# Patient Record
Sex: MALE | Race: WHITE | NOT HISPANIC OR LATINO | Employment: OTHER | URBAN - METROPOLITAN AREA
[De-identification: names, ages, dates, MRNs, and addresses within clinical notes are randomized per-mention and may not be internally consistent; named-entity substitution may affect disease eponyms.]

---

## 2017-01-04 ENCOUNTER — ANTICOAGULATION THERAPY VISIT (OUTPATIENT)
Dept: NURSING | Facility: CLINIC | Age: 72
End: 2017-01-04
Payer: COMMERCIAL

## 2017-01-04 DIAGNOSIS — I26.99 PULMONARY EMBOLUS (H): ICD-10-CM

## 2017-01-04 DIAGNOSIS — Z79.01 LONG-TERM (CURRENT) USE OF ANTICOAGULANTS: Primary | ICD-10-CM

## 2017-01-04 LAB — INR POINT OF CARE: 2.4 (ref 0.86–1.14)

## 2017-01-04 PROCEDURE — 85610 PROTHROMBIN TIME: CPT | Mod: QW

## 2017-01-04 PROCEDURE — 36416 COLLJ CAPILLARY BLOOD SPEC: CPT

## 2017-01-04 PROCEDURE — 99207 ZZC NO CHARGE NURSE ONLY: CPT

## 2017-01-04 NOTE — PROGRESS NOTES
ANTICOAGULATION FOLLOW-UP CLINIC VISIT    Patient Name:  Rob Beavers  Date:  1/4/2017  Contact Type:  Face to Face    SUBJECTIVE:     Patient Findings     Positives No Problem Findings           OBJECTIVE    INR PROTIME   Date Value Ref Range Status   01/04/2017 2.4* 0.86 - 1.14 Final       ASSESSMENT / PLAN  INR assessment THER    Recheck INR In: 3 WEEKS    INR Location Clinic      Anticoagulation Summary as of 1/4/2017     INR goal 2.0-3.0   Selected INR 2.4 (1/4/2017)   Maintenance plan 5 mg (5 mg x 1) on Fri; 2.5 mg (5 mg x 0.5) all other days   Full instructions 5 mg on Fri; 2.5 mg all other days   Weekly total 20 mg   No change documented Laura Ventura RN   Plan last modified Alecia Saavedra RN (3/15/2016)   Next INR check 1/27/2017   Priority INR   Target end date     Indications   Long-term (current) use of anticoagulants [Z79.01] [Z79.01]  Pulmonary embolus (H) [I26.99]         Anticoagulation Episode Summary     INR check location     Preferred lab     Send INR reminders to  [facility]    Comments       Anticoagulation Care Providers     Provider Role Specialty Phone number    Tian Santana MD [facility] [phone]            See the Encounter Report to view Anticoagulation Flowsheet and Dosing Calendar (Go to Encounters tab in chart review, and find the Anticoagulation Therapy Visit)    2.4 today.  Continue with 5 mg on Friday;  2.5 mg all other days.  Recheck in 3 weeks. Patient will be in [place] for 8 weeks from Feb - March.     Dosage adjustment made based on physician directed care plan.    Laura Ventura RN

## 2017-01-04 NOTE — MR AVS SNAPSHOT
Rob Kelleyger   1/4/2017 9:00 AM   Anticoagulation Therapy Visit    Description:  71 year old male   Provider:   ANTICOAGULATION CLINIC   Department:  Ec Nurse           INR as of 1/4/2017     Selected INR 2.4 (1/4/2017)      Anticoagulation Summary as of 1/4/2017     INR goal 2.0-3.0   Selected INR 2.4 (1/4/2017)   Full instructions 5 mg on Fri; 2.5 mg all other days   Next INR check 1/27/2017    Indications   Long-term (current) use of anticoagulants [Z79.01] [Z79.01]  Pulmonary embolus (H) [I26.99]         Description     2.4 today.  Continue with 5 mg on Friday;  2.5 mg all other days.  Recheck in 3 weeks.         Your next Anticoagulation Clinic appointment(s)     Jan 27, 2017  9:00 AM   Anticoagulation Visit with  [facility] CLINIC   [facility]    [address] [place] [id number] [phone]              Contact Numbers     Clinic Number:         January 2017 Details    Sun Mon Tue Wed Thu Fri Sat     1               2               3               4      2.5 mg   See details      5      2.5 mg         6      5 mg         7      2.5 mg           8      2.5 mg         9      2.5 mg         10      2.5 mg         11      2.5 mg         12      2.5 mg         13      5 mg         14      2.5 mg           15      2.5 mg         16      2.5 mg         17      2.5 mg         18      2.5 mg         19      2.5 mg         20      5 mg         21      2.5 mg           22      2.5 mg         23      2.5 mg         24      2.5 mg         25      2.5 mg         26      2.5 mg         27            28                 29               30               31                    Date Details   01/04 This INR check       Date of next INR:  1/27/2017         How to take your warfarin dose     To take:  2.5 mg Take 0.5 of a 5 mg tablet.    To take:  5 mg Take 1 of the 5 mg tablets.

## 2017-01-20 ENCOUNTER — ANTICOAGULATION THERAPY VISIT (OUTPATIENT)
Dept: NURSING | Facility: CLINIC | Age: 72
End: 2017-01-20
Payer: COMMERCIAL

## 2017-01-20 DIAGNOSIS — I26.99 PULMONARY EMBOLUS (H): ICD-10-CM

## 2017-01-20 DIAGNOSIS — Z79.01 LONG-TERM (CURRENT) USE OF ANTICOAGULANTS: Primary | ICD-10-CM

## 2017-01-20 LAB — INR POINT OF CARE: 2.5 (ref 0.86–1.14)

## 2017-01-20 PROCEDURE — 99207 ZZC NO CHARGE NURSE ONLY: CPT

## 2017-01-20 PROCEDURE — 85610 PROTHROMBIN TIME: CPT | Mod: QW

## 2017-01-20 PROCEDURE — 36416 COLLJ CAPILLARY BLOOD SPEC: CPT

## 2017-01-20 NOTE — PROGRESS NOTES
ANTICOAGULATION FOLLOW-UP CLINIC VISIT    Patient Name:  Rob Beavers  Date:  1/20/2017  Contact Type:  Face to Face    SUBJECTIVE:     Patient Findings     Positives No Problem Findings           OBJECTIVE    INR PROTIME   Date Value Ref Range Status   01/20/2017 2.5* 0.86 - 1.14 Final       ASSESSMENT / PLAN  INR assessment THER    Recheck INR In: 6 WEEKS    INR Location Clinic      Anticoagulation Summary as of 1/20/2017     INR goal 2.0-3.0   Selected INR 2.5 (1/20/2017)   Maintenance plan 5 mg (5 mg x 1) on Fri; 2.5 mg (5 mg x 0.5) all other days   Full instructions 5 mg on Fri; 2.5 mg all other days   Weekly total 20 mg   Plan last modified Alecia Saavedra RN (3/15/2016)   Next INR check 3/3/2017   Priority INR   Target end date     Indications   Long-term (current) use of anticoagulants [Z79.01] [Z79.01]  Pulmonary embolus (H) [I26.99]         Anticoagulation Episode Summary     INR check location     Preferred lab     Send INR reminders to  [facility]    Comments       Anticoagulation Care Providers     Provider Role Specialty Phone number    Tian Santana MD [facility] [phone]            See the Encounter Report to view Anticoagulation Flowsheet and Dosing Calendar (Go to Encounters tab in chart review, and find the Anticoagulation Therapy Visit)    2.5 today.  Continue with 5 mg on Fri; 2.5 mg all other days.  Recheck in 6 weeks.    Patient going to [place] for couple month.  Will be back around March 2017    Dosage adjustment made based on physician directed care plan.    Laura Ventura RN

## 2017-02-22 DIAGNOSIS — I27.82 OTHER CHRONIC PULMONARY EMBOLISM WITH ACUTE COR PULMONALE (H): ICD-10-CM

## 2017-02-22 DIAGNOSIS — I26.09 OTHER CHRONIC PULMONARY EMBOLISM WITH ACUTE COR PULMONALE (H): ICD-10-CM

## 2017-02-22 RX ORDER — WARFARIN SODIUM 5 MG/1
TABLET ORAL
Qty: 90 TABLET | Refills: 0 | Status: SHIPPED | OUTPATIENT
Start: 2017-02-22 | End: 2017-08-22

## 2017-02-22 NOTE — TELEPHONE ENCOUNTER
Refill request approved per [facility] protocol    Laura Ventura RN

## 2017-02-22 NOTE — TELEPHONE ENCOUNTER
Warfarin    Last Written Prescription Date: 6/8/16  Last Fill Qty: 90, # refills: 1  Last Office Visit with [facility] or [facility] prescribing provider: 4/20/16       Date and Result of Last PT/INR:   Lab Results   Component Value Date    INR 2.5 01/20/2017    INR 2.4 01/04/2017    INR 2.8 03/15/2016    INR 1.13 02/19/2014          Karen Gomez CMA

## 2017-04-06 ENCOUNTER — ANTICOAGULATION THERAPY VISIT (OUTPATIENT)
Dept: NURSING | Facility: CLINIC | Age: 72
End: 2017-04-06
Payer: COMMERCIAL

## 2017-04-06 DIAGNOSIS — Z79.01 LONG-TERM (CURRENT) USE OF ANTICOAGULANTS: ICD-10-CM

## 2017-04-06 LAB — INR POINT OF CARE: 2.6 (ref 0.86–1.14)

## 2017-04-06 PROCEDURE — 85610 PROTHROMBIN TIME: CPT | Mod: QW

## 2017-04-06 PROCEDURE — 99207 ZZC NO CHARGE NURSE ONLY: CPT

## 2017-04-06 PROCEDURE — 36416 COLLJ CAPILLARY BLOOD SPEC: CPT

## 2017-04-06 NOTE — MR AVS SNAPSHOT
Rob Kelleyger   4/6/2017 9:00 AM   Anticoagulation Therapy Visit    Description:  71 year old male   Provider:  [facility] ANT[facility]   Department:  Ec Nurse           INR as of 4/6/2017     Today's INR 2.6      Anticoagulation Summary as of 4/6/2017     INR goal 2.0-3.0   Today's INR 2.6   Full instructions 5 mg on Fri; 2.5 mg all other days   Next INR check 5/18/2017    Indications   Long-term (current) use of anticoagulants [Z79.01] [Z79.01]  Pulmonary embolus (H) [I26.99]         Your next Anticoagulation Clinic appointment(s)     May 18, 2017  9:00 AM CDT   Anticoagulation Visit with [facility]   [facility]    [address] [place] [id number]   [phone]              Contact Numbers     Clinic Number:         April 2017 Details    Sun Mon Tue Wed Thu Fri Sat           1                 2               3               4               5               6      2.5 mg   See details      7      5 mg         8      2.5 mg           9      2.5 mg         10      2.5 mg         11      2.5 mg         12      2.5 mg         13      2.5 mg         14      5 mg         15      2.5 mg           16      2.5 mg         17      2.5 mg         18      2.5 mg         19      2.5 mg         20      2.5 mg         21      5 mg         22      2.5 mg           23      2.5 mg         24      2.5 mg         25      2.5 mg         26      2.5 mg         27      2.5 mg         28      5 mg         29      2.5 mg           30      2.5 mg                Date Details   04/06 This INR check               How to take your warfarin dose     To take:  2.5 mg Take 0.5 of a 5 mg tablet.    To take:  5 mg Take 1 of the 5 mg tablets.           May 2017 Details    Sun Mon Tue Wed Thu Fri Sat      1      2.5 mg         2      2.5 mg         3      2.5 mg         4      2.5 mg         5      5 mg         6      2.5 mg           7      2.5 mg         8       2.5 mg         9      2.5 mg         10      2.5 mg         11      2.5 mg         12      5 mg         13      2.5 mg           14      2.5 mg         15      2.5 mg         16      2.5 mg         17      2.5 mg         18            19               20                 21               22               23               24               25               26               27                 28               29               30               31                   Date Details   No additional details    Date of next INR:  5/18/2017         How to take your warfarin dose     To take:  2.5 mg Take 0.5 of a 5 mg tablet.    To take:  5 mg Take 1 of the 5 mg tablets.

## 2017-04-06 NOTE — PROGRESS NOTES
ANTICOAGULATION FOLLOW-UP CLINIC VISIT    Patient Name:  Rob Beavers  Date:  4/6/2017  Contact Type:  Face to Face    SUBJECTIVE:     Patient Findings     Positives No Problem Findings           OBJECTIVE    INR Protime   Date Value Ref Range Status   04/06/2017 2.6 (A) 0.86 - 1.14 Final       ASSESSMENT / PLAN  INR assessment THER    Recheck INR In: 6 WEEKS    INR Location Clinic      Anticoagulation Summary as of 4/6/2017     INR goal 2.0-3.0   Today's INR 2.6   Maintenance plan 5 mg (5 mg x 1) on Fri; 2.5 mg (5 mg x 0.5) all other days   Full instructions 5 mg on Fri; 2.5 mg all other days   Weekly total 20 mg   No change documented Sisi Granados RN   Plan last modified Alecia Saavedra RN (3/15/2016)   Next INR check 5/18/2017   Priority INR   Target end date     Indications   Long-term (current) use of anticoagulants [Z79.01] [Z79.01]  Pulmonary embolus (H) [I26.99]         Anticoagulation Episode Summary     INR check location     Preferred lab     Send INR reminders to [facility]    Comments       Anticoagulation Care Providers     Provider Role Specialty Phone number    Tian Santana MD [facility] [phone]            See the Encounter Report to view Anticoagulation Flowsheet and Dosing Calendar (Go to Encounters tab in chart review, and find the Anticoagulation Therapy Visit)    Take 5 mg F, 2.5 mg all other days, recheck 6 weeks.     Dosage adjustment made based on physician directed care plan.    Sisi Granados RN

## 2017-04-12 DIAGNOSIS — G25.0 BENIGN FAMILIAL TREMOR: ICD-10-CM

## 2017-04-12 DIAGNOSIS — Z00.00 ROUTINE GENERAL MEDICAL EXAMINATION AT A HEALTH CARE FACILITY: ICD-10-CM

## 2017-04-12 DIAGNOSIS — I10 HYPERTENSION GOAL BP (BLOOD PRESSURE) < 140/90: ICD-10-CM

## 2017-04-13 RX ORDER — PROPRANOLOL HYDROCHLORIDE 80 MG/1
CAPSULE, EXTENDED RELEASE ORAL
Qty: 30 CAPSULE | Refills: 0 | Status: SHIPPED | OUTPATIENT
Start: 2017-04-13 | End: 2017-04-26

## 2017-04-13 RX ORDER — LISINOPRIL 10 MG/1
TABLET ORAL
Qty: 30 TABLET | Refills: 0 | Status: SHIPPED | OUTPATIENT
Start: 2017-04-13 | End: 2017-04-26

## 2017-04-13 NOTE — TELEPHONE ENCOUNTER
Lisinopril      Last Written Prescription Date: 4/20/16  Last Fill Quantity: 90, # refills: 3  Last Office Visit with [facility] or [facility] prescribing provider: 4/20/16       Potassium   Date Value Ref Range Status   04/20/2016 4.7 3.4 - 5.3 mmol/L Final     Creatinine   Date Value Ref Range Status   04/20/2016 0.80 0.66 - 1.25 mg/dL Final     BP Readings from Last 3 Encounters:   04/20/16 112/64   02/27/15 114/70   02/19/14 117/73     Propranolol      Last Written Prescription Date: 4/20/16  Last Fill Quantity: 90, # refills: 3    Last Office Visit with [facility], [facility] or [facility] prescribing provider:  4/20/16   Future Office Visit:        BP Readings from Last 3 Encounters:   04/20/16 112/64   02/27/15 114/70   02/19/14 117/73     Karen Gomez CMA

## 2017-04-13 NOTE — TELEPHONE ENCOUNTER
Called patient and advised that he is due for an office visit for htn med recheck- patient states that he will call next week to schedule this- advised that i would give a 30 day supply- he will need t get the next refills from Dr. Santana in an office visit- patient understands - does not want to schedule now- he will call back.    Jennifer Bush RN  [facility]  [phone]

## 2017-04-26 ENCOUNTER — OFFICE VISIT (OUTPATIENT)
Dept: FAMILY MEDICINE | Facility: CLINIC | Age: 72
End: 2017-04-26
Payer: COMMERCIAL

## 2017-04-26 VITALS
BODY MASS INDEX: 31.98 KG/M2 | HEIGHT: 68 IN | SYSTOLIC BLOOD PRESSURE: 134 MMHG | HEART RATE: 64 BPM | WEIGHT: 211 LBS | TEMPERATURE: 97.3 F | DIASTOLIC BLOOD PRESSURE: 80 MMHG

## 2017-04-26 DIAGNOSIS — Z11.59 NEED FOR HEPATITIS C SCREENING TEST: ICD-10-CM

## 2017-04-26 DIAGNOSIS — Z00.00 ROUTINE GENERAL MEDICAL EXAMINATION AT A HEALTH CARE FACILITY: Primary | ICD-10-CM

## 2017-04-26 DIAGNOSIS — Z79.01 LONG-TERM (CURRENT) USE OF ANTICOAGULANTS: ICD-10-CM

## 2017-04-26 DIAGNOSIS — Z13.6 CARDIOVASCULAR SCREENING; LDL GOAL LESS THAN 130: ICD-10-CM

## 2017-04-26 DIAGNOSIS — Z71.89 ADVANCED DIRECTIVES, COUNSELING/DISCUSSION: Chronic | ICD-10-CM

## 2017-04-26 DIAGNOSIS — G25.0 BENIGN FAMILIAL TREMOR: ICD-10-CM

## 2017-04-26 DIAGNOSIS — I26.99 OTHER PULMONARY EMBOLISM WITHOUT ACUTE COR PULMONALE, UNSPECIFIED CHRONICITY (H): ICD-10-CM

## 2017-04-26 DIAGNOSIS — I10 HYPERTENSION GOAL BP (BLOOD PRESSURE) < 140/90: ICD-10-CM

## 2017-04-26 DIAGNOSIS — K22.70 BARRETT'S ESOPHAGUS WITHOUT DYSPLASIA: ICD-10-CM

## 2017-04-26 LAB
ALBUMIN SERPL-MCNC: 3.8 G/DL (ref 3.4–5)
ALP SERPL-CCNC: 84 U/L (ref 40–150)
ALT SERPL W P-5'-P-CCNC: 23 U/L (ref 0–70)
ANION GAP SERPL CALCULATED.3IONS-SCNC: 7 MMOL/L (ref 3–14)
AST SERPL W P-5'-P-CCNC: 18 U/L (ref 0–45)
BILIRUB SERPL-MCNC: 0.7 MG/DL (ref 0.2–1.3)
BUN SERPL-MCNC: 14 MG/DL (ref 7–30)
CALCIUM SERPL-MCNC: 8.8 MG/DL (ref 8.5–10.1)
CHLORIDE SERPL-SCNC: 104 MMOL/L (ref 94–109)
CHOLEST SERPL-MCNC: 189 MG/DL
CO2 SERPL-SCNC: 30 MMOL/L (ref 20–32)
CREAT SERPL-MCNC: 0.87 MG/DL (ref 0.66–1.25)
GFR SERPL CREATININE-BSD FRML MDRD: 87 ML/MIN/1.7M2
GLUCOSE SERPL-MCNC: 100 MG/DL (ref 70–99)
HCV AB SERPL QL IA: NORMAL
HDLC SERPL-MCNC: 48 MG/DL
LDLC SERPL CALC-MCNC: 119 MG/DL
NONHDLC SERPL-MCNC: 141 MG/DL
POTASSIUM SERPL-SCNC: 4.4 MMOL/L (ref 3.4–5.3)
PROT SERPL-MCNC: 7.7 G/DL (ref 6.8–8.8)
PSA SERPL-MCNC: 1.05 UG/L (ref 0–4)
SODIUM SERPL-SCNC: 141 MMOL/L (ref 133–144)
TRIGL SERPL-MCNC: 111 MG/DL

## 2017-04-26 PROCEDURE — 80053 COMPREHEN METABOLIC PANEL: CPT | Performed by: FAMILY MEDICINE

## 2017-04-26 PROCEDURE — 99397 PER PM REEVAL EST PAT 65+ YR: CPT | Performed by: FAMILY MEDICINE

## 2017-04-26 PROCEDURE — 36415 COLL VENOUS BLD VENIPUNCTURE: CPT | Performed by: FAMILY MEDICINE

## 2017-04-26 PROCEDURE — G0103 PSA SCREENING: HCPCS | Performed by: FAMILY MEDICINE

## 2017-04-26 PROCEDURE — 86803 HEPATITIS C AB TEST: CPT | Performed by: FAMILY MEDICINE

## 2017-04-26 PROCEDURE — 80061 LIPID PANEL: CPT | Performed by: FAMILY MEDICINE

## 2017-04-26 RX ORDER — LISINOPRIL 10 MG/1
TABLET ORAL
Qty: 90 TABLET | Refills: 3 | Status: SHIPPED | OUTPATIENT
Start: 2017-04-26 | End: 2018-05-29

## 2017-04-26 RX ORDER — PROPRANOLOL HYDROCHLORIDE 80 MG/1
CAPSULE, EXTENDED RELEASE ORAL
Qty: 90 CAPSULE | Refills: 3 | Status: SHIPPED | OUTPATIENT
Start: 2017-04-26 | End: 2018-05-29

## 2017-04-26 RX ORDER — OMEPRAZOLE 40 MG/1
40 CAPSULE, DELAYED RELEASE ORAL DAILY
Qty: 90 CAPSULE | Refills: 3 | Status: SHIPPED | OUTPATIENT
Start: 2017-04-26 | End: 2018-05-05

## 2017-04-26 NOTE — PROGRESS NOTES
SUBJECTIVE:                                                            Rob Beavers is a 71 year old male who presents for Preventive Visit.      Are you in the first 12 months of your Medicare Part B coverage?  No    Healthy Habits:    Do you get at least three servings of calcium containing foods daily (dairy, green leafy vegetables, etc.)? No    Amount of exercise or daily activities, outside of work: 2-3 day(s) per week    Problems taking medications regularly No    Medication side effects: No    Have you had an eye exam in the past two years? no    Do you see a dentist twice per year? no    Do you have sleep apnea, excessive snoring or daytime drowsiness?no    COGNITIVE SCREEN  1) Repeat 3 items (Banana, Sunrise, Chair)    2) Clock draw: NORMAL  3) 3 item recall: Recalls 3 objects  Results: 3 items recalled: COGNITIVE IMPAIRMENT LESS LIKELY    Mini-CogTM Copyright S [employer]. Licensed by the author for use in [facility]; reprinted with permission (calvin@[facility]). All rights reserved.            Sore in mouth      Duration: ~ 6 months   Not painful, soft mucous no surrounding change in color.       Reviewed and updated as needed this visit by clinical staff  Tobacco  Allergies  Meds  Fam Hx  Soc Hx        Reviewed and updated as needed this visit by Provider        Social History   Substance Use Topics     Smoking status: Never Smoker     Smokeless tobacco: Never Used     Alcohol use 0.0 oz/week     0 Standard drinks or equivalent per week      Comment: moderate       The patient does not drink >3 drinks per day nor >7 drinks per week.    Today's PHQ-2 Score:   PHQ-2 ( 1999 [employer]) 4/26/2017 4/19/2017   Q1: Little interest or pleasure in doing things 0 -   Q2: Feeling down, depressed or hopeless 0 -   PHQ-2 Score 0 -   Little interest or pleasure in doing things - Not at all   Feeling down, depressed or hopeless - Not at all   PHQ-2 Score - 0       Do you feel safe in your environment -  Yes    Do you have a Health Care Directive?: No: Advance care planning reviewed with patient; information given to patient to review.    Current providers sharing in care for this patient include:   Patient Care Team:  Tian Santana MD as PCP - General (Family Practice)      Hearing impairment: No    Ability to successfully perform activities of daily living: Yes, no assistance needed     Fall risk:  Fallen 2 or more times in the past year?: No  Any fall with injury in the past year?: No    Home safety:  none identified      The following health maintenance items are reviewed in Epic and correct as of today:  Health Maintenance   Topic Date Due     HEPATITIS C SCREENING  08/27/1963     AORTIC ANEURYSM SCREENING (SYSTEM ASSIGNED)  08/27/2012     PNEUMOCOCCAL (2 of 2 - PCV13) 11/13/2013     OP ANNUAL INR REFERRAL  02/20/2015     FALL RISK ASSESSMENT  04/20/2017     INFLUENZA VACCINE (SYSTEM ASSIGNED)  09/01/2017     TETANUS IMMUNIZATION (SYSTEM ASSIGNED)  12/13/2020     LIPID SCREEN Q5 YR MALE (SYSTEM ASSIGNED)  04/20/2021     ADVANCE DIRECTIVE PLANNING Q5 YRS (NO INBASKET)  04/20/2021     COLONOSCOPY Q5 YR INBASKET MESSAGE  05/25/2021         Pneumonia Vaccine:Adults age 65+ who received Pneumovax (PPSV23) at 65 years or older: Should be given PCV13 > 1 year after their most recent PPSV23     ROS:  C: NEGATIVE for fever, chills, change in weight  I: NEGATIVE for worrisome rashes, moles or lesions  E: NEGATIVE for vision changes or irritation  E/M: NEGATIVE for ear, mouth and throat problems  R: NEGATIVE for significant cough or SOB  B: NEGATIVE for masses, tenderness or discharge  CV: NEGATIVE for chest pain, palpitations or peripheral edema  GI: NEGATIVE for nausea, abdominal pain, heartburn, or change in bowel habits  : NEGATIVE for frequency, dysuria, or hematuria  M: NEGATIVE for significant arthralgias or myalgia  N: NEGATIVE for weakness, dizziness or paresthesias  E: NEGATIVE for temperature intolerance,  "skin/hair changes  H: NEGATIVE for bleeding problems  P: NEGATIVE for changes in mood or affect    Problem list, Medication list, Allergies, and Medical/Social/Surgical histories reviewed in [facility] and updated as appropriate.  Labs reviewed in EPIC  BP Readings from Last 3 Encounters:   04/26/17 134/80   04/20/16 112/64   02/27/15 114/70    Wt Readings from Last 3 Encounters:   04/26/17 211 lb (95.7 kg)   04/20/16 200 lb (90.7 kg)   02/27/15 206 lb (93.4 kg)                  OBJECTIVE:                                                            /80  Pulse 64  Temp 97.3  F (36.3  C) (Tympanic)  Ht 5' 7.72\" (1.72 m)  Wt 211 lb (95.7 kg)  BMI 32.35 kg/m2 Estimated body mass index is 32.35 kg/(m^2) as calculated from the following:    Height as of this encounter: 5' 7.72\" (1.72 m).    Weight as of this encounter: 211 lb (95.7 kg).  EXAM:   GENERAL: healthy, alert and no distress  EYES: Eyes grossly normal to inspection, PERRL and conjunctivae and sclerae normal  HENT: ear canals and TM's normal, nose and mouth without ulcers or lesions  NECK: no adenopathy, no asymmetry, masses, or scars and thyroid normal to palpation  RESP: lungs clear to auscultation - no rales, rhonchi or wheezes  CV: regular rate and rhythm, normal S1 S2, no S3 or S4, no murmur, click or rub, no peripheral edema and peripheral pulses strong  ABDOMEN: soft, nontender, no hepatosplenomegaly, no masses and bowel sounds normal  MS: no gross musculoskeletal defects noted, no edema  SKIN: no suspicious lesions or rashes  NEURO: Normal strength and tone, mentation intact and speech normal  PSYCH: mentation appears normal, affect normal/bright    ASSESSMENT / PLAN:                                                            1. Routine general medical examination at a health care facility  small sore in the mouth most likely mucocele. Suggested observation and if not improving or getting bigger may need to be removed, we can refer to ENT.  - " lisinopril (PRINIVIL/ZESTRIL) 10 MG tablet; TAKE ONE TABLET (10 MG) BY MOUTH ONE TIME DAILY  Dispense: 90 tablet; Refill: 3  - omeprazole (PRILOSEC) 40 MG capsule; Take 1 capsule (40 mg) by mouth daily Take 30-60 minutes before a meal.  Dispense: 90 capsule; Refill: 3  - propranolol (INDERAL LA) 80 MG 24 hr capsule; TAKE 1 CAPSULE (80 MG) BY MOUTH ONCE A DAY  Dispense: 90 capsule; Refill: 3  - Hepatitis C Screen Reflex to HCV RNA Quant and Genotype  - HONORING CHOICES REFERRAL  - Lipid Profile (Chol, Trig, HDL, LDL calc)  - Comprehensive metabolic panel  - PSA, tumor marker    2. Hypertension goal BP (blood pressure) < 140/90    - lisinopril (PRINIVIL/ZESTRIL) 10 MG tablet; TAKE ONE TABLET (10 MG) BY MOUTH ONE TIME DAILY  Dispense: 90 tablet; Refill: 3  - propranolol (INDERAL LA) 80 MG 24 hr capsule; TAKE 1 CAPSULE (80 MG) BY MOUTH ONCE A DAY  Dispense: 90 capsule; Refill: 3  - Comprehensive metabolic panel    3. Benign familial tremor    Use Inderal LA, no issues    4. Need for hepatitis C screening test      5. Advanced directives, counseling/discussion    - HONORING CHOICES REFERRAL    6. Long-term (current) use of anticoagulants [Z79.01]    - Comprehensive metabolic panel    7. Other pulmonary embolism without acute cor pulmonale, unspecified chronicity (H)  On warfarin    8. CARDIOVASCULAR SCREENING; LDL GOAL LESS THAN 130    - Lipid Profile (Chol, Trig, HDL, LDL calc)  - Comprehensive metabolic panel    9. 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  - omeprazole (PRILOSEC) 40 MG capsule; Take 1 capsule (40 mg) by mouth daily Take 30-60 minutes before a meal.  Dispense: 90 capsule; Refill: 3    End of Life Planning:  Patient currently has an advanced directive: No.  I have verified the patient's ablity to prepare an advanced directive/make health care decisions.  Literature was provided to assist patient in preparing an advanced directive.    COUNSELING:  Reviewed preventive health counseling, as  "reflected in patient instructions       Regular exercise       Healthy diet/nutrition        Estimated body mass index is 32.35 kg/(m^2) as calculated from the following:    Height as of this encounter: 5' 7.72\" (1.72 m).    Weight as of this encounter: 211 lb (95.7 kg).     reports that he has never smoked. He has never used smokeless tobacco.      Appropriate preventive services were discussed with this patient, including applicable screening as appropriate for cardiovascular disease, diabetes, osteopenia/osteoporosis, and glaucoma.  As appropriate for age/gender, discussed screening for colorectal cancer, prostate cancer, breast cancer, and cervical cancer. Checklist reviewing preventive services available has been given to the patient.    Reviewed patients plan of care and provided an AVS. The Basic Care Plan (routine screening as documented in Health Maintenance) for Rob meets the Care Plan requirement. This Care Plan has been established and reviewed with the Patient.    Counseling Resources:  ATP IV Guidelines  Pooled Cohorts Equation Calculator  Breast Cancer Risk Calculator  FRAX Risk Assessment  ICSI Preventive Guidelines  Dietary Guidelines for Americans, 2010  USDA's MyPlate  ASA Prophylaxis  Lung CA Screening    Tian Santana MD  [facility]  "

## 2017-04-26 NOTE — MR AVS SNAPSHOT
After Visit Summary   4/26/2017    Rob Beavers    MRN: [id number]           Patient Information     Date Of Birth          1945        Visit Information        Provider Department      4/26/2017 9:00 AM Tian Santana MD [facility] [place]        Today's Diagnoses     Routine general medical examination at a health care facility    -  1    Hypertension goal BP (blood pressure) < 140/90        Benign familial tremor        Need for hepatitis C screening test        Advanced directives, counseling/discussion        Long-term (current) use of anticoagulants [Z79.01]        Other pulmonary embolism without acute cor pulmonale, unspecified chronicity (H)        CARDIOVASCULAR SCREENING; LDL GOAL LESS THAN 130        Andrews's esophagus without dysplasia          Care Instructions      Preventive Health Recommendations:       Male Ages 65 and over    Yearly exam:             See your health care provider every year in order to  o   Review health changes.   o   Discuss preventive care.    o   Review your medicines if your doctor has prescribed any.    Talk with your health care provider about whether you should have a test to screen for prostate cancer (PSA).    Every 3 years, have a diabetes test (fasting glucose). If you are at risk for diabetes, you should have this test more often.    Every 5 years, have a cholesterol test. Have this test more often if you are at risk for high cholesterol or heart disease.     Every 10 years, have a colonoscopy. Or, have a yearly FIT test (stool test). These exams will check for colon cancer.    Talk to with your health care provider about screening for Abdominal Aortic Aneurysm if you have a family history of AAA or have a history of smoking.  Shots:     Get a flu shot each year.     Get a tetanus shot every 10 years.     Talk to your doctor about your pneumonia vaccines. There are now two you should receive - Pneumovax (PPSV 23) and Prevnar (PCV  13).    Talk to your doctor about a shingles vaccine.     Talk to your doctor about the hepatitis B vaccine.  Nutrition:     Eat at least 5 servings of fruits and vegetables each day.     Eat whole-grain bread, whole-wheat pasta and brown rice instead of white grains and rice.     Talk to your doctor about Calcium and Vitamin D.   Lifestyle    Exercise for at least 150 minutes a week (30 minutes a day, 5 days a week). This will help you control your weight and prevent disease.     Limit alcohol to one drink per day.     No smoking.     Wear sunscreen to prevent skin cancer.     See your dentist every six months for an exam and cleaning.     See your eye doctor every 1 to 2 years to screen for conditions such as glaucoma, macular degeneration and cataracts.        Follow-ups after your visit        Additional Services     HONORING CHOICES REFERRAL       Referral to Advance Directive Counseling:  Basic Advance Care Planning - 1:1 need    The  will call you unless your appointment was made at the clinic. You may also call the  at [phone] if you wish.                  Your next 10 appointments already scheduled     May 18, 2017  9:00 AM CDT   Anticoagulation Visit with [facility]   [facility] [place] ([facility])    [address] [place] [id number] [phone]              Who to contact     If you have questions or need follow up information about today's clinic visit or your schedule please contact [facility] [place] directly at [phone].  Normal or non-critical lab and imaging results will be communicated to you by MyChart, letter or phone within 4 business days after the clinic has received the results. If you do not hear from us within 7 days, please contact the clinic through MyChart or phone. If you have a critical or abnormal lab result, we will notify you by phone as soon as possible.  Submit refill  "requests through [employer] or call your pharmacy and they will forward the refill request to us. Please allow 3 business days for your refill to be completed.          Additional Information About Your Visit        S[employer] Information     [employer] gives you secure access to your electronic health record. If you see a primary care provider, you can also send messages to your care team and make appointments. If you have questions, please call your primary care clinic.  If you do not have a primary care provider, please call [phone] and they will assist you.        Care EveryWhere ID     This is your Care EveryWhere ID. This could be used by other organizations to access your [place] medical records  [id number]        Your Vitals Were     Pulse Temperature Height BMI (Body Mass Index)          64 97.3  F (36.3  C) (Tympanic) 5' 7.72\" (1.72 m) 32.35 kg/m2         Blood Pressure from Last 3 Encounters:   04/26/17 134/80   04/20/16 112/64   02/27/15 114/70    Weight from Last 3 Encounters:   04/26/17 211 lb (95.7 kg)   04/20/16 200 lb (90.7 kg)   02/27/15 206 lb (93.4 kg)              We Performed the Following     Comprehensive metabolic panel     Hepatitis C Screen Reflex to HCV RNA Quant and Genotype     HONORING CHOICES REFERRAL     Lipid Profile (Chol, Trig, HDL, LDL calc)     PSA, tumor marker          Today's Medication Changes          These changes are accurate as of: 4/26/17  9:32 AM.  If you have any questions, ask your nurse or doctor.               These medicines have changed or have updated prescriptions.        Dose/Directions    lisinopril 10 MG tablet   Commonly known as:  PRINIVIL/ZESTRIL   This may have changed:  See the new instructions.   Used for:  Hypertension goal BP (blood pressure) < 140/90, Routine general medical examination at a health care facility   Changed by:  Tian Santana MD        TAKE ONE TABLET (10 MG) BY MOUTH ONE TIME DAILY   Quantity:  90 tablet   Refills:  3       omeprazole " 40 MG capsule   Commonly known as:  priLOSEC   This may have changed:  Another medication with the same name was removed. Continue taking this medication, and follow the directions you see here.   Used for:  Routine general medical examination at a health care facility, Andrews's esophagus without dysplasia   Changed by:  Tian Santana MD        Dose:  40 mg   Take 1 capsule (40 mg) by mouth daily Take 30-60 minutes before a meal.   Quantity:  90 capsule   Refills:  3       propranolol 80 MG 24 hr capsule   Commonly known as:  INDERAL LA   This may have changed:  See the new instructions.   Used for:  Hypertension goal BP (blood pressure) < 140/90, Routine general medical examination at a health care facility   Changed by:  Tian Santana MD        TAKE 1 CAPSULE (80 MG) BY MOUTH ONCE A DAY   Quantity:  90 capsule   Refills:  3            Where to get your medicines      These medications were sent to [facility] [address] [place] [id number] [address]  [address] [address]     Phone:  [phone]     lisinopril 10 MG tablet    omeprazole 40 MG capsule    propranolol 80 MG 24 hr capsule                Primary Care Provider Office Phone # Fax #    Tian Santana [phone]       [facility] [address] [place] [address] [address]  [place] [address]        Thank you!     Thank you for choosing [facility] [place]  for your care. Our goal is always to provide you with excellent care. Hearing back from our patients is one way we can continue to improve our services. Please take a few minutes to complete the written survey that you may receive in the mail after your visit with us. Thank you!             Your Updated Medication List - Protect others around you: Learn how to safely use, store and throw away your medicines at www.disposemymeds.org.          This list is accurate as of: 4/26/17  9:32 AM.  Always use your most recent med list.                   Brand Name  Dispense Instructions for use    lisinopril 10 MG tablet    PRINIVIL/ZESTRIL    90 tablet    TAKE ONE TABLET (10 MG) BY MOUTH ONE TIME DAILY       Multi-vitamin Tabs tablet   Generic drug:  multivitamin, therapeutic with minerals      ONE TABLET DAILY       omeprazole 40 MG capsule    priLOSEC    90 capsule    Take 1 capsule (40 mg) by mouth daily Take 30-60 minutes before a meal.       propranolol 80 MG 24 hr capsule    INDERAL LA    90 capsule    TAKE 1 CAPSULE (80 MG) BY MOUTH ONCE A DAY       VITAMIN D (CHOLECALCIFEROL) PO      Take 1,000 Units by mouth daily       warfarin 5 MG tablet    COUMADIN    90 tablet    TAKE 1 TAB(5MG) BY MOUTH ON FRIDAYS AND TAKE 1/2 TAB(2.5MG)ALL OTHER DAY/OR AS DIRECTED BY [facility]

## 2017-04-26 NOTE — NURSING NOTE
"Chief Complaint   Patient presents with     Wellness Visit     Fasting        Initial /80  Pulse 64  Temp 97.3  F (36.3  C) (Tympanic)  Ht 5' 7.72\" (1.72 m)  Wt 211 lb (95.7 kg)  BMI 32.35 kg/m2 Estimated body mass index is 32.35 kg/(m^2) as calculated from the following:    Height as of this encounter: 5' 7.72\" (1.72 m).    Weight as of this encounter: 211 lb (95.7 kg).  Medication Reconciliation: complete    Current Outpatient Prescriptions   Medication Sig Dispense Refill     lisinopril (PRINIVIL/ZESTRIL) 10 MG tablet TAKE ONE TABLET (10 MG) BY MOUTH ONE TIME DAILY 30 tablet 0     propranolol (INDERAL LA) 80 MG 24 hr capsule TAKE 1 CAPSULE (80 MG) BY MOUTH ONCE A DAY 30 capsule 0     warfarin (COUMADIN) 5 MG tablet TAKE 1 TAB(5MG) BY MOUTH ON FRIDAYS AND TAKE 1/2 TAB(2.5MG)ALL OTHER DAY/OR AS DIRECTED BY [facility] 90 tablet 0     omeprazole (PRILOSEC) 40 MG capsule Take 1 capsule (40 mg) by mouth daily Take 30-60 minutes before a meal. 30 capsule 1     VITAMIN D, CHOLECALCIFEROL, PO Take 1,000 Units by mouth daily       MULTI-VITAMIN PO TABS ONE TABLET DAILY         Genaro LUU CMA  "

## 2017-04-26 NOTE — LETTER
[address] [place] [address]   [phone]      April 27, 2017      Rob Beavers  [address] [place] [address]            Dear Rob,        I have reviewed your recent labs. Here are the results:     -Liver and gallbladder tests are normal. (ALT,AST, Alk phos, bilirubin), kidney function is normal (Cr, GFR), Sodium is normal, Potassium is normal, Calcium is normal, Glucose is normal (diabetes screening test).   -Cholesterol levels (LDL,HDL, Triglycerides) are normal.  ADVISE: rechecking in 1 year.   -PSA (prostate specific antigen) test is normal.  This indicates a low likelihood of prostate cancer.  ADVISE: yearly recheck.     Enclosed is a copy of the results.  If you have any questions or concerns, please call the clinic at [phone] and make a follow up appointment with me.    Results for orders placed or performed in visit on 04/26/17   Hepatitis C Screen Reflex to HCV RNA Quant and Genotype   Result Value Ref Range    Hepatitis C Antibody  NR     Nonreactive   Assay performance characteristics have not been established for newborns,   infants, and children     Lipid Profile (Chol, Trig, HDL, LDL calc)   Result Value Ref Range    Cholesterol 189 <200 mg/dL    Triglycerides 111 <150 mg/dL    HDL Cholesterol 48 >39 mg/dL    LDL Cholesterol Calculated 119 (H) <100 mg/dL    Non HDL Cholesterol 141 (H) <130 mg/dL   Comprehensive metabolic panel   Result Value Ref Range    Sodium 141 133 - 144 mmol/L    Potassium 4.4 3.4 - 5.3 mmol/L    Chloride 104 94 - 109 mmol/L    Carbon Dioxide 30 20 - 32 mmol/L    Anion Gap 7 3 - 14 mmol/L    Glucose 100 (H) 70 - 99 mg/dL    Urea Nitrogen 14 7 - 30 mg/dL    Creatinine 0.87 0.66 - 1.25 mg/dL    GFR Estimate 87 >60 mL/min/1.7m2    GFR Estimate If Black >90   GFR Calc   >60 mL/min/1.7m2    Calcium 8.8 8.5 - 10.1 mg/dL    Bilirubin Total 0.7 0.2 - 1.3 mg/dL    Albumin 3.8 3.4 - 5.0 g/dL     Protein Total 7.7 6.8 - 8.8 g/dL    Alkaline Phosphatase 84 40 - 150 U/L    ALT 23 0 - 70 U/L    AST 18 0 - 45 U/L   PSA, tumor marker   Result Value Ref Range    PSA 1.05 0 - 4 ug/L       Sincerely,    Tian Santana M.D.

## 2017-05-18 ENCOUNTER — TELEPHONE (OUTPATIENT)
Dept: FAMILY MEDICINE | Facility: CLINIC | Age: 72
End: 2017-05-18

## 2017-05-18 ENCOUNTER — ANTICOAGULATION THERAPY VISIT (OUTPATIENT)
Dept: NURSING | Facility: CLINIC | Age: 72
End: 2017-05-18
Payer: COMMERCIAL

## 2017-05-18 DIAGNOSIS — I26.99 PULMONARY EMBOLUS (H): Primary | ICD-10-CM

## 2017-05-18 DIAGNOSIS — I26.99 OTHER PULMONARY EMBOLISM WITHOUT ACUTE COR PULMONALE, UNSPECIFIED CHRONICITY (H): ICD-10-CM

## 2017-05-18 DIAGNOSIS — Z79.01 LONG-TERM (CURRENT) USE OF ANTICOAGULANTS: ICD-10-CM

## 2017-05-18 LAB — INR POINT OF CARE: 2.6 (ref 0.86–1.14)

## 2017-05-18 PROCEDURE — 36416 COLLJ CAPILLARY BLOOD SPEC: CPT

## 2017-05-18 PROCEDURE — 85610 PROTHROMBIN TIME: CPT | Mod: QW

## 2017-05-18 NOTE — MR AVS SNAPSHOT
Rob Kelleyger   5/18/2017 9:00 AM   Anticoagulation Therapy Visit    Description:  71 year old male   Provider:  [facility]   Department:  [facility]           INR as of 5/18/2017     Today's INR 2.6      Anticoagulation Summary as of 5/18/2017     INR goal 2.0-3.0   Today's INR 2.6   Full instructions 5 mg on Fri; 2.5 mg all other days   Next INR check 6/29/2017    Indications   Long-term (current) use of anticoagulants [Z79.01] [Z79.01]  Pulmonary embolus (H) [I26.99]         Your next Anticoagulation Clinic appointment(s)     Jun 29, 2017  9:00 AM CDT   [facility] with [facility]   [facility] [place]    [address] [place] [id number]   [phone]              Contact Numbers     Clinic Number:         May 2017 Details    Sun Mon Tue Wed Thu Fri Sat      1               2               3               4               5               6                 7               8               9               10               11               12               13                 14               15               16               17               18      2.5 mg   See details      19      5 mg         20      2.5 mg           21      2.5 mg         22      2.5 mg         23      2.5 mg         24      2.5 mg         25      2.5 mg         26      5 mg         27      2.5 mg           28      2.5 mg         29      2.5 mg         30      2.5 mg         31      2.5 mg             Date Details   05/18 This INR check               How to take your warfarin dose     To take:  2.5 mg Take 0.5 of a 5 mg tablet.    To take:  5 mg Take 1 of the 5 mg tablets.           June 2017 Details    Sun Mon Tue Wed Thu Fri Sat         1      2.5 mg         2      5 mg         3      2.5 mg           4      2.5 mg         5      2.5 mg         6      2.5 mg         7      2.5 mg         8      2.5 mg         9      5 mg         10      2.5  mg           11      2.5 mg         12      2.5 mg         13      2.5 mg         14      2.5 mg         15      2.5 mg         16      5 mg         17      2.5 mg           18      2.5 mg         19      2.5 mg         20      2.5 mg         21      2.5 mg         22      2.5 mg         23      5 mg         24      2.5 mg           25      2.5 mg         26      2.5 mg         27      2.5 mg         28      2.5 mg         29            30                 Date Details   No additional details    Date of next INR:  6/29/2017         How to take your warfarin dose     To take:  2.5 mg Take 0.5 of a 5 mg tablet.    To take:  5 mg Take 1 of the 5 mg tablets.

## 2017-05-18 NOTE — TELEPHONE ENCOUNTER
Patient is due for annual INR referral renewal    Indication:  PE    Range:  2.0-3.0    Duration:  Lifetime    Referral pended    LOBITO Mayfield

## 2017-05-18 NOTE — PROGRESS NOTES
ANTICOAGULATION FOLLOW-UP CLINIC VISIT    Patient Name:  Rob Beavers  Date:  5/18/2017  Contact Type:  Face to Face    SUBJECTIVE:     Patient Findings     Positives No Problem Findings           OBJECTIVE    INR Protime   Date Value Ref Range Status   05/18/2017 2.6 (A) 0.86 - 1.14 Final       ASSESSMENT / PLAN  INR assessment THER    Recheck INR In: 6 WEEKS    INR Location Clinic      Anticoagulation Summary as of 5/18/2017     INR goal 2.0-3.0   Today's INR 2.6   Maintenance plan 5 mg (5 mg x 1) on Fri; 2.5 mg (5 mg x 0.5) all other days   Full instructions 5 mg on Fri; 2.5 mg all other days   Weekly total 20 mg   Plan last modified Alecia Saavedra RN (3/15/2016)   Next INR check 6/29/2017   Priority INR   Target end date     Indications   Long-term (current) use of anticoagulants [Z79.01] [Z79.01]  Pulmonary embolus (H) [I26.99]         Anticoagulation Episode Summary     INR check location     Preferred lab     Send INR reminders to  [facility]    Comments       Anticoagulation Care Providers     Provider Role Specialty Phone number    Tian Santana MD [facility] [phone]            See the Encounter Report to view Anticoagulation Flowsheet and Dosing Calendar (Go to Encounters tab in chart review, and find the Anticoagulation Therapy Visit)    Take 5 mg F, 2.5 mg all other days, recheck 6 weeks.     Dosage adjustment made based on physician directed care plan.    Sisi Granados RN

## 2017-06-24 ENCOUNTER — APPOINTMENT (OUTPATIENT)
Dept: ULTRASOUND IMAGING | Facility: CLINIC | Age: 72
End: 2017-06-24
Attending: EMERGENCY MEDICINE
Payer: COMMERCIAL

## 2017-06-24 ENCOUNTER — HOSPITAL ENCOUNTER (EMERGENCY)
Facility: CLINIC | Age: 72
Discharge: HOME | End: 2017-06-24
Attending: EMERGENCY MEDICINE | Admitting: EMERGENCY MEDICINE
Payer: COMMERCIAL

## 2017-06-24 VITALS
DIASTOLIC BLOOD PRESSURE: 100 MMHG | WEIGHT: 204 LBS | HEART RATE: 50 BPM | OXYGEN SATURATION: 95 % | BODY MASS INDEX: 30.92 KG/M2 | SYSTOLIC BLOOD PRESSURE: 173 MMHG | HEIGHT: 68 IN | TEMPERATURE: 97.7 F | RESPIRATION RATE: 14 BRPM

## 2017-06-24 DIAGNOSIS — K80.21 CALCULUS OF GALLBLADDER WITH BILIARY OBSTRUCTION BUT WITHOUT CHOLECYSTITIS: ICD-10-CM

## 2017-06-24 LAB
ALBUMIN SERPL-MCNC: 3.8 G/DL (ref 3.4–5)
ALP SERPL-CCNC: 80 U/L (ref 40–150)
ALT SERPL W P-5'-P-CCNC: 25 U/L (ref 0–70)
ANION GAP SERPL CALCULATED.3IONS-SCNC: 7 MMOL/L (ref 3–14)
AST SERPL W P-5'-P-CCNC: 25 U/L (ref 0–45)
BASOPHILS # BLD AUTO: 0 10E9/L (ref 0–0.2)
BASOPHILS NFR BLD AUTO: 0.3 %
BILIRUB SERPL-MCNC: 0.7 MG/DL (ref 0.2–1.3)
BUN SERPL-MCNC: 14 MG/DL (ref 7–30)
CALCIUM SERPL-MCNC: 8.6 MG/DL (ref 8.5–10.1)
CHLORIDE SERPL-SCNC: 106 MMOL/L (ref 94–109)
CO2 SERPL-SCNC: 26 MMOL/L (ref 20–32)
CREAT SERPL-MCNC: 0.82 MG/DL (ref 0.66–1.25)
DIFFERENTIAL METHOD BLD: ABNORMAL
EOSINOPHIL # BLD AUTO: 0.1 10E9/L (ref 0–0.7)
EOSINOPHIL NFR BLD AUTO: 1.2 %
ERYTHROCYTE [DISTWIDTH] IN BLOOD BY AUTOMATED COUNT: 13.3 % (ref 10–15)
GFR SERPL CREATININE-BSD FRML MDRD: ABNORMAL ML/MIN/1.7M2
GLUCOSE SERPL-MCNC: 158 MG/DL (ref 70–99)
HCT VFR BLD AUTO: 43.3 % (ref 40–53)
HGB BLD-MCNC: 15.6 G/DL (ref 13.3–17.7)
IMM GRANULOCYTES # BLD: 0 10E9/L (ref 0–0.4)
IMM GRANULOCYTES NFR BLD: 0.3 %
INR PPP: 2.37 (ref 0.86–1.14)
LIPASE SERPL-CCNC: 139 U/L (ref 73–393)
LYMPHOCYTES # BLD AUTO: 1.5 10E9/L (ref 0.8–5.3)
LYMPHOCYTES NFR BLD AUTO: 13.7 %
MCH RBC QN AUTO: 32.5 PG (ref 26.5–33)
MCHC RBC AUTO-ENTMCNC: 36 G/DL (ref 31.5–36.5)
MCV RBC AUTO: 90 FL (ref 78–100)
MONOCYTES # BLD AUTO: 0.4 10E9/L (ref 0–1.3)
MONOCYTES NFR BLD AUTO: 3.5 %
NEUTROPHILS # BLD AUTO: 9 10E9/L (ref 1.6–8.3)
NEUTROPHILS NFR BLD AUTO: 81 %
NRBC # BLD AUTO: 0 10*3/UL
NRBC BLD AUTO-RTO: 0 /100
PLATELET # BLD AUTO: 241 10E9/L (ref 150–450)
POTASSIUM SERPL-SCNC: 4.6 MMOL/L (ref 3.4–5.3)
PROT SERPL-MCNC: 7.7 G/DL (ref 6.8–8.8)
RBC # BLD AUTO: 4.8 10E12/L (ref 4.4–5.9)
SODIUM SERPL-SCNC: 139 MMOL/L (ref 133–144)
WBC # BLD AUTO: 11.1 10E9/L (ref 4–11)

## 2017-06-24 PROCEDURE — 96375 TX/PRO/DX INJ NEW DRUG ADDON: CPT

## 2017-06-24 PROCEDURE — 76705 ECHO EXAM OF ABDOMEN: CPT

## 2017-06-24 PROCEDURE — 85025 COMPLETE CBC W/AUTO DIFF WBC: CPT | Performed by: EMERGENCY MEDICINE

## 2017-06-24 PROCEDURE — 96361 HYDRATE IV INFUSION ADD-ON: CPT

## 2017-06-24 PROCEDURE — 96376 TX/PRO/DX INJ SAME DRUG ADON: CPT

## 2017-06-24 PROCEDURE — 25000132 ZZH RX MED GY IP 250 OP 250 PS 637: Performed by: EMERGENCY MEDICINE

## 2017-06-24 PROCEDURE — 25000128 H RX IP 250 OP 636

## 2017-06-24 PROCEDURE — 99285 EMERGENCY DEPT VISIT HI MDM: CPT | Mod: 25

## 2017-06-24 PROCEDURE — 80053 COMPREHEN METABOLIC PANEL: CPT | Performed by: EMERGENCY MEDICINE

## 2017-06-24 PROCEDURE — 96374 THER/PROPH/DIAG INJ IV PUSH: CPT

## 2017-06-24 PROCEDURE — 25000128 H RX IP 250 OP 636: Performed by: EMERGENCY MEDICINE

## 2017-06-24 PROCEDURE — 83690 ASSAY OF LIPASE: CPT | Performed by: EMERGENCY MEDICINE

## 2017-06-24 PROCEDURE — 85610 PROTHROMBIN TIME: CPT | Performed by: EMERGENCY MEDICINE

## 2017-06-24 RX ORDER — ONDANSETRON 4 MG/1
4 TABLET, ORALLY DISINTEGRATING ORAL EVERY 6 HOURS PRN
Qty: 10 TABLET | Refills: 0 | Status: SHIPPED | OUTPATIENT
Start: 2017-06-24 | End: 2017-07-01

## 2017-06-24 RX ORDER — OXYCODONE HYDROCHLORIDE 5 MG/1
5 TABLET ORAL EVERY 6 HOURS PRN
Qty: 20 TABLET | Refills: 0 | Status: SHIPPED | OUTPATIENT
Start: 2017-06-24 | End: 2017-07-12

## 2017-06-24 RX ORDER — ONDANSETRON 2 MG/ML
4 INJECTION INTRAMUSCULAR; INTRAVENOUS ONCE
Status: COMPLETED | OUTPATIENT
Start: 2017-06-24 | End: 2017-06-24

## 2017-06-24 RX ORDER — OXYCODONE AND ACETAMINOPHEN 5; 325 MG/1; MG/1
1 TABLET ORAL ONCE
Status: COMPLETED | OUTPATIENT
Start: 2017-06-24 | End: 2017-06-24

## 2017-06-24 RX ORDER — HYDROMORPHONE HYDROCHLORIDE 1 MG/ML
0.5 INJECTION, SOLUTION INTRAMUSCULAR; INTRAVENOUS; SUBCUTANEOUS
Status: DISCONTINUED | OUTPATIENT
Start: 2017-06-24 | End: 2017-06-24 | Stop reason: HOSPADM

## 2017-06-24 RX ADMIN — HYDROMORPHONE HYDROCHLORIDE 0.5 MG: 1 INJECTION, SOLUTION INTRAMUSCULAR; INTRAVENOUS; SUBCUTANEOUS at 05:31

## 2017-06-24 RX ADMIN — ONDANSETRON 4 MG: 2 SOLUTION INTRAMUSCULAR; INTRAVENOUS at 04:18

## 2017-06-24 RX ADMIN — SODIUM CHLORIDE 1000 ML: 9 INJECTION, SOLUTION INTRAVENOUS at 04:17

## 2017-06-24 RX ADMIN — OXYCODONE HYDROCHLORIDE AND ACETAMINOPHEN 1 TABLET: 5; 325 TABLET ORAL at 05:43

## 2017-06-24 RX ADMIN — HYDROMORPHONE HYDROCHLORIDE 1 MG: 1 INJECTION, SOLUTION INTRAMUSCULAR; INTRAVENOUS; SUBCUTANEOUS at 04:18

## 2017-06-24 ASSESSMENT — ENCOUNTER SYMPTOMS
ABDOMINAL PAIN: 1
VOMITING: 1
ABDOMINAL DISTENTION: 1
NAUSEA: 1

## 2017-06-24 NOTE — ED AVS SNAPSHOT
Emergency Department    [address] [facility] [address]    Phone:  [phone]    Fax:  [phone]                                       Rob Beavers   MRN: [id number]    Department:   Emergency Department   Date of Visit:  6/24/2017           Patient Information     Date Of Birth          1945        Your diagnoses for this visit were:     Calculus of gallbladder with biliary obstruction but without cholecystitis        You were seen by Elsa Guerra MD.      Follow-up Information     Schedule an appointment as soon as possible for a visit with [facility].    Contact information:    [address], [address]  [facility] [address] [phone]        Follow up with  Emergency Department.    Specialty:  EMERGENCY MEDICINE    Why:  As needed, If symptoms worsen    Contact information:    [address] [facility] [address] [phone]        Discharge Instructions         Gallstones with Biliary Colic    You have abdominal pain due to irritation and spasm of the gallbladder. This is called biliary colic. The gallbladder is a small sac under the liver, which stores and releases a fluid that aids in the digestion of fat. A collection of crystals may form stones inside the gallbladder (gallstones). Gallstones can cause the gallbladder to spasm. If they block the duct out of the gallbladder, they can cause pain and even an infection.   A number of factors increase the risk for having gallstones:    Being female    Being severely overweight (obese)    Older age    Losing or gaining weight quickly    Eating a high-calorie diet    Being pregnant    Taking hormone therapy    Having diabetes  Home care    Rest in bed.    Drink only clear liquids until you feel better.    You may have been prescribed medicine for pain or nausea. Take these as directed.    Fat in your diet makes the gallbladder contract and may cause increased pain.  Avoid foods that are high in fat (such as full-fat dairy, fried foods, and fatty meats) for at least two days.    If you are overweight, talk to your healthcare provider about losing weight.  Follow-up care  Follow up with your healthcare provider or as advised. There is a chance that you will have another episode of pain from your gallstones at some point. Removal of the gallbladder is an option to prevent this. Talk with your healthcare provider about your treatment options.  When to seek medical advice  Call your healthcare provider if any of the following occur:    Worsening pain or pain lasting for longer than 6 hours    Pain moving to the right lower abdomen    Repeated vomiting    Swollen abdomen    Fever of 100.4 F (38 C) or higher, or as directed by your healthcare provider    Very dark urine, light colored stools, or yellow color of the skin or eyes    Chest, arm, back, neck or jaw pain  Date Last Reviewed: 6/18/2015 2000-2017 [employer]. [address] [place] [address]. All rights reserved. This information is not intended as a substitute for professional medical care. Always follow your healthcare professional's instructions.      Opioid Medication Information    You have been given a prescription for an opioid (narcotic) pain medicine and/or have received a pain medicine while here in the Emergency Department. These medicines can make you drowsy or impaired. You must not drive, operate dangerous equipment, or engage in any other dangerous activities while taking these medications. If you drive while taking these medications, you could be arrested for DUI, or driving under the influence. Do not drink any alcohol while you are taking these medications.   Opioid pain medications can cause addiction. If you have a history of chemical dependency of any type, you are at a higher risk of becoming addicted to pain medications.  Only take these prescribed medications to treat your  pain when all other options have been tried. Take it for as short a time and as few doses as possible. Store your pain pills in a secure place, as they are frequently stolen and provide a dangerous opportunity for children or visitors in your house to start abusing these powerful medications. We will not replace any lost or stolen medicine.  As soon as your pain is better, you should flush all your remaining medication.   Many prescription pain medications contain Tylenol  (acetaminophen), including Vicodin , Tylenol #3 , Norco , Lortab , and Percocet .  You should not take any extra pills of Tylenol  if you are using these prescription medications or you can get very sick.  Do not ever take more than 4000 mg of acetaminophen in any 24 hour period.  All opioids tend to cause constipation. Drink plenty of water and eat foods that have a lot of fiber, such as fruits, vegetables, prune juice, apple juice and high fiber cereal.  Take a laxative if you don t move your bowels at least every other day. Miralax , Milk of Magnesia, Colace , or Senna  can be used to keep you regular.            Future Appointments        Provider Department Dept Phone Center    6/29/2017 9:00 AM  [facility] [phone]       24 Hour Appointment Hotline       To make an appointment at any [facility], call [phone] ([phone]). If you don't have a family doctor or clinic, we will help you find one. [facility] are conveniently located to serve the needs of you and your family.             Review of your medicines      START taking        Dose / Directions Last dose taken    ondansetron 4 MG ODT tab   Commonly known as:  ZOFRAN ODT   Dose:  4 mg   Quantity:  10 tablet        Take 1 tablet (4 mg) by mouth every 6 hours as needed for nausea   Refills:  0        oxyCODONE 5 MG IR tablet   Commonly known as:  ROXICODONE   Dose:  5 mg   Quantity:  20 tablet        Take 1 tablet (5  mg) by mouth every 6 hours as needed for pain   Refills:  0          Our records show that you are taking the medicines listed below. If these are incorrect, please call your family doctor or clinic.        Dose / Directions Last dose taken    lisinopril 10 MG tablet   Commonly known as:  PRINIVIL/ZESTRIL   Quantity:  90 tablet        TAKE ONE TABLET (10 MG) BY MOUTH ONE TIME DAILY   Refills:  3        Multi-vitamin Tabs tablet   Generic drug:  multivitamin, therapeutic with minerals        ONE TABLET DAILY   Refills:  0        omeprazole 40 MG capsule   Commonly known as:  priLOSEC   Dose:  40 mg   Quantity:  90 capsule        Take 1 capsule (40 mg) by mouth daily Take 30-60 minutes before a meal.   Refills:  3        propranolol 80 MG 24 hr capsule   Commonly known as:  INDERAL LA   Quantity:  90 capsule        TAKE 1 CAPSULE (80 MG) BY MOUTH ONCE A DAY   Refills:  3        VITAMIN D (CHOLECALCIFEROL) PO   Dose:  1000 Units        Take 1,000 Units by mouth daily   Refills:  0        warfarin 5 MG tablet   Commonly known as:  COUMADIN   Quantity:  90 tablet        TAKE 1 TAB(5MG) BY MOUTH ON FRIDAYS AND TAKE 1/2 TAB(2.5MG)ALL OTHER DAY/OR AS DIRECTED BY [facility]   Refills:  0                Prescriptions were sent or printed at these locations (2 Prescriptions)                   Other Prescriptions                Printed at Department/Unit printer (2 of 2)         oxyCODONE (ROXICODONE) 5 MG IR tablet               ondansetron (ZOFRAN ODT) 4 MG ODT tab                Procedures and tests performed during your visit     CBC with platelets differential    Comprehensive metabolic panel    INR    Lipase    US Abdomen Limited      Orders Needing Specimen Collection     None      Pending Results     Date and Time Order Name Status Description    6/24/2017 0400 US Abdomen Limited Preliminary             Pending Culture Results     No orders found from 6/22/2017 to 6/25/2017.            Pending Results Instructions     If you  had any lab results that were not finalized at the time of your Discharge, you can call the ED Lab Result RN at [phone]. You will be contacted by this team for any positive Lab results or changes in treatment. The nurses are available 7 days a week from 10A to 6:30P.  You can leave a message 24 hours per day and they will return your call.        Test Results From Your Hospital Stay        6/24/2017  4:30 AM      Component Results     Component Value Ref Range & Units Status    WBC 11.1 (H) 4.0 - 11.0 10e9/L Final    RBC Count 4.80 4.4 - 5.9 10e12/L Final    Hemoglobin 15.6 13.3 - 17.7 g/dL Final    Hematocrit 43.3 40.0 - 53.0 % Final    MCV 90 78 - 100 fl Final    MCH 32.5 26.5 - 33.0 pg Final    MCHC 36.0 31.5 - 36.5 g/dL Final    RDW 13.3 10.0 - 15.0 % Final    Platelet Count 241 150 - 450 10e9/L Final    Diff Method Automated Method  Final    % Neutrophils 81.0 % Final    % Lymphocytes 13.7 % Final    % Monocytes 3.5 % Final    % Eosinophils 1.2 % Final    % Basophils 0.3 % Final    % Immature Granulocytes 0.3 % Final    Nucleated RBCs 0 0 /100 Final    Absolute Neutrophil 9.0 (H) 1.6 - 8.3 10e9/L Final    Absolute Lymphocytes 1.5 0.8 - 5.3 10e9/L Final    Absolute Monocytes 0.4 0.0 - 1.3 10e9/L Final    Absolute Eosinophils 0.1 0.0 - 0.7 10e9/L Final    Absolute Basophils 0.0 0.0 - 0.2 10e9/L Final    Abs Immature Granulocytes 0.0 0 - 0.4 10e9/L Final    Absolute Nucleated RBC 0.0  Final         6/24/2017  4:48 AM      Component Results     Component Value Ref Range & Units Status    Sodium 139 133 - 144 mmol/L Final    Potassium 4.6 3.4 - 5.3 mmol/L Final    Chloride 106 94 - 109 mmol/L Final    Carbon Dioxide 26 20 - 32 mmol/L Final    Anion Gap 7 3 - 14 mmol/L Final    Glucose 158 (H) 70 - 99 mg/dL Final    Urea Nitrogen 14 7 - 30 mg/dL Final    Creatinine 0.82 0.66 - 1.25 mg/dL Final    GFR Estimate >90  Non  GFR Calc   >60 mL/min/1.7m2 Final    GFR Estimate If Black >90    American GFR Calc   >60 mL/min/1.7m2 Final    Calcium 8.6 8.5 - 10.1 mg/dL Final    Bilirubin Total 0.7 0.2 - 1.3 mg/dL Final    Albumin 3.8 3.4 - 5.0 g/dL Final    Protein Total 7.7 6.8 - 8.8 g/dL Final    Alkaline Phosphatase 80 40 - 150 U/L Final    ALT 25 0 - 70 U/L Final    AST 25 0 - 45 U/L Final         6/24/2017  4:45 AM      Component Results     Component Value Ref Range & Units Status    Lipase 139 73 - 393 U/L Final         6/24/2017  5:45 AM      Narrative     US ABDOMEN LIMITED  6/24/2017 4:57 AM      HISTORY: Abdominal pain and bloating.     COMPARISON: None.    FINDINGS: The liver is diffusely increased in echotexture consistent  with fatty infiltration. No focal mass. There is no intra or  extrahepatic biliary dilatation. The common hepatic duct measures 0.4  cm. There is a single 1.2 cm stone in the neck of the gallbladder. The  pancreas is completely obscured by bowel gas. The right kidney  measures 11.6 cm and is normal in appearance.        Impression     IMPRESSION:  1. Cholelithiasis. There is no biliary dilatation or evidence of  cholecystitis.  2. Fatty infiltration of the liver.         6/24/2017  5:45 AM      Component Results     Component Value Ref Range & Units Status    INR 2.37 (H) 0.86 - 1.14 Final                Clinical Quality Measure: Blood Pressure Screening     Your blood pressure was checked while you were in the emergency department today. The last reading we obtained was  BP: (!) 176/92 . Please read the guidelines below about what these numbers mean and what you should do about them.  If your systolic blood pressure (the top number) is less than 120 and your diastolic blood pressure (the bottom number) is less than 80, then your blood pressure is normal. There is nothing more that you need to do about it.  If your systolic blood pressure (the top number) is 120-139 or your diastolic blood pressure (the bottom number) is 80-89, your blood pressure may be higher than it  should be. You should have your blood pressure rechecked within a year by a primary care provider.  If your systolic blood pressure (the top number) is 140 or greater or your diastolic blood pressure (the bottom number) is 90 or greater, you may have high blood pressure. High blood pressure is treatable, but if left untreated over time it can put you at risk for heart attack, stroke, or kidney failure. You should have your blood pressure rechecked by a primary care provider within the next 4 weeks.  If your provider in the emergency department today gave you specific instructions to follow-up with your doctor or provider even sooner than that, you should follow that instruction and not wait for up to 4 weeks for your follow-up visit.        Thank you for choosing [facility]       Thank you for choosing [facility] for your care. Our goal is always to provide you with excellent care. Hearing back from our patients is one way we can continue to improve our services. Please take a few minutes to complete the written survey that you may receive in the mail after you visit with us. Thank you!        [facility] Information     [employer] gives you secure access to your electronic health record. If you see a primary care provider, you can also send messages to your care team and make appointments. If you have questions, please call your primary care clinic.  If you do not have a primary care provider, please call [phone] and they will assist you.        Care EveryWhere ID     This is your Care EveryWhere ID. This could be used by other organizations to access your [facility] medical records  [id number]        Equal Access to Services     DELL DASILVA : Hadii deana Jiménez, waaxda luqadaha, qaybta kaalmada elizabeth, gavi morales . So [facility] [phone].    ATENCIÓN: Si habla español, tiene a lobo disposición servicios gratuitos de asistencia lingüística. Llame al [phone].    We comply with  applicable federal civil rights laws and [place] laws. We do not discriminate on the basis of race, color, national origin, age, disability sex, sexual orientation or gender identity.            After Visit Summary       This is your record. Keep this with you and show to your community pharmacist(s) and doctor(s) at your next visit.

## 2017-06-24 NOTE — ED AVS SNAPSHOT
Emergency Department    [address] [place] [address]    Phone:  [phone]    Fax:  [phone]                                       Rob Beavers   MRN: [id number]    Department:   Emergency Department   Date of Visit:  6/24/2017           After Visit Summary Signature Page     I have received my discharge instructions, and my questions have been answered. I have discussed any challenges I see with this plan with the nurse or doctor.    ..........................................................................................................................................  Patient/Patient Representative Signature      ..........................................................................................................................................  Patient Representative Print Name and Relationship to Patient    ..................................................               ................................................  Date                                            Time    ..........................................................................................................................................  Reviewed by Signature/Title    ...................................................              ..............................................  Date                                                            Time

## 2017-06-24 NOTE — DISCHARGE INSTRUCTIONS
Gallstones with Biliary Colic    You have abdominal pain due to irritation and spasm of the gallbladder. This is called biliary colic. The gallbladder is a small sac under the liver, which stores and releases a fluid that aids in the digestion of fat. A collection of crystals may form stones inside the gallbladder (gallstones). Gallstones can cause the gallbladder to spasm. If they block the duct out of the gallbladder, they can cause pain and even an infection.   A number of factors increase the risk for having gallstones:    Being female    Being severely overweight (obese)    Older age    Losing or gaining weight quickly    Eating a high-calorie diet    Being pregnant    Taking hormone therapy    Having diabetes  Home care    Rest in bed.    Drink only clear liquids until you feel better.    You may have been prescribed medicine for pain or nausea. Take these as directed.    Fat in your diet makes the gallbladder contract and may cause increased pain. Avoid foods that are high in fat (such as full-fat dairy, fried foods, and fatty meats) for at least two days.    If you are overweight, talk to your healthcare provider about losing weight.  Follow-up care  Follow up with your healthcare provider or as advised. There is a chance that you will have another episode of pain from your gallstones at some point. Removal of the gallbladder is an option to prevent this. Talk with your healthcare provider about your treatment options.  When to seek medical advice  Call your healthcare provider if any of the following occur:    Worsening pain or pain lasting for longer than 6 hours    Pain moving to the right lower abdomen    Repeated vomiting    Swollen abdomen    Fever of 100.4 F (38 C) or higher, or as directed by your healthcare provider    Very dark urine, light colored stools, or yellow color of the skin or eyes    Chest, arm, back, neck or jaw pain  Date Last Reviewed: 6/18/2015 2000-2017 [employer]  [employer]. [address] [place] [address]. All rights reserved. This information is not intended as a substitute for professional medical care. Always follow your healthcare professional's instructions.      Opioid Medication Information    You have been given a prescription for an opioid (narcotic) pain medicine and/or have received a pain medicine while here in the Emergency Department. These medicines can make you drowsy or impaired. You must not drive, operate dangerous equipment, or engage in any other dangerous activities while taking these medications. If you drive while taking these medications, you could be arrested for DUI, or driving under the influence. Do not drink any alcohol while you are taking these medications.   Opioid pain medications can cause addiction. If you have a history of chemical dependency of any type, you are at a higher risk of becoming addicted to pain medications.  Only take these prescribed medications to treat your pain when all other options have been tried. Take it for as short a time and as few doses as possible. Store your pain pills in a secure place, as they are frequently stolen and provide a dangerous opportunity for children or visitors in your house to start abusing these powerful medications. We will not replace any lost or stolen medicine.  As soon as your pain is better, you should flush all your remaining medication.   Many prescription pain medications contain Tylenol  (acetaminophen), including Vicodin , Tylenol #3 , Norco , Lortab , and Percocet .  You should not take any extra pills of Tylenol  if you are using these prescription medications or you can get very sick.  Do not ever take more than 4000 mg of acetaminophen in any 24 hour period.  All opioids tend to cause constipation. Drink plenty of water and eat foods that have a lot of fiber, such as fruits, vegetables, prune juice, apple juice and high fiber cereal.  Take a laxative if you don t move your  bowels at least every other day. Miralax , Milk of Magnesia, Colace , or Senna  can be used to keep you regular.

## 2017-06-24 NOTE — ED PROVIDER NOTES
"  History     Chief Complaint:  Abdominal pain     HPI   Rob Beavers is a 71 year old male with history of gallstones who presents to the emergency department today via EMS for evaluation of abdominal pain. Patient reports epigastric abdominal pain starting around 0000, 4 hours prior to arrival, that was accompanied by bloating, nausea, and vomiting . He states his pain worsened throughout the night, prompting his presentation to the emergency department for further evaluation. He has had similar pain previously but never pain that lasted this long; he was diagnosed with gallstones at that time.    Allergies:  No Known Drug Allergies      Medications:    lisinopril (PRINIVIL/ZESTRIL) 10 MG tablet  omeprazole (PRILOSEC) 40 MG capsule  propranolol (INDERAL LA) 80 MG 24 hr capsule  warfarin (COUMADIN) 5 MG tablet  VITAMIN D, CHOLECALCIFEROL, PO    Past Medical History:    Andrews's esophagus without dysplasia   Cardiovascular screening   Hypertension     Past Surgical History:    C total hip arthroplasty   Dental surgery   Tonsillectomy     Family History:    Hypertension   Macular disorder   Blood disease   Dementia     Social History:  The patient was accompanied to the ED by EMS.  Smoking Status: Never smoker  Smokeless Tobacco: Never used   Alcohol Use: Moderate   Marital Status:  Single     Review of Systems   Gastrointestinal: Positive for abdominal distention, abdominal pain, nausea and vomiting.   All other systems reviewed and are negative.    Physical Exam   First Vitals:   Patient Vitals for the past 24 hrs:   BP Temp Temp src Pulse Resp SpO2 Height Weight   06/24/17 0600 (!) 176/92 - - - - 90 % - -   06/24/17 0430 156/83 - - 55 - (!) 81 % - -   06/24/17 0415 - - - - - 99 % - -   06/24/17 0409 (!) 177/91 97.7  F (36.5  C) Oral 52 18 - 1.727 m (5' 8\") 92.5 kg (204 lb)   06/24/17 0400 (!) 177/91 - - - - 99 % - -     Physical Exam  General/Appearance: appears stated age, well-groomed, appears " uncomfortable  Eyes: EOMI, no scleral injection, no icterus  ENT: MMM  Neck: supple, nl ROM, no stiffness  Cardiovascular: RRR, nl S1S2, no m/r/g, 2+ pulses in all 4 extremities, cap refill <2sec  Respiratory: CTAB, good air movement throughout, no wheezes/rhonchi/rales, no increased WOB, no retractions  Back: no lesions  GI: abd soft, non-distended, moderated epigastric and RUQ ttp,  no HSM, no rebound, no guarding, nl BS  MSK: STEWART, good tone, no bony abnormality  Skin: warm and well-perfused, no rash, no edema, no ecchymosis, nl turgor  Neuro: GCS 15, alert and oriented, no gross focal neuro deficits  Psych: interacts appropriately  Heme: no petechia, no purpura, no active bleeding      Emergency Department Course     Imaging:  Radiology findings were communicated with the patient who voiced understanding of the findings.    US Abdomen Limited:  IMPRESSION:  1. Cholelithiasis. There is no biliary dilatation or evidence of  cholecystitis.  2. Fatty infiltration of the liver.  Report per radiology      Laboratory:  Laboratory findings were communicated with the patient who voiced understanding of the findings.    CBC: WBC 11.1 (H), o/w WNL. (HGB 15.6, )    CMP: Glucose 158 (H),  o/w WNL. (Creatinine 0.82)   Lipase: 139  INR: 2.37 (H)      Interventions:  0417 NS 1,000mL IV   0418 Dilaudid 0.5mg IV   0418 Zofran 4mg IV   0531 Dilaudid 0.5mg IV   0543 Percocet 5-325mg PO      Emergency Department Course:  Nursing notes and vitals reviewed.  I performed an exam of the patient as documented above.   IV was inserted and blood was drawn for laboratory testing, results above.   The patient was sent for a US Abdomen Limited while in the emergency department, results above.    0510 Patient rechecked and updated.    0615 Patient rechecked and updated. Patient feels much better and ready for discharge   I personally reviewed the laboratory results with the Patient and answered all related questions prior to  discharge.    Impression & Plan      Medical Decision Making:  Rob Beavers is a 71 year old male with known gallstones who presents with epigastric and right upper quadrant pain. He states these symptoms are similar to previous episodes of gallstone attacks, however they have never lasted as long. Ultrasound confirms a gallstone stuck in the gallbladder neck but no other evidence of infection. I suspect this is the cause of his pain. I also considered other things such as bowel obstruction, appendicitis, other intraabdominal infections, however I feel based on exam these are unlikely. The patient at this time has symptom control and improvement. He will be sent home with oxycodone and Zofran but instruction to watch for worsening pain, worsening nausea/vomiting, fevers, which should prompt him to return to the emergency department.     Diagnosis:    ICD-10-CM    1. Calculus of gallbladder with biliary obstruction but without cholecystitis K80.21       Disposition:   Discharged to home with the below prescription      Discharge Medications:    New Prescriptions    ONDANSETRON (ZOFRAN ODT) 4 MG ODT TAB    Take 1 tablet (4 mg) by mouth every 6 hours as needed for nausea    OXYCODONE (ROXICODONE) 5 MG IR TABLET    Take 1 tablet (5 mg) by mouth every 6 hours as needed for pain       Scribe Disclosure:  I, Grant Clemente, am serving as a scribe at 4:03 AM on 6/24/2017 to document services personally performed by Elsa Guerra*, based on my observations and the provider's statements to me.     Grant Clemente  6/24/2017    EMERGENCY DEPARTMENT       Elsa Guerra MD  06/24/17 0641

## 2017-06-24 NOTE — ED NOTES
Bed: ED26  Expected date: 6/24/17  Expected time: 3:50 AM  Means of arrival: Ambulance  Comments:  [phone] [id number] Abd. Elsa Gonzalez MD  06/24/17 0359

## 2017-06-27 ENCOUNTER — TELEPHONE (OUTPATIENT)
Dept: FAMILY MEDICINE | Facility: CLINIC | Age: 72
End: 2017-06-27

## 2017-06-27 ENCOUNTER — MYC MEDICAL ADVICE (OUTPATIENT)
Dept: FAMILY MEDICINE | Facility: CLINIC | Age: 72
End: 2017-06-27

## 2017-06-27 ENCOUNTER — RADIANT APPOINTMENT (OUTPATIENT)
Dept: GENERAL RADIOLOGY | Facility: CLINIC | Age: 72
End: 2017-06-27
Attending: FAMILY MEDICINE
Payer: COMMERCIAL

## 2017-06-27 ENCOUNTER — OFFICE VISIT (OUTPATIENT)
Dept: FAMILY MEDICINE | Facility: CLINIC | Age: 72
End: 2017-06-27
Payer: COMMERCIAL

## 2017-06-27 ENCOUNTER — TRANSFERRED RECORDS (OUTPATIENT)
Dept: HEALTH INFORMATION MANAGEMENT | Facility: CLINIC | Age: 72
End: 2017-06-27

## 2017-06-27 VITALS
BODY MASS INDEX: 31.78 KG/M2 | SYSTOLIC BLOOD PRESSURE: 144 MMHG | HEART RATE: 68 BPM | OXYGEN SATURATION: 98 % | WEIGHT: 209 LBS | DIASTOLIC BLOOD PRESSURE: 80 MMHG | TEMPERATURE: 99.3 F

## 2017-06-27 DIAGNOSIS — R10.31 RLQ ABDOMINAL PAIN: ICD-10-CM

## 2017-06-27 DIAGNOSIS — K81.9 CHOLECYSTITIS: ICD-10-CM

## 2017-06-27 DIAGNOSIS — R10.13 ABDOMINAL PAIN, EPIGASTRIC: Primary | ICD-10-CM

## 2017-06-27 DIAGNOSIS — R10.13 ABDOMINAL PAIN, EPIGASTRIC: ICD-10-CM

## 2017-06-27 LAB
ALBUMIN UR-MCNC: 30 MG/DL
APPEARANCE UR: CLEAR
BACTERIA #/AREA URNS HPF: ABNORMAL /HPF
BILIRUB UR QL STRIP: ABNORMAL
COLOR UR AUTO: YELLOW
ERYTHROCYTE [DISTWIDTH] IN BLOOD BY AUTOMATED COUNT: 13.5 % (ref 10–15)
GLUCOSE UR STRIP-MCNC: NEGATIVE MG/DL
HCT VFR BLD AUTO: 44.6 % (ref 40–53)
HGB BLD-MCNC: 15.4 G/DL (ref 13.3–17.7)
HGB UR QL STRIP: ABNORMAL
KETONES UR STRIP-MCNC: 40 MG/DL
LEUKOCYTE ESTERASE UR QL STRIP: NEGATIVE
MCH RBC QN AUTO: 31.4 PG (ref 26.5–33)
MCHC RBC AUTO-ENTMCNC: 34.5 G/DL (ref 31.5–36.5)
MCV RBC AUTO: 91 FL (ref 78–100)
MUCOUS THREADS #/AREA URNS LPF: PRESENT /LPF
NITRATE UR QL: NEGATIVE
NON-SQ EPI CELLS #/AREA URNS LPF: ABNORMAL /LPF
PH UR STRIP: 6 PH (ref 5–7)
PLATELET # BLD AUTO: 261 10E9/L (ref 150–450)
RBC # BLD AUTO: 4.9 10E12/L (ref 4.4–5.9)
RBC #/AREA URNS AUTO: ABNORMAL /HPF (ref 0–2)
SP GR UR STRIP: 1.02 (ref 1–1.03)
URN SPEC COLLECT METH UR: ABNORMAL
UROBILINOGEN UR STRIP-ACNC: 1 EU/DL (ref 0.2–1)
WBC # BLD AUTO: 14.2 10E9/L (ref 4–11)
WBC #/AREA URNS AUTO: ABNORMAL /HPF (ref 0–2)

## 2017-06-27 PROCEDURE — 85027 COMPLETE CBC AUTOMATED: CPT | Performed by: FAMILY MEDICINE

## 2017-06-27 PROCEDURE — 80053 COMPREHEN METABOLIC PANEL: CPT | Performed by: FAMILY MEDICINE

## 2017-06-27 PROCEDURE — 36415 COLL VENOUS BLD VENIPUNCTURE: CPT | Performed by: FAMILY MEDICINE

## 2017-06-27 PROCEDURE — 81001 URINALYSIS AUTO W/SCOPE: CPT | Performed by: FAMILY MEDICINE

## 2017-06-27 PROCEDURE — 99214 OFFICE O/P EST MOD 30 MIN: CPT | Performed by: FAMILY MEDICINE

## 2017-06-27 PROCEDURE — 74020 XR ABDOMEN 2 VW: CPT

## 2017-06-27 NOTE — TELEPHONE ENCOUNTER
Please have patient follow up. May need to evaluate, to see if surgical opnionis most Likely vs constipation.

## 2017-06-27 NOTE — TELEPHONE ENCOUNTER
Please see MyChart message below and advise. 2 separate messages sent, please see both.   Ting Barreto RN   [facility] - Triage

## 2017-06-27 NOTE — TELEPHONE ENCOUNTER
Results received from [facility] and given to provider to review.  CT Abdomen & Pelvis with Contrast  Kirsten GODOY

## 2017-06-27 NOTE — PATIENT INSTRUCTIONS
[facility])  [address], [address]  [place], [place] [address]        Phone: [phone]    Nothing to eat or drink 2 hours prior to the exam.     Tuesday June 27  Check in at 12:20pm

## 2017-06-27 NOTE — MR AVS SNAPSHOT
After Visit Summary   6/27/2017    Rob Beavers    MRN: [id number]           Patient Information     Date Of Birth          1945        Visit Information        Provider Department      6/27/2017 11:40 AM Tian Santana MD [facility]        Today's Diagnoses     Abdominal pain, epigastric    -  1    RLQ abdominal pain          Care Instructions    [facility]  [facility]  [address], [address]  [place], [place] [address]        Phone: [phone]    Nothing to eat or drink 2 hours prior to the exam.     Tuesday June 27  Check in at 12:20pm          Follow-ups after your visit        Your next 10 appointments already scheduled     Jun 29, 2017  9:00 AM CDT   Anticoagulation Visit with [facility]   [facility]    [address] [address]   [phone]              Future tests that were ordered for you today     Open Future Orders        Priority Expected Expires Ordered    CT Abdomen Pelvis w Contrast Routine  6/27/2018 6/27/2017            Who to contact     If you have questions or need follow up information about today's clinic visit or your schedule please contact [facility] directly at [phone].  Normal or non-critical lab and imaging results will be communicated to you by MyChart, letter or phone within 4 business days after the clinic has received the results. If you do not hear from us within 7 days, please contact the clinic through MyChart or phone. If you have a critical or abnormal lab result, we will notify you by phone as soon as possible.  Submit refill requests through [employer] or call your pharmacy and they will forward the refill request to us. Please allow 3 business days for your refill to be completed.          Additional Information About Your Visit        MyChart Information     [employer]  gives you secure access to your electronic health record. If you see a primary care provider, you can also send messages to your care team and make appointments. If you have questions, please call your primary care clinic.  If you do not have a primary care provider, please call [phone] and they will assist you.        Care EveryWhere ID     This is your Care EveryWhere ID. This could be used by other organizations to access your [place] medical records  [id number]        Your Vitals Were     Pulse Temperature Pulse Oximetry BMI (Body Mass Index)          68 99.3  F (37.4  C) (Tympanic) 98% 31.78 kg/m2         Blood Pressure from Last 3 Encounters:   06/27/17 144/80   06/24/17 (!) 173/100   04/26/17 134/80    Weight from Last 3 Encounters:   06/27/17 209 lb (94.8 kg)   06/24/17 204 lb (92.5 kg)   04/26/17 211 lb (95.7 kg)              We Performed the Following     CBC with platelets     Comprehensive metabolic panel     UA reflex to Microscopic        Primary Care Provider Office Phone # Fax #    Tian Santana [phone]       [facility] [place] [address] [place] [address] [address]  [place] [address]        Equal Access to Services     DELL DASILVA AH: Hadii aad ku hadasho Soomaali, waaxda luqadaha, qaybta kaalmada adeegyada, waxay idiin hayshannonn lissett mclaughlin lakarley ah. So [facility] [phone].    ATENCIÓN: Si habla español, tiene a lobo disposición servicios gratuitos de asistencia lingüística. Llame al [phone].    We comply with applicable federal civil rights laws and [place] laws. We do not discriminate on the basis of race, color, national origin, age, disability sex, sexual orientation or gender identity.            Thank you!     Thank you for choosing [facility] [place]  for your care. Our goal is always to provide you with excellent care. Hearing back from our patients is one way we can continue to improve our services. Please take a few minutes to complete the written  survey that you may receive in the mail after your visit with us. Thank you!             Your Updated Medication List - Protect others around you: Learn how to safely use, store and throw away your medicines at www.disposemymeds.org.          This list is accurate as of: 6/27/17 12:13 PM.  Always use your most recent med list.                   Brand Name Dispense Instructions for use Diagnosis    lisinopril 10 MG tablet    PRINIVIL/ZESTRIL    90 tablet    TAKE ONE TABLET (10 MG) BY MOUTH ONE TIME DAILY    Hypertension goal BP (blood pressure) < 140/90, Routine general medical examination at a health care facility       Multi-vitamin Tabs tablet   Generic drug:  multivitamin, therapeutic with minerals      ONE TABLET DAILY        omeprazole 40 MG capsule    priLOSEC    90 capsule    Take 1 capsule (40 mg) by mouth daily Take 30-60 minutes before a meal.    Routine general medical examination at a health care facility, Andrews's esophagus without dysplasia       ondansetron 4 MG ODT tab    ZOFRAN ODT    10 tablet    Take 1 tablet (4 mg) by mouth every 6 hours as needed for nausea        oxyCODONE 5 MG IR tablet    ROXICODONE    20 tablet    Take 1 tablet (5 mg) by mouth every 6 hours as needed for pain        propranolol 80 MG 24 hr capsule    INDERAL LA    90 capsule    TAKE 1 CAPSULE (80 MG) BY MOUTH ONCE A DAY    Hypertension goal BP (blood pressure) < 140/90, Routine general medical examination at a health care facility       VITAMIN D (CHOLECALCIFEROL) PO      Take 1,000 Units by mouth daily        warfarin 5 MG tablet    COUMADIN    90 tablet    TAKE 1 TAB(5MG) BY MOUTH ON FRIDAYS AND TAKE 1/2 TAB(2.5MG)ALL OTHER DAY/OR AS DIRECTED BY ACC    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

## 2017-06-27 NOTE — PROGRESS NOTES
SUBJECTIVE:                                                    Rob Beavers is a 71 year old male who presents to clinic today for the following health issues:      ED/UC Followup:    Facility:  [facility]  Date of visit: 6/24/17  Reason for visit: abd pain  Current Status: still c/o pressure in abd area    Was seen in the ER, ultra sound was done, which indicate stone at the neck of gall bladder without any acute cholecystitis., he was given one pain medication in the clinic.  He was given nausea medication, he noticed pain was better but last 24 hours again he has some discomfort in the abdomen, now more in lower abdomen, he did not have nay bowl movement since he was in the ER. No fever, chills, no nausea or vomiting.        Problem list and histories reviewed & adjusted, as indicated.  Additional history: as documented    Patient Active Problem List   Diagnosis     CARDIOVASCULAR SCREENING; LDL GOAL LESS THAN 130     Advanced directives, counseling/discussion     Hypertension goal BP (blood pressure) < 140/90     Prostate nodule     Benign familial tremor     Pulmonary embolus (H)     Long-term (current) use of anticoagulants [Z79.01]     Andrews's esophagus without dysplasia     Past Surgical History:   Procedure Laterality Date     C TOTAL HIP ARTHROPLASTY  2010     DENTAL SURGERY       TONSILLECTOMY  1951       Social History   Substance Use Topics     Smoking status: Never Smoker     Smokeless tobacco: Never Used     Alcohol use 0.0 oz/week     0 Standard drinks or equivalent per week      Comment: moderate     Family History   Problem Relation Age of Onset     Hypertension Mother      passed away at 89     Eye Disorder Mother      macular degeneration     Blood Disease Mother      She is on warfarin     Dementia Father 80     passed away at 88 yrs old     Breast Cancer Sister      DIABETES Paternal Uncle      Asthma No family hx of      C.A.D. No family hx of      Cancer - colorectal No family hx of       Prostate Cancer No family hx of      Anesthesia Reaction No family hx of      Thyroid Disease No family hx of          Current Outpatient Prescriptions   Medication Sig Dispense Refill     ondansetron (ZOFRAN ODT) 4 MG ODT tab Take 1 tablet (4 mg) by mouth every 6 hours as needed for nausea 10 tablet 0     lisinopril (PRINIVIL/ZESTRIL) 10 MG tablet TAKE ONE TABLET (10 MG) BY MOUTH ONE TIME DAILY 90 tablet 3     omeprazole (PRILOSEC) 40 MG capsule Take 1 capsule (40 mg) by mouth daily Take 30-60 minutes before a meal. 90 capsule 3     propranolol (INDERAL LA) 80 MG 24 hr capsule TAKE 1 CAPSULE (80 MG) BY MOUTH ONCE A DAY 90 capsule 3     warfarin (COUMADIN) 5 MG tablet TAKE 1 TAB(5MG) BY MOUTH ON FRIDAYS AND TAKE 1/2 TAB(2.5MG)ALL OTHER DAY/OR AS DIRECTED BY ACC 90 tablet 0     VITAMIN D, CHOLECALCIFEROL, PO Take 1,000 Units by mouth daily       MULTI-VITAMIN PO TABS ONE TABLET DAILY       oxyCODONE (ROXICODONE) 5 MG IR tablet Take 1 tablet (5 mg) by mouth every 6 hours as needed for pain (Patient not taking: Reported on 6/27/2017) 20 tablet 0       Reviewed and updated as needed this visit by clinical staff  Tobacco  Allergies  Meds       Reviewed and updated as needed this visit by Provider         ROS:  C: NEGATIVE for fever, chills, change in weight  E/M: NEGATIVE for ear, mouth and throat problems  R: NEGATIVE for significant cough or SOB  CV: NEGATIVE for chest pain, palpitations or peripheral edema  GI: POSITIVE for abdominal pain epigastric and RLQ    OBJECTIVE:                                                    /80  Pulse 68  Temp 99.3  F (37.4  C) (Tympanic)  Wt 209 lb (94.8 kg)  SpO2 98%  BMI 31.78 kg/m2  Body mass index is 31.78 kg/(m^2).   GENERAL: healthy, alert, well nourished, well hydrated, no distress  HENT: ear canals- normal; TMs- normal; Nose- normal; Mouth- no ulcers, no lesions  NECK: no tenderness, no adenopathy, no asymmetry, no masses, no stiffness; thyroid- normal to  palpation  RESP: lungs clear to auscultation - no rales, no rhonchi, no wheezes  CV: regular rates and rhythm, normal S1 S2, no S3 or S4 and no murmur, no click or rub -  Pain in the epigastric and RLQ area, no shifting dullness.         ASSESSMENT/PLAN:                                                        ICD-10-CM    1. Abdominal pain, epigastric R10.13 XR Abdomen 2 Views     CBC with platelets     Comprehensive metabolic panel     UA reflex to Microscopic     CT Abdomen Pelvis w Contrast   2. RLQ abdominal pain R10.31 CT Abdomen Pelvis w Contrast       Patient has increase discomfort in RLQ , as well some degree of indigestion symptoms as well. US was reviewed.  He has gall balder stone with out acute cholecystitis.  He denies any fever, no nausea or vomiting.  Abdominal xray done as well he has obstipation, non specific gas pattern.  Will get further evaluation by CT scan to rule out any acute etiology like appendicitis vs cholecystitis given mild elevation WBC.  UA is also reviewed, urine seem concentrated with mild dehydration with increase ketones.  Once CT scan is done we will follow up on that, if nay acute etiology he will be directed accordingly other wise out patient General surgery referral for gall bladder issue.    Tian Santana MD  [facility]    Addendum:  Spoke with the radiologist, he does not have acute appendicitis, he does have inflamation around the gall bladder area with stone, US was done before which also innidiate gall bladder stone but no acute symptoms,  No free gas or peritonitis symptoms, suggested him he need surgical evaluation, referral done, he will follow up.  He will need gall bladder surgery.  Warning signs including worsening pain or fever nausea or vomiting , he may need to go ER.  U indicate some dehydration, he may need to drink plenty of fluid.

## 2017-06-27 NOTE — NURSING NOTE
"Chief Complaint   Patient presents with     ER F/U       Initial /80  Pulse 68  Temp 99.3  F (37.4  C) (Tympanic)  Wt 209 lb (94.8 kg)  SpO2 98%  BMI 31.78 kg/m2 Estimated body mass index is 31.78 kg/(m^2) as calculated from the following:    Height as of 6/24/17: 5' 8\" (1.727 m).    Weight as of this encounter: 209 lb (94.8 kg).  Medication Reconciliation: complete    Current Outpatient Prescriptions   Medication Sig Dispense Refill     ondansetron (ZOFRAN ODT) 4 MG ODT tab Take 1 tablet (4 mg) by mouth every 6 hours as needed for nausea 10 tablet 0     lisinopril (PRINIVIL/ZESTRIL) 10 MG tablet TAKE ONE TABLET (10 MG) BY MOUTH ONE TIME DAILY 90 tablet 3     omeprazole (PRILOSEC) 40 MG capsule Take 1 capsule (40 mg) by mouth daily Take 30-60 minutes before a meal. 90 capsule 3     propranolol (INDERAL LA) 80 MG 24 hr capsule TAKE 1 CAPSULE (80 MG) BY MOUTH ONCE A DAY 90 capsule 3     warfarin (COUMADIN) 5 MG tablet TAKE 1 TAB(5MG) BY MOUTH ON FRIDAYS AND TAKE 1/2 TAB(2.5MG)ALL OTHER DAY/OR AS DIRECTED BY ACC 90 tablet 0     VITAMIN D, CHOLECALCIFEROL, PO Take 1,000 Units by mouth daily       MULTI-VITAMIN PO TABS ONE TABLET DAILY       oxyCODONE (ROXICODONE) 5 MG IR tablet Take 1 tablet (5 mg) by mouth every 6 hours as needed for pain (Patient not taking: Reported on 6/27/2017) 20 tablet 0       Genaro LUU CMA  "

## 2017-06-28 ENCOUNTER — TELEPHONE (OUTPATIENT)
Dept: FAMILY MEDICINE | Facility: CLINIC | Age: 72
End: 2017-06-28

## 2017-06-28 ENCOUNTER — APPOINTMENT (OUTPATIENT)
Dept: ULTRASOUND IMAGING | Facility: CLINIC | Age: 72
DRG: 419 | End: 2017-06-28
Attending: EMERGENCY MEDICINE
Payer: COMMERCIAL

## 2017-06-28 ENCOUNTER — HOSPITAL ENCOUNTER (INPATIENT)
Facility: CLINIC | Age: 72
LOS: 2 days | Discharge: HOME | DRG: 419 | End: 2017-07-01
Attending: EMERGENCY MEDICINE | Admitting: INTERNAL MEDICINE
Payer: COMMERCIAL

## 2017-06-28 DIAGNOSIS — K81.0 ACUTE CHOLECYSTITIS: ICD-10-CM

## 2017-06-28 DIAGNOSIS — R50.9 FEVER, UNSPECIFIED: Primary | ICD-10-CM

## 2017-06-28 DIAGNOSIS — Z86.711 HISTORY OF PULMONARY EMBOLISM: Primary | ICD-10-CM

## 2017-06-28 LAB
ALBUMIN SERPL-MCNC: 3 G/DL (ref 3.4–5)
ALBUMIN SERPL-MCNC: 3.4 G/DL (ref 3.4–5)
ALP SERPL-CCNC: 79 U/L (ref 40–150)
ALP SERPL-CCNC: 91 U/L (ref 40–150)
ALT SERPL W P-5'-P-CCNC: 19 U/L (ref 0–70)
ALT SERPL W P-5'-P-CCNC: 20 U/L (ref 0–70)
ANION GAP SERPL CALCULATED.3IONS-SCNC: 12 MMOL/L (ref 3–14)
ANION GAP SERPL CALCULATED.3IONS-SCNC: 7 MMOL/L (ref 3–14)
AST SERPL W P-5'-P-CCNC: 15 U/L (ref 0–45)
AST SERPL W P-5'-P-CCNC: 16 U/L (ref 0–45)
BASOPHILS # BLD AUTO: 0 10E9/L (ref 0–0.2)
BASOPHILS NFR BLD AUTO: 0.2 %
BILIRUB SERPL-MCNC: 1 MG/DL (ref 0.2–1.3)
BILIRUB SERPL-MCNC: 1.4 MG/DL (ref 0.2–1.3)
BUN SERPL-MCNC: 13 MG/DL (ref 7–30)
BUN SERPL-MCNC: 13 MG/DL (ref 7–30)
CALCIUM SERPL-MCNC: 8.3 MG/DL (ref 8.5–10.1)
CALCIUM SERPL-MCNC: 8.5 MG/DL (ref 8.5–10.1)
CHLORIDE SERPL-SCNC: 102 MMOL/L (ref 94–109)
CHLORIDE SERPL-SCNC: 97 MMOL/L (ref 94–109)
CO2 SERPL-SCNC: 25 MMOL/L (ref 20–32)
CO2 SERPL-SCNC: 28 MMOL/L (ref 20–32)
CREAT SERPL-MCNC: 0.86 MG/DL (ref 0.66–1.25)
CREAT SERPL-MCNC: 0.9 MG/DL (ref 0.66–1.25)
DIFFERENTIAL METHOD BLD: ABNORMAL
EOSINOPHIL # BLD AUTO: 0.3 10E9/L (ref 0–0.7)
EOSINOPHIL NFR BLD AUTO: 2.2 %
ERYTHROCYTE [DISTWIDTH] IN BLOOD BY AUTOMATED COUNT: 13.3 % (ref 10–15)
GFR SERPL CREATININE-BSD FRML MDRD: 84 ML/MIN/1.7M2
GFR SERPL CREATININE-BSD FRML MDRD: 88 ML/MIN/1.7M2
GLUCOSE SERPL-MCNC: 117 MG/DL (ref 70–99)
GLUCOSE SERPL-MCNC: 119 MG/DL (ref 70–99)
HCT VFR BLD AUTO: 41.4 % (ref 40–53)
HGB BLD-MCNC: 15.2 G/DL (ref 13.3–17.7)
IMM GRANULOCYTES # BLD: 0 10E9/L (ref 0–0.4)
IMM GRANULOCYTES NFR BLD: 0.3 %
INR PPP: 2.05 (ref 0.86–1.14)
INTERPRETATION ECG - MUSE: NORMAL
LIPASE SERPL-CCNC: 127 U/L (ref 73–393)
LYMPHOCYTES # BLD AUTO: 1.7 10E9/L (ref 0.8–5.3)
LYMPHOCYTES NFR BLD AUTO: 13.3 %
MCH RBC QN AUTO: 32.7 PG (ref 26.5–33)
MCHC RBC AUTO-ENTMCNC: 36.7 G/DL (ref 31.5–36.5)
MCV RBC AUTO: 89 FL (ref 78–100)
MONOCYTES # BLD AUTO: 1.5 10E9/L (ref 0–1.3)
MONOCYTES NFR BLD AUTO: 11.4 %
NEUTROPHILS # BLD AUTO: 9.4 10E9/L (ref 1.6–8.3)
NEUTROPHILS NFR BLD AUTO: 72.6 %
NRBC # BLD AUTO: 0 10*3/UL
NRBC BLD AUTO-RTO: 0 /100
PLATELET # BLD AUTO: 238 10E9/L (ref 150–450)
POTASSIUM SERPL-SCNC: 3.6 MMOL/L (ref 3.4–5.3)
POTASSIUM SERPL-SCNC: 3.8 MMOL/L (ref 3.4–5.3)
PROT SERPL-MCNC: 7.5 G/DL (ref 6.8–8.8)
PROT SERPL-MCNC: 7.5 G/DL (ref 6.8–8.8)
RBC # BLD AUTO: 4.65 10E12/L (ref 4.4–5.9)
SODIUM SERPL-SCNC: 134 MMOL/L (ref 133–144)
SODIUM SERPL-SCNC: 137 MMOL/L (ref 133–144)
WBC # BLD AUTO: 12.9 10E9/L (ref 4–11)

## 2017-06-28 PROCEDURE — 83690 ASSAY OF LIPASE: CPT | Performed by: EMERGENCY MEDICINE

## 2017-06-28 PROCEDURE — 80053 COMPREHEN METABOLIC PANEL: CPT | Performed by: EMERGENCY MEDICINE

## 2017-06-28 PROCEDURE — 93005 ELECTROCARDIOGRAM TRACING: CPT

## 2017-06-28 PROCEDURE — 25000128 H RX IP 250 OP 636: Performed by: EMERGENCY MEDICINE

## 2017-06-28 PROCEDURE — 99223 1ST HOSP IP/OBS HIGH 75: CPT | Mod: AI | Performed by: INTERNAL MEDICINE

## 2017-06-28 PROCEDURE — 76705 ECHO EXAM OF ABDOMEN: CPT

## 2017-06-28 PROCEDURE — 85025 COMPLETE CBC W/AUTO DIFF WBC: CPT | Performed by: EMERGENCY MEDICINE

## 2017-06-28 PROCEDURE — 96365 THER/PROPH/DIAG IV INF INIT: CPT

## 2017-06-28 PROCEDURE — 96361 HYDRATE IV INFUSION ADD-ON: CPT

## 2017-06-28 PROCEDURE — 99285 EMERGENCY DEPT VISIT HI MDM: CPT | Mod: 25

## 2017-06-28 PROCEDURE — 85610 PROTHROMBIN TIME: CPT | Performed by: EMERGENCY MEDICINE

## 2017-06-28 RX ORDER — SODIUM CHLORIDE 9 MG/ML
INJECTION, SOLUTION INTRAVENOUS CONTINUOUS
Status: DISCONTINUED | OUTPATIENT
Start: 2017-06-28 | End: 2017-06-29 | Stop reason: CLARIF

## 2017-06-28 RX ORDER — PIPERACILLIN SODIUM, TAZOBACTAM SODIUM 3; .375 G/15ML; G/15ML
3.38 INJECTION, POWDER, LYOPHILIZED, FOR SOLUTION INTRAVENOUS ONCE
Status: COMPLETED | OUTPATIENT
Start: 2017-06-28 | End: 2017-06-29

## 2017-06-28 RX ADMIN — PIPERACILLIN SODIUM,TAZOBACTAM SODIUM 3.38 G: 3; .375 INJECTION, POWDER, FOR SOLUTION INTRAVENOUS at 23:40

## 2017-06-28 RX ADMIN — SODIUM CHLORIDE: 9 INJECTION, SOLUTION INTRAVENOUS at 21:36

## 2017-06-28 ASSESSMENT — ENCOUNTER SYMPTOMS
DYSURIA: 0
ABDOMINAL PAIN: 1
NAUSEA: 1
DIFFICULTY URINATING: 0

## 2017-06-28 NOTE — IP AVS SNAPSHOT
MRN:[id number]                      After Visit Summary   6/28/2017    Rob Beavers    MRN: [id number]           Thank you!     Thank you for choosing [facility] for your care. Our goal is always to provide you with excellent care. Hearing back from our patients is one way we can continue to improve our services. Please take a few minutes to complete the written survey that you may receive in the mail after you visit with us. Thank you!        Patient Information     Date Of Birth          1945        Designated Caregiver       Most Recent Value    Caregiver    Will someone help with your care after discharge? no      About your hospital stay     You were admitted on:  June 29, 2017 You last received care in the:  [facility] [facility]    You were discharged on:  July 1, 2017        Reason for your hospital stay       Acute Gallbladder Inflammation.                  Who to Call     For medical emergencies, please call 911.  For non-urgent questions about your medical care, please call your primary care provider or clinic, [phone]  For questions related to your surgery, please call your surgery clinic        Attending Provider     Provider Specialty    Gianna Daly MD Emergency Medicine    Marquis Diaz MD Emergency Medicine    Manish, Constantin Tavera MD Internal Medicine       Primary Care Provider Office Phone # Fax #    Tian Santana [phone]      After Care Instructions     Activity       Avoid strenuous activity or heavy lifting >20 pounds for 2-3 weeks.            Diet       Gradually resume your regular diet as tolerated.            Discharge Instructions       CONSTIPATION PREVENTION  Go to a pharmacy and purchase ONE of the following stool softeners/laxatives and start taking today to prevent constipation. You can stop this bowel regimen once you are no longer taking your narcotic pain medication and are having regular bowel  movements:    - Senokot oral once daily  - Milk of magnesium once daily  - Docusate Sodium 1 pill twice daily (stool softener)  - Miralax 1 scoop/packet 1-2 times daily (laxative)    In addition to the above options, if constipation continues, you can add:   - 4 oz Prune juice or Plum juice daily for constipation            Wound care and dressings       Keep dressings clean and dry. Remove the bandaids. Do not remove the small white steri strips over your incision. These will typically fall off on their own in 7-10 days. Do not attempt to replace these if they should fall off sooner. After you've removed the bandaids, you can shower normally. You can allow warm water and soap to run over the incision. Do not scrub the incision. After showering pat your skin and steri strips dry. Do not submerge or soak your incisions under water for 2 weeks.    Change guaze dressing at drain site daily or when saturated.  You will have dorothy drainage for the first several days until the site heals.                  Follow-up Appointments     Follow-up and recommended labs and tests        Follow up with Dr. Yin or PA, at [address] [facility] [address], [place] [address], within 2 weeks, call office for appointment at [phone].    IF you are doing well and have no questions or concerns, you may call our nurse to update on your condition instead of coming in for appointment.    For INR Check on 7/3/17 and for dosing of warfarin based on the result.                  Further instructions from your care team       You should make an appointment to follow up with your primary care physician within 1 week.    Warfarin Instruction     You have started taking a medicine called warfarin. This is a blood-thinning medicine (anticoagulant). It helps prevent and treat blood clots.      Before leaving the hospital, make sure you know how much to take and how long to take it.      You will need regular blood tests to make sure your blood is clotting  "safely. It is very important to see your doctor for regular blood tests.    Talk to your doctor before taking any new medicine (this includes over-the-counter drugs and herbal products). Many medicines can interact with warfarin. This may cause more bleeding or too much clotting.     Eating a lot of vitamin K--found in green, leafy vegetables--can change the way warfarin works in your body. Do NOT avoid these foods. Instead, try to eat the same amount each day.     Bleeding is the most common side-effect of warfarin. You may notice bleeding gums, a bloody nose, bruises and bleeding longer when you cut yourself. See a doctor at once if:   o You cough up blood  o You find blood in your stool (poop)  o You have a deep cut, or a cut that bleeds longer than 10 minutes   o You have a bad cut, hard fall, accident or hit your head (go to urgent care or the emergency room).    For women who can get pregnant: This medicine can harm an unborn baby. Be very careful not to get pregnant while taking this medicine. If you think you might be pregnant, call your doctor right away.    For more information, read \"Guide to Warfarin Therapy,  the booklet you received in the hospital.        Pending Results     No orders found from 6/26/2017 to 6/29/2017.            Statement of Approval     Ordered          07/01/17 1518  I have reviewed and agree with all the recommendations and orders detailed in this document.  EFFECTIVE NOW     Approved and electronically signed by:  Shahid Parra MD             Admission Information     Date & Time Provider Department Dept. Phone    6/28/2017 Constantin Hammond MD [facility] [address] [facility] [phone]      Your Vitals Were     Blood Pressure Pulse Temperature Respirations Height Weight    142/89 (BP Location: Left arm) 65 98.1  F (36.7  C) (Oral) 18 1.727 m (5' 8\") 94.2 kg (207 lb 11.2 oz)    Pulse Oximetry BMI (Body Mass Index)                93% 31.58 kg/m2 "          [employer] Information     [employer] gives you secure access to your electronic health record. If you see a primary care provider, you can also send messages to your care team and make appointments. If you have questions, please call your primary care clinic.  If you do not have a primary care provider, please call [phone] and they will assist you.        Care EveryWhere ID     This is your Care EveryWhere ID. This could be used by other organizations to access your [facility] medical records  [id number]        Equal Access to Services     DELL DASILVA : Hadii deana alegria hadasho Soomaali, waaxda luqadaha, qaybta kaalmada adeegyada, waxdilan hopkins. So [facility] [phone].    ATENCIÓN: Si habla español, tiene a lobo disposición servicios gratuitos de asistencia lingüística. Llame al [phone].    We comply with applicable federal civil rights laws and [place] laws. We do not discriminate on the basis of race, color, national origin, age, disability sex, sexual orientation or gender identity.               Review of your medicines      START taking        Dose / Directions    enoxaparin 100 MG/ML injection   Commonly known as:  LOVENOX   Used for:  History of pulmonary embolism        Dose:  1 mg/kg   Inject 0.94 mLs (94 mg) Subcutaneous every 12 hours   Quantity:  2.82 mL   Refills:  0         CONTINUE these medicines which may have CHANGED, or have new prescriptions. If we are uncertain of the size of tablets/capsules you have at home, strength may be listed as something that might have changed.        Dose / Directions    * warfarin 5 MG tablet   Commonly known as:  COUMADIN   This may have changed:  Another medication with the same name was added. Make sure you understand how and when to take each.   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 Make sure you understand how and when to take each.   Used for:  History of pulmonary embolism   Notes to Patient:  Also take this dose on Sunday 7/2/17 and have INR checked on Monday 7/3/17        Dose:  7.5 mg   Take 1 tablet (7.5 mg) by mouth daily   Quantity:  2 tablet   Refills:  0       * Notice:  This list has 2 medication(s) that are the same as other medications prescribed for you. Read the directions carefully, and ask your doctor or other care provider to review them with you.      CONTINUE these medicines which have NOT CHANGED        Dose / Directions    lisinopril 10 MG tablet   Commonly known as:  PRINIVIL/ZESTRIL   Used for:  Hypertension goal BP (blood pressure) < 140/90, Routine general medical examination at a health care facility        TAKE ONE TABLET (10 MG) BY MOUTH ONE TIME DAILY   Quantity:  90 tablet   Refills:  3       loratadine 10 MG tablet   Commonly known as:  CLARITIN        Dose:  10 mg   Take 10 mg by mouth daily   Refills:  0       Multi-vitamin Tabs tablet   Generic drug:  multivitamin, therapeutic with minerals        ONE TABLET DAILY   Refills:  0       omeprazole 40 MG capsule   Commonly known as:  priLOSEC   Used for:  Routine general medical examination at a health care facility, Andrews's esophagus without dysplasia        Dose:  40 mg   Take 1 capsule (40 mg) by mouth daily Take 30-60 minutes before a meal.   Quantity:  90 capsule   Refills:  3       oxyCODONE 5 MG IR tablet   Commonly known as:  ROXICODONE        Dose:  5 mg   Take 1 tablet (5 mg) by mouth every 6 hours as needed for pain   Quantity:  20 tablet   Refills:  0       propranolol 80 MG 24 hr capsule    Commonly known as:  INDERAL LA   Used for:  Hypertension goal BP (blood pressure) < 140/90, Routine general medical examination at a health care facility        TAKE 1 CAPSULE (80 MG) BY MOUTH ONCE A DAY   Quantity:  90 capsule   Refills:  3       VITAMIN D (CHOLECALCIFEROL) PO        Dose:  1000 Units   Take 1,000 Units by mouth daily   Refills:  0         STOP taking     ondansetron 4 MG ODT tab   Commonly known as:  ZOFRAN ODT                    Protect others around you: Learn how to safely use, store and throw away your medicines at www.disposemymeds.org.             Medication List: This is a list of all your medications and when to take them. Check marks below indicate your daily home schedule. Keep this list as a reference.      Medications           Morning Afternoon Evening Bedtime As Needed    enoxaparin 100 MG/ML injection   Commonly known as:  LOVENOX   Inject 0.94 mLs (94 mg) Subcutaneous every 12 hours   Last time this was given:  90 mg on 7/1/2017  5:57 AM   Next Dose Due:  Today 7/1/17 6 pm            6am           6pm               lisinopril 10 MG tablet   Commonly known as:  PRINIVIL/ZESTRIL   TAKE ONE TABLET (10 MG) BY MOUTH ONE TIME DAILY   Last time this was given:  10 mg on 7/1/2017  7:53 AM   Next Dose Due:  Tomorrow 7/2/17                                   loratadine 10 MG tablet   Commonly known as:  CLARITIN   Take 10 mg by mouth daily   Next Dose Due:  Today 7/1/17                                   Multi-vitamin Tabs tablet   ONE TABLET DAILY   Generic drug:  multivitamin, therapeutic with minerals   Next Dose Due:  Tomorrow 7/2/17                                   omeprazole 40 MG capsule   Commonly known as:  priLOSEC   Take 1 capsule (40 mg) by mouth daily Take 30-60 minutes before a meal.   Next Dose Due:  Tomorrow 7/2/17                                   oxyCODONE 5 MG IR tablet   Commonly known as:  ROXICODONE   Take 1 tablet (5 mg) by mouth every 6 hours as needed for pain                                    propranolol 80 MG 24 hr capsule   Commonly known as:  INDERAL LA   TAKE 1 CAPSULE (80 MG) BY MOUTH ONCE A DAY   Last time this was given:  80 mg on 7/1/2017  7:53 AM   Next Dose Due:  Tomorrow 7/2/17                                   VITAMIN D (CHOLECALCIFEROL) PO   Take 1,000 Units by mouth daily   Next Dose Due:  Tomorrow 7/2/17                                   * warfarin 5 MG tablet   Commonly known as:  COUMADIN   TAKE 1 TAB(5MG) BY MOUTH ON FRIDAYS AND TAKE 1/2 TAB(2.5MG)ALL OTHER DAY/OR AS DIRECTED BY ACC   Last time this was given:  5 mg on 6/30/2017  7:57 PM   Notes to Patient:  Do not follow this dosing unless approved when you have your INR checked on Monday 7/3/17                                * warfarin 7.5 MG tablet   Commonly known as:  COUMADIN   Take 1 tablet (7.5 mg) by mouth daily   Last time this was given:  5 mg on 6/30/2017  7:57 PM   Next Dose Due:  Today 7/1/17   Notes to Patient:  Also take this dose on Sunday 7/2/17 and have INR checked on Monday 7/3/17                                   * Notice:  This list has 2 medication(s) that are the same as other medications prescribed for you. Read the directions carefully, and ask your doctor or other care provider to review them with you.

## 2017-06-28 NOTE — TELEPHONE ENCOUNTER
Patient states that he felt like he had a fever this afternoon.    First time he checked it was 101.6 F. Patient put ice pack on his neck. Brought his fever to 100.8.    Please advise. Triage to call the patient back.  Elissa Portillo RN

## 2017-06-28 NOTE — TELEPHONE ENCOUNTER
I guthrie end some antibiotics to the patient, but if fever prist with abdominal ;ain, he may need to go to ER for further evaluation as some time gall bladder inflamation can cause some infection as well.

## 2017-06-28 NOTE — IP AVS SNAPSHOT
[facility] [address] [facility]    [address] [place] [address]    Phone:  [phone]                                       After Visit Summary   6/28/2017    Rob Beavers    MRN: [id number]           After Visit Summary Signature Page     I have received my discharge instructions, and my questions have been answered. I have discussed any challenges I see with this plan with the nurse or doctor.    ..........................................................................................................................................  Patient/Patient Representative Signature      ..........................................................................................................................................  Patient Representative Print Name and Relationship to Patient    ..................................................               ................................................  Date                                            Time    ..........................................................................................................................................  Reviewed by Signature/Title    ...................................................              ..............................................  Date                                                            Time

## 2017-06-28 NOTE — TELEPHONE ENCOUNTER
Patient states that his abdominal pain is less than yesterday. States that he only gets a little twinge when he moves. Has not needed pain medication today. Yesterday he needed oxycodone.    Current temp is 99.2. Patient has not taken any medication to reduce his fever.    Huddled with Dr. Santana. Due to results of scan and new fever, elevated white count, patient needs to be evaluated.    Patient notified. Agrees with plan.    Elissa Portillo RN

## 2017-06-29 ENCOUNTER — ANESTHESIA EVENT (OUTPATIENT)
Dept: SURGERY | Facility: CLINIC | Age: 72
DRG: 419 | End: 2017-06-29
Payer: COMMERCIAL

## 2017-06-29 ENCOUNTER — ANESTHESIA (OUTPATIENT)
Dept: SURGERY | Facility: CLINIC | Age: 72
DRG: 419 | End: 2017-06-29
Payer: COMMERCIAL

## 2017-06-29 PROBLEM — K80.20 CHOLELITHIASIS: Status: ACTIVE | Noted: 2017-06-29

## 2017-06-29 LAB
ABO + RH BLD: NORMAL
ABO + RH BLD: NORMAL
ALBUMIN SERPL-MCNC: 2.6 G/DL (ref 3.4–5)
ALBUMIN SERPL-MCNC: 2.8 G/DL (ref 3.4–5)
ALBUMIN UR-MCNC: 10 MG/DL
ALP SERPL-CCNC: 78 U/L (ref 40–150)
ALP SERPL-CCNC: 86 U/L (ref 40–150)
ALT SERPL W P-5'-P-CCNC: 16 U/L (ref 0–70)
ALT SERPL W P-5'-P-CCNC: 18 U/L (ref 0–70)
APPEARANCE UR: CLEAR
AST SERPL W P-5'-P-CCNC: 12 U/L (ref 0–45)
AST SERPL W P-5'-P-CCNC: 15 U/L (ref 0–45)
BILIRUB DIRECT SERPL-MCNC: 0.4 MG/DL (ref 0–0.2)
BILIRUB DIRECT SERPL-MCNC: 0.5 MG/DL (ref 0–0.2)
BILIRUB SERPL-MCNC: 1.3 MG/DL (ref 0.2–1.3)
BILIRUB SERPL-MCNC: 1.5 MG/DL (ref 0.2–1.3)
BILIRUB UR QL STRIP: NEGATIVE
BLD GP AB SCN SERPL QL: NORMAL
BLD PROD TYP BPU: NORMAL
BLD UNIT ID BPU: 0
BLOOD BANK CMNT PATIENT-IMP: NORMAL
BLOOD PRODUCT CODE: NORMAL
BPU ID: NORMAL
COLOR UR AUTO: YELLOW
GLUCOSE UR STRIP-MCNC: NEGATIVE MG/DL
HGB UR QL STRIP: NEGATIVE
INR PPP: 1.59 (ref 0.86–1.14)
INR PPP: 2.05 (ref 0.86–1.14)
KETONES UR STRIP-MCNC: 40 MG/DL
LEUKOCYTE ESTERASE UR QL STRIP: NEGATIVE
MUCOUS THREADS #/AREA URNS LPF: PRESENT /LPF
NITRATE UR QL: NEGATIVE
PH UR STRIP: 6 PH (ref 5–7)
PROT SERPL-MCNC: 6.5 G/DL (ref 6.8–8.8)
PROT SERPL-MCNC: 6.8 G/DL (ref 6.8–8.8)
RBC #/AREA URNS AUTO: <1 /HPF (ref 0–2)
SP GR UR STRIP: 1.01 (ref 1–1.03)
SPECIMEN EXP DATE BLD: NORMAL
TRANSFUSION STATUS PATIENT QL: NORMAL
TRANSFUSION STATUS PATIENT QL: NORMAL
URN SPEC COLLECT METH UR: ABNORMAL
UROBILINOGEN UR STRIP-MCNC: 2 MG/DL (ref 0–2)
WBC #/AREA URNS AUTO: 1 /HPF (ref 0–2)

## 2017-06-29 PROCEDURE — 27210794 ZZH OR GENERAL SUPPLY STERILE: Performed by: SURGERY

## 2017-06-29 PROCEDURE — 88304 TISSUE EXAM BY PATHOLOGIST: CPT | Mod: 26 | Performed by: SURGERY

## 2017-06-29 PROCEDURE — 25000125 ZZHC RX 250: Performed by: SURGERY

## 2017-06-29 PROCEDURE — 25000128 H RX IP 250 OP 636: Performed by: ANESTHESIOLOGY

## 2017-06-29 PROCEDURE — 25000128 H RX IP 250 OP 636

## 2017-06-29 PROCEDURE — 88304 TISSUE EXAM BY PATHOLOGIST: CPT | Performed by: SURGERY

## 2017-06-29 PROCEDURE — 40000344 ZZHCL STATISTIC THAWING COMPONENT: Performed by: SURGERY

## 2017-06-29 PROCEDURE — 40000170 ZZH STATISTIC PRE-PROCEDURE ASSESSMENT II: Performed by: SURGERY

## 2017-06-29 PROCEDURE — 27210995 ZZH RX 272: Performed by: SURGERY

## 2017-06-29 PROCEDURE — 25800025 ZZH RX 258: Performed by: SURGERY

## 2017-06-29 PROCEDURE — 25000132 ZZH RX MED GY IP 250 OP 250 PS 637: Performed by: INTERNAL MEDICINE

## 2017-06-29 PROCEDURE — 99221 1ST HOSP IP/OBS SF/LOW 40: CPT | Mod: 57 | Performed by: SURGERY

## 2017-06-29 PROCEDURE — 86900 BLOOD TYPING SEROLOGIC ABO: CPT | Performed by: SURGERY

## 2017-06-29 PROCEDURE — 37000009 ZZH ANESTHESIA TECHNICAL FEE, EACH ADDTL 15 MIN: Performed by: SURGERY

## 2017-06-29 PROCEDURE — 25000125 ZZHC RX 250: Performed by: ANESTHESIOLOGY

## 2017-06-29 PROCEDURE — 36000063 ZZH SURGERY LEVEL 4 EA 15 ADDTL MIN: Performed by: SURGERY

## 2017-06-29 PROCEDURE — 36415 COLL VENOUS BLD VENIPUNCTURE: CPT | Performed by: INTERNAL MEDICINE

## 2017-06-29 PROCEDURE — P9059 PLASMA, FRZ BETWEEN 8-24HOUR: HCPCS | Performed by: SURGERY

## 2017-06-29 PROCEDURE — 47562 LAPAROSCOPIC CHOLECYSTECTOMY: CPT | Mod: 22 | Performed by: SURGERY

## 2017-06-29 PROCEDURE — 81001 URINALYSIS AUTO W/SCOPE: CPT | Performed by: EMERGENCY MEDICINE

## 2017-06-29 PROCEDURE — 12000000 ZZH R&B MED SURG/OB

## 2017-06-29 PROCEDURE — 37000008 ZZH ANESTHESIA TECHNICAL FEE, 1ST 30 MIN: Performed by: SURGERY

## 2017-06-29 PROCEDURE — 25000128 H RX IP 250 OP 636: Performed by: NURSE ANESTHETIST, CERTIFIED REGISTERED

## 2017-06-29 PROCEDURE — 25000125 ZZHC RX 250: Performed by: NURSE ANESTHETIST, CERTIFIED REGISTERED

## 2017-06-29 PROCEDURE — 25000128 H RX IP 250 OP 636: Performed by: INTERNAL MEDICINE

## 2017-06-29 PROCEDURE — 93005 ELECTROCARDIOGRAM TRACING: CPT

## 2017-06-29 PROCEDURE — 85610 PROTHROMBIN TIME: CPT | Performed by: INTERNAL MEDICINE

## 2017-06-29 PROCEDURE — 36000093 ZZH SURGERY LEVEL 4 1ST 30 MIN: Performed by: SURGERY

## 2017-06-29 PROCEDURE — 47562 LAPAROSCOPIC CHOLECYSTECTOMY: CPT | Mod: AS | Performed by: PHYSICIAN ASSISTANT

## 2017-06-29 PROCEDURE — 99232 SBSQ HOSP IP/OBS MODERATE 35: CPT | Performed by: INTERNAL MEDICINE

## 2017-06-29 PROCEDURE — 80076 HEPATIC FUNCTION PANEL: CPT | Performed by: INTERNAL MEDICINE

## 2017-06-29 PROCEDURE — 25000125 ZZHC RX 250: Performed by: INTERNAL MEDICINE

## 2017-06-29 PROCEDURE — 86901 BLOOD TYPING SEROLOGIC RH(D): CPT | Performed by: SURGERY

## 2017-06-29 PROCEDURE — 93010 ELECTROCARDIOGRAM REPORT: CPT | Performed by: INTERNAL MEDICINE

## 2017-06-29 PROCEDURE — 86850 RBC ANTIBODY SCREEN: CPT | Performed by: SURGERY

## 2017-06-29 PROCEDURE — 0FT44ZZ RESECTION OF GALLBLADDER, PERCUTANEOUS ENDOSCOPIC APPROACH: ICD-10-PCS | Performed by: SURGERY

## 2017-06-29 PROCEDURE — 71000012 ZZH RECOVERY PHASE 1 LEVEL 1 FIRST HR: Performed by: SURGERY

## 2017-06-29 PROCEDURE — 25000125 ZZHC RX 250

## 2017-06-29 PROCEDURE — 25000125 ZZHC RX 250: Performed by: PHYSICIAN ASSISTANT

## 2017-06-29 PROCEDURE — 25000566 ZZH SEVOFLURANE, EA 15 MIN: Performed by: SURGERY

## 2017-06-29 RX ORDER — BUPIVACAINE HYDROCHLORIDE AND EPINEPHRINE 2.5; 5 MG/ML; UG/ML
INJECTION, SOLUTION INFILTRATION; PERINEURAL PRN
Status: DISCONTINUED | OUTPATIENT
Start: 2017-06-29 | End: 2017-06-29 | Stop reason: HOSPADM

## 2017-06-29 RX ORDER — GLYCOPYRROLATE 0.2 MG/ML
INJECTION, SOLUTION INTRAMUSCULAR; INTRAVENOUS PRN
Status: DISCONTINUED | OUTPATIENT
Start: 2017-06-29 | End: 2017-06-29

## 2017-06-29 RX ORDER — FENTANYL CITRATE 0.05 MG/ML
25-50 INJECTION, SOLUTION INTRAMUSCULAR; INTRAVENOUS
Status: DISCONTINUED | OUTPATIENT
Start: 2017-06-29 | End: 2017-06-29 | Stop reason: HOSPADM

## 2017-06-29 RX ORDER — POLYETHYLENE GLYCOL 3350 17 G/17G
17 POWDER, FOR SOLUTION ORAL DAILY PRN
Status: DISCONTINUED | OUTPATIENT
Start: 2017-06-29 | End: 2017-07-01 | Stop reason: HOSPADM

## 2017-06-29 RX ORDER — PIPERACILLIN SODIUM, TAZOBACTAM SODIUM 3; .375 G/15ML; G/15ML
3.38 INJECTION, POWDER, LYOPHILIZED, FOR SOLUTION INTRAVENOUS EVERY 6 HOURS
Status: DISCONTINUED | OUTPATIENT
Start: 2017-06-29 | End: 2017-06-30

## 2017-06-29 RX ORDER — POTASSIUM CL/LIDO/0.9 % NACL 10MEQ/0.1L
10 INTRAVENOUS SOLUTION, PIGGYBACK (ML) INTRAVENOUS
Status: DISCONTINUED | OUTPATIENT
Start: 2017-06-29 | End: 2017-07-01 | Stop reason: HOSPADM

## 2017-06-29 RX ORDER — EPHEDRINE SULFATE 50 MG/ML
INJECTION, SOLUTION INTRAMUSCULAR; INTRAVENOUS; SUBCUTANEOUS PRN
Status: DISCONTINUED | OUTPATIENT
Start: 2017-06-29 | End: 2017-06-29

## 2017-06-29 RX ORDER — PROPOFOL 10 MG/ML
INJECTION, EMULSION INTRAVENOUS PRN
Status: DISCONTINUED | OUTPATIENT
Start: 2017-06-29 | End: 2017-06-29

## 2017-06-29 RX ORDER — MAGNESIUM HYDROXIDE 1200 MG/15ML
LIQUID ORAL PRN
Status: DISCONTINUED | OUTPATIENT
Start: 2017-06-29 | End: 2017-06-29 | Stop reason: HOSPADM

## 2017-06-29 RX ORDER — LABETALOL HYDROCHLORIDE 5 MG/ML
INJECTION, SOLUTION INTRAVENOUS PRN
Status: DISCONTINUED | OUTPATIENT
Start: 2017-06-29 | End: 2017-06-29

## 2017-06-29 RX ORDER — DILTIAZEM HYDROCHLORIDE 5 MG/ML
INJECTION INTRAVENOUS PRN
Status: DISCONTINUED | OUTPATIENT
Start: 2017-06-29 | End: 2017-06-29

## 2017-06-29 RX ORDER — SODIUM CHLORIDE, SODIUM LACTATE, POTASSIUM CHLORIDE, CALCIUM CHLORIDE 600; 310; 30; 20 MG/100ML; MG/100ML; MG/100ML; MG/100ML
INJECTION, SOLUTION INTRAVENOUS CONTINUOUS
Status: DISCONTINUED | OUTPATIENT
Start: 2017-06-29 | End: 2017-06-29 | Stop reason: HOSPADM

## 2017-06-29 RX ORDER — POTASSIUM CHLORIDE 7.45 MG/ML
10 INJECTION INTRAVENOUS
Status: DISCONTINUED | OUTPATIENT
Start: 2017-06-29 | End: 2017-07-01 | Stop reason: HOSPADM

## 2017-06-29 RX ORDER — NALOXONE HYDROCHLORIDE 0.4 MG/ML
.1-.4 INJECTION, SOLUTION INTRAMUSCULAR; INTRAVENOUS; SUBCUTANEOUS
Status: DISCONTINUED | OUTPATIENT
Start: 2017-06-29 | End: 2017-07-01 | Stop reason: HOSPADM

## 2017-06-29 RX ORDER — AMOXICILLIN 250 MG
1-2 CAPSULE ORAL 2 TIMES DAILY PRN
Status: DISCONTINUED | OUTPATIENT
Start: 2017-06-29 | End: 2017-06-30

## 2017-06-29 RX ORDER — MEPERIDINE HYDROCHLORIDE 25 MG/ML
12.5 INJECTION INTRAMUSCULAR; INTRAVENOUS; SUBCUTANEOUS EVERY 5 MIN PRN
Status: DISCONTINUED | OUTPATIENT
Start: 2017-06-29 | End: 2017-06-29 | Stop reason: HOSPADM

## 2017-06-29 RX ORDER — PROPRANOLOL HYDROCHLORIDE 80 MG/1
80 CAPSULE, EXTENDED RELEASE ORAL DAILY
Status: DISCONTINUED | OUTPATIENT
Start: 2017-06-29 | End: 2017-07-01 | Stop reason: HOSPADM

## 2017-06-29 RX ORDER — ONDANSETRON 4 MG/1
4 TABLET, ORALLY DISINTEGRATING ORAL EVERY 30 MIN PRN
Status: DISCONTINUED | OUTPATIENT
Start: 2017-06-29 | End: 2017-06-29 | Stop reason: HOSPADM

## 2017-06-29 RX ORDER — LIDOCAINE HYDROCHLORIDE 20 MG/ML
INJECTION, SOLUTION INFILTRATION; PERINEURAL PRN
Status: DISCONTINUED | OUTPATIENT
Start: 2017-06-29 | End: 2017-06-29

## 2017-06-29 RX ORDER — POTASSIUM CHLORIDE 1500 MG/1
20-40 TABLET, EXTENDED RELEASE ORAL
Status: DISCONTINUED | OUTPATIENT
Start: 2017-06-29 | End: 2017-07-01 | Stop reason: HOSPADM

## 2017-06-29 RX ORDER — FENTANYL CITRATE 50 UG/ML
INJECTION, SOLUTION INTRAMUSCULAR; INTRAVENOUS PRN
Status: DISCONTINUED | OUTPATIENT
Start: 2017-06-29 | End: 2017-06-29

## 2017-06-29 RX ORDER — ONDANSETRON 4 MG/1
4 TABLET, ORALLY DISINTEGRATING ORAL EVERY 6 HOURS PRN
Status: DISCONTINUED | OUTPATIENT
Start: 2017-06-29 | End: 2017-07-01 | Stop reason: HOSPADM

## 2017-06-29 RX ORDER — NEOSTIGMINE METHYLSULFATE 1 MG/ML
VIAL (ML) INJECTION PRN
Status: DISCONTINUED | OUTPATIENT
Start: 2017-06-29 | End: 2017-06-29

## 2017-06-29 RX ORDER — OXYCODONE HYDROCHLORIDE 5 MG/1
5-10 TABLET ORAL
Status: DISCONTINUED | OUTPATIENT
Start: 2017-06-29 | End: 2017-07-01 | Stop reason: HOSPADM

## 2017-06-29 RX ORDER — DEXAMETHASONE SODIUM PHOSPHATE 4 MG/ML
INJECTION, SOLUTION INTRA-ARTICULAR; INTRALESIONAL; INTRAMUSCULAR; INTRAVENOUS; SOFT TISSUE PRN
Status: DISCONTINUED | OUTPATIENT
Start: 2017-06-29 | End: 2017-06-29

## 2017-06-29 RX ORDER — POTASSIUM CHLORIDE 29.8 MG/ML
20 INJECTION INTRAVENOUS
Status: DISCONTINUED | OUTPATIENT
Start: 2017-06-29 | End: 2017-07-01 | Stop reason: HOSPADM

## 2017-06-29 RX ORDER — HYDROMORPHONE HYDROCHLORIDE 1 MG/ML
.3-.5 INJECTION, SOLUTION INTRAMUSCULAR; INTRAVENOUS; SUBCUTANEOUS
Status: DISCONTINUED | OUTPATIENT
Start: 2017-06-29 | End: 2017-07-01 | Stop reason: HOSPADM

## 2017-06-29 RX ORDER — POTASSIUM CHLORIDE 1.5 G/1.58G
20-40 POWDER, FOR SOLUTION ORAL
Status: DISCONTINUED | OUTPATIENT
Start: 2017-06-29 | End: 2017-07-01 | Stop reason: HOSPADM

## 2017-06-29 RX ORDER — DILTIAZEM HYDROCHLORIDE 5 MG/ML
0.25 INJECTION INTRAVENOUS ONCE
Status: COMPLETED | OUTPATIENT
Start: 2017-06-29 | End: 2017-06-29

## 2017-06-29 RX ORDER — ONDANSETRON 2 MG/ML
4 INJECTION INTRAMUSCULAR; INTRAVENOUS EVERY 6 HOURS PRN
Status: DISCONTINUED | OUTPATIENT
Start: 2017-06-29 | End: 2017-07-01 | Stop reason: HOSPADM

## 2017-06-29 RX ORDER — FENTANYL CITRATE 50 UG/ML
25-50 INJECTION, SOLUTION INTRAMUSCULAR; INTRAVENOUS
Status: DISCONTINUED | OUTPATIENT
Start: 2017-06-29 | End: 2017-06-29 | Stop reason: HOSPADM

## 2017-06-29 RX ORDER — ESMOLOL HYDROCHLORIDE 10 MG/ML
INJECTION INTRAVENOUS PRN
Status: DISCONTINUED | OUTPATIENT
Start: 2017-06-29 | End: 2017-06-29

## 2017-06-29 RX ORDER — LORATADINE 10 MG/1
10 TABLET ORAL DAILY
COMMUNITY

## 2017-06-29 RX ORDER — ONDANSETRON 2 MG/ML
4 INJECTION INTRAMUSCULAR; INTRAVENOUS EVERY 30 MIN PRN
Status: DISCONTINUED | OUTPATIENT
Start: 2017-06-29 | End: 2017-06-29 | Stop reason: HOSPADM

## 2017-06-29 RX ADMIN — PANTOPRAZOLE SODIUM 40 MG: 40 INJECTION, POWDER, FOR SOLUTION INTRAVENOUS at 07:46

## 2017-06-29 RX ADMIN — PROPOFOL 200 MG: 10 INJECTION, EMULSION INTRAVENOUS at 13:29

## 2017-06-29 RX ADMIN — PIPERACILLIN SODIUM,TAZOBACTAM SODIUM 3.38 G: 3; .375 INJECTION, POWDER, FOR SOLUTION INTRAVENOUS at 05:32

## 2017-06-29 RX ADMIN — GLYCOPYRROLATE 0.6 MG: 0.2 INJECTION, SOLUTION INTRAMUSCULAR; INTRAVENOUS at 14:56

## 2017-06-29 RX ADMIN — PHYTONADIONE 1 MG: 10 INJECTION, EMULSION INTRAMUSCULAR; INTRAVENOUS; SUBCUTANEOUS at 02:37

## 2017-06-29 RX ADMIN — PHENYLEPHRINE HYDROCHLORIDE 150 MCG: 10 INJECTION, SOLUTION INTRAMUSCULAR; INTRAVENOUS; SUBCUTANEOUS at 13:50

## 2017-06-29 RX ADMIN — ROCURONIUM BROMIDE 20 MG: 10 INJECTION INTRAVENOUS at 13:45

## 2017-06-29 RX ADMIN — NEOSTIGMINE METHYLSULFATE 4 MG: 1 INJECTION INTRAMUSCULAR; INTRAVENOUS; SUBCUTANEOUS at 14:56

## 2017-06-29 RX ADMIN — PHENYLEPHRINE HYDROCHLORIDE: 10 INJECTION, SOLUTION INTRAMUSCULAR; INTRAVENOUS; SUBCUTANEOUS at 13:49

## 2017-06-29 RX ADMIN — FENTANYL CITRATE 50 MCG: 50 INJECTION, SOLUTION INTRAMUSCULAR; INTRAVENOUS at 13:29

## 2017-06-29 RX ADMIN — SODIUM CHLORIDE, POTASSIUM CHLORIDE, SODIUM LACTATE AND CALCIUM CHLORIDE: 600; 310; 30; 20 INJECTION, SOLUTION INTRAVENOUS at 14:20

## 2017-06-29 RX ADMIN — PHENYLEPHRINE HYDROCHLORIDE 100 MCG: 10 INJECTION, SOLUTION INTRAMUSCULAR; INTRAVENOUS; SUBCUTANEOUS at 13:47

## 2017-06-29 RX ADMIN — SODIUM CHLORIDE, POTASSIUM CHLORIDE, SODIUM LACTATE AND CALCIUM CHLORIDE: 600; 310; 30; 20 INJECTION, SOLUTION INTRAVENOUS at 11:43

## 2017-06-29 RX ADMIN — DEXAMETHASONE SODIUM PHOSPHATE 4 MG: 4 INJECTION, SOLUTION INTRA-ARTICULAR; INTRALESIONAL; INTRAMUSCULAR; INTRAVENOUS; SOFT TISSUE at 13:40

## 2017-06-29 RX ADMIN — DEXTROSE AND SODIUM CHLORIDE: 5; 900 INJECTION, SOLUTION INTRAVENOUS at 16:55

## 2017-06-29 RX ADMIN — ONDANSETRON 4 MG: 2 INJECTION INTRAMUSCULAR; INTRAVENOUS at 13:40

## 2017-06-29 RX ADMIN — ROCURONIUM BROMIDE 10 MG: 10 INJECTION INTRAVENOUS at 14:15

## 2017-06-29 RX ADMIN — ROCURONIUM BROMIDE 20 MG: 10 INJECTION INTRAVENOUS at 14:00

## 2017-06-29 RX ADMIN — HYDROMORPHONE HYDROCHLORIDE 0.5 MG: 1 INJECTION, SOLUTION INTRAMUSCULAR; INTRAVENOUS; SUBCUTANEOUS at 14:00

## 2017-06-29 RX ADMIN — LIDOCAINE HYDROCHLORIDE 60 MG: 20 INJECTION, SOLUTION INFILTRATION; PERINEURAL at 13:29

## 2017-06-29 RX ADMIN — LABETALOL HYDROCHLORIDE 10 MG: 5 INJECTION, SOLUTION INTRAVENOUS at 14:10

## 2017-06-29 RX ADMIN — PIPERACILLIN SODIUM,TAZOBACTAM SODIUM 3.38 G: 3; .375 INJECTION, POWDER, FOR SOLUTION INTRAVENOUS at 11:43

## 2017-06-29 RX ADMIN — DEXTROSE AND SODIUM CHLORIDE: 5; 900 INJECTION, SOLUTION INTRAVENOUS at 00:58

## 2017-06-29 RX ADMIN — Medication 5 MG: at 13:50

## 2017-06-29 RX ADMIN — DILTIAZEM HYDROCHLORIDE 23.55 MG: 5 INJECTION INTRAVENOUS at 15:30

## 2017-06-29 RX ADMIN — PIPERACILLIN SODIUM,TAZOBACTAM SODIUM 3.38 G: 3; .375 INJECTION, POWDER, FOR SOLUTION INTRAVENOUS at 18:26

## 2017-06-29 RX ADMIN — FENTANYL CITRATE 50 MCG: 50 INJECTION, SOLUTION INTRAMUSCULAR; INTRAVENOUS at 13:35

## 2017-06-29 RX ADMIN — PHENYLEPHRINE HYDROCHLORIDE 150 MCG: 10 INJECTION, SOLUTION INTRAMUSCULAR; INTRAVENOUS; SUBCUTANEOUS at 13:52

## 2017-06-29 RX ADMIN — ESMOLOL HYDROCHLORIDE 50 MG: 10 INJECTION, SOLUTION INTRAVENOUS at 14:07

## 2017-06-29 RX ADMIN — MIDAZOLAM HYDROCHLORIDE 2 MG: 1 INJECTION, SOLUTION INTRAMUSCULAR; INTRAVENOUS at 13:29

## 2017-06-29 RX ADMIN — PROPRANOLOL HYDROCHLORIDE 80 MG: 80 CAPSULE, EXTENDED RELEASE ORAL at 08:09

## 2017-06-29 RX ADMIN — HYDROMORPHONE HYDROCHLORIDE 0.5 MG: 1 INJECTION, SOLUTION INTRAMUSCULAR; INTRAVENOUS; SUBCUTANEOUS at 15:06

## 2017-06-29 RX ADMIN — PHENYLEPHRINE HYDROCHLORIDE 150 MCG: 10 INJECTION, SOLUTION INTRAMUSCULAR; INTRAVENOUS; SUBCUTANEOUS at 13:56

## 2017-06-29 RX ADMIN — SUCCINYLCHOLINE CHLORIDE 160 MG: 20 INJECTION, SOLUTION INTRAMUSCULAR; INTRAVENOUS at 13:29

## 2017-06-29 ASSESSMENT — ACTIVITIES OF DAILY LIVING (ADL)
BATHING: 0-->INDEPENDENT
TRANSFERRING: 0-->INDEPENDENT
RETIRED_EATING: 0-->INDEPENDENT
DRESS: 0-->INDEPENDENT
TOILETING: 0-->INDEPENDENT
RETIRED_COMMUNICATION: 0-->UNDERSTANDS/COMMUNICATES WITHOUT DIFFICULTY
COGNITION: 0 - NO COGNITION ISSUES REPORTED
AMBULATION: 0-->INDEPENDENT
SWALLOWING: 0-->SWALLOWS FOODS/LIQUIDS WITHOUT DIFFICULTY
FALL_HISTORY_WITHIN_LAST_SIX_MONTHS: NO

## 2017-06-29 NOTE — PROGRESS NOTES
[facility]    Hospitalist Progress Note    Assessment & Plan   Assessment & Plan     Rob Beavers is a 71 year old male who presented with abdominal pain.     Acute calculus cholecystitis  -initially presented to the emergency department on 24 June with abdominal pain and nausea.  At that time he was found to have gallstones was discharged home.    -re-presented with pain abdomen,temperature of 101.6 degrees   -Evaluated by surgery  -Plan is for laparoscopic cholecystectomy today with intraoperative cholangiogram  -Bilirubin slightly high, we will have to consult GI if intraoperative cholangiogram is abnormal  -Continue on IV Zosyn  -Anticoagulation reversal as described below     History of pulmonary emboli  Mr. Beavers reports history of pulmonary embolism a couple of years ago.    -No known hypercoagulable condition  -hold  Coumadin  -INR reversed with vitamin K and subsequently FFP  -Last INR 1.59  -Resume anticoagulation post-procedure once okay with surgery; likely will need Lovenox bridge     Hypertension  PTA- lisinopril 10 mg as well as propranolol 80 mg daily, although the propranolol is for tremors  -Resume once he is able to take PO     Andrews esophagus  We'll continue PPI     D/W: RN  DVT Prophylaxis: Warfarin  Code Status: Full Code    Disposition: Expected discharge in 1-2 days    Yo Li MD    Interval History   Abdominal pain much better today.  Low-grade fever.  No nausea or vomiting    -Data reviewed today: I reviewed all new labs and imaging results over the last 24 hours. I personally reviewed no images or EKG's today.    Physical Exam   Temp: 98.5  F (36.9  C) Temp src: Tympanic BP: 138/76 Pulse: 74 Heart Rate: 73 Resp: 16 SpO2: 96 % O2 Device: None (Room air)    Vitals:    06/1945 06/29/17 0100   Weight: 93 kg (205 lb) 94.2 kg (207 lb 11.2 oz)     Vital Signs with Ranges  Temp:  [98.1  F (36.7  C)-99.7  F (37.6  C)] 98.5  F (36.9  C)  Pulse:  [72-90] 74  Heart  Rate:  [66-90] 73  Resp:  [11-18] 16  BP: (130-140)/(74-93) 138/76  SpO2:  [95 %-97 %] 96 %  I/O last 3 completed shifts:  In: 440 [I.V.:440]  Out: 400 [Urine:400]    Constitutional: AAOX3, NAD, Appears comfortable  HEENT: Moist oral mucosa, no oral lesions, No pallor or icterus  Respiratory:  No crackles, No wheezes, CTA B/L, Normal WOB  Cardiovascular: RRR, No murmur  GI: Soft, Non- tender, BS- normoactive, No Guarding/rebound/rigidity  Skin/Integument: Warm and dry, no rashes  MSK: No joint deformity or swelling, no edema  Neuro: CN- grossly intact, Motor strength 5/5 on all 4 extremities.      Medications     dextrose 5% and 0.9% NaCl 100 mL/hr at 06/29/17 0058     no pre procedure antibiotic needed       lactated ringers       lactated ringers         [Auto Hold] propranolol  80 mg Oral Daily     [Auto Hold] pantoprazole  40 mg Intravenous QAM AC     [Auto Hold] piperacillin-tazobactam  3.375 g Intravenous Q6H     fentaNYL  25-50 mcg Intravenous Pre-Op/Pre-procedure x 1 dose     midazolam  1-4 mg Intravenous Pre-Op/Pre-procedure x 1 dose     sodium chloride (PF)  3 mL Intracatheter Q8H       Data     Recent Labs  Lab 06/29/17  0920 06/29/17  0754 06/29/17  0235 06/28/17  2135 06/27/17  1202 06/24/17  0405   WBC  --   --   --  12.9* 14.2* 11.1*   HGB  --   --   --  15.2 15.4 15.6   MCV  --   --   --  89 91 90   PLT  --   --   --  238 261 241   INR 1.59*  --  2.05* 2.05*  --  2.37*   NA  --   --   --  134 137 139   POTASSIUM  --   --   --  3.6 3.8 4.6   CHLORIDE  --   --   --  97 102 106   CO2  --   --   --  25 28 26   BUN  --   --   --  13 13 14   CR  --   --   --  0.90 0.86 0.82   ANIONGAP  --   --   --  12 7 7   SUKHWINDER  --   --   --  8.3* 8.5 8.6   GLC  --   --   --  117* 119* 158*   ALBUMIN  --  2.8* 2.6* 3.0* 3.4 3.8   PROTTOTAL  --  6.8 6.5* 7.5 7.5 7.7   BILITOTAL  --  1.5* 1.3 1.4* 1.0 0.7   ALKPHOS  --  86 78 91 79 80   ALT  --  18 16 19 20 25   AST  --  15 12 16 15 25   LIPASE  --   --   --  127  --  139        Recent Results (from the past 24 hour(s))   US Abdomen Limited    Narrative    US ABDOMEN LIMITED   6/28/2017 10:21 PM     HISTORY: Right upper quadrant pain.    COMPARISON: Ultrasound abdomen 6/24/2017.    FINDINGS:    Gallbladder: Cholelithiasis demonstrated with a 1.5 cm gallstone at  the gallbladder neck. Negative sonographic Garza's sign. There is  mild gallbladder wall thickening measuring 0.5 cm.    Bile ducts: CHD is normal diameter. No intrahepatic biliary  dilatation.    Liver: Fatty infiltration of the liver.    Pancreas: Partially obscured, but grossly unremarkable.     Right kidney: Normal.     Aorta and IVC: Not specifically assessed.       Impression    IMPRESSION:    1. Cholelithiasis with gallstone localizing to the gallbladder neck.  Mild gallbladder wall thickening. Negative sonographic Garza's sign.  Cholecystitis not excluded based on this exam alone. No biliary  dilatation.  2. Fatty infiltration of the liver.    OMKAR LUONG MD

## 2017-06-29 NOTE — ANESTHESIA POSTPROCEDURE EVALUATION
Patient: Rob Beavers    Procedure(s):  LAPAROSCOPIC CHOLECYSTECTOMY WITH INTROPERATIVE CHOLANGIOGRAMS. - Wound Class: II-Clean Contaminated    Diagnosis:ACUTE CHOLECYSTITIS  Diagnosis Additional Information: No value filed.    Anesthesia Type:  General, ETT    Note:  Anesthesia Post Evaluation    Patient location during evaluation: PACU  Patient participation: Able to fully participate in evaluation  Level of consciousness: awake  Pain management: adequate  Cardiovascular status: acceptable  Respiratory status: acceptable  Hydration status: acceptable  PONV: none     Anesthetic complications: None    Comments: Patient went into Atrial fib shortly after incision.  SBP elevated, otherwise stable.  Given esmolol 50 mg, then labetalol 10 mg.  Patient remained in Atrial fib in PACU.  Given loading dose of diltiazem in PACU.  Patient is comfortable, HR 60s-70s, BP stable.  Discussed with Dr. Holguin of hospitalist service.  Will obtain EKG before releasing to station.          Last vitals:  Vitals:    06/29/17 1530 06/29/17 1536 06/29/17 1545   BP: 132/74 132/74 116/70   Pulse:      Resp: 16 10 14   Temp: 37.5  C (99.5  F) 37.5  C (99.5  F)    SpO2: 96% 94% 95%         Electronically Signed By: TIP APPLE  June 29, 2017  3:59 PM

## 2017-06-29 NOTE — ANESTHESIA PREPROCEDURE EVALUATION
Anesthesia Evaluation     . Pt has had prior anesthetic.     No history of anesthetic complications          ROS/MED HX    ENT/Pulmonary:       Neurologic:       Cardiovascular:     (+) hypertension----. : . . . :. .       METS/Exercise Tolerance:  >4 METS   Hematologic: Comments: FFP for INR 1.59    (+) History of blood clots pt is anticoagulated, -      Musculoskeletal:         GI/Hepatic: Comment: carrion's    (+) GERD Asymptomatic on medication, cholecystitis/cholelithiasis,       Renal/Genitourinary:         Endo:     (+) Obesity, .      Psychiatric:         Infectious Disease:         Malignancy:         Other:                     Physical Exam      Airway   Mallampati: III  TM distance: >3 FB  Neck ROM: full    Dental   (+) caps    Cardiovascular   Rhythm and rate: regular and normal      Pulmonary    breath sounds clear to auscultation                    Anesthesia Plan      History & Physical Review  History and physical reviewed and following examination; no interval change.    ASA Status:  2 .    NPO Status:  > 8 hours    Plan for General and ETT with Intravenous induction. Maintenance will be Balanced.    PONV prophylaxis:  Ondansetron (or other 5HT-3) and Dexamethasone or Solumedrol  Additional equipment: Videolaryngoscope ogt      Postoperative Care      Consents  Anesthetic plan, risks, benefits and alternatives discussed with:  Patient..                          .

## 2017-06-29 NOTE — ED NOTES
Pt has appointment with surgery tomorrow for robert possibly.  Pt started with fever today 100.6  Instructed by  to come here.

## 2017-06-29 NOTE — PROGRESS NOTES
GI Attending  Agree with proceeding to OR for cholecystectomy.  Please call our service if intraoperative cholangiogram is positive.    Momo Ojeda MD  [facility]  [phone] Cell  [phone]  Office

## 2017-06-29 NOTE — ANESTHESIA CARE TRANSFER NOTE
Patient: Rob Beavers    Procedure(s):  LAPAROSCOPIC CHOLECYSTECTOMY WITH INTROPERATIVE CHOLANGIOGRAMS. - Wound Class: II-Clean Contaminated    Diagnosis: ACUTE CHOLECYSTITIS  Diagnosis Additional Information: No value filed.    Anesthesia Type:   General, ETT     Note:  Airway :Face Mask  Patient transferred to:PACU        Vitals: (Last set prior to Anesthesia Care Transfer)    CRNA VITALS  6/29/2017 1444 - 6/29/2017 1528      6/29/2017             Pulse: 109    SpO2: 95 %    Resp Rate (set): 10                Electronically Signed By: ALLYSSA Roblero CRNA  June 29, 2017  3:28 PM

## 2017-06-29 NOTE — PROGRESS NOTES
6/29/2017 10:46 AM    Patient transporting to pre-op. Report given to pre-op nurse. Check list completed. PCD's on. Voided prior to procedure. 1 unit of FFP infusing at 200 cc/hr. INR 1.59 and Bili 1.5,  and Dr. Nur aware, Dr Yin wanted patient to still have 1 unit plasma. Patient tolerating, VSS on RA. Up independently. Consents signed. Patient aware of plan.

## 2017-06-29 NOTE — CONSULTS
General Surgery Consultation    Rob Beavers MRN#: [id number]   Age: 71 year old YOB: 1945     Date of Admission:          6/28/2017  Reason for consult: Cholecystitis   Surgeon:      Gian Yin MD   Requesting provider:     Yo Li MD          Chief Complaint:   ACUTE CHOLECYSTITIS         History of Present Illness:   This patient is a 71 year old  male with history of gallstones and PE who presented to the [facility] ER with fever and abdominal pain. He had an appointment for general surgery consultation for outpatient lap robert this week, but due to his fever of 101.6 at home he presented to the ED last night.   Ultrasound revealed cholelithiasis and mild gallbladder wall thickening. The patient's pain has been improving but bilirubin has been slowly trending up. AST, ALT and lipase normal. WBC 12.9. INR 2.05 upon admission, received vitamin K and now 1.59.  Patient denies having any previous episodes or abdominal surgery. History is obtained from the patient and chart. Has not eaten anything today. He has no history of anesthesia reaction.         Past Medical History:    has a past medical history of Andrews's esophagus without dysplasia (4/26/2017); CARDIOVASCULAR SCREENING; LDL GOAL LESS THAN 130 (10/31/2010); and Hypertension goal BP (blood pressure) < 140/90 (1/31/2012).          Past Surgical History:     Past Surgical History:   Procedure Laterality Date     C TOTAL HIP ARTHROPLASTY  2010     DENTAL SURGERY       TONSILLECTOMY  1951            Medications:     Prior to Admission medications    Medication Sig Start Date End Date Taking? Authorizing Provider   loratadine (CLARITIN) 10 MG tablet Take 10 mg by mouth daily   Yes Unknown, Entered By History   oxyCODONE (ROXICODONE) 5 MG IR tablet Take 1 tablet (5 mg) by mouth every 6 hours as needed for pain 6/24/17  Yes Elsa Guerra MD   lisinopril (PRINIVIL/ZESTRIL) 10 MG tablet TAKE  "ONE TABLET (10 MG) BY MOUTH ONE TIME DAILY 4/26/17  Yes Tian Santana MD   omeprazole (PRILOSEC) 40 MG capsule Take 1 capsule (40 mg) by mouth daily Take 30-60 minutes before a meal. 4/26/17  Yes Tian Santana MD   propranolol (INDERAL LA) 80 MG 24 hr capsule TAKE 1 CAPSULE (80 MG) BY MOUTH ONCE A DAY 4/26/17  Yes Tian Santana MD   warfarin (COUMADIN) 5 MG tablet TAKE 1 TAB(5MG) BY MOUTH ON FRIDAYS AND TAKE 1/2 TAB(2.5MG)ALL OTHER DAY/OR AS DIRECTED BY ACC 2/22/17  Yes Tian Santana MD   VITAMIN D, CHOLECALCIFEROL, PO Take 1,000 Units by mouth daily   Yes Reported, Patient   MULTI-VITAMIN PO TABS ONE TABLET DAILY 8/9/10  Yes Osbaldo Myrick MD            Allergies:   No Known Allergies         Social History:     Social History   Substance Use Topics     Smoking status: Never Smoker     Smokeless tobacco: Never Used     Alcohol use 0.0 oz/week     0 Standard drinks or equivalent per week      Comment: moderate             Family History:   POSITIVE for stroke. This patient has no other pertinent family history.  The patient has no family history of any anesthesia problems.          Review of Systems:   Brief ROS is negative other than noted in the HPI.  C: POSITIVE for fever; NEGATIVE for chills, change in weight  R: NEGATIVE for significant cough or SOB  CV: NEGATIVE for chest pain, palpitations or peripheral edema  GI: POSITIVE for abdominal pain; NEGATIVE for nausea, vomiting, heartburn, or change in bowel habits  H: POSITIVE for easy bleeding due to warfarin therapy         Physical Exam:   Blood pressure 140/89, pulse 78, temperature 98.7  F (37.1  C), temperature source Oral, resp. rate 16, height 1.727 m (5' 8\"), weight 94.2 kg (207 lb 11.2 oz), SpO2 97 %.  I/O last 3 completed shifts:  In: 440 [I.V.:440]  Out: 400 [Urine:400]    General - This is a well developed, well nourished male in no apparent distress.  HEENT - Normocephalic. Atraumatic. Moist mucous membranes. Pupils equal.  No scleral " icterus.  Neck - Supple without masses.  Abdomen - Soft, very minimal tenderness to palpation, nondistended with +bowel sounds, no organomegaly or masses palpated  Extremities - Moves all extremities. Warm without edema, no calf tenderness  Neurologic - Nonfocal.          Data:   Labs:  Recent Labs   Lab Test  06/28/17 2135 06/27/17   1202  06/24/17   0405   WBC  12.9*  14.2*  11.1*   HGB  15.2  15.4  15.6   HCT  41.4  44.6  43.3   PLT  238  261  241     Recent Labs   Lab Test  06/28/17 2135 06/27/17   1202  06/24/17   0405   POTASSIUM  3.6  3.8  4.6   CHLORIDE  97  102  106   CO2  25  28  26   BUN  13  13  14   CR  0.90  0.86  0.82     Recent Labs   Lab Test  06/29/17 0754 06/29/17 0235 06/28/17 2135 06/24/17   0405   BILITOTAL  1.5*  1.3  1.4*   < >  0.7   ALT  18  16  19   < >  25   AST  15  12  16   < >  25   ALKPHOS  86  78  91   < >  80   LIPASE   --    --   127   --   139    < > = values in this interval not displayed.     Recent Labs   Lab Test  06/29/17   0920  06/29/17   0235  06/28/17   2135   02/23/10   0515   INR  1.59*  2.05*  2.05*   < >  0.93   PTT   --    --    --    --   25    < > = values in this interval not displayed.     Recent Labs   Lab Test  06/28/17 2135 06/27/17   1202 06/24/17   0405   SUKHWINDER  8.3*  8.5  8.6     Recent Labs   Lab Test  06/29/17   0754  06/29/17   0235  06/29/17   0030  06/28/17 2135 06/27/17   1206  06/27/17   1202  06/24/17   0405   12/13/10   0913   ANIONGAP   --    --    --   12   --   7  7   < >   --    PROTEIN   --    --   10*   --   30*   --    --    --   Negative   ALBUMIN  2.8*  2.6*   --   3.0*   --   3.4  3.8   < >   --     < > = values in this interval not displayed.       Ultrasound of the abdomen:  FINDINGS:    Gallbladder: Cholelithiasis demonstrated with a 1.5 cm gallstone at  the gallbladder neck. Negative sonographic Garza's sign. There is  mild gallbladder wall thickening measuring 0.5 cm.     Bile ducts: CHD is normal diameter. No  intrahepatic biliary  dilatation.     Liver: Fatty infiltration of the liver.     Pancreas: Partially obscured, but grossly unremarkable.      Right kidney: Normal.      Aorta and IVC: Not specifically assessed.          IMPRESSION:    1. Cholelithiasis with gallstone localizing to the gallbladder neck.  Mild gallbladder wall thickening. Negative sonographic Garza's sign.  Cholecystitis not excluded based on this exam alone. No biliary  dilatation.  2. Fatty infiltration of the liver.             Assessment:   Rob Beavers is a 71 year old male with cholelithiasis, likely cholecystitis and elevated bilirubin         Plan:   Recommend laparoscopic cholecystectomy today with intraoperative cholangiogram when INR appropriate. INR currently 1.59, receiving FFP. Continue Zosyn preoperatively. Discussed risks, benefits, alternatives and potential complications of surgery with patient. Understands potential need for additional procedures depending on what cholangiogram reveals. Patient is in agreement with this plan and would like to proceed to the OR.       I have discussed the history, physical, and plan with Dr. Yin who has independently interviewed and examined the patient and agrees with the plan as stated.         Juli David PA-C  [facility]  [phone]

## 2017-06-29 NOTE — BRIEF OP NOTE
[facility] Brief Operative Note    Pre-operative diagnosis: ACUTE CHOLECYSTITIS   Post-operative diagnosis Acute cholecystitis    Procedure: Procedure(s):  LAPAROSCOPIC CHOLECYSTECTOMY WITH INTROPERATIVE CHOLANGIOGRAMS. - Wound Class: II-Clean Contaminated   Surgeon(s): Surgeon(s) and Role:     * Gian Yin MD - Primary     * Juli David PA-C - Assisting     * Hung Mo PA-S - Assisting   Estimated blood loss: 150 mL    Specimens:   ID Type Source Tests Collected by Time Destination   A : gallbladder and contents Tissue Gallbladder and Contents SURGICAL PATHOLOGY EXAM Gian Yin MD 6/29/2017  2:33 PM       Findings: Severely inflamed and necrotic gallbladder. Drain left in gallbladder fossa. No immediate complications. See operative report for full details.       Juli David PA-C  [facility]  [phone]

## 2017-06-29 NOTE — OP NOTE
General Surgery Operative Note    PREOPERATIVE DIAGNOSIS:  ACUTE CHOLECYSTITIS    POSTOPERATIVE DIAGNOSIS:  Acute gangrenous cholecystitis    PROCEDURE:   Procedure(s):  LAPAROSCOPIC CHOLECYSTECTOMY    Difficulty: Extremely difficult with a 22 modifier owing to gangrenous changes in the gallbladder and intense inflammatory changes    ANESTHESIA:  General.    PREOPERATIVE MEDICATIONS:  Zosyn IV.    SURGEON:  Gian Yin MD    ASSISTANT:  Juli David PA-C  A first assistant was necessary owing to challenging laparoscopic visualization and exposure.  Retraction was also necessary.    INDICATIONS:  Patient presents with severe abdominal pain.  Ultrasound showed gallstones and thickened gallbladder wall consistent with acute cholecystitis.    PROCEDURE:  The patient was taken to the operating suite and uneventfully endotracheally intubated.  The abdomen was prepped and draped in a sterile fashion.  Surgeon initiated timeout was acknowledged.  We entered the abdomen in the left upper quadrant using Visiport technique.  Three other trocars were placed under laparoscopic visualization.  There were dense omental adhesions to a very inflamed and tense gallbladder.  We elevated the liver and were able to identify the dome of the gallbladder with several patches of what appeared to be necrosis.  The gallbladder was grasped and used to elevate the liver further, but the gallbladder immediately ruptured given its extreme friability.  Several adhesions flexor: Present attached to the capsule of the liver which were delicately dissected away.  We began dissecting out some fatty adhesions down near the neck of the gallbladder until a cystic duct was encountered.  We continued our dissection using combination of sharp and blunt dissection until the cystic duct was largely dissected out.  We continued our dissection up along the sides of the gallbladder, both medially and laterally, until we had created a space between the  gallbladder and the liver.  At this point, we encountered the cystic artery, just posterior and lateral to the cystic duct.  This again was dissected out.  Once we had created a window where only the cystic artery and duct were noted to be entering the gallbladder, we felt that this represented our critical view.  The cystic artery and duct were then doubly clipped and divided.  We continued our dissection up along the body of the gallbladder, freeing all attachments and adhesions of the gallbladder to the liver.  Gallbladder was removed from the liver in an atraumatic fashion.  The gallbladder was then brought up through the umbilical port site and removed from the abdomen.  The gallbladder fossa was reinspected, and all areas of bleeding were managed with electrocautery.  There continued to be losing along the gallbladder fossa and we used fibrillar as a hemostatic agent.  A drain was placed in the right upper quadrant in the gallbladder fossa.  We irrigated the area with normal saline and aspirated it out.  We then removed the umbilical port trocar and closed the fascia with a figure-of-eight 0 Vicryl suture.  This was done using the Delfino-Charlene device.  We then reinspected the abdomen, and everything appeared to be in pristine condition.  We removed the trocars under laparoscopic visualization and desufflated the abdomen with the Arp suction .  The skin edges were reapproximated with 4-0 Vicryl and Steri-Strips.  The patient was uneventfully extubated, awakened and taken to the PACU in stable condition.  At the conclusion of the case, all lap and needle counts were correct.      ESTIMATED BLOOD LOSS:  150 mL    INTRAOPERATIVE FINDINGS:  Gangrenous cholecystitis    Gian Yin MD, MD

## 2017-06-29 NOTE — H&P
[facility]    History and Physical  Hospitalist       Date of Admission:  6/28/2017  Date of Service (when I saw the patient): 6/28/17    Assessment & Plan   Rob Beavers is a 71 year old male who presents with abdominal pain    Abdominal pain  Mr. Beavers is a pleasant 71-year-old gentleman with a medical history remarkable for pulmonary embolism, hypertension, and Andrews esophagus with known gallstones who presents today with abdominal pain.  Mr. Beavers initially presented to the emergency department on 24 June with abdominal pain and nausea.  At that time he was found to have gallstones was discharged home.  He states his symptoms were stable until the last day or two when he started advancing his diet.  He visited his clinic yesterday with reported imaging performed which confirmed gallstones.  He states he woke up today feeling warm.  He checks temperature is 101.6 degrees Fahrenheit called his clinic and discussed with nurse was advised to come to the emergency department.  Yesterday he was also found to have leukocytosis with white blood cell count of 14.2.  Today in the emergency department his white blood cell count is 12.9.  An ultrasound was performed in the emergency department today which demonstrated cholelithiasis with gallbladder stones localizing to the gallbladder neck.  Assessment mild gallbladder wall thickening.  He had a negative sonographic Garza's sign.  Liver function tests remarkable for total bilirubin at 1.4.  - Mr. Carrington will be admitted to monitor his fever curve and liver function tests.  - For surgery if he has elevation in his bilirubin in the morning we'll likely consult gastroenterology for potential ERCP.  - If his bilirubin is normal the plan will be for laparoscopic cholecystectomy with intraoperative cholangiogram  - N.p.o. status will be maintained  - We'll have him on IV fluids   - We'll have Zofran available.  - We'll also have Dilaudid IV available for  any pain.  - We'll continue on IV Zosyn  - We'll reverse his INR in case surgery is necessary or GI procedure is necessary  - At the time of my exam he has minimal pain and tenderness and is quite comfortable.  - We'll place him on an IV PPI    History of pulmonary emboli  Mr. Beavers reports history of pulmonary embolism a couple of years ago.  Is unaware of any workup that was done for hypercoagulable state.  - We will hold his Coumadin for now  - We'll give him a dose of vitamin K to reverse his INR  - We will have him on PCD's for DVT prophylaxis  - Given his high risk of DVT/PE if he were to remain off Coumadin and I would recommend heparinization.  - We'll repeat an INR in the morning.    Hypertension  Outpatient he's on lisinopril 10 mg as well as propranolol 80 mg daily, although the propranolol is mostly for tremor.  - We'll hold his ACE inhibitor for now given the above.    Andrews esophagus  We'll continue on a PPI as above     DVT Prophylaxis: Pneumatic Compression Devices  Code Status: Full Code    Disposition: Expected discharge  unclear pending ongoing assessment and treatment of his gallbladder disease     Constantin Hammond MD  [phone] (P)  Text Page     Primary Care Physician   Dr Tian Santana    Chief Complaint   Fever and abdominal pain     History is obtained from the patient and medical records    History of Present Illness   Rob Beavers is a 71 year old male who presents with fever and abdominal pain.  Mr. Beavers presented to the emergency department on 24 June with complaints of abdominal pain.  At that time he was found to have gallstones and was sent home. he been doing fine up until two days ago when he started to advance his diet.  He started having increased pain.  He contacted his primary care provider and went to the clinic.  At that time it appeared he had a CAT scan done which patient report showed gallbladder disease.  Surgery reports that he was told his white blood cell  consult was elevated.  Today he woke up with pain as well as a fever to 101.6 at home.  He called his clinic and discussed with the nurse there who advised him to come to the emergency department.    At the time my visit with the patient he is doing well.  His temperatures at 99.7 degrees Fahrenheit.  He states his abdominal pain is minimal at this time.  Otherwise denies any chest pain or shortness of breath.  He denies any nausea or vomiting.      Past Medical History    I have reviewed this patient's medical history and updated it with pertinent information if needed.   Past Medical History:   Diagnosis Date     Andrews's esophagus without dysplasia 4/26/2017     CARDIOVASCULAR SCREENING; LDL GOAL LESS THAN 130 10/31/2010     Hypertension goal BP (blood pressure) < 140/90 1/31/2012       Past Surgical History   I have reviewed this patient's surgical history and updated it with pertinent information if needed.  Past Surgical History:   Procedure Laterality Date     C TOTAL HIP ARTHROPLASTY  2010     DENTAL SURGERY       TONSILLECTOMY  1951       Prior to Admission Medications   Prior to Admission Medications   Prescriptions Last Dose Informant Patient Reported? Taking?   MULTI-VITAMIN PO TABS 6/28/2017 at am  Yes Yes   Sig: ONE TABLET DAILY   VITAMIN D, CHOLECALCIFEROL, PO 6/28/2017 at am  Yes Yes   Sig: Take 1,000 Units by mouth daily   lisinopril (PRINIVIL/ZESTRIL) 10 MG tablet 6/28/2017 at am  No Yes   Sig: TAKE ONE TABLET (10 MG) BY MOUTH ONE TIME DAILY   loratadine (CLARITIN) 10 MG tablet 6/28/2017 at am  Yes Yes   Sig: Take 10 mg by mouth daily   omeprazole (PRILOSEC) 40 MG capsule 6/28/2017 at am  No Yes   Sig: Take 1 capsule (40 mg) by mouth daily Take 30-60 minutes before a meal.   ondansetron (ZOFRAN ODT) 4 MG ODT tab   No No   Sig: Take 1 tablet (4 mg) by mouth every 6 hours as needed for nausea   oxyCODONE (ROXICODONE) 5 MG IR tablet 6/27/2017 at Unknown time  No Yes   Sig: Take 1 tablet (5 mg) by  mouth every 6 hours as needed for pain   propranolol (INDERAL LA) 80 MG 24 hr capsule 6/28/2017 at am  No Yes   Sig: TAKE 1 CAPSULE (80 MG) BY MOUTH ONCE A DAY   warfarin (COUMADIN) 5 MG tablet 6/27/2017 at Unknown time  No Yes   Sig: TAKE 1 TAB(5MG) BY MOUTH ON FRIDAYS AND TAKE 1/2 TAB(2.5MG)ALL OTHER DAY/OR AS DIRECTED BY ACC      Facility-Administered Medications: None     Allergies   No Known Allergies    Social History   I have reviewed this patient's social history and updated it with pertinent information if needed. Rob Beavers  reports that he has never smoked. He has never used smokeless tobacco. He reports that he drinks alcohol. He reports that he does not use illicit drugs.    Family History   I have reviewed this patient's family history and updated it with pertinent information if needed.   Family History   Problem Relation Age of Onset     Hypertension Mother      passed away at 89     Eye Disorder Mother      macular degeneration     Blood Disease Mother      She is on warfarin     Dementia Father 80     passed away at 88 yrs old     Breast Cancer Sister      DIABETES Paternal Uncle      Asthma No family hx of      C.A.D. No family hx of      Cancer - colorectal No family hx of      Prostate Cancer No family hx of      Anesthesia Reaction No family hx of      Thyroid Disease No family hx of        Review of Systems   The 10 point Review of Systems is negative other than noted in the HPI or here.     Physical Exam   Temp: 99.7  F (37.6  C) Temp src: Oral BP: 140/79 Pulse: 90 Heart Rate: 81 Resp: 11 SpO2: 96 % O2 Device: None (Room air)    Vital Signs with Ranges  207 lbs 11.2 oz    Constitutional: alert, oriented and in no acute distress  Eyes: EOMI, PERRL  HEENT: OP clear  Respiratory: CTA B without w/c  Cardiovascular: RRR without m/r/g  GI: soft, nontender, nondistended, no HSM  Lymph/Hematologic: no cervical LAD  Genitourinary: deferred  Skin: no rashes or lesions grossly  Musculoskeletal: no  deformities or arthritis  Neurologic: CN II-XII, STEWART, sensation grossly intact  Psychiatric: mood and affect wnl    Data   Data reviewed today:  I personally reviewed his EKG which showed no ischemic changes.    Recent Labs  Lab 06/29/17  0235 06/28/17  2135 06/27/17  1202 06/24/17  0405   WBC  --  12.9* 14.2* 11.1*   HGB  --  15.2 15.4 15.6   MCV  --  89 91 90   PLT  --  238 261 241   INR 2.05* 2.05*  --  2.37*   NA  --  134 137 139   POTASSIUM  --  3.6 3.8 4.6   CHLORIDE  --  97 102 106   CO2  --  25 28 26   BUN  --  13 13 14   CR  --  0.90 0.86 0.82   ANIONGAP  --  12 7 7   SUKHWINDER  --  8.3* 8.5 8.6   GLC  --  117* 119* 158*   ALBUMIN 2.6* 3.0* 3.4 3.8   PROTTOTAL 6.5* 7.5 7.5 7.7   BILITOTAL 1.3 1.4* 1.0 0.7   ALKPHOS 78 91 79 80   ALT 16 19 20 25   AST 12 16 15 25   LIPASE  --  127  --  139       Recent Results (from the past 24 hour(s))   US Abdomen Limited    Narrative    US ABDOMEN LIMITED   6/28/2017 10:21 PM     HISTORY: Right upper quadrant pain.    COMPARISON: Ultrasound abdomen 6/24/2017.    FINDINGS:    Gallbladder: Cholelithiasis demonstrated with a 1.5 cm gallstone at  the gallbladder neck. Negative sonographic Garza's sign. There is  mild gallbladder wall thickening measuring 0.5 cm.    Bile ducts: CHD is normal diameter. No intrahepatic biliary  dilatation.    Liver: Fatty infiltration of the liver.    Pancreas: Partially obscured, but grossly unremarkable.     Right kidney: Normal.     Aorta and IVC: Not specifically assessed.       Impression    IMPRESSION:    1. Cholelithiasis with gallstone localizing to the gallbladder neck.  Mild gallbladder wall thickening. Negative sonographic Garza's sign.  Cholecystitis not excluded based on this exam alone. No biliary  dilatation.  2. Fatty infiltration of the liver.    OMKAR LUONG MD

## 2017-06-29 NOTE — PROGRESS NOTES
Addendum:  Called by anesthesia. Pt went into new onset A-fib during surgery. Per report rate controlled with IV diltiazem.  No prior hx of A-fib    Plan:  -Telemetry  -TSH,ECHO in AM  -Pt on propranolol PTA for tremors  -Already anti-coagulated on coumadin for prior hx of PE  -Monitor rate/rhythm  -D/w Dr Yin; no cholangiogram done, will trend LFTs in AM; hold off on resuming anti-coagulation for now.    Yo Li MD  June 29, 2017

## 2017-06-29 NOTE — PROGRESS NOTES
Patient came out of OR with new atrial fibrillation.  ELLEN Kumar is managing in PACU; he did give 23.55 mg of Diltiazem IVP over two minutes and also informed hospitalist MD Li.  EKG has been completed.

## 2017-06-29 NOTE — PLAN OF CARE
Problem: Goal Outcome Summary  Goal: Goal Outcome Summary  Outcome: No Change  VSS, denies pain. Up independently, voiding and UA sent. Vit K given, INR 0830. Albumin parameters if needed, blood consent to be signed. Denies nausea. NPO/ice from MN. Sleeping between cares.

## 2017-06-29 NOTE — PROGRESS NOTES
Surgery Note    Consult received. Plan for admission to Hospitalist for reversal of INR and am labs. If bilirubin increasing - would obtain GI consult in am vs plan for laparoscopic cholecystectomy with IOC tomorrow if INR reversed.   - NPO  - IV abx for cholecystitis  - am labs  - reversal of INR    Merlyn Lockett MD  [facility]  [phone]

## 2017-06-29 NOTE — PLAN OF CARE
Problem: Goal Outcome Summary  Goal: Goal Outcome Summary  Outcome: No Change  A/O x4. VSS on RA. Up independently. Slight abdominal pain 2/10, declines intervention. Abdomen distended, BS active, passing some gas. No nausea. Given 1 unit FFP per surgery, INR 1.59. Bili 1.5. NPO today, had lap robert around noon, currently not on floor. GI consulted today. Continue IVF at 100 cc/hr and IV zosyn.  Nursing will continue to monitor.

## 2017-06-29 NOTE — ED NOTES
"[facility]  ED Nurse Handoff Report    ED Chief complaint: Abdominal Pain (here recently for gallstones; temp at home 101.6, advised by PCP to come to ED)      ED Diagnosis:   Final diagnoses:   Acute cholecystitis       Code Status: Full Code    Allergies: No Known Allergies    Activity level - Baseline/Home:  Independent    Activity Level - Current:   Independent     Needed?: No    Isolation: No  Infection: Not Applicable    Bariatric?: No    Vital Signs:   Vitals:    06/1945 06/28/17 2115   BP: 137/82 140/79   Pulse: 90    Resp: 18 11   Temp: 99.5  F (37.5  C) 99.7  F (37.6  C)   TempSrc: Oral Oral   SpO2: 96% 96%   Weight: 93 kg (205 lb)    Height: 1.727 m (5' 8\")        Cardiac Rhythm: ,        Pain level:      Is this patient confused?: No    Patient Report: Initial Complaint: Abdominal Pain (here recently for gallstones; temp at home 101.6, advised by PCP to come to ED)  Focused Assessment: alert, orientated, c/o abdominal pain  Tests Performed: labs, ultrasound  Abnormal Results: see results  Wbc 12.9  FINDINGS:    Gallbladder: Cholelithiasis demonstrated with a 1.5 cm gallstone at  the gallbladder neck. Negative sonographic Garza's sign. There is  mild gallbladder wall thickening measuring 0.5 cm.    Treatments provided: IV fluids, antibiotics    Family Comments: not present    OBS brochure/video discussed/provided to patient: No    ED Medications:   Medications   0.9% sodium chloride infusion ( Intravenous New Bag 6/28/17 9154)   piperacillin-tazobactam (ZOSYN) 3.375 g vial to attach to  mL bag (3.375 g Intravenous New Bag 6/28/17 0482)       Drips infusing?:  No      ED NURSE PHONE NUMBER: [phone]         "

## 2017-06-29 NOTE — ED PROVIDER NOTES
"  History     Chief Complaint:  Abdominal Pain    HPI   Rob Beavers is a 71 year old male who presents with abdominal pain. The patient was seen at this ED on 6/24 with abdominal pains and nausea. Patient was diagnosed with gallstones and discharged to home. The patient's symptoms improved following discharge. Two days ago, he reports that he started eating bigger meals and his symptoms returned, though less severe than they were. He visited his clinic yesterday, and workup indicated gallbladder issues. WBC count was high, and PCP wanted patient to present to ED due to potential gallbladder inflammation and appendicitis.     In addition, the patient felt feverish today, and measured a temperature of 101.6. He currently has very little abdominal pain, just complains of slight tenderness on his right-side. Pain initially had begun at the middle of his abdomen. He did use oxycodone last night to help him sleep, and this helped improve symptoms. Denies dysuria or difficulty urinating.    Allergies:  The patient has no known drug allergies.    Medications:    Roxicodone  Lisinopril  Prilosec  Inderal  Coumadin  Augmentin     Past Medical History:    Andrews's esophagus  HTN  PE  Prostate nodule    Past Surgical History:    Hip arthroplasty  Dental surgery  Tonsillectomy    Family History:    HTN  Macular degeneration  Dementia  Breast cancer  DM    Social History:  Relationship status: Single  Tobacco use: Negative  Alcohol use: Positive  The patient presents alone.     Review of Systems   Gastrointestinal: Positive for abdominal pain and nausea.   Genitourinary: Negative for difficulty urinating and dysuria.   All other systems reviewed and are negative.      Physical Exam   First Vitals:  BP: 137/82  Pulse: 90  Heart Rate: 90  Temp: 99.5  F (37.5  C)  Resp: 18  Height: 172.7 cm (5' 8\")  Weight: 93 kg (205 lb)  SpO2: 96 %    Physical Exam  Constitutional:  Appears well-developed and well-nourished. Cooperative. "   HENT:   Head:    Atraumatic.   Mouth/Throat:   Oropharynx is without erythema or exudate and mucous     membranes are moist.   Eyes:    Conjunctivae normal and EOM are normal.      Pupils are equal, round, and reactive to light.   Neck:    Normal range of motion. Neck supple.   Cardiovascular:  Normal rate, regular rhythm, normal heart sounds and radial and    dorsalis pedis pulses are 2+ and symmetric.    Pulmonary/Chest:  Effort normal and breath sounds normal.   Abdominal:   Soft. Bowel sounds are normal.      No splenomegaly or hepatomegaly. Tender in RUQ. No rebound.   Musculoskeletal:  Normal range of motion. No edema and no tenderness.   Neurological:  Alert. Normal strength. No cranial nerve deficit. GCS 15.  Skin:    Skin is warm and dry.   Psychiatric:   Normal mood and affect.       Emergency Department Course   ECG (21:18:56):  Indication: Screening for cardiovascular disease.   Rate 84 bpm. CT interval 196. QRS duration 82. QT/QTc 368/434. P-R-T axes 33.   Interpretation: Normal sinus rhythm.  Agree with computer interpretation.   Interpreted at 2230 by Dr. Diaz.     Imaging:  Radiographic findings were communicated with the patient who voiced understanding of the findings.    Abdomen US, per radiology:   1. Cholelithiasis with gallstone localizing to the gallbladder neck. Mild gallbladder wall thickening. Negative sonographic Garza's sign. Cholecystitis not excluded based on this exam alone. No biliary  dilatation.  2. Fatty infiltration of the liver.     Laboratory:  CBC: WBC 12.9 (H), HGB 15.2,   CMP: Glucose 117 (H), Calcium 8.3 (L), Bilirubin 1.4 (H), Albumin 3.0 (L), o/w WNL (Creatinine 0.90)  Lipase: 127  2135: INR: 2.05 (H)    Interventions:  2136: Normal Saline, 125 mL/hr, IV  2340: Zosyn, 3.375 g, IV     Emergency Department Course:  Nursing notes and vitals reviewed.  I performed an exam of the patient as documented above.  The above workup was undertaken.   I rechecked the patient  and discussed results.  2313: I discussed the patient with Dr. Lockett of general surgery.   2328: I discussed the patient with Dr. Hammond of the hospitalist service.    Findings and plan explained to the Patient who consents to admission. Discussed the patient with Dr. Hammond, who will admit the patient to a Med bed for further monitoring, evaluation, and treatment.      Impression & Plan      Medical Decision Making:  Rob Beavers is a 71 year old male who presents with abdominal pain.  The workup in the Emergency Room is concerning for acute cholecystitis.  Antibiotics have been started and the patient will be admitted to the medicine service for further evaluation and treatment with a likely surgical consultation    There is no evidence at this point of serious complications of cholecystitis such as gangrenous cholecystitis, septic shock, etc.  No signs of other complications such as CBD stone, ascending cholangitis, gallstone pancreatitis.  Given constellation of symptoms, lab data and ultrasound I doubt GERD, gastritis, PUD, perforated ulcer, diverticulitis, colitis.      Diagnosis:    ICD-10-CM    1. Acute cholecystitis K81.0 INR     INR     CANCELED: INR     Disposition:  Admit to a Med bed under the care of Dr. Hammond.    I, Osei Sharif, am serving as a scribe on 6/28/2017 at 10:34 PM to personally document services performed by Marquis Diaz MD, based on my observations and the provider's statements to me.     EMERGENCY DEPARTMENT       Marquis Diaz MD  07/07/17 0040

## 2017-06-29 NOTE — PHARMACY-ADMISSION MEDICATION HISTORY
Admission medication history interview status for the 6/28/2017  admission is complete. See EPIC admission navigator for prior to admission medications     Medication history source reliability:Good    Actions taken by pharmacist (provider contacted, etc):None     Additional medication history information not noted on PTA med list :None    Medication reconciliation/reorder completed by provider prior to medication history? No    Time spent in this activity: 15min      Prior to Admission medications    Medication Sig Last Dose Taking? Auth Provider   loratadine (CLARITIN) 10 MG tablet Take 10 mg by mouth daily 6/28/2017 at am Yes Unknown, Entered By History   oxyCODONE (ROXICODONE) 5 MG IR tablet Take 1 tablet (5 mg) by mouth every 6 hours as needed for pain 6/27/2017 at Unknown time Yes Elsa Guerra MD   lisinopril (PRINIVIL/ZESTRIL) 10 MG tablet TAKE ONE TABLET (10 MG) BY MOUTH ONE TIME DAILY 6/28/2017 at am Yes Tian Santana MD   omeprazole (PRILOSEC) 40 MG capsule Take 1 capsule (40 mg) by mouth daily Take 30-60 minutes before a meal. 6/28/2017 at am Yes Tian Santana MD   propranolol (INDERAL LA) 80 MG 24 hr capsule TAKE 1 CAPSULE (80 MG) BY MOUTH ONCE A DAY 6/28/2017 at am Yes Tian Santana MD   warfarin (COUMADIN) 5 MG tablet TAKE 1 TAB(5MG) BY MOUTH ON FRIDAYS AND TAKE 1/2 TAB(2.5MG)ALL OTHER DAY/OR AS DIRECTED BY [facility] 6/27/2017 at Unknown time Yes Tian Santana MD   VITAMIN D, CHOLECALCIFEROL, PO Take 1,000 Units by mouth daily 6/28/2017 at am Yes Reported, Patient   MULTI-VITAMIN PO TABS ONE TABLET DAILY 6/28/2017 at am Yes Osbaldo Myrick MD

## 2017-06-30 ENCOUNTER — APPOINTMENT (OUTPATIENT)
Dept: CARDIOLOGY | Facility: CLINIC | Age: 72
DRG: 419 | End: 2017-06-30
Attending: INTERNAL MEDICINE
Payer: COMMERCIAL

## 2017-06-30 LAB
ALBUMIN SERPL-MCNC: 2.4 G/DL (ref 3.4–5)
ALP SERPL-CCNC: 78 U/L (ref 40–150)
ALT SERPL W P-5'-P-CCNC: 22 U/L (ref 0–70)
AST SERPL W P-5'-P-CCNC: 21 U/L (ref 0–45)
BILIRUB DIRECT SERPL-MCNC: 0.4 MG/DL (ref 0–0.2)
BILIRUB SERPL-MCNC: 0.8 MG/DL (ref 0.2–1.3)
COPATH REPORT: NORMAL
ERYTHROCYTE [DISTWIDTH] IN BLOOD BY AUTOMATED COUNT: 13.4 % (ref 10–15)
HCT VFR BLD AUTO: 35.3 % (ref 40–53)
HGB BLD-MCNC: 12.3 G/DL (ref 13.3–17.7)
INR PPP: 1.26 (ref 0.86–1.14)
MCH RBC QN AUTO: 31.6 PG (ref 26.5–33)
MCHC RBC AUTO-ENTMCNC: 34.8 G/DL (ref 31.5–36.5)
MCV RBC AUTO: 91 FL (ref 78–100)
PLATELET # BLD AUTO: 212 10E9/L (ref 150–450)
PROT SERPL-MCNC: 6.2 G/DL (ref 6.8–8.8)
RBC # BLD AUTO: 3.89 10E12/L (ref 4.4–5.9)
TSH SERPL DL<=0.05 MIU/L-ACNC: 0.19 MU/L (ref 0.4–4)
WBC # BLD AUTO: 11.4 10E9/L (ref 4–11)

## 2017-06-30 PROCEDURE — 84443 ASSAY THYROID STIM HORMONE: CPT | Performed by: PHYSICIAN ASSISTANT

## 2017-06-30 PROCEDURE — 36415 COLL VENOUS BLD VENIPUNCTURE: CPT | Performed by: INTERNAL MEDICINE

## 2017-06-30 PROCEDURE — 93306 TTE W/DOPPLER COMPLETE: CPT | Mod: 26 | Performed by: INTERNAL MEDICINE

## 2017-06-30 PROCEDURE — 99232 SBSQ HOSP IP/OBS MODERATE 35: CPT | Performed by: INTERNAL MEDICINE

## 2017-06-30 PROCEDURE — 85027 COMPLETE CBC AUTOMATED: CPT | Performed by: PHYSICIAN ASSISTANT

## 2017-06-30 PROCEDURE — 25000132 ZZH RX MED GY IP 250 OP 250 PS 637

## 2017-06-30 PROCEDURE — 80076 HEPATIC FUNCTION PANEL: CPT | Performed by: PHYSICIAN ASSISTANT

## 2017-06-30 PROCEDURE — 25000128 H RX IP 250 OP 636: Performed by: INTERNAL MEDICINE

## 2017-06-30 PROCEDURE — 36415 COLL VENOUS BLD VENIPUNCTURE: CPT | Performed by: PHYSICIAN ASSISTANT

## 2017-06-30 PROCEDURE — 12000000 ZZH R&B MED SURG/OB

## 2017-06-30 PROCEDURE — 85610 PROTHROMBIN TIME: CPT | Performed by: INTERNAL MEDICINE

## 2017-06-30 PROCEDURE — 25000132 ZZH RX MED GY IP 250 OP 250 PS 637: Performed by: INTERNAL MEDICINE

## 2017-06-30 PROCEDURE — 93306 TTE W/DOPPLER COMPLETE: CPT

## 2017-06-30 PROCEDURE — 25000125 ZZHC RX 250: Performed by: INTERNAL MEDICINE

## 2017-06-30 PROCEDURE — 25000125 ZZHC RX 250: Performed by: PHYSICIAN ASSISTANT

## 2017-06-30 PROCEDURE — 25000132 ZZH RX MED GY IP 250 OP 250 PS 637: Performed by: PHYSICIAN ASSISTANT

## 2017-06-30 RX ORDER — AMOXICILLIN 250 MG
1-2 CAPSULE ORAL DAILY
Status: DISCONTINUED | OUTPATIENT
Start: 2017-06-30 | End: 2017-07-01 | Stop reason: HOSPADM

## 2017-06-30 RX ORDER — PANTOPRAZOLE SODIUM 40 MG/1
40 TABLET, DELAYED RELEASE ORAL EVERY MORNING
Status: DISCONTINUED | OUTPATIENT
Start: 2017-07-01 | End: 2017-07-01 | Stop reason: HOSPADM

## 2017-06-30 RX ORDER — ACETAMINOPHEN 325 MG/1
650 TABLET ORAL EVERY 4 HOURS PRN
Status: DISCONTINUED | OUTPATIENT
Start: 2017-06-30 | End: 2017-07-01 | Stop reason: HOSPADM

## 2017-06-30 RX ORDER — LISINOPRIL 10 MG/1
10 TABLET ORAL DAILY
Status: DISCONTINUED | OUTPATIENT
Start: 2017-07-01 | End: 2017-07-01 | Stop reason: HOSPADM

## 2017-06-30 RX ORDER — WARFARIN SODIUM 5 MG/1
5 TABLET ORAL
Status: COMPLETED | OUTPATIENT
Start: 2017-06-30 | End: 2017-06-30

## 2017-06-30 RX ADMIN — WARFARIN SODIUM 5 MG: 5 TABLET ORAL at 19:57

## 2017-06-30 RX ADMIN — DEXTROSE AND SODIUM CHLORIDE: 5; 900 INJECTION, SOLUTION INTRAVENOUS at 04:28

## 2017-06-30 RX ADMIN — ENOXAPARIN SODIUM 90 MG: 100 INJECTION SUBCUTANEOUS at 17:49

## 2017-06-30 RX ADMIN — PIPERACILLIN SODIUM,TAZOBACTAM SODIUM 3.38 G: 3; .375 INJECTION, POWDER, FOR SOLUTION INTRAVENOUS at 11:55

## 2017-06-30 RX ADMIN — PIPERACILLIN SODIUM,TAZOBACTAM SODIUM 3.38 G: 3; .375 INJECTION, POWDER, FOR SOLUTION INTRAVENOUS at 06:00

## 2017-06-30 RX ADMIN — PANTOPRAZOLE SODIUM 40 MG: 40 INJECTION, POWDER, FOR SOLUTION INTRAVENOUS at 08:52

## 2017-06-30 RX ADMIN — PROPRANOLOL HYDROCHLORIDE 80 MG: 80 CAPSULE, EXTENDED RELEASE ORAL at 08:52

## 2017-06-30 RX ADMIN — PIPERACILLIN SODIUM,TAZOBACTAM SODIUM 3.38 G: 3; .375 INJECTION, POWDER, FOR SOLUTION INTRAVENOUS at 00:40

## 2017-06-30 RX ADMIN — SENNOSIDES AND DOCUSATE SODIUM 1 TABLET: 8.6; 5 TABLET ORAL at 08:52

## 2017-06-30 RX ADMIN — DEXTROSE AND SODIUM CHLORIDE: 5; 900 INJECTION, SOLUTION INTRAVENOUS at 11:51

## 2017-06-30 NOTE — PLAN OF CARE
Problem: Goal Outcome Summary  Goal: Goal Outcome Summary  Outcome: No Change  Pt is A&O x 4, VSS on 2 LNC, denied pain and discomfort. BS + and abdomen non-tender round, No BM at this shift. Clear liquid diet and tolerating well. Up and independent, ambulated to bathroom and in the room. J.P. patent and drainined 27 cc of dark brown bloody content. TELE A-fib with controled rate. IVF infusing and on IV abtibiotic Will continue to monitor.

## 2017-06-30 NOTE — PLAN OF CARE
Problem: Goal Outcome Summary  Goal: Goal Outcome Summary  Outcome: Improving  A&O X 4.  Up independently.  Ambulated in [place] X 1.  Denies pain except with change of position and has not required pain meds.  Lap robert sites X 3 and drain site CDI.  Minimal output in drain.  Tolerated full liquid diet.

## 2017-06-30 NOTE — PHARMACY-ANTICOAGULATION SERVICE
Clinical Pharmacy - Warfarin Dosing Consult     Pharmacy has been consulted to manage this patient s warfarin therapy.  Indication: DVT/ PE Treatment  Therapy Goal: INR 2-3  Warfarin Prior to Admission: Yes  Warfarin PTA Regimen: 5 mg Fri, 2.5 mg ROW  Recent documented change in oral intake/nutrition: Unknown    INR   Date Value Ref Range Status   06/30/2017 1.26 (H) 0.86 - 1.14 Final   06/29/2017 1.59 (H) 0.86 - 1.14 Final       Recommend warfarin 5  mg today.  Pharmacy will monitor Rob Beavers daily and order warfarin doses to achieve specified goal.      Please contact pharmacy as soon as possible if the warfarin needs to be held for a procedure or if the warfarin goals change.

## 2017-06-30 NOTE — PLAN OF CARE
Problem: Goal Outcome Summary  Goal: Goal Outcome Summary  Outcome: Improving  Pt alert and oriented. Up to bathroom with SBA, VSS on room air sats 96%, Telemetry Atrial fib with CVR. Denies pain at rest, complains of abdominal discomfort with movement, refusing offer of pain meds. Incision sites with dry blood, no new bleeding noted. ARACELI drain to right side 10cc bloody drainage. Abdomen rounded bowel sounds hypoactive, denied passing flatus. Plan ECHO, follow up LFTs today.

## 2017-06-30 NOTE — PROGRESS NOTES
GI Chart Check    Chart reviewed  Discussed with Dr Yin  Total bili normal today  No plans for EUS/ERCP at this time  Please call with any questions.     Mir Cornelius PA-C  [facility]  [phone] Cell ([phone])  [phone] Office (after 1300)

## 2017-06-30 NOTE — PROGRESS NOTES
"General Surgery Progress Note    Admission Date: 6/28/2017  Today's Date: 6/30/2017         Assessment:      Rob Beavers is a 71 year old male   S/P laparoscopic cholecystectomy and drain placement  POD #1  Diagnosis: Acute cholecystitis         Plan:   Continue Zosyn, drain with minimal output. Await AM labs. OK from surgical standpoint for anticoagulation. Workup for afib ongoing, currently rate-controlled. Discussed surgical findings with patient.    Pain: minimal, not taking anything - tylenol, oxycodone and dilaudid available  Bowel: scheduled senna, miralax prn  Diet: tolerating clears, ADAT  IVFs: D5 and 0.9NaCl @ 125ml/hr  DVT prophylaxis: PCDs, ambulation, OK for heparin/coumadin per hospitalist    Dispo: continue to monitor, expect a few more days in hospital prior to discharge home        Interval History:   Went into afib during surgery. Has remained rate controlled. Afebrile, on room air. Reports mild pain with movement, has not taken any pain medications. Ambulating without difficulty, voiding adequately. ARACELI with 10cc bloody serosanguinous output. White count trending down.          Physical Exam:   /74 (BP Location: Left arm)  Pulse 74  Temp 98.1  F (36.7  C) (Oral)  Resp 18  Ht 1.727 m (5' 8\")  Wt 94.2 kg (207 lb 11.2 oz)  SpO2 94%  BMI 31.58 kg/m2  I/O last 3 completed shifts:  In: 3711.42 [P.O.:360; I.V.:3056]  Out: 1635 [Urine:1475; Drains:10; Blood:150]  General: NAD, pleasant, alert and oriented x3  Abdomen: soft, mildly tender to palpation around incisions, non distended; drain in place right abdomen  Incisions: clean, dry, and intact with steris and bandaids in place  Extremities: no edema, no calf tenderness, wearing PCDs    LABS:  Recent Labs   Lab Test  06/30/17   0803  06/28/17   2135  06/27/17   1202   WBC  11.4*  12.9*  14.2*   HGB  12.3*  15.2  15.4   MCV  91  89  91   PLT  212  238  261      Juli David PA-C  [facility]: [phone]  Pager: " [email or web address] (7am-5pm)      Agree with above.  Pt doing well.  Minimal drain output.  Okay to resume anticoagulation, advance diet.  D/c drain before discharge home  Emir Yin MD  General Surgery, Office [phone]

## 2017-06-30 NOTE — PROGRESS NOTES
[facility]    Hospitalist Progress Note  Shahid Parra MD    Assessment & Plan     Rob Beavers is a 71 year old male with PmHx of Pulmonary embolism, hypertension, carrion esophagus, who was admitted on 6/28/17 with worsening abdominal pain and temp of 101F.  Work up done on 6/28/17 revealed, CMP significant for Alb 3.0. CBC revealed, WBC 12.9. INR was 2.05. CT abd/pelvis done on 6/27/17 revealed, gallstone in the lumen, suspected gall bladder wall edema and adjacent inflammation. US abdomen on 6/28/17 revealed, cholelithiasis with gallstone localizing to the gallbladder neck. Mild gallbladder wall thickening. Negative sonographic Garza's sign. No biliary dilatation. Fatty infiltration of the liver. Abdomen x-rays on 6/27/17 revealed, no intraperitoneal air. No dilated air-filled loops of small bowel. Mild-to-moderate amount of stool in portions of the  colon.      1. POD# 1 Lap Cholecystectomy, due to acute gangrenous cholecystitis: Appreciate General Surgery input. S/P IV Zosyn. Advance diet from full liquid diet to low fiber diet. Continue IV dilaudid prn. For Oxycodone IR prn. LFTs revealed, total bili 1.5-->0.8 on 6/30/17.      2. History of pulmonary emboli:. INR was reversed from 2.05 with vitamin K and FFP pre-surgery. INR was 1.59 on 6/29/17. Will resume lovenox and coumadin today. Pharmacy was consulted to dose coumadin.     3. Hypertension: resume lisinopril tomorrow am.    4. History of tremors: Continue Inderal LA.      5. Carrion esophagus: Changed IV Protonix 40mg qd to oral.         DVT Prophylaxis: Ambulate every shift  Code Status: Full Code    Disposition: Expected discharge in 1 day.      Interval History   The patient reported having abdominal discomfort at his surgical site. He is tolerating a full liquid diet. His ARACELI drain has minimal output at 15 mls during the dayshift today.    -Data reviewed today: I reviewed all new labs and imaging results over the last  24 hours. I personally reviewed no images or EKG's today.    Physical Exam   Temp: 98.1  F (36.7  C) Temp src: Oral BP: 123/74   Heart Rate: 63 Resp: 18 SpO2: 94 % O2 Device: None (Room air) Oxygen Delivery: 2 LPM  Vitals:    06/1945 06/29/17 0100   Weight: 93 kg (205 lb) 94.2 kg (207 lb 11.2 oz)     Vital Signs with Ranges  Temp:  [98  F (36.7  C)-99.5  F (37.5  C)] 98.1  F (36.7  C)  Heart Rate:  [51-87] 63  Resp:  [10-18] 18  BP: (112-139)/(67-85) 123/74  SpO2:  [93 %-96 %] 94 %  I/O last 3 completed shifts:  In: 3711.42 [P.O.:360; I.V.:3056]  Out: 1635 [Urine:1475; Drains:10; Blood:150]    Constitutional: Adult white male, awake, cooperative, no apparent distress, O2 Sats 95% on RA  Respiratory: BS vesicular bilaterally, no crackles or wheezing  Cardiovascular: S1 and S2, reg, no murmur noted  GI: Soft, non-distended, tenderness+ at laparoscopic incisions, no masses, BS present+ ARACELI drain in situ+  Skin/Integumen: No rashes  Extremities: No pedal edema    Medications     dextrose 5% and 0.9% NaCl 125 mL/hr at 06/30/17 1151       senna-docusate  1-2 tablet Oral Daily     propranolol  80 mg Oral Daily     pantoprazole  40 mg Intravenous QAM AC     piperacillin-tazobactam  3.375 g Intravenous Q6H       Data     Recent Labs  Lab 06/30/17  0803 06/29/17  0920 06/29/17  0754 06/29/17  0235 06/28/17  2135 06/27/17  1202 06/24/17  0405   WBC 11.4*  --   --   --  12.9* 14.2* 11.1*   HGB 12.3*  --   --   --  15.2 15.4 15.6   MCV 91  --   --   --  89 91 90     --   --   --  238 261 241   INR  --  1.59*  --  2.05* 2.05*  --  2.37*   NA  --   --   --   --  134 137 139   POTASSIUM  --   --   --   --  3.6 3.8 4.6   CHLORIDE  --   --   --   --  97 102 106   CO2  --   --   --   --  25 28 26   BUN  --   --   --   --  13 13 14   CR  --   --   --   --  0.90 0.86 0.82   ANIONGAP  --   --   --   --  12 7 7   SUKHWINDER  --   --   --   --  8.3* 8.5 8.6   GLC  --   --   --   --  117* 119* 158*   ALBUMIN 2.4*  --  2.8* 2.6* 3.0*  3.4 3.8   PROTTOTAL 6.2*  --  6.8 6.5* 7.5 7.5 7.7   BILITOTAL 0.8  --  1.5* 1.3 1.4* 1.0 0.7   ALKPHOS 78  --  86 78 91 79 80   ALT 22  --  18 16 19 20 25   AST 21  --  15 12 16 15 25   LIPASE  --   --   --   --  127  --  139       No results found for this or any previous visit (from the past 24 hour(s)).

## 2017-07-01 VITALS
DIASTOLIC BLOOD PRESSURE: 89 MMHG | SYSTOLIC BLOOD PRESSURE: 142 MMHG | HEART RATE: 65 BPM | BODY MASS INDEX: 31.48 KG/M2 | OXYGEN SATURATION: 93 % | WEIGHT: 207.7 LBS | TEMPERATURE: 98.1 F | HEIGHT: 68 IN | RESPIRATION RATE: 18 BRPM

## 2017-07-01 PROBLEM — K81.0 ACUTE CHOLECYSTITIS: Status: ACTIVE | Noted: 2017-07-01

## 2017-07-01 LAB
INR PPP: 1.13 (ref 0.86–1.14)
INTERPRETATION ECG - MUSE: NORMAL

## 2017-07-01 PROCEDURE — 99238 HOSP IP/OBS DSCHRG MGMT 30/<: CPT | Performed by: INTERNAL MEDICINE

## 2017-07-01 PROCEDURE — 85610 PROTHROMBIN TIME: CPT | Performed by: INTERNAL MEDICINE

## 2017-07-01 PROCEDURE — 25000132 ZZH RX MED GY IP 250 OP 250 PS 637: Performed by: INTERNAL MEDICINE

## 2017-07-01 PROCEDURE — 25000128 H RX IP 250 OP 636: Performed by: INTERNAL MEDICINE

## 2017-07-01 PROCEDURE — 36415 COLL VENOUS BLD VENIPUNCTURE: CPT | Performed by: INTERNAL MEDICINE

## 2017-07-01 PROCEDURE — 25000132 ZZH RX MED GY IP 250 OP 250 PS 637: Performed by: PHYSICIAN ASSISTANT

## 2017-07-01 RX ORDER — WARFARIN SODIUM 7.5 MG/1
7.5 TABLET ORAL
Status: DISCONTINUED | OUTPATIENT
Start: 2017-07-01 | End: 2017-07-01 | Stop reason: HOSPADM

## 2017-07-01 RX ORDER — WARFARIN SODIUM 7.5 MG/1
7.5 TABLET ORAL DAILY
Qty: 2 TABLET | Refills: 0 | COMMUNITY
Start: 2017-07-01 | End: 2020-08-12

## 2017-07-01 RX ADMIN — SENNOSIDES AND DOCUSATE SODIUM 1 TABLET: 8.6; 5 TABLET ORAL at 07:54

## 2017-07-01 RX ADMIN — ENOXAPARIN SODIUM 90 MG: 100 INJECTION SUBCUTANEOUS at 05:57

## 2017-07-01 RX ADMIN — ACETAMINOPHEN 650 MG: 325 TABLET, FILM COATED ORAL at 15:57

## 2017-07-01 RX ADMIN — PROPRANOLOL HYDROCHLORIDE 80 MG: 80 CAPSULE, EXTENDED RELEASE ORAL at 07:53

## 2017-07-01 RX ADMIN — LISINOPRIL 10 MG: 10 TABLET ORAL at 07:53

## 2017-07-01 RX ADMIN — ACETAMINOPHEN 650 MG: 325 TABLET, FILM COATED ORAL at 08:23

## 2017-07-01 RX ADMIN — PANTOPRAZOLE SODIUM 40 MG: 40 TABLET, DELAYED RELEASE ORAL at 07:53

## 2017-07-01 NOTE — PLAN OF CARE
Problem: Goal Outcome Summary  Goal: Goal Outcome Summary  Outcome: Adequate for Discharge Date Met:  07/01/17  Pt tolerating low fiber diet.  Up ambulating independently.  Drain removed per surgery, site CDI.  Lap sites X 3 CDI.  Minimal pain, only taking tylenol sparingly.  OK per Dr. Parra for pt. to drive himself home.  Discharged to home.

## 2017-07-01 NOTE — PLAN OF CARE
Problem: Goal Outcome Summary  Goal: Goal Outcome Summary  Outcome: No Change  A/O.  VSS.  Up ind in hallway x1.  Passing small amounts of flatus, diet advanced to low fiber, tolerating well, bowel sounds active.  ARACELI output minimal.  Possible D/c tomorrow.

## 2017-07-01 NOTE — PROGRESS NOTES
[facility]    General Surgery  Daily Post-Op Note       Assessment and Plan:   Rob Beavers is a 71 year old male S/P Procedure(s):  LAPAROSCOPIC CHOLECYSTECTOMY WITH CHOLANGIOGRAMS, 2 Days Post-Op    Pain - minimal to none, using Tylenol.  Has oxycodone at home if needs something stronger  Diet - tolerating low fiber   Activity - ambulating well on own  ARACELI - discontinued  Dispo - doing well from surgical standpoint, home when ok with medicine           Interval History:   Pt sitting up on bed.  No complaints.  Minimal pain.  Tolerating diet. Discharge instructions discussed.            Physical Exam:   Temp:  [97.7  F (36.5  C)-98.1  F (36.7  C)] 98.1  F (36.7  C)  Pulse:  [65] 65  Heart Rate:  [65-66] 65  Resp:  [18-20] 18  BP: (138-158)/(73-98) 158/98  SpO2:  [93 %-97 %] 93 %  I/O last 3 completed shifts:  In: 1477 [P.O.:1230; I.V.:247]  Out: 1110 [Urine:1075; Drains:35]  Constitutional: alert and no distress   Abdomen: Abdomen soft, non-tender. Band-aids removed, steri strips in place - clean/dry/intact. ARACELI removed intact without difficulty

## 2017-07-01 NOTE — PLAN OF CARE
Problem: Goal Outcome Summary  Goal: Goal Outcome Summary  Outcome: No Change  Pt a/o, VSS on RA. C/o pain only with position changes, declines PRN analgesics. Up independently in room. Lap sites x3 c/d/i, ARACELI patent with minimal output. Tolerating low fiber diet. Hypo BS, +flatus. Voiding. Plan for possible discharge today, will continue to monitor

## 2017-07-02 NOTE — DISCHARGE SUMMARY
[facility]    Discharge Summary  Hospitalist  Shahid Parra MD    Date of Admission:  6/28/2017  Date of Discharge:  7/1/2017  4:21 PM  Discharging Provider: Shahid Parra    Discharge Diagnoses   1. S/P Laparoscopic Cholecystectomy on 6/29/17, due to acute gangrenous cholecystitis.    History of Present Illness   Rob Beavers is an 71 year old male, who presented with epigastric abdominal pain, nausea, vomiting and fever.    Hospital Course   Rob Beavers is a 71 year old male with PmHx of Pulmonary embolism, hypertension, carrion esophagus, who was admitted on 6/28/17 with worsening epigastric abdominal pain, nausea, vomiting and temp of 101F. Work up done on 6/28/17 revealed, CMP significant for Alb 3.0. CBC revealed, WBC 12.9. INR was 2.05. CT abd/pelvis done on 6/27/17 revealed, gallstone in the lumen, suspected gall bladder wall edema and adjacent inflammation. US abdomen on 6/28/17 revealed, cholelithiasis with gallstone localizing to the gallbladder neck. Mild gallbladder wall thickening. Negative sonographic Garza's sign. No biliary dilatation. Fatty infiltration of the liver. Abdomen x-rays on 6/27/17 revealed, no intraperitoneal air. No dilated air-filled loops of small bowel. Mild-to-moderate amount of stool in portions of the colon.      The patient was admitted to the medical floor and was commenced on IV N/Saline and IV Zosyn. He was reviewed by the General Surgery. His INR was reversed from 2.05 with vitamin K and FFP pre-surgery. INR was 1.59-->1.13 on 7/1/17. He underwent laparoscopic cholecystectomy on 6/29/17. His IV Zosyn was discontinued on 6/30/17. The patient's diet was advanced to low fiber diet, which he tolerated. LFTs revealed, total bili 1.5-->0.8 on 6/30/17. The patient was discharged home and is to follow up with the General Surgeon in 2 weeks.         Significant Results and Procedures   See above.    Pending Results   None.  Unresulted  Labs Ordered in the Past 30 Days of this Admission     No orders found from 4/29/2017 to 6/29/2017.          Code Status   Full Code       Primary Care Physician   Tian Santana    Physical Exam   Temp: 98.1  F (36.7  C) Temp src: Oral BP: 142/89 Pulse: 65 Heart Rate: 69 Resp: 18 SpO2: 93 % O2 Device: None (Room air)    Vitals:    06/1945 06/29/17 0100   Weight: 93 kg (205 lb) 94.2 kg (207 lb 11.2 oz)     Constitutional:  Adult white male, awake, cooperative, no apparent distress, O2 Sats 93% on RA  Respiratory: BS vesicular bilaterally, no crackles or wheezing  Cardiovascular: S1 and S2, reg, no murmur noted  GI: Soft, non-distended, tenderness at laparoscopic incisions, no masses, BS present+   Skin/Integumen: No rashes  Extremities: No pedal edema    Discharge Disposition   Discharged to home  Condition at discharge: Stable    Consultations This Hospital Stay   GASTROENTEROLOGY IP CONSULT  SURGERY GENERAL IP CONSULT  PHARMACY TO DOSE WARFARIN    Time Spent on this Encounter   IShahid, personally saw the patient today and spent less than or equal to 30 minutes discharging this patient.    Discharge Orders     Activity   Avoid strenuous activity or heavy lifting >20 pounds for 2-3 weeks.     Discharge Instructions   CONSTIPATION PREVENTION  Go to a pharmacy and purchase ONE of the following stool softeners/laxatives and start taking today to prevent constipation. You can stop this bowel regimen once you are no longer taking your narcotic pain medication and are having regular bowel movements:    - Senokot oral once daily  - Milk of magnesium once daily  - Docusate Sodium 1 pill twice daily (stool softener)  - Miralax 1 scoop/packet 1-2 times daily (laxative)    In addition to the above options, if constipation continues, you can add:   - 4 oz Prune juice or Plum juice daily for constipation     Wound care and dressings   Keep dressings clean and dry. Remove the bandaids. Do not remove the  small white steri strips over your incision. These will typically fall off on their own in 7-10 days. Do not attempt to replace these if they should fall off sooner. After you've removed the bandaids, you can shower normally. You can allow warm water and soap to run over the incision. Do not scrub the incision. After showering pat your skin and steri strips dry. Do not submerge or soak your incisions under water for 2 weeks.    Change guaze dressing at drain site daily or when saturated.  You will have dorothy drainage for the first several days until the site heals.     Follow-up and recommended labs and tests    Follow up with Dr. Yin or PA, at [address] [facility] [address], [place] [address], within 2 weeks, call office for appointment at [phone].    IF you are doing well and have no questions or concerns, you may call our nurse to update on your condition instead of coming in for appointment.    For INR Check on 7/3/17 and for dosing of warfarin based on the result.     Reason for your hospital stay   Acute Gallbladder Inflammation.     Diet   Gradually resume your regular diet as tolerated.       Discharge Medications   Discharge Medication List as of 7/1/2017  3:34 PM      START taking these medications    Details   enoxaparin (LOVENOX) 100 MG/ML injection Inject 0.94 mLs (94 mg) Subcutaneous every 12 hours, Disp-2.82 mL, R-0, Historical      !! warfarin (COUMADIN) 7.5 MG tablet Take 1 tablet (7.5 mg) by mouth daily, Disp-2 tablet, R-0, Historical       !! - Potential duplicate medications found. Please discuss with provider.      CONTINUE these medications which have NOT CHANGED    Details   loratadine (CLARITIN) 10 MG tablet Take 10 mg by mouth daily, Historical      oxyCODONE (ROXICODONE) 5 MG IR tablet Take 1 tablet (5 mg) by mouth every 6 hours as needed for pain, Disp-20 tablet, R-0, Local Print      lisinopril (PRINIVIL/ZESTRIL) 10 MG tablet TAKE ONE TABLET (10 MG) BY MOUTH ONE TIME DAILY, Disp-90 tablet,  R-3, E-Prescribe      omeprazole (PRILOSEC) 40 MG capsule Take 1 capsule (40 mg) by mouth daily Take 30-60 minutes before a meal., Disp-90 capsule, R-3, E-Prescribe      propranolol (INDERAL LA) 80 MG 24 hr capsule TAKE 1 CAPSULE (80 MG) BY MOUTH ONCE A DAY, Disp-90 capsule, R-3, E-Prescribe      !! warfarin (COUMADIN) 5 MG tablet TAKE 1 TAB(5MG) BY MOUTH ON FRIDAYS AND TAKE 1/2 TAB(2.5MG)ALL OTHER DAY/OR AS DIRECTED BY ACC, Disp-90 tablet, R-0, E-Prescribe      VITAMIN D, CHOLECALCIFEROL, PO Take 1,000 Units by mouth daily, Historical      MULTI-VITAMIN PO TABS ONE TABLET DAILY, Oral, Historical       !! - Potential duplicate medications found. Please discuss with provider.      STOP taking these medications       ondansetron (ZOFRAN ODT) 4 MG ODT tab Comments:   Reason for Stopping:             Allergies   No Known Allergies  Data   Most Recent 3 CBC's:  Recent Labs   Lab Test  06/30/17   0803  06/28/17   2135  06/27/17   1202   WBC  11.4*  12.9*  14.2*   HGB  12.3*  15.2  15.4   MCV  91  89  91   PLT  212  238  261      Most Recent 3 BMP's:  Recent Labs   Lab Test  06/28/17   2135  06/27/17   1202  06/24/17   0405   NA  134  137  139   POTASSIUM  3.6  3.8  4.6   CHLORIDE  97  102  106   CO2  25  28  26   BUN  13  13  14   CR  0.90  0.86  0.82   ANIONGAP  12  7  7   SUKHWINDER  8.3*  8.5  8.6   GLC  117*  119*  158*     Most Recent 2 LFT's:  Recent Labs   Lab Test  06/30/17   0803  06/29/17   0754   AST  21  15   ALT  22  18   ALKPHOS  78  86   BILITOTAL  0.8  1.5*     Most Recent INR's and Anticoagulation Dosing History:  Anticoagulation Dose History     Recent Dosing and Labs Latest Ref Rng & Units 5/18/2017 6/24/2017 6/28/2017 6/29/2017 6/29/2017 6/30/2017 7/1/2017    Warfarin 5 mg - - - - - - 5 mg -    INR 0.86 - 1.14 - 2.37(H) 2.05(H) 2.05(H) 1.59(H) 1.26(H) 1.13    INR 0.86 - 1.14 2.6(A) - - - - - -        Most Recent 3 Troponin's:  Recent Labs   Lab Test  02/18/14   0010   TROPI  <0.012     Most Recent Cholesterol  Panel:  Recent Labs   Lab Test  04/26/17   0923   CHOL  189   LDL  119*   HDL  48   TRIG  111     Most Recent 6 Bacteria Isolates From Any Culture (See EPIC Reports for Culture Details):No lab results found.  Most Recent TSH, T4 and A1c Labs:  Recent Labs   Lab Test  06/30/17   0803  05/10/11   0918   TSH  0.19*   --    A1C   --   5.6       Results for orders placed or performed during the hospital encounter of 06/28/17   US Abdomen Limited    Narrative    US ABDOMEN LIMITED   6/28/2017 10:21 PM     HISTORY: Right upper quadrant pain.    COMPARISON: Ultrasound abdomen 6/24/2017.    FINDINGS:    Gallbladder: Cholelithiasis demonstrated with a 1.5 cm gallstone at  the gallbladder neck. Negative sonographic Garza's sign. There is  mild gallbladder wall thickening measuring 0.5 cm.    Bile ducts: CHD is normal diameter. No intrahepatic biliary  dilatation.    Liver: Fatty infiltration of the liver.    Pancreas: Partially obscured, but grossly unremarkable.     Right kidney: Normal.     Aorta and IVC: Not specifically assessed.       Impression    IMPRESSION:    1. Cholelithiasis with gallstone localizing to the gallbladder neck.  Mild gallbladder wall thickening. Negative sonographic Garza's sign.  Cholecystitis not excluded based on this exam alone. No biliary  dilatation.  2. Fatty infiltration of the liver.    OMKAR LUONG MD

## 2017-07-03 ENCOUNTER — TELEPHONE (OUTPATIENT)
Dept: FAMILY MEDICINE | Facility: CLINIC | Age: 72
End: 2017-07-03

## 2017-07-03 ENCOUNTER — ANTICOAGULATION THERAPY VISIT (OUTPATIENT)
Dept: NURSING | Facility: CLINIC | Age: 72
End: 2017-07-03
Payer: COMMERCIAL

## 2017-07-03 DIAGNOSIS — Z79.01 LONG-TERM (CURRENT) USE OF ANTICOAGULANTS: ICD-10-CM

## 2017-07-03 DIAGNOSIS — I26.99 PULMONARY EMBOLUS (H): ICD-10-CM

## 2017-07-03 LAB
BLD PROD TYP BPU: NORMAL
BLD PROD TYP BPU: NORMAL
BLD UNIT ID BPU: 0
BLOOD PRODUCT CODE: NORMAL
BPU ID: NORMAL
INR POINT OF CARE: 2.1 (ref 0.86–1.14)
NUM BPU REQUESTED: 1
TRANSFUSION STATUS PATIENT QL: NORMAL
TRANSFUSION STATUS PATIENT QL: NORMAL

## 2017-07-03 PROCEDURE — 36416 COLLJ CAPILLARY BLOOD SPEC: CPT

## 2017-07-03 PROCEDURE — 85610 PROTHROMBIN TIME: CPT | Mod: QW

## 2017-07-03 PROCEDURE — 99207 ZZC NO CHARGE NURSE ONLY: CPT

## 2017-07-03 NOTE — TELEPHONE ENCOUNTER
Patient given message to stop Lovenox and continue warfarin. Patient returning for INR recheck on 7/5. Elissa Portillo RN

## 2017-07-03 NOTE — TELEPHONE ENCOUNTER
Patient's INR is 2.1 today. Normal protocol would be for him to return to the clinic tomorrow to confirm that he is therapeutic and discontinue Lovenox.    Due to the holiday tomorrow the patient is unable to return to the clinic for an INR recheck.    OK to discontinue Lovenox today and recheck INR 7/5?    Please route back to p ec acc.  Elissa Portillo RN

## 2017-07-03 NOTE — TELEPHONE ENCOUNTER
Pt was seen at [facility] on Sat 7/1/2017.  Reason for visit: Acute Cholecystitis, History Of Pulmonary Embolism      Thank you!    Kirsten Martini TC

## 2017-07-03 NOTE — PROGRESS NOTES
ANTICOAGULATION FOLLOW-UP CLINIC VISIT    Patient Name:  Rob Beavers  Date:  7/3/2017  Contact Type:  Face to Face    SUBJECTIVE:     Patient Findings     Positives No Problem Findings (Patient was disharged from [facility] hospital on 7/1 following cholecystectomy.)    Comments Patient took warfarin 5 mg Friday, 7.5 mg Sat and Sunday. INR 2.1 today. Will check with PCP if OK to discontinue Lovenox bridging since the patient is not able to have INR recheck tomorrow due to the holiday.           OBJECTIVE    INR Protime   Date Value Ref Range Status   07/03/2017 2.1 (A) 0.86 - 1.14 Final       ASSESSMENT / PLAN  INR assessment THER    Recheck INR In: 2 DAYS    INR Location Clinic      Anticoagulation Summary as of 7/3/2017     INR goal 2.0-3.0   Today's INR 2.1   Maintenance plan 5 mg (5 mg x 1) on Fri; 2.5 mg (5 mg x 0.5) all other days   Full instructions 5 mg on Fri; 2.5 mg all other days   Weekly total 20 mg   Plan last modified Alecia Saavedra RN (3/15/2016)   Next INR check 7/5/2017   Priority INR   Target end date     Indications   Long-term (current) use of anticoagulants [Z79.01] [Z79.01]  Pulmonary embolus (H) [I26.99]         Anticoagulation Episode Summary     INR check location     Preferred lab     Send INR reminders to  [facility]    Comments       Anticoagulation Care Providers     Provider Role Specialty Phone number    Tian Santana MD [facility] [phone]            See the Encounter Report to view Anticoagulation Flowsheet and Dosing Calendar (Go to Encounters tab in chart review, and find the Anticoagulation Therapy Visit)    2.5 mg warfarin Monday and Tuesday. Recheck Wed 7/5. Patient also has hospital follow up with Dr. Santana on 7/5.    Elissa Portillo, LOBITO

## 2017-07-03 NOTE — TELEPHONE ENCOUNTER
"ED/Discharge Protocol    \"Hi, my name is Elissa Portillo, a registered nurse, and I am calling on behalf of Dr. Santana's office at [facility].  I am calling to follow up and see how things are going for you after your recent visit.\"    \"I see that you were in the (ER/UC/IP) on discharged on 7/1 following cholecystectomy.    How are you doing now that you are home?\" well, has had BM and retuning to normal diet. Wounds - no sign of infection.    Is patient experiencing symptoms that may require a hospital visit?  no    Discharge Instructions    \"Let's review your discharge instructions.  What is/are the follow-up recommendations?  Pt. Response: see AVS from hospital    \"Were you instructed to make a follow-up appointment?\"  Pt. Response: Yes.  Has appointment been made?   No.  \"Can I help you schedule that appointment?\" yes, scheduled for 7/5/17      \"When you see the provider, I would recommend that you bring your discharge instructions with you.    Medications    \"How many new medications are you on since your hospitalization/ED visit?\"    0-1  \"How many of your current medicines changed (dose, timing, name, etc.) while you were in the hospital/ED visit?\"   0-1  \"Do you have questions about your medications?\"   No  \"Were you newly diagnosed with heart failure, COPD, diabetes or did you have a heart attack?\"   No  For patients on insulin: \"Did you start on insulin in the hospital or did you have your insulin dose changed?\"   No    Medication reconciliation completed? No, due to no new medications. Patient states that he is no longer taking Zofran or oxycodone    Was MTM referral placed (*Make sure to put transitions as reason for referral)?   No    Call Summary    \"Do you have any questions or concerns about your condition or care plan at the moment?\"    No  Triage nurse advice given: reviewed wound care    Patient was in ER 2 in the past year (assess appropriateness of ER visits.)      \"If you have questions or " "things don't continue to improve, we encourage you contact us through the main clinic number,  [phone].  Even if the clinic is not open, triage nurses are available 24/7 to help you.     We would like you to know that our clinic has extended hours (provide information).  We also have urgent care (provide details on closest location and hours/contact info)\"      \"Thank you for your time and take care!\"  Elissa Portillo RN      "

## 2017-07-03 NOTE — MR AVS SNAPSHOT
Rob Ruthann   7/3/2017 8:45 AM   Anticoagulation Therapy Visit    Description:  71 year old male   Provider:  [facility]   Department:  Ec Nurse           INR as of 7/3/2017     Today's INR 2.1      Anticoagulation Summary as of 7/3/2017     INR goal 2.0-3.0   Today's INR 2.1   Full instructions 5 mg on Fri; 2.5 mg all other days   Next INR check 7/5/2017    Indications   Long-term (current) use of anticoagulants [Z79.01] [Z79.01]  Pulmonary embolus (H) [I26.99]         Your next Anticoagulation Clinic appointment(s)     Jul 05, 2017  8:45 AM CDT   Anticoagulation Visit with [facility]   [facility] ([facility]    [address] [place] [id number]   [phone]              Contact Numbers     Clinic Number:         July 2017 Details    Sun Mon Tue Wed Thu Fri Sat           1                 2               3      2.5 mg   See details      4      2.5 mg         5            6               7               8                 9               10               11               12               13               14               15                 16               17               18               19               20               21               22                 23               24               25               26               27               28               29                 30               31                     Date Details   07/03 This INR check       Date of next INR:  7/5/2017         How to take your warfarin dose     To take:  2.5 mg Take 0.5 of a 5 mg tablet.

## 2017-07-05 ENCOUNTER — ANTICOAGULATION THERAPY VISIT (OUTPATIENT)
Dept: NURSING | Facility: CLINIC | Age: 72
End: 2017-07-05
Payer: COMMERCIAL

## 2017-07-05 ENCOUNTER — OFFICE VISIT (OUTPATIENT)
Dept: FAMILY MEDICINE | Facility: CLINIC | Age: 72
End: 2017-07-05
Payer: COMMERCIAL

## 2017-07-05 VITALS
SYSTOLIC BLOOD PRESSURE: 124 MMHG | WEIGHT: 207 LBS | HEART RATE: 79 BPM | OXYGEN SATURATION: 96 % | DIASTOLIC BLOOD PRESSURE: 79 MMHG | TEMPERATURE: 97.3 F | BODY MASS INDEX: 31.37 KG/M2 | HEIGHT: 68 IN

## 2017-07-05 DIAGNOSIS — I26.99 PULMONARY EMBOLUS (H): ICD-10-CM

## 2017-07-05 DIAGNOSIS — I10 HYPERTENSION GOAL BP (BLOOD PRESSURE) < 140/90: Primary | ICD-10-CM

## 2017-07-05 DIAGNOSIS — Z79.01 LONG-TERM (CURRENT) USE OF ANTICOAGULANTS: ICD-10-CM

## 2017-07-05 DIAGNOSIS — K80.01 CALCULUS OF GALLBLADDER WITH ACUTE CHOLECYSTITIS AND OBSTRUCTION: ICD-10-CM

## 2017-07-05 DIAGNOSIS — K81.0 ACUTE CHOLECYSTITIS: ICD-10-CM

## 2017-07-05 LAB — INR POINT OF CARE: 1.9 (ref 0.86–1.14)

## 2017-07-05 PROCEDURE — 85610 PROTHROMBIN TIME: CPT | Mod: QW

## 2017-07-05 PROCEDURE — 99207 ZZC NO CHARGE NURSE ONLY: CPT

## 2017-07-05 PROCEDURE — 99495 TRANSJ CARE MGMT MOD F2F 14D: CPT | Performed by: FAMILY MEDICINE

## 2017-07-05 PROCEDURE — 36416 COLLJ CAPILLARY BLOOD SPEC: CPT

## 2017-07-05 ASSESSMENT — PAIN SCALES - GENERAL: PAINLEVEL: NO PAIN (0)

## 2017-07-05 NOTE — MR AVS SNAPSHOT
After Visit Summary   7/5/2017    Rob Beavers    MRN: [id number]           Patient Information     Date Of Birth          1945        Visit Information        Provider Department      7/5/2017 10:00 AM Tian Santana MD [facility]        Today's Diagnoses     Hypertension goal BP (blood pressure) < 140/90    -  1    Acute cholecystitis        Calculus of gallbladder with acute cholecystitis and obstruction        Long-term (current) use of anticoagulants [Z79.01]           Follow-ups after your visit        Your next 10 appointments already scheduled     Jul 12, 2017  9:00 AM CDT   Anticoagulation Visit with [facility]   [facility] ([facility])    [address] [place] [id number] [phone]              Who to contact     If you have questions or need follow up information about today's clinic visit or your schedule please contact [facility] directly at [phone].  Normal or non-critical lab and imaging results will be communicated to you by [employer], letter or phone within 4 business days after the clinic has received the results. If you do not hear from us within 7 days, please contact the clinic through [employer] or phone. If you have a critical or abnormal lab result, we will notify you by phone as soon as possible.  Submit refill requests through [employer] or call your pharmacy and they will forward the refill request to us. Please allow 3 business days for your refill to be completed.          Additional Information About Your Visit        [employer] Information     [employer] gives you secure access to your electronic health record. If you see a primary care provider, you can also send messages to your care team and make appointments. If you have questions, please call your primary care clinic.  If you do not have a primary care provider, please call [phone] and they will assist  "you.        Care EveryWhere ID     This is your Care EveryWhere ID. This could be used by other organizations to access your [facility] medical records  [id number]        Your Vitals Were     Pulse Temperature Height Pulse Oximetry BMI (Body Mass Index)       79 97.3  F (36.3  C) (Tympanic) 5' 8\" (1.727 m) 96% 31.47 kg/m2        Blood Pressure from Last 3 Encounters:   07/05/17 124/79   07/01/17 142/89   06/27/17 144/80    Weight from Last 3 Encounters:   07/05/17 207 lb (93.9 kg)   06/29/17 207 lb 11.2 oz (94.2 kg)   06/27/17 209 lb (94.8 kg)              Today, you had the following     No orders found for display       Primary Care Provider Office Phone # Fax #    Tian Santana [phone]       [facility] [place] [address] [place] [address] [address]  [place] [address]        Equal Access to Services     [facility]: Hadii aad ku hadasho Soomaali, waaxda luqadaha, qaybta kaalmada adeegyada, waxay idiin hayalen morales . So [facility] [phone].    ATENCIÓN: Si habla español, tiene a lobo disposición servicios gratuitos de asistencia lingüística. Llame al [phone].    We comply with applicable federal civil rights laws and [place] laws. We do not discriminate on the basis of race, color, national origin, age, disability sex, sexual orientation or gender identity.            Thank you!     Thank you for choosing [facility] [place]  for your care. Our goal is always to provide you with excellent care. Hearing back from our patients is one way we can continue to improve our services. Please take a few minutes to complete the written survey that you may receive in the mail after your visit with us. Thank you!             Your Updated Medication List - Protect others around you: Learn how to safely use, store and throw away your medicines at www.disposemymeds.org.          This list is accurate as of: 7/5/17 10:23 AM.  Always use your most recent med list.                   " Brand Name Dispense Instructions for use Diagnosis    enoxaparin 100 MG/ML injection    LOVENOX    2.82 mL    Inject 0.94 mLs (94 mg) Subcutaneous every 12 hours    History of pulmonary embolism       lisinopril 10 MG tablet    PRINIVIL/ZESTRIL    90 tablet    TAKE ONE TABLET (10 MG) BY MOUTH ONE TIME DAILY    Hypertension goal BP (blood pressure) < 140/90, Routine general medical examination at a health care facility       loratadine 10 MG tablet    CLARITIN     Take 10 mg by mouth daily        Multi-vitamin Tabs tablet   Generic drug:  multivitamin, therapeutic with minerals      ONE TABLET DAILY        omeprazole 40 MG capsule    priLOSEC    90 capsule    Take 1 capsule (40 mg) by mouth daily Take 30-60 minutes before a meal.    Routine general medical examination at a health care facility, Andrews's esophagus without dysplasia       oxyCODONE 5 MG IR tablet    ROXICODONE    20 tablet    Take 1 tablet (5 mg) by mouth every 6 hours as needed for pain        propranolol 80 MG 24 hr capsule    INDERAL LA    90 capsule    TAKE 1 CAPSULE (80 MG) BY MOUTH ONCE A DAY    Hypertension goal BP (blood pressure) < 140/90, Routine general medical examination at a health care facility       VITAMIN D (CHOLECALCIFEROL) PO      Take 1,000 Units by mouth daily        * warfarin 5 MG tablet    COUMADIN    90 tablet    TAKE 1 TAB(5MG) BY MOUTH ON FRIDAYS AND TAKE 1/2 TAB(2.5MG)ALL OTHER DAY/OR AS DIRECTED BY [facility]    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 Read the directions carefully, and ask your doctor or other care provider to review them with you.

## 2017-07-05 NOTE — PROGRESS NOTES
SUBJECTIVE:                                                    Rob Beavers is a 71 year old male who presents to clinic today for the following health issues:      Hospital Follow-up Visit:    Hospital/Nursing Home/IP Rehab Facility: [facility]  Date of Admission: 06/28/2017  Date of Discharge: 07/01/2017  Reason(s) for Admission: Gall balder surgery              Problems taking medications regularly:  None       Medication changes since discharge: None       Problems adhering to non-medication therapy:  None    Summary of hospitalization:  [facility] discharge summary reviewed  Diagnostic Tests/Treatments reviewed.  Follow up needed: none  Other Healthcare Providers Involved in Patient s Care:         General surgery  Update since discharge: improved.     Post Discharge Medication Reconciliation: discharge medications reconciled, continue medications without change.  Plan of care communicated with patient     Coding guidelines for this visit:  Type of Medical   Decision Making Face-to-Face Visit       within 7 Days of discharge Face-to-Face Visit        within 14 days of discharge   Moderate Complexity [id number]   High Complexity [id number]                Problem list and histories reviewed & adjusted, as indicated.  Additional history: as documented    Patient Active Problem List   Diagnosis     CARDIOVASCULAR SCREENING; LDL GOAL LESS THAN 130     Advanced directives, counseling/discussion     Hypertension goal BP (blood pressure) < 140/90     Prostate nodule     Benign familial tremor     Pulmonary embolus (H)     Long-term (current) use of anticoagulants [Z79.01]     Andrews's esophagus without dysplasia     Cholelithiasis     Acute cholecystitis     Past Surgical History:   Procedure Laterality Date     C TOTAL HIP ARTHROPLASTY  2010     DENTAL SURGERY       LAPAROSCOPIC CHOLECYSTECTOMY WITH CHOLANGIOGRAMS N/A 6/29/2017    Procedure: LAPAROSCOPIC CHOLECYSTECTOMY WITH  CHOLANGIOGRAMS;  LAPAROSCOPIC CHOLECYSTECTOMY WITH INTROPERATIVE CHOLANGIOGRAMS.;  Surgeon: Gian Yin MD;  Location: [facility] OR     TONSILLECTOMY  1949       Social History   Substance Use Topics     Smoking status: Never Smoker     Smokeless tobacco: Never Used     Alcohol use 0.0 oz/week     0 Standard drinks or equivalent per week      Comment: moderate     Family History   Problem Relation Age of Onset     Hypertension Mother      passed away at 89     Eye Disorder Mother      macular degeneration     Blood Disease Mother      She is on warfarin     Dementia Father 80     passed away at 88 yrs old     Breast Cancer Sister      DIABETES Paternal Uncle      Asthma No family hx of      C.A.D. No family hx of      Cancer - colorectal No family hx of      Prostate Cancer No family hx of      Anesthesia Reaction No family hx of      Thyroid Disease No family hx of          Current Outpatient Prescriptions   Medication Sig Dispense Refill     enoxaparin (LOVENOX) 100 MG/ML injection Inject 0.94 mLs (94 mg) Subcutaneous every 12 hours 2.82 mL 0     warfarin (COUMADIN) 7.5 MG tablet Take 1 tablet (7.5 mg) by mouth daily 2 tablet 0     loratadine (CLARITIN) 10 MG tablet Take 10 mg by mouth daily       oxyCODONE (ROXICODONE) 5 MG IR tablet Take 1 tablet (5 mg) by mouth every 6 hours as needed for pain 20 tablet 0     lisinopril (PRINIVIL/ZESTRIL) 10 MG tablet TAKE ONE TABLET (10 MG) BY MOUTH ONE TIME DAILY 90 tablet 3     omeprazole (PRILOSEC) 40 MG capsule Take 1 capsule (40 mg) by mouth daily Take 30-60 minutes before a meal. 90 capsule 3     propranolol (INDERAL LA) 80 MG 24 hr capsule TAKE 1 CAPSULE (80 MG) BY MOUTH ONCE A DAY 90 capsule 3     warfarin (COUMADIN) 5 MG tablet TAKE 1 TAB(5MG) BY MOUTH ON FRIDAYS AND TAKE 1/2 TAB(2.5MG)ALL OTHER DAY/OR AS DIRECTED BY [facility] 90 tablet 0     VITAMIN D, CHOLECALCIFEROL, PO Take 1,000 Units by mouth daily       MULTI-VITAMIN PO TABS ONE TABLET DAILY       No Known  "Allergies    Reviewed and updated as needed this visit by clinical staff  Tobacco  Allergies  Meds  Med Hx  Surg Hx  Fam Hx  Soc Hx      Reviewed and updated as needed this visit by Provider         ROS:  C: NEGATIVE for fever, chills, change in weight  E/M: NEGATIVE for ear, mouth and throat problems  R: NEGATIVE for significant cough or SOB  CV: NEGATIVE for chest pain, palpitations or peripheral edema  GI: none.    OBJECTIVE:                                                    /79 (BP Location: Right arm, Patient Position: Chair, Cuff Size: Adult Large)  Pulse 79  Temp 97.3  F (36.3  C) (Tympanic)  Ht 5' 8\" (1.727 m)  Wt 207 lb (93.9 kg)  SpO2 96%  BMI 31.47 kg/m2  Body mass index is 31.47 kg/(m^2).   GENERAL: healthy, alert, well nourished, well hydrated, no distress  NECK: no tenderness, no adenopathy, no asymmetry, no masses, no stiffness; thyroid- normal to palpation  RESP: lungs clear to auscultation - no rales, no rhonchi, no wheezes  CV: regular rates and rhythm, normal S1 S2, no S3 or S4 and no murmur, no click or rub -  ABDOMEN: soft, no tenderness, no  hepatosplenomegaly, no masses, normal bowel sounds  Surgical wound is healing, no drainage noted.         ASSESSMENT/PLAN:                                                        ICD-10-CM    1. Hypertension goal BP (blood pressure) < 140/90 I10    2. Acute cholecystitis K81.0    3. Calculus of gallbladder with acute cholecystitis and obstruction K80.01    4. Long-term (current) use of anticoagulants [Z79.01] Z79.01      Dicussed after care, his wound is healing properly.  INR is 1.9, medication adjustments are done and follow up as dicussed. Watch for signs of any redness, fever or abdominal pain.  Keep your self hydrated.    Tian Santana MD  [facility]  "

## 2017-07-05 NOTE — MR AVS SNAPSHOT
Rob Ruthann   7/5/2017 9:30 AM   Anticoagulation Therapy Visit    Description:  71 year old male   Provider:   ANTICOAGULATION CLINIC   Department:  Ec Nurse           INR as of 7/5/2017     Today's INR 1.9!      Anticoagulation Summary as of 7/5/2017     INR goal 2.0-3.0   Today's INR 1.9!   Full instructions 5 mg on Fri; 2.5 mg all other days   Next INR check 7/12/2017    Indications   Long-term (current) use of anticoagulants [Z79.01] [Z79.01]  Pulmonary embolus (H) [I26.99]         Description     INR 1.9 today.  Continue with 5 mg on Fri;  2.5 mg all other days = 20 mg weekly.  Recheck in 1 week.         Your next Anticoagulation Clinic appointment(s)     Jul 12, 2017  9:00 AM CDT   Anticoagulation Visit with  ANTICOAGULATION CLINIC   [facility] ([facility])    [address] [place] [id number] [phone]              Contact Numbers     Clinic Number:         July 2017 Details    Sun Mon Tue Wed Thu Fri Sat           1                 2               3               4               5      2.5 mg   See details      6      2.5 mg         7      5 mg         8      2.5 mg           9      2.5 mg         10      2.5 mg         11      2.5 mg         12            13               14               15                 16               17               18               19               20               21               22                 23               24               25               26               27               28               29                 30               31                     Date Details   07/05 This INR check       Date of next INR:  7/12/2017         How to take your warfarin dose     To take:  2.5 mg Take 0.5 of a 5 mg tablet.    To take:  5 mg Take 1 of the 5 mg tablets.

## 2017-07-05 NOTE — NURSING NOTE
"Chief Complaint   Patient presents with     Hospital F/U       Initial /79 (BP Location: Right arm, Patient Position: Chair, Cuff Size: Adult Large)  Pulse 79  Temp 97.3  F (36.3  C) (Tympanic)  Ht 5' 8\" (1.727 m)  Wt 207 lb (93.9 kg)  SpO2 96%  BMI 31.47 kg/m2 Estimated body mass index is 31.47 kg/(m^2) as calculated from the following:    Height as of this encounter: 5' 8\" (1.727 m).    Weight as of this encounter: 207 lb (93.9 kg).  Medication Reconciliation: complete     Amanda De Santiago MA     "

## 2017-07-05 NOTE — PROGRESS NOTES
ANTICOAGULATION FOLLOW-UP CLINIC VISIT    Patient Name:  Rob Beavers  Date:  7/5/2017  Contact Type:  Face to Face    SUBJECTIVE:     Patient Findings     Positives No Problem Findings           OBJECTIVE    INR Protime   Date Value Ref Range Status   07/03/2017 2.1 (A) 0.86 - 1.14 Final       ASSESSMENT / PLAN  INR assessment THER    Recheck INR In: 1 WEEK    INR Location Clinic      Anticoagulation Summary as of 7/5/2017     INR goal 2.0-3.0   Today's INR    Maintenance plan 5 mg (5 mg x 1) on Fri; 2.5 mg (5 mg x 0.5) all other days   Full instructions 5 mg on Fri; 2.5 mg all other days   Weekly total 20 mg   No change documented Laura Ventura RN   Plan last modified Alecia Saavedra RN (3/15/2016)   Next INR check 7/12/2017   Priority INR   Target end date     Indications   Long-term (current) use of anticoagulants [Z79.01] [Z79.01]  Pulmonary embolus (H) [I26.99]         Anticoagulation Episode Summary     INR check location     Preferred lab     Send INR reminders to [facility]    Comments       Anticoagulation Care Providers     Provider Role Specialty Phone number    Tian Santana MD [facility] [phone]            See the Encounter Report to view Anticoagulation Flowsheet and Dosing Calendar (Go to Encounters tab in chart review, and find the Anticoagulation Therapy Visit)    Dosage adjustment made based on physician directed care plan.    INR 1.9 today.  Continue with 5 mg on Fri;  2.5 mg all other days = 20 mg weekly.  Recheck in 1 week.     Laura Ventura RN

## 2017-07-12 ENCOUNTER — ANTICOAGULATION THERAPY VISIT (OUTPATIENT)
Dept: NURSING | Facility: CLINIC | Age: 72
End: 2017-07-12
Payer: COMMERCIAL

## 2017-07-12 DIAGNOSIS — I26.99 PULMONARY EMBOLUS (H): ICD-10-CM

## 2017-07-12 DIAGNOSIS — Z79.01 LONG-TERM (CURRENT) USE OF ANTICOAGULANTS: ICD-10-CM

## 2017-07-12 LAB — INR POINT OF CARE: 2.3 (ref 0.86–1.14)

## 2017-07-12 PROCEDURE — 36416 COLLJ CAPILLARY BLOOD SPEC: CPT

## 2017-07-12 PROCEDURE — 85610 PROTHROMBIN TIME: CPT | Mod: QW

## 2017-07-12 NOTE — MR AVS SNAPSHOT
Rob Kelleyger   7/12/2017 9:00 AM   Anticoagulation Therapy Visit    Description:  71 year old male   Provider:   ANTICOAGULATION CLINIC   Department:  Ec Nurse           INR as of 7/12/2017     Today's INR 2.3      Anticoagulation Summary as of 7/12/2017     INR goal 2.0-3.0   Today's INR 2.3   Full instructions 5 mg on Fri; 2.5 mg all other days   Next INR check 8/23/2017    Indications   Long-term (current) use of anticoagulants [Z79.01] [Z79.01]  Pulmonary embolus (H) [I26.99]         Description     INR 2.3 today.  Continue with 5 mg on Fri;  2.5 mg all other days = 20 mg weekly.  Recheck in 6 weeks.         Your next Anticoagulation Clinic appointment(s)     Aug 23, 2017  9:00 AM CDT   Anticoagulation Visit with  ANTICOAGULATION CLINIC   [facility] ([facility])    [address] [place] [id number] [phone]              Contact Numbers     Clinic Number:         July 2017 Details    Sun Mon Tue Wed Thu Fri Sat           1                 2               3               4               5               6               7               8                 9               10               11               12      2.5 mg   See details      13      2.5 mg         14      5 mg         15      2.5 mg           16      2.5 mg         17      2.5 mg         18      2.5 mg         19      2.5 mg         20      2.5 mg         21      5 mg         22      2.5 mg           23      2.5 mg         24      2.5 mg         25      2.5 mg         26      2.5 mg         27      2.5 mg         28      5 mg         29      2.5 mg           30      2.5 mg         31      2.5 mg               Date Details   07/12 This INR check               How to take your warfarin dose     To take:  2.5 mg Take 0.5 of a 5 mg tablet.    To take:  5 mg Take 1 of the 5 mg tablets.           August 2017 Details    Sun Mon Tue Wed Thu Fri Sat       1      2.5 mg         2      2.5 mg          3      2.5 mg         4      5 mg         5      2.5 mg           6      2.5 mg         7      2.5 mg         8      2.5 mg         9      2.5 mg         10      2.5 mg         11      5 mg         12      2.5 mg           13      2.5 mg         14      2.5 mg         15      2.5 mg         16      2.5 mg         17      2.5 mg         18      5 mg         19      2.5 mg           20      2.5 mg         21      2.5 mg         22      2.5 mg         23            24               25               26                 27               28               29               30               31                  Date Details   No additional details    Date of next INR:  8/23/2017         How to take your warfarin dose     To take:  2.5 mg Take 0.5 of a 5 mg tablet.    To take:  5 mg Take 1 of the 5 mg tablets.

## 2017-07-12 NOTE — PROGRESS NOTES
ANTICOAGULATION FOLLOW-UP CLINIC VISIT    Patient Name:  Rob Beavers  Date:  7/12/2017  Contact Type:  Face to Face    SUBJECTIVE:     Patient Findings     Positives No Problem Findings           OBJECTIVE    INR Protime   Date Value Ref Range Status   07/12/2017 2.3 (A) 0.86 - 1.14 Final       ASSESSMENT / PLAN  INR assessment THER    Recheck INR In: 6 WEEKS    INR Location Clinic      Anticoagulation Summary as of 7/12/2017     INR goal 2.0-3.0   Today's INR 2.3   Maintenance plan 5 mg (5 mg x 1) on Fri; 2.5 mg (5 mg x 0.5) all other days   Full instructions 5 mg on Fri; 2.5 mg all other days   Weekly total 20 mg   No change documented Laura Ventura RN   Plan last modified Alecia Saavedra RN (3/15/2016)   Next INR check 8/23/2017   Priority INR   Target end date     Indications   Long-term (current) use of anticoagulants [Z79.01] [Z79.01]  Pulmonary embolus (H) [I26.99]         Anticoagulation Episode Summary     INR check location     Preferred lab     Send INR reminders to  [facility]    Comments       Anticoagulation Care Providers     Provider Role Specialty Phone number    Tian Santana MD [facility] [phone]            See the Encounter Report to view Anticoagulation Flowsheet and Dosing Calendar (Go to Encounters tab in chart review, and find the Anticoagulation Therapy Visit)    Dosage adjustment made based on physician directed care plan.    INR 2.3 today.  Continue with 5 mg on Fri;  2.5 mg all other days = 20 mg weekly.  Recheck in 6 weeks.       Laura Ventura RN

## 2017-08-22 ENCOUNTER — ANTICOAGULATION THERAPY VISIT (OUTPATIENT)
Dept: NURSING | Facility: CLINIC | Age: 72
End: 2017-08-22
Payer: COMMERCIAL

## 2017-08-22 DIAGNOSIS — I27.82 OTHER CHRONIC PULMONARY EMBOLISM WITH ACUTE COR PULMONALE (H): ICD-10-CM

## 2017-08-22 DIAGNOSIS — Z79.01 LONG-TERM (CURRENT) USE OF ANTICOAGULANTS: ICD-10-CM

## 2017-08-22 DIAGNOSIS — I26.09 OTHER CHRONIC PULMONARY EMBOLISM WITH ACUTE COR PULMONALE (H): ICD-10-CM

## 2017-08-22 DIAGNOSIS — I26.99 PULMONARY EMBOLUS (H): ICD-10-CM

## 2017-08-22 LAB — INR POINT OF CARE: 2.3 (ref 0.86–1.14)

## 2017-08-22 PROCEDURE — 36416 COLLJ CAPILLARY BLOOD SPEC: CPT

## 2017-08-22 PROCEDURE — 99207 ZZC NO CHARGE NURSE ONLY: CPT

## 2017-08-22 PROCEDURE — 85610 PROTHROMBIN TIME: CPT | Mod: QW

## 2017-08-22 RX ORDER — WARFARIN SODIUM 5 MG/1
TABLET ORAL
Qty: 90 TABLET | Refills: 0 | Status: SHIPPED | OUTPATIENT
Start: 2017-08-22 | End: 2018-02-03

## 2017-08-22 NOTE — PROGRESS NOTES
ANTICOAGULATION FOLLOW-UP CLINIC VISIT    Patient Name:  Rob Beavers  Date:  8/22/2017  Contact Type:  Face to Face    SUBJECTIVE:     Patient Findings     Positives No Problem Findings           OBJECTIVE    INR Protime   Date Value Ref Range Status   08/22/2017 2.3 (A) 0.86 - 1.14 Final       ASSESSMENT / PLAN  INR assessment THER    Recheck INR In: 6 WEEKS    INR Location Clinic      Anticoagulation Summary as of 8/22/2017     INR goal 2.0-3.0   Today's INR 2.3   Maintenance plan 5 mg (5 mg x 1) on Fri; 2.5 mg (5 mg x 0.5) all other days   Full instructions 5 mg on Fri; 2.5 mg all other days   Weekly total 20 mg   No change documented Deisy Swenson RN   Plan last modified Alecia Saavedra RN (3/15/2016)   Next INR check 10/4/2017   Priority INR   Target end date     Indications   Long-term (current) use of anticoagulants [Z79.01] [Z79.01]  Pulmonary embolus (H) [I26.99]         Anticoagulation Episode Summary     INR check location     Preferred lab     Send INR reminders to [facility]    Comments       Anticoagulation Care Providers     Provider Role Specialty Phone number    Tian Santana MD [facility] [phone]            See the Encounter Report to view Anticoagulation Flowsheet and Dosing Calendar (Go to Encounters tab in chart review, and find the Anticoagulation Therapy Visit)    Patient INR in goal will continue same maintenance dosing and follow up in 6 weeks    Deisy Reyes RN

## 2017-08-22 NOTE — MR AVS SNAPSHOT
Rob Kelleyger   8/22/2017 12:15 PM   Anticoagulation Therapy Visit    Description:  71 year old male   Provider:  [facility]   Department:  Ec Nurse           INR as of 8/22/2017     Today's INR 2.3      Anticoagulation Summary as of 8/22/2017     INR goal 2.0-3.0   Today's INR 2.3   Full instructions 5 mg on Fri; 2.5 mg all other days   Next INR check 10/4/2017    Indications   Long-term (current) use of anticoagulants [Z79.01] [Z79.01]  Pulmonary embolus (H) [I26.99]         Your next Anticoagulation Clinic appointment(s)     Oct 04, 2017  9:00 AM CDT   [facility]   [facility]    [address] [place] [id number]   [phone]              Contact Numbers     Clinic Number:         August 2017 Details    Sun Mon Tue Wed Thu Fri Sat       1               2               3               4               5                 6               7               8               9               10               11               12                 13               14               15               16               17               18               19                 20               21               22      2.5 mg   See details      23      2.5 mg         24      2.5 mg         25      5 mg         26      2.5 mg           27      2.5 mg         28      2.5 mg         29      2.5 mg         30      2.5 mg         31      2.5 mg            Date Details   08/22 This INR check               How to take your warfarin dose     To take:  2.5 mg Take 0.5 of a 5 mg tablet.    To take:  5 mg Take 1 of the 5 mg tablets.           September 2017 Details    Sun Mon Tue Wed Thu Fri Sat          1      5 mg         2      2.5 mg           3      2.5 mg         4      2.5 mg         5      2.5 mg         6      2.5 mg         7      2.5 mg         8      5 mg         9      2.5 mg           10      2.5 mg          11      2.5 mg         12      2.5 mg         13      2.5 mg         14      2.5 mg         15      5 mg         16      2.5 mg           17      2.5 mg         18      2.5 mg         19      2.5 mg         20      2.5 mg         21      2.5 mg         22      5 mg         23      2.5 mg           24      2.5 mg         25      2.5 mg         26      2.5 mg         27      2.5 mg         28      2.5 mg         29      5 mg         30      2.5 mg          Date Details   No additional details            How to take your warfarin dose     To take:  2.5 mg Take 0.5 of a 5 mg tablet.    To take:  5 mg Take 1 of the 5 mg tablets.           October 2017 Details    Sun Mon Tue Wed Thu Fri Sat     1      2.5 mg         2      2.5 mg         3      2.5 mg         4            5               6               7                 8               9               10               11               12               13               14                 15               16               17               18               19               20               21                 22               23               24               25               26               27               28                 29               30               31                    Date Details   No additional details    Date of next INR:  10/4/2017         How to take your warfarin dose     To take:  2.5 mg Take 0.5 of a 5 mg tablet.

## 2017-08-22 NOTE — TELEPHONE ENCOUNTER
Coumadin    Last Written Prescription Date: 2/22/2017  Last Fill Qty: 90, # refills: 0  Last Office Visit with [facility], [facility] or [facility] prescribing provider: 7/5/2017       Date and Result of Last PT/INR:   Lab Results   Component Value Date    INR 2.3 08/22/2017    INR 2.3 07/12/2017    INR 1.13 07/01/2017    INR 1.26 06/30/2017      Prescription approved per [facility], [facility] or [facility] refill protocol.  Deisy Mackenzie RN - Triage  [facility]

## 2017-10-04 ENCOUNTER — ANTICOAGULATION THERAPY VISIT (OUTPATIENT)
Dept: NURSING | Facility: CLINIC | Age: 72
End: 2017-10-04
Payer: COMMERCIAL

## 2017-10-04 DIAGNOSIS — I26.99 PULMONARY EMBOLUS (H): ICD-10-CM

## 2017-10-04 DIAGNOSIS — Z79.01 LONG-TERM (CURRENT) USE OF ANTICOAGULANTS: ICD-10-CM

## 2017-10-04 LAB — INR POINT OF CARE: 2.8 (ref 0.86–1.14)

## 2017-10-04 PROCEDURE — 85610 PROTHROMBIN TIME: CPT | Mod: QW

## 2017-10-04 PROCEDURE — 36416 COLLJ CAPILLARY BLOOD SPEC: CPT

## 2017-10-04 PROCEDURE — 99207 ZZC NO CHARGE NURSE ONLY: CPT

## 2017-10-04 NOTE — PROGRESS NOTES
ANTICOAGULATION FOLLOW-UP CLINIC VISIT    Patient Name:  Rob Beavers  Date:  10/4/2017  Contact Type:  Face to Face    SUBJECTIVE:     Patient Findings     Positives No Problem Findings           OBJECTIVE    INR Protime   Date Value Ref Range Status   10/04/2017 2.8 (A) 0.86 - 1.14 Final       ASSESSMENT / PLAN  INR assessment THER    Recheck INR In: 6 WEEKS    INR Location Clinic      Anticoagulation Summary as of 10/4/2017     INR goal 2.0-3.0   Today's INR 2.8   Maintenance plan 5 mg (5 mg x 1) on Fri; 2.5 mg (5 mg x 0.5) all other days   Full instructions 5 mg on Fri; 2.5 mg all other days   Weekly total 20 mg   No change documented Laura Ventura RN   Plan last modified Alecia Saavedra RN (3/15/2016)   Next INR check 11/15/2017   Priority INR   Target end date     Indications   Long-term (current) use of anticoagulants [Z79.01] [Z79.01]  Pulmonary embolus (H) [I26.99]         Anticoagulation Episode Summary     INR check location     Preferred lab     Send INR reminders to  [facility]    Comments       Anticoagulation Care Providers     Provider Role Specialty Phone number    Tian Santana MD [facility] [phone]            See the Encounter Report to view Anticoagulation Flowsheet and Dosing Calendar (Go to Encounters tab in chart review, and find the Anticoagulation Therapy Visit)    Dosage adjustment made based on physician directed care plan.    INR 2.8 today.  Continue with 5 mg on Fri;  2.5 mg all other days = 20 mg weekly.  Recheck in 6 weeks.     Laura Ventura RN

## 2017-10-04 NOTE — MR AVS SNAPSHOT
Rob Ruthann   10/4/2017 9:00 AM   Anticoagulation Therapy Visit    Description:  72 year old male   Provider:   ANTICOAGULATION CLINIC   Department:  Ec Nurse           INR as of 10/4/2017     Today's INR 2.8      Anticoagulation Summary as of 10/4/2017     INR goal 2.0-3.0   Today's INR 2.8   Full instructions 5 mg on Fri; 2.5 mg all other days   Next INR check 11/15/2017    Indications   Long-term (current) use of anticoagulants [Z79.01] [Z79.01]  Pulmonary embolus (H) [I26.99]         Description     INR 2.8 today.  Continue with 5 mg on Fri;  2.5 mg all other days = 20 mg weekly.  Recheck in 6 weeks.         Your next Anticoagulation Clinic appointment(s)     Nov 15, 2017  9:00 AM CST   Anticoagulation Visit with  ANTICOAGULATION CLINIC   [facility] [place]    [address] [place] [id number] [phone]              Contact Numbers     Clinic Number:         October 2017 Details    Sun Mon Tue Wed Thu Fri Sat     1               2               3               4      2.5 mg   See details      5      2.5 mg         6      5 mg         7      2.5 mg           8      2.5 mg         9      2.5 mg         10      2.5 mg         11      2.5 mg         12      2.5 mg         13      5 mg         14      2.5 mg           15      2.5 mg         16      2.5 mg         17      2.5 mg         18      2.5 mg         19      2.5 mg         20      5 mg         21      2.5 mg           22      2.5 mg         23      2.5 mg         24      2.5 mg         25      2.5 mg         26      2.5 mg         27      5 mg         28      2.5 mg           29      2.5 mg         30      2.5 mg         31      2.5 mg              Date Details   10/04 This INR check               How to take your warfarin dose     To take:  2.5 mg Take 0.5 of a 5 mg tablet.    To take:  5 mg Take 1 of the 5 mg tablets.           November 2017 Details    Sun Mon Tue Wed Thu Fri Sat         1      2.5 mg         2      2.5 mg         3      5 mg         4      2.5 mg           5      2.5 mg         6      2.5 mg         7      2.5 mg         8      2.5 mg         9      2.5 mg         10      5 mg         11      2.5 mg           12      2.5 mg         13      2.5 mg         14      2.5 mg         15            16               17               18                 19               20               21               22               23               24               25                 26               27               28               29               30                  Date Details   No additional details    Date of next INR:  11/15/2017         How to take your warfarin dose     To take:  2.5 mg Take 0.5 of a 5 mg tablet.    To take:  5 mg Take 1 of the 5 mg tablets.

## 2017-11-15 ENCOUNTER — ANTICOAGULATION THERAPY VISIT (OUTPATIENT)
Dept: NURSING | Facility: CLINIC | Age: 72
End: 2017-11-15
Payer: COMMERCIAL

## 2017-11-15 DIAGNOSIS — Z79.01 LONG-TERM (CURRENT) USE OF ANTICOAGULANTS: ICD-10-CM

## 2017-11-15 DIAGNOSIS — I26.99 PULMONARY EMBOLUS (H): ICD-10-CM

## 2017-11-15 LAB — INR POINT OF CARE: 2.4 (ref 0.86–1.14)

## 2017-11-15 PROCEDURE — 85610 PROTHROMBIN TIME: CPT | Mod: QW

## 2017-11-15 PROCEDURE — 36416 COLLJ CAPILLARY BLOOD SPEC: CPT

## 2017-11-15 PROCEDURE — 99207 ZZC NO CHARGE NURSE ONLY: CPT

## 2017-11-15 NOTE — MR AVS SNAPSHOT
Rob Beavers   11/15/2017 9:00 AM   Anticoagulation Therapy Visit    Description:  72 year old male   Provider:   ANTICOAGULATION CLINIC   Department:  Ec Nurse           INR as of 11/15/2017     Today's INR 2.4      Anticoagulation Summary as of 11/15/2017     INR goal 2.0-3.0   Today's INR 2.4   Full instructions 5 mg on Fri; 2.5 mg all other days   Next INR check 1/3/2018    Indications   Long-term (current) use of anticoagulants [Z79.01] [Z79.01]  Pulmonary embolus (H) [I26.99]         Description     INR therapeutic at 2.4 today.  Continue with 5 mg on Fri;  2.5 mg all other days = 20 mg weekly.  Recheck in 7 weeks.         Your next Anticoagulation Clinic appointment(s)     Jan 03, 2018  9:00 AM CST   Anticoagulation Visit with  ANTICOAGULATION CLINIC   [facility] ([facility])    [address] [place] [id number] [phone]              Contact Numbers     Clinic Number:         November 2017 Details    Sun Mon Tue Wed Thu Fri Sat        1               2               3               4                 5               6               7               8               9               10               11                 12               13               14               15      2.5 mg   See details      16      2.5 mg         17      5 mg         18      2.5 mg           19      2.5 mg         20      2.5 mg         21      2.5 mg         22      2.5 mg         23      2.5 mg         24      5 mg         25      2.5 mg           26      2.5 mg         27      2.5 mg         28      2.5 mg         29      2.5 mg         30      2.5 mg            Date Details   11/15 This INR check               How to take your warfarin dose     To take:  2.5 mg Take 0.5 of a 5 mg tablet.    To take:  5 mg Take 1 of the 5 mg tablets.           December 2017 Details    Sun Mon Tue Wed Thu Fri Sat          1      5 mg         2      2.5 mg           3      2.5 mg          4      2.5 mg         5      2.5 mg         6      2.5 mg         7      2.5 mg         8      5 mg         9      2.5 mg           10      2.5 mg         11      2.5 mg         12      2.5 mg         13      2.5 mg         14      2.5 mg         15      5 mg         16      2.5 mg           17      2.5 mg         18      2.5 mg         19      2.5 mg         20      2.5 mg         21      2.5 mg         22      5 mg         23      2.5 mg           24      2.5 mg         25      2.5 mg         26      2.5 mg         27      2.5 mg         28      2.5 mg         29      5 mg         30      2.5 mg           31      2.5 mg                Date Details   No additional details            How to take your warfarin dose     To take:  2.5 mg Take 0.5 of a 5 mg tablet.    To take:  5 mg Take 1 of the 5 mg tablets.           January 2018 Details    Sun Mon Tue Wed Thu Fri Sat      1      2.5 mg         2      2.5 mg         3            4               5               6                 7               8               9               10               11               12               13                 14               15               16               17               18               19               20                 21               22               23               24               25               26               27                 28               29               30               31                   Date Details   No additional details    Date of next INR:  1/3/2018         How to take your warfarin dose     To take:  2.5 mg Take 0.5 of a 5 mg tablet.

## 2017-11-15 NOTE — PROGRESS NOTES
ANTICOAGULATION FOLLOW-UP CLINIC VISIT    Patient Name:  Rob Beavers  Date:  11/15/2017  Contact Type:  Face to Face    SUBJECTIVE:     Patient Findings     Positives No Problem Findings           OBJECTIVE    INR Protime   Date Value Ref Range Status   11/15/2017 2.4 (A) 0.86 - 1.14 Final       ASSESSMENT / PLAN  INR assessment THER    Recheck INR In: 7 WEEKS    INR Location Clinic      Anticoagulation Summary as of 11/15/2017     INR goal 2.0-3.0   Today's INR 2.4   Maintenance plan 5 mg (5 mg x 1) on Fri; 2.5 mg (5 mg x 0.5) all other days   Full instructions 5 mg on Fri; 2.5 mg all other days   Weekly total 20 mg   No change documented Laura Ventura RN   Plan last modified Alecia Saavedra RN (3/15/2016)   Next INR check 1/3/2018   Priority INR   Target end date     Indications   Long-term (current) use of anticoagulants [Z79.01] [Z79.01]  Pulmonary embolus (H) [I26.99]         Anticoagulation Episode Summary     INR check location     Preferred lab     Send INR reminders to  [facility]    Comments       Anticoagulation Care Providers     Provider Role Specialty Phone number    Tian Santana MD [facility] [phone]            See the Encounter Report to view Anticoagulation Flowsheet and Dosing Calendar (Go to Encounters tab in chart review, and find the Anticoagulation Therapy Visit)    Dosage adjustment made based on physician directed care plan.    INR therapeutic at 2.4 today.  Continue with 5 mg on Fri;  2.5 mg all other days = 20 mg weekly.  Recheck in 7 weeks.     Laura Ventura RN

## 2018-01-03 ENCOUNTER — ANTICOAGULATION THERAPY VISIT (OUTPATIENT)
Dept: NURSING | Facility: CLINIC | Age: 73
End: 2018-01-03
Payer: COMMERCIAL

## 2018-01-03 DIAGNOSIS — I26.99 PULMONARY EMBOLUS (H): ICD-10-CM

## 2018-01-03 DIAGNOSIS — Z79.01 LONG-TERM (CURRENT) USE OF ANTICOAGULANTS: ICD-10-CM

## 2018-01-03 LAB — INR POINT OF CARE: 2.7 (ref 0.86–1.14)

## 2018-01-03 PROCEDURE — 36416 COLLJ CAPILLARY BLOOD SPEC: CPT

## 2018-01-03 PROCEDURE — 99207 ZZC NO CHARGE NURSE ONLY: CPT

## 2018-01-03 PROCEDURE — 85610 PROTHROMBIN TIME: CPT | Mod: QW

## 2018-01-03 NOTE — PROGRESS NOTES
ANTICOAGULATION FOLLOW-UP CLINIC VISIT    Patient Name:  Rob Beavers  Date:  1/3/2018  Contact Type:  Face to Face    SUBJECTIVE:     Patient Findings     Positives No Problem Findings           OBJECTIVE    INR Protime   Date Value Ref Range Status   01/03/2018 2.7 (A) 0.86 - 1.14 Final       ASSESSMENT / PLAN  INR assessment THER    Recheck INR In: 6 WEEKS    INR Location Clinic      Anticoagulation Summary as of 1/3/2018     INR goal 2.0-3.0   Today's INR 2.7   Maintenance plan 5 mg (5 mg x 1) on Fri; 2.5 mg (5 mg x 0.5) all other days   Full instructions 5 mg on Fri; 2.5 mg all other days   Weekly total 20 mg   No change documented Deisy Swenson RN   Plan last modified Alecia Saavedra RN (3/15/2016)   Next INR check 2/14/2018   Priority INR   Target end date     Indications   Long-term (current) use of anticoagulants [Z79.01] [Z79.01]  Pulmonary embolus (H) [I26.99]         Anticoagulation Episode Summary     INR check location     Preferred lab     Send INR reminders to [facility]    Comments       Anticoagulation Care Providers     Provider Role Specialty Phone number    Tian Santana MD Respons[facility] [phone]            See the Encounter Report to view Anticoagulation Flowsheet and Dosing Calendar (Go to Encounters tab in chart review, and find the Anticoagulation Therapy Visit)    Patient INR at goal.  Patient will continue 20 mg weekly dosing and follow up in 6 weeks.    Deisy Reyes RN

## 2018-01-03 NOTE — MR AVS SNAPSHOT
Rob Kelleyger   1/3/2018 9:00 AM   Anticoagulation Therapy Visit    Description:  72 year old male   Provider:  [facility]   Department:  Ec Nurse           INR as of 1/3/2018     Today's INR 2.7      Anticoagulation Summary as of 1/3/2018     INR goal 2.0-3.0   Today's INR 2.7   Full instructions 5 mg on Fri; 2.5 mg all other days   Next INR check 2/14/2018    Indications   Long-term (current) use of anticoagulants [Z79.01] [Z79.01]  Pulmonary embolus (H) [I26.99]         Your next Anticoagulation Clinic appointment(s)     Feb 14, 2018  9:00 AM CST   Anticoagulation Visit with  ANTICOAGULATION CLINIC   [facility] ([facility])    [address] [place] [id number]   [phone]              Contact Numbers     Clinic Number:         January 2018 Details    Sun Mon Tue Wed Thu Fri Sat      1               2               3      2.5 mg   See details      4      2.5 mg         5      5 mg         6      2.5 mg           7      2.5 mg         8      2.5 mg         9      2.5 mg         10      2.5 mg         11      2.5 mg         12      5 mg         13      2.5 mg           14      2.5 mg         15      2.5 mg         16      2.5 mg         17      2.5 mg         18      2.5 mg         19      5 mg         20      2.5 mg           21      2.5 mg         22      2.5 mg         23      2.5 mg         24      2.5 mg         25      2.5 mg         26      5 mg         27      2.5 mg           28      2.5 mg         29      2.5 mg         30      2.5 mg         31      2.5 mg             Date Details   01/03 This INR check               How to take your warfarin dose     To take:  2.5 mg Take 0.5 of a 5 mg tablet.    To take:  5 mg Take 1 of the 5 mg tablets.           February 2018 Details    Sun Mon Tue Wed Thu Fri Sat         1      2.5 mg         2      5 mg         3      2.5 mg           4      2.5 mg         5      2.5 mg         6       2.5 mg         7      2.5 mg         8      2.5 mg         9      5 mg         10      2.5 mg           11      2.5 mg         12      2.5 mg         13      2.5 mg         14            15               16               17                 18               19               20               21               22               23               24                 25               26               27               28                   Date Details   No additional details    Date of next INR:  2/14/2018         How to take your warfarin dose     To take:  2.5 mg Take 0.5 of a 5 mg tablet.    To take:  5 mg Take 1 of the 5 mg tablets.

## 2018-02-03 DIAGNOSIS — I26.09 OTHER CHRONIC PULMONARY EMBOLISM WITH ACUTE COR PULMONALE (H): ICD-10-CM

## 2018-02-03 DIAGNOSIS — I27.82 OTHER CHRONIC PULMONARY EMBOLISM WITH ACUTE COR PULMONALE (H): ICD-10-CM

## 2018-02-05 NOTE — TELEPHONE ENCOUNTER
"Requested Prescriptions   Pending Prescriptions Disp Refills     warfarin (COUMADIN) 5 MG tablet [Pharmacy Med Name: WARFARIN SODIUM 5 MG TABLET] 90 tablet 0    Last Written Prescription Date:  8/22/17  Last Fill Quantity: 90,  # refills: 0   Last Office Visit with [facility] provider:  7/5/17   Future Office Visit:      Sig: TAKE 1 TAB(5MG) BY MOUTH ON FRIDAYS AND TAKE 1/2 TAB(2.5MG)ALL OTHER DAY/OR AS DIRECTED BY [facility]    Vitamin K Antagonists Failed    2/3/2018  2:06 PM       Failed - INR is within goal in the past 6 weeks    Confirm INR is within goal in the past 6 weeks.     Recent Labs   Lab Test 01/03/18   INR  2.7*                      Passed - Recent or future visit with authorizing provider's specialty    Patient had office visit in the last year or has a visit in the next 30 days with authorizing provider.  See \"Patient Info\" tab in inbasket, or \"Choose Columns\" in Meds & Orders section of the refill encounter.            Passed - Patient is 18 years of age or older          "

## 2018-02-06 RX ORDER — WARFARIN SODIUM 5 MG/1
TABLET ORAL
Qty: 90 TABLET | Refills: 0 | Status: SHIPPED | OUTPATIENT
Start: 2018-02-06 | End: 2018-05-06

## 2018-02-06 NOTE — TELEPHONE ENCOUNTER
Prescription approved per [facility], [facility] or [facility] refill protocol.  Deisy Mackenzie RN - Triage  [facility]

## 2018-02-14 ENCOUNTER — ANTICOAGULATION THERAPY VISIT (OUTPATIENT)
Dept: NURSING | Facility: CLINIC | Age: 73
End: 2018-02-14
Payer: COMMERCIAL

## 2018-02-14 DIAGNOSIS — I26.99 PULMONARY EMBOLUS (H): ICD-10-CM

## 2018-02-14 DIAGNOSIS — Z79.01 LONG-TERM (CURRENT) USE OF ANTICOAGULANTS: ICD-10-CM

## 2018-02-14 LAB — INR POINT OF CARE: 2.7 (ref 0.86–1.14)

## 2018-02-14 PROCEDURE — 36416 COLLJ CAPILLARY BLOOD SPEC: CPT

## 2018-02-14 PROCEDURE — 85610 PROTHROMBIN TIME: CPT | Mod: QW

## 2018-02-14 PROCEDURE — 99207 ZZC NO CHARGE NURSE ONLY: CPT

## 2018-02-14 NOTE — PROGRESS NOTES
ANTICOAGULATION FOLLOW-UP CLINIC VISIT    Patient Name:  Rob Beavers  Date:  2/14/2018  Contact Type:  Face to Face    SUBJECTIVE:     Patient Findings     Positives No Problem Findings           OBJECTIVE    INR Protime   Date Value Ref Range Status   02/14/2018 2.7 (A) 0.86 - 1.14 Final       ASSESSMENT / PLAN  INR assessment THER    Recheck INR In: 6 WEEKS    INR Location Clinic      Anticoagulation Summary as of 2/14/2018     INR goal 2.0-3.0   Today's INR 2.7   Maintenance plan 5 mg (5 mg x 1) on Fri; 2.5 mg (5 mg x 0.5) all other days   Full instructions 5 mg on Fri; 2.5 mg all other days   Weekly total 20 mg   No change documented Deisy Swenson RN   Plan last modified Alecia Saavedra RN (3/15/2016)   Next INR check 3/28/2018   Priority INR   Target end date     Indications   Long-term (current) use of anticoagulants [Z79.01] [Z79.01]  Pulmonary embolus (H) [I26.99]         Anticoagulation Episode Summary     INR check location     Preferred lab     Send INR reminders to [facility]    Comments       Anticoagulation Care Providers     Provider Role Specialty Phone number    Tian Santana MD Responsible Family [facility] [phone]            See the Encounter Report to view Anticoagulation Flowsheet and Dosing Calendar (Go to Encounters tab in chart review, and find the Anticoagulation Therapy Visit)    Patient INR is at goal.  Will continue same 20 mg weekly dosing and follow up in 6 weeks.    Deisy Reyes RN

## 2018-02-14 NOTE — MR AVS SNAPSHOT
Rob Kelleyger   2/14/2018 9:00 AM   Anticoagulation Therapy Visit    Description:  72 year old male   Provider:  [facility]   Department:  Ec Nurse           INR as of 2/14/2018     Today's INR 2.7      Anticoagulation Summary as of 2/14/2018     INR goal 2.0-3.0   Today's INR 2.7   Full instructions 5 mg on Fri; 2.5 mg all other days   Next INR check 3/28/2018    Indications   Long-term (current) use of anticoagulants [Z79.01] [Z79.01]  Pulmonary embolus (H) [I26.99]         Your next Anticoagulation Clinic appointment(s)     Mar 28, 2018  9:30 AM CDT   Anticoagulation Visit with [facility]   [facility] ([facility])    [address] [place] [id number]   [phone]              Contact Numbers     Clinic Number:         February 2018 Details    Sun Mon Tue Wed Thu Fri Sat         1               2               3                 4               5               6               7               8               9               10                 11               12               13               14      2.5 mg   See details      15      2.5 mg         16      5 mg         17      2.5 mg           18      2.5 mg         19      2.5 mg         20      2.5 mg         21      2.5 mg         22      2.5 mg         23      5 mg         24      2.5 mg           25      2.5 mg         26      2.5 mg         27      2.5 mg         28      2.5 mg             Date Details   02/14 This INR check               How to take your warfarin dose     To take:  2.5 mg Take 0.5 of a 5 mg tablet.    To take:  5 mg Take 1 of the 5 mg tablets.           March 2018 Details    Sun Mon Tue Wed Thu Fri Sat         1      2.5 mg         2      5 mg         3      2.5 mg           4      2.5 mg         5      2.5 mg         6      2.5 mg         7      2.5 mg         8      2.5 mg         9      5 mg         10      2.5 mg           11      2.5 mg          12      2.5 mg         13      2.5 mg         14      2.5 mg         15      2.5 mg         16      5 mg         17      2.5 mg           18      2.5 mg         19      2.5 mg         20      2.5 mg         21      2.5 mg         22      2.5 mg         23      5 mg         24      2.5 mg           25      2.5 mg         26      2.5 mg         27      2.5 mg         28            29               30               31                Date Details   No additional details    Date of next INR:  3/28/2018         How to take your warfarin dose     To take:  2.5 mg Take 0.5 of a 5 mg tablet.    To take:  5 mg Take 1 of the 5 mg tablets.

## 2018-03-28 ENCOUNTER — ANTICOAGULATION THERAPY VISIT (OUTPATIENT)
Dept: NURSING | Facility: CLINIC | Age: 73
End: 2018-03-28
Payer: COMMERCIAL

## 2018-03-28 DIAGNOSIS — I26.99 PULMONARY EMBOLUS (H): ICD-10-CM

## 2018-03-28 DIAGNOSIS — Z79.01 LONG-TERM (CURRENT) USE OF ANTICOAGULANTS: ICD-10-CM

## 2018-03-28 LAB — INR POINT OF CARE: 2.7 (ref 0.86–1.14)

## 2018-03-28 PROCEDURE — 85610 PROTHROMBIN TIME: CPT | Mod: QW

## 2018-03-28 PROCEDURE — 99207 ZZC NO CHARGE NURSE ONLY: CPT

## 2018-03-28 PROCEDURE — 36416 COLLJ CAPILLARY BLOOD SPEC: CPT

## 2018-03-28 NOTE — PROGRESS NOTES
ANTICOAGULATION FOLLOW-UP CLINIC VISIT    Patient Name:  Rob Beavers  Date:  3/28/2018  Contact Type:  Face to Face    SUBJECTIVE:     Patient Findings     Positives No Problem Findings           OBJECTIVE    INR Protime   Date Value Ref Range Status   03/28/2018 2.7 (A) 0.86 - 1.14 Final       ASSESSMENT / PLAN  INR assessment THER    Recheck INR In: 6 WEEKS    INR Location Clinic      Anticoagulation Summary as of 3/28/2018     INR goal 2.0-3.0   Today's INR 2.7   Maintenance plan 5 mg (5 mg x 1) on Fri; 2.5 mg (5 mg x 0.5) all other days   Full instructions 5 mg on Fri; 2.5 mg all other days   Weekly total 20 mg   No change documented Laura Ventura RN   Plan last modified Alecia Saavedra RN (3/15/2016)   Next INR check 5/9/2018   Priority INR   Target end date     Indications   Long-term (current) use of anticoagulants [Z79.01] [Z79.01]  Pulmonary embolus (H) [I26.99]         Anticoagulation Episode Summary     INR check location     Preferred lab     Send INR reminders to  [facility]    Comments       Anticoagulation Care Providers     Provider Role Specialty Phone number    Tian Santana MD [facility] [phone]            See the Encounter Report to view Anticoagulation Flowsheet and Dosing Calendar (Go to Encounters tab in chart review, and find the Anticoagulation Therapy Visit)    Dosage adjustment made based on physician directed care plan.    INR therapeutic at 2.7 today with 20 mg weekly dose.  Denies changes or problems.  Will continue with 5 mg on Fri;  2.5 mg all other days = 20 mg weekly.  Recheck in 6 weeks or sooner if problems/concerns.     Larua Ventura, LOBITO

## 2018-03-28 NOTE — MR AVS SNAPSHOT
Rob Kelleyger   3/28/2018 9:30 AM   Anticoagulation Therapy Visit    Description:  72 year old male   Provider:   ANTICOAGULATION CLINIC   Department:  Ec Nurse           INR as of 3/28/2018     Today's INR 2.7      Anticoagulation Summary as of 3/28/2018     INR goal 2.0-3.0   Today's INR 2.7   Full instructions 5 mg on Fri; 2.5 mg all other days   Next INR check 5/9/2018    Indications   Long-term (current) use of anticoagulants [Z79.01] [Z79.01]  Pulmonary embolus (H) [I26.99]         Description     INR therapeutic at 2.7 today with 20 mg weekly dose.  Denies changes or problems.  Will continue with 5 mg on Fri;  2.5 mg all other days = 20 mg weekly.  Recheck in 6 weeks or sooner if problems/concerns.         Your next Anticoagulation Clinic appointment(s)     May 09, 2018  9:30 AM CDT   Anticoagulation Visit with [facility]   [facility] [place] [id number] [phone]              Contact Numbers     Clinic Number:         March 2018 Details    Sun Mon Tue Wed Thu Fri Sat         1               2               3                 4               5               6               7               8               9               10                 11               12               13               14               15               16               17                 18               19               20               21               22               23               24                 25               26               27               28      2.5 mg   See details      29      2.5 mg         30      5 mg         31      2.5 mg          Date Details   03/28 This INR check               How to take your warfarin dose     To take:  2.5 mg Take 0.5 of a 5 mg tablet.    To take:  5 mg Take 1 of the 5 mg tablets.           April 2018 Details    Sun Mon Tue Wed Thu Fri Sat     1      2.5 mg         2      2.5 mg          3      2.5 mg         4      2.5 mg         5      2.5 mg         6      5 mg         7      2.5 mg           8      2.5 mg         9      2.5 mg         10      2.5 mg         11      2.5 mg         12      2.5 mg         13      5 mg         14      2.5 mg           15      2.5 mg         16      2.5 mg         17      2.5 mg         18      2.5 mg         19      2.5 mg         20      5 mg         21      2.5 mg           22      2.5 mg         23      2.5 mg         24      2.5 mg         25      2.5 mg         26      2.5 mg         27      5 mg         28      2.5 mg           29      2.5 mg         30      2.5 mg               Date Details   No additional details            How to take your warfarin dose     To take:  2.5 mg Take 0.5 of a 5 mg tablet.    To take:  5 mg Take 1 of the 5 mg tablets.           May 2018 Details    Sun Mon Tue Wed Thu Fri Sat       1      2.5 mg         2      2.5 mg         3      2.5 mg         4      5 mg         5      2.5 mg           6      2.5 mg         7      2.5 mg         8      2.5 mg         9            10               11               12                 13               14               15               16               17               18               19                 20               21               22               23               24               25               26                 27               28               29               30               31                  Date Details   No additional details    Date of next INR:  5/9/2018         How to take your warfarin dose     To take:  2.5 mg Take 0.5 of a 5 mg tablet.    To take:  5 mg Take 1 of the 5 mg tablets.

## 2018-05-05 DIAGNOSIS — Z00.00 ROUTINE GENERAL MEDICAL EXAMINATION AT A HEALTH CARE FACILITY: ICD-10-CM

## 2018-05-05 DIAGNOSIS — K22.70 BARRETT'S ESOPHAGUS WITHOUT DYSPLASIA: ICD-10-CM

## 2018-05-06 DIAGNOSIS — I26.09 OTHER CHRONIC PULMONARY EMBOLISM WITH ACUTE COR PULMONALE (H): ICD-10-CM

## 2018-05-06 DIAGNOSIS — I27.82 OTHER CHRONIC PULMONARY EMBOLISM WITH ACUTE COR PULMONALE (H): ICD-10-CM

## 2018-05-06 DIAGNOSIS — Z79.01 LONG-TERM (CURRENT) USE OF ANTICOAGULANTS: Primary | ICD-10-CM

## 2018-05-07 RX ORDER — OMEPRAZOLE 40 MG/1
CAPSULE, DELAYED RELEASE ORAL
Qty: 90 CAPSULE | Refills: 0 | Status: SHIPPED | OUTPATIENT
Start: 2018-05-07 | End: 2018-06-13

## 2018-05-07 NOTE — TELEPHONE ENCOUNTER
Prescription approved per [facility] Refill Protocol.  Jeana Akins RN- Triage [employer]

## 2018-05-07 NOTE — TELEPHONE ENCOUNTER
"Requested Prescriptions   Pending Prescriptions Disp Refills     warfarin (COUMADIN) 5 MG tablet [Pharmacy Med Name: WARFARIN SODIUM 5 MG TABLET]  Last Written Prescription Date:  2-6-2018  Last Fill Quantity: 90 tablet,  # refills: 0   Last office visit: 7/5/2017 with prescribing provider:  7-5-2017   Future Office Visit:     90 tablet 0     Sig: TAKE 1 TAB(5MG) BY MOUTH ON FRIDAYS AND TAKE 1/2 TAB(2.5MG)ALL OTHER DAY/OR AS DIRECTED BY [facility]    Vitamin K Antagonists Failed    5/6/2018  9:14 AM       Failed - INR is within goal in the past 6 weeks    Confirm INR is within goal in the past 6 weeks.     Recent Labs   Lab Test 03/28/18   INR  2.7*                      Passed - Recent (12 mo) or future (30 days) visit within the authorizing provider's specialty    Patient had office visit in the last 12 months or has a visit in the next 30 days with authorizing provider or within the authorizing provider's specialty.  See \"Patient Info\" tab in inbasket, or \"Choose Columns\" in Meds & Orders section of the refill encounter.           Passed - Patient is 18 years of age or older            "

## 2018-05-07 NOTE — TELEPHONE ENCOUNTER
"Requested Prescriptions   Pending Prescriptions Disp Refills     omeprazole (PRILOSEC) 40 MG capsule [Pharmacy Med Name: OMEPRAZOLE DR 40 MG CAPSULE]  Last Written Prescription Date:  4/26/17  Last Fill Quantity: 90,  # refills: 3   Last office visit: 7/5/2017 with prescribing provider:  Max   Future Office Visit:     90 capsule 3     Sig: TAKE 1 CAPSULE (40 MG) BY MOUTH DAILY 30-60 MINUTES BEFORE A MEAL.    PPI Protocol Passed    5/5/2018  1:59 AM       Passed - Not on Clopidogrel (unless Pantoprazole ordered)       Passed - No diagnosis of osteoporosis on record       Passed - Recent (12 mo) or future (30 days) visit within the authorizing provider's specialty    Patient had office visit in the last 12 months or has a visit in the next 30 days with authorizing provider or within the authorizing provider's specialty.  See \"Patient Info\" tab in inbasket, or \"Choose Columns\" in Meds & Orders section of the refill encounter.           Passed - Patient is age 18 or older          "

## 2018-05-08 RX ORDER — WARFARIN SODIUM 5 MG/1
TABLET ORAL
Qty: 90 TABLET | Refills: 0 | Status: SHIPPED | OUTPATIENT
Start: 2018-05-08 | End: 2018-10-19

## 2018-05-08 NOTE — TELEPHONE ENCOUNTER
Prescription approved per [facility] Refill Protocol.  Elissa Portillo RN

## 2018-05-09 ENCOUNTER — ANTICOAGULATION THERAPY VISIT (OUTPATIENT)
Dept: NURSING | Facility: CLINIC | Age: 73
End: 2018-05-09
Payer: COMMERCIAL

## 2018-05-09 DIAGNOSIS — I26.99 PULMONARY EMBOLUS (H): ICD-10-CM

## 2018-05-09 DIAGNOSIS — Z79.01 LONG-TERM (CURRENT) USE OF ANTICOAGULANTS: ICD-10-CM

## 2018-05-09 LAB — INR POINT OF CARE: 1.9 (ref 0.86–1.14)

## 2018-05-09 PROCEDURE — 99207 ZZC NO CHARGE NURSE ONLY: CPT

## 2018-05-09 PROCEDURE — 85610 PROTHROMBIN TIME: CPT | Mod: QW

## 2018-05-09 PROCEDURE — 36416 COLLJ CAPILLARY BLOOD SPEC: CPT

## 2018-05-09 NOTE — MR AVS SNAPSHOT
Rob Kelleyger   5/9/2018 9:30 AM   Anticoagulation Therapy Visit    Description:  72 year old male   Provider:  [facility]   Department:  [facility]           INR as of 5/9/2018     Today's INR 1.9!      Anticoagulation Summary as of 5/9/2018     INR goal 2.0-3.0   Today's INR 1.9!   Full instructions 5 mg on Fri; 2.5 mg all other days   Next INR check 6/20/2018    Indications   Long-term (current) use of anticoagulants [Z79.01] [Z79.01]  Pulmonary embolus (H) [I26.99]         Your next Anticoagulation Clinic appointment(s)     Jun 20, 2018  8:30 AM CDT   Anticoagulation Visit with  ANTICOAGULATION CLINIC   [facility] ([facility])    [address] [place] [id number]   [phone]              Contact Numbers     Clinic Number:         May 2018 Details    Sun Mon Tue Wed Thu Fri Sat       1               2               3               4               5                 6               7               8               9      2.5 mg   See details      10      2.5 mg         11      5 mg         12      2.5 mg           13      2.5 mg         14      2.5 mg         15      2.5 mg         16      2.5 mg         17      2.5 mg         18      5 mg         19      2.5 mg           20      2.5 mg         21      2.5 mg         22      2.5 mg         23      2.5 mg         24      2.5 mg         25      5 mg         26      2.5 mg           27      2.5 mg         28      2.5 mg         29      2.5 mg         30      2.5 mg         31      2.5 mg            Date Details   05/09 This INR check               How to take your warfarin dose     To take:  2.5 mg Take 0.5 of a 5 mg tablet.    To take:  5 mg Take 1 of the 5 mg tablets.           June 2018 Details    Sun Mon Tue Wed Thu Fri Sat          1      5 mg         2      2.5 mg           3      2.5 mg         4      2.5 mg         5      2.5 mg         6      2.5 mg         7      2.5 mg         8       5 mg         9      2.5 mg           10      2.5 mg         11      2.5 mg         12      2.5 mg         13      2.5 mg         14      2.5 mg         15      5 mg         16      2.5 mg           17      2.5 mg         18      2.5 mg         19      2.5 mg         20            21               22               23                 24               25               26               27               28               29               30                Date Details   No additional details    Date of next INR:  6/20/2018         How to take your warfarin dose     To take:  2.5 mg Take 0.5 of a 5 mg tablet.    To take:  5 mg Take 1 of the 5 mg tablets.

## 2018-05-09 NOTE — PROGRESS NOTES
ANTICOAGULATION FOLLOW-UP CLINIC VISIT    Patient Name:  Rob Beavers  Date:  5/9/2018  Contact Type:  Face to Face    SUBJECTIVE:     Patient Findings     Positives No Problem Findings           OBJECTIVE    INR Protime   Date Value Ref Range Status   05/09/2018 1.9 (A) 0.86 - 1.14 Final       ASSESSMENT / PLAN  INR assessment THER    Recheck INR In: 6 WEEKS    INR Location Clinic      Anticoagulation Summary as of 5/9/2018     INR goal 2.0-3.0   Today's INR 1.9!   Maintenance plan 5 mg (5 mg x 1) on Fri; 2.5 mg (5 mg x 0.5) all other days   Full instructions 5 mg on Fri; 2.5 mg all other days   Weekly total 20 mg   No change documented Elissa Portillo RN   Plan last modified Alecia Saavedra RN (3/15/2016)   Next INR check 6/20/2018   Priority INR   Target end date     Indications   Long-term (current) use of anticoagulants [Z79.01] [Z79.01]  Pulmonary embolus (H) [I26.99]         Anticoagulation Episode Summary     INR check location     Preferred lab     Send INR reminders to  [facility]    Comments       Anticoagulation Care Providers     Provider Role Specialty Phone number    Tian Santana MD [facility] [phone]            See the Encounter Report to view Anticoagulation Flowsheet and Dosing Calendar (Go to Encounters tab in chart review, and find the Anticoagulation Therapy Visit)    INR  therapeutic at 1.9 today.  Continue warfarin 5 mg Fri;  2.5 mg all other days = 20 mg weekly.  Recheck in 6 weeks or sooner if problems/concerns.       Elissa Portillo, RN

## 2018-05-29 DIAGNOSIS — I10 HYPERTENSION GOAL BP (BLOOD PRESSURE) < 140/90: ICD-10-CM

## 2018-05-29 DIAGNOSIS — Z00.00 ROUTINE GENERAL MEDICAL EXAMINATION AT A HEALTH CARE FACILITY: ICD-10-CM

## 2018-05-29 RX ORDER — LISINOPRIL 10 MG/1
TABLET ORAL
Qty: 30 TABLET | Refills: 0 | Status: SHIPPED | OUTPATIENT
Start: 2018-05-29 | End: 2018-06-13

## 2018-05-29 RX ORDER — PROPRANOLOL HYDROCHLORIDE 80 MG/1
CAPSULE, EXTENDED RELEASE ORAL
Qty: 30 CAPSULE | Refills: 0 | Status: SHIPPED | OUTPATIENT
Start: 2018-05-29 | End: 2018-06-13

## 2018-05-29 NOTE — TELEPHONE ENCOUNTER
"Requested Prescriptions   Pending Prescriptions Disp Refills     propranolol (INDERAL LA) 80 MG 24 hr capsule [Pharmacy Med Name: PROPRANOLOL ER 80 MG CAPSULE] 90 capsule 3     Sig: TAKE 1 CAPSULE (80 MG) BY MOUTH ONCE A DAY    Beta-Blockers Protocol Passed    5/29/2018  7:25 AM       Passed - Blood pressure under 140/90 in past 12 months    BP Readings from Last 3 Encounters:   07/05/17 124/79   07/01/17 142/89   06/27/17 144/80                Passed - Patient is age 6 or older       Passed - Recent (12 mo) or future (30 days) visit within the authorizing provider's specialty    Patient had office visit in the last 12 months or has a visit in the next 30 days with authorizing provider or within the authorizing provider's specialty.  See \"Patient Info\" tab in inbasket, or \"Choose Columns\" in Meds & Orders section of the refill encounter.            lisinopril (PRINIVIL/ZESTRIL) 10 MG tablet [Pharmacy Med Name: LISINOPRIL 10 MG TABLET] 90 tablet 3     Sig: TAKE ONE TABLET (10 MG) BY MOUTH ONE TIME DAILY    ACE Inhibitors (Including Combos) Protocol Passed    5/29/2018  7:25 AM       Passed - Blood pressure under 140/90 in past 12 months    BP Readings from Last 3 Encounters:   07/05/17 124/79   07/01/17 142/89   06/27/17 144/80                Passed - Recent (12 mo) or future (30 days) visit within the authorizing provider's specialty    Patient had office visit in the last 12 months or has a visit in the next 30 days with authorizing provider or within the authorizing provider's specialty.  See \"Patient Info\" tab in inbasket, or \"Choose Columns\" in Meds & Orders section of the refill encounter.           Passed - Patient is age 18 or older       Passed - Normal serum creatinine on file in past 12 months    Recent Labs   Lab Test  06/28/17   2135   CR  0.90            Passed - Normal serum potassium on file in past 12 months    Recent Labs   Lab Test  06/28/17 2135   POTASSIUM  3.6             Last office visit: " 7/5/2017 with prescribing provider:     Future Office Visit:

## 2018-06-13 ENCOUNTER — OFFICE VISIT (OUTPATIENT)
Dept: FAMILY MEDICINE | Facility: CLINIC | Age: 73
End: 2018-06-13
Payer: COMMERCIAL

## 2018-06-13 VITALS
HEART RATE: 60 BPM | HEIGHT: 67 IN | WEIGHT: 205 LBS | SYSTOLIC BLOOD PRESSURE: 114 MMHG | TEMPERATURE: 98 F | BODY MASS INDEX: 32.18 KG/M2 | DIASTOLIC BLOOD PRESSURE: 70 MMHG

## 2018-06-13 DIAGNOSIS — Z00.00 ROUTINE GENERAL MEDICAL EXAMINATION AT A HEALTH CARE FACILITY: Primary | ICD-10-CM

## 2018-06-13 DIAGNOSIS — I10 HYPERTENSION GOAL BP (BLOOD PRESSURE) < 140/90: ICD-10-CM

## 2018-06-13 DIAGNOSIS — Z79.01 LONG-TERM (CURRENT) USE OF ANTICOAGULANTS: ICD-10-CM

## 2018-06-13 DIAGNOSIS — G25.0 BENIGN FAMILIAL TREMOR: ICD-10-CM

## 2018-06-13 DIAGNOSIS — Z23 NEED FOR VACCINATION: ICD-10-CM

## 2018-06-13 DIAGNOSIS — K22.70 BARRETT'S ESOPHAGUS WITHOUT DYSPLASIA: ICD-10-CM

## 2018-06-13 DIAGNOSIS — I26.99 OTHER PULMONARY EMBOLISM WITHOUT ACUTE COR PULMONALE, UNSPECIFIED CHRONICITY (H): ICD-10-CM

## 2018-06-13 PROBLEM — K81.0 ACUTE CHOLECYSTITIS: Status: RESOLVED | Noted: 2017-07-01 | Resolved: 2018-06-13

## 2018-06-13 PROBLEM — K80.20 CHOLELITHIASIS: Status: RESOLVED | Noted: 2017-06-29 | Resolved: 2018-06-13

## 2018-06-13 PROCEDURE — G0439 PPPS, SUBSEQ VISIT: HCPCS | Performed by: FAMILY MEDICINE

## 2018-06-13 PROCEDURE — G0009 ADMIN PNEUMOCOCCAL VACCINE: HCPCS | Performed by: FAMILY MEDICINE

## 2018-06-13 PROCEDURE — 36415 COLL VENOUS BLD VENIPUNCTURE: CPT | Performed by: FAMILY MEDICINE

## 2018-06-13 PROCEDURE — 80061 LIPID PANEL: CPT | Performed by: FAMILY MEDICINE

## 2018-06-13 PROCEDURE — 80053 COMPREHEN METABOLIC PANEL: CPT | Performed by: FAMILY MEDICINE

## 2018-06-13 PROCEDURE — 90670 PCV13 VACCINE IM: CPT | Performed by: FAMILY MEDICINE

## 2018-06-13 PROCEDURE — G0103 PSA SCREENING: HCPCS | Performed by: FAMILY MEDICINE

## 2018-06-13 RX ORDER — LISINOPRIL 10 MG/1
TABLET ORAL
Qty: 90 TABLET | Refills: 3 | Status: SHIPPED | OUTPATIENT
Start: 2018-06-13 | End: 2019-06-07

## 2018-06-13 RX ORDER — PROPRANOLOL HYDROCHLORIDE 80 MG/1
CAPSULE, EXTENDED RELEASE ORAL
Qty: 90 CAPSULE | Refills: 3 | Status: SHIPPED | OUTPATIENT
Start: 2018-06-13 | End: 2019-06-07

## 2018-06-13 RX ORDER — OMEPRAZOLE 40 MG/1
CAPSULE, DELAYED RELEASE ORAL
Qty: 90 CAPSULE | Refills: 3 | Status: SHIPPED | OUTPATIENT
Start: 2018-06-13 | End: 2019-06-30

## 2018-06-13 NOTE — LETTER
Emilee 15, 2018      Rob Beavers  [address] [place] [address]        Dear Rob,       I have reviewed your recent labs. Here are the results:     -Liver and gallbladder tests are normal. (ALT,AST, Alk phos, bilirubin), kidney function is normal (Cr, GFR), Sodium is normal, Potassium is normal, Calcium is normal, Glucose is normal (diabetes screening test).   -Cholesterol levels (LDL,HDL, Triglycerides) are normal.  ADVISE: rechecking in 1 year.   -PSA (prostate specific antigen) test is normal.  This indicates a low likelihood of prostate cancer.  ADVISE: yearly recheck.   -All labs looks normal.  Please continue the same dose of medication.  No new or change in medication       Tian Santana MD   6/14/2018   Results for orders placed or performed in visit on 06/13/18   PSA, screen   Result Value Ref Range    PSA 0.76 0 - 4 ug/L   Lipid panel reflex to direct LDL Fasting   Result Value Ref Range    Cholesterol 161 <200 mg/dL    Triglycerides 78 <150 mg/dL    HDL Cholesterol 44 >39 mg/dL    LDL Cholesterol Calculated 101 (H) <100 mg/dL    Non HDL Cholesterol 117 <130 mg/dL   Comprehensive metabolic panel   Result Value Ref Range    Sodium 141 133 - 144 mmol/L    Potassium 4.4 3.4 - 5.3 mmol/L    Chloride 107 94 - 109 mmol/L    Carbon Dioxide 27 20 - 32 mmol/L    Anion Gap 7 3 - 14 mmol/L    Glucose 95 70 - 99 mg/dL    Urea Nitrogen 17 7 - 30 mg/dL    Creatinine 0.91 0.66 - 1.25 mg/dL    GFR Estimate 83 >60 mL/min/1.7m2    GFR Estimate If Black >90 >60 mL/min/1.7m2    Calcium 8.6 8.5 - 10.1 mg/dL    Bilirubin Total 1.0 0.2 - 1.3 mg/dL    Albumin 3.7 3.4 - 5.0 g/dL    Protein Total 7.5 6.8 - 8.8 g/dL    Alkaline Phosphatase 80 40 - 150 U/L    ALT 31 0 - 70 U/L    AST 26 0 - 45 U/L         Sincerely,        Tian Santana MD/DIXIEO

## 2018-06-13 NOTE — PROGRESS NOTES
SUBJECTIVE:   Rob Beavers is a 72 year old male who presents for Preventive Visit.      Are you in the first 12 months of your Medicare Part B coverage?  No    Healthy Habits:    Do you get at least three servings of calcium containing foods daily (dairy, green leafy vegetables, etc.)? yes    Amount of exercise or daily activities, outside of work: 2-3 day(s) per week    Problems taking medications regularly No    Medication side effects: No    Have you had an eye exam in the past two years? no    Do you see a dentist twice per year? no    Do you have sleep apnea, excessive snoring or daytime drowsiness?no      Ability to successfully perform activities of daily living: Yes, no assistance needed    Home safety:  none identified     Hearing impairment: No    Fall risk:  Fallen 2 or more times in the past year?: No  Any fall with injury in the past year?: No        COGNITIVE SCREEN  1) Repeat 3 items (Banana, Sunrise, Chair)    2) Clock draw: NORMAL  3) 3 item recall: Recalls 3 objects  Results: 3 items recalled: COGNITIVE IMPAIRMENT LESS LIKELY    Mini-CogTM Copyright S [employer]. Licensed by the author for use in [facility] [employer]; reprinted with permission (calvin@[facility]). All rights reserved.            No new concerns. Noticed his hand tremors might be worsened but mostly when he want to write, not bothersome to him at this time.    Reviewed and updated as needed this visit by clinical staff  Tobacco  Allergies  Soc Hx        Reviewed and updated as needed this visit by Provider        Social History   Substance Use Topics     Smoking status: Never Smoker     Smokeless tobacco: Never Used     Alcohol use 0.0 oz/week     0 Standard drinks or equivalent per week      Comment: moderate       If you drink alcohol do you typically have >3 drinks per day or >7 drinks per week? No                        Today's PHQ-2 Score:   PHQ-2 ( 1999 [employer]) 6/13/2018 4/26/2017   Q1: Little interest or pleasure in doing  things 0 0   Q2: Feeling down, depressed or hopeless 0 0   PHQ-2 Score 0 0   Q1: Little interest or pleasure in doing things - -   Q2: Feeling down, depressed or hopeless - -   PHQ-2 Score - -       Do you feel safe in your environment - Yes    Do you have a Health Care Directive?: No: Advance care planning reviewed with patient; information given to patient to review.    Current providers sharing in care for this patient include:   Patient Care Team:  Tian Santana MD as PCP - General (Family Practice)    The following health maintenance items are reviewed in Epic and correct as of today:  Health Maintenance   Topic Date Due     AORTIC ANEURYSM SCREENING (SYSTEM ASSIGNED)  08/27/2012     PNEUMOCOCCAL (2 of 2 - PCV13) 11/13/2013     OP ANNUAL INR REFERRAL  02/20/2015     FALL RISK ASSESSMENT  04/26/2018     INFLUENZA VACCINE (Season Ended) 09/01/2018     TETANUS IMMUNIZATION (SYSTEM ASSIGNED)  12/13/2020     COLONOSCOPY Q5 YR  05/25/2021     LIPID SCREEN Q5 YR MALE (SYSTEM ASSIGNED)  04/26/2022     ADVANCE DIRECTIVE PLANNING Q5 YRS  04/26/2022     HEPATITIS C SCREENING  Completed     BP Readings from Last 3 Encounters:   06/13/18 114/70   07/05/17 124/79   07/01/17 142/89    Wt Readings from Last 3 Encounters:   06/13/18 205 lb (93 kg)   07/05/17 207 lb (93.9 kg)   06/29/17 207 lb 11.2 oz (94.2 kg)                  Patient Active Problem List   Diagnosis     CARDIOVASCULAR SCREENING; LDL GOAL LESS THAN 130     Advanced directives, counseling/discussion     Hypertension goal BP (blood pressure) < 140/90     Prostate nodule     Benign familial tremor     Pulmonary embolus (H)     Long-term (current) use of anticoagulants [Z79.01]     Andrews's esophagus without dysplasia     Past Surgical History:   Procedure Laterality Date     C TOTAL HIP ARTHROPLASTY  2010     DENTAL SURGERY       LAPAROSCOPIC CHOLECYSTECTOMY WITH CHOLANGIOGRAMS N/A 6/29/2017    Procedure: LAPAROSCOPIC CHOLECYSTECTOMY WITH CHOLANGIOGRAMS;   "LAPAROSCOPIC CHOLECYSTECTOMY WITH INTROPERATIVE CHOLANGIOGRAMS.;  Surgeon: Gian Yin MD;  Location: [facility] OR     TONSILLECTOMY  1949       Social History   Substance Use Topics     Smoking status: Never Smoker     Smokeless tobacco: Never Used     Alcohol use 0.0 oz/week     0 Standard drinks or equivalent per week      Comment: moderate     Family History   Problem Relation Age of Onset     Hypertension Mother      passed away at 89     Eye Disorder Mother      macular degeneration     Blood Disease Mother      She is on warfarin     Dementia Father 80     passed away at 88 yrs old     Breast Cancer Sister      DIABETES Paternal Uncle      Asthma No family hx of      C.A.D. No family hx of      Cancer - colorectal No family hx of      Prostate Cancer No family hx of      Anesthesia Reaction No family hx of      Thyroid Disease No family hx of            Pneumonia Vaccine:Adults age 65+ who received Pneumovax (PPSV23) at 65 years or older: Should be given PCV13 > 1 year after their most recent PPSV23    ROS:  CONSTITUTIONAL: NEGATIVE for fever, chills, change in weight  INTEGUMENTARY/SKIN: NEGATIVE for worrisome rashes, moles or lesions  EYES: NEGATIVE for vision changes or irritation  ENT/MOUTH: NEGATIVE for ear, mouth and throat problems  RESP: NEGATIVE for significant cough or SOB  BREAST: NEGATIVE for masses, tenderness or discharge  CV: NEGATIVE for chest pain, palpitations or peripheral edema  GI: NEGATIVE for nausea, abdominal pain, heartburn, or change in bowel habits  : NEGATIVE for frequency, dysuria, or hematuria  MUSCULOSKELETAL: NEGATIVE for significant arthralgias or myalgia  NEURO: NEGATIVE for weakness, dizziness or paresthesias  ENDOCRINE: NEGATIVE for temperature intolerance, skin/hair changes  HEME: NEGATIVE for bleeding problems  PSYCHIATRIC: NEGATIVE for changes in mood or affect    OBJECTIVE:   /70  Pulse 60  Temp 98  F (36.7  C) (Tympanic)  Ht 5' 7.32\" (1.71 m)  Wt 205 lb " "(93 kg)  BMI 31.8 kg/m2 Estimated body mass index is 31.8 kg/(m^2) as calculated from the following:    Height as of this encounter: 5' 7.32\" (1.71 m).    Weight as of this encounter: 205 lb (93 kg).  EXAM:   GENERAL: healthy, alert and no distress,   EYES: Eyes grossly normal to inspection, PERRL and conjunctivae and sclerae normal  HENT: ear canals and TM's normal, nose and mouth without ulcers or lesions  NECK: no adenopathy, no asymmetry, masses, or scars and thyroid normal to palpation  RESP: lungs clear to auscultation - no rales, rhonchi or wheezes  CV: regular rate and rhythm, normal S1 S2, no S3 or S4, no murmur, click or rub, no peripheral edema and peripheral pulses strong  ABDOMEN: soft, nontender, no hepatosplenomegaly, no masses and bowel sounds normal  MS: no gross musculoskeletal defects noted, no edema  SKIN: no suspicious lesions or rashes  NEURO: Normal strength and tone, mentation intact and speech normal  PSYCH: mentation appears normal, affect normal/bright    ASSESSMENT / PLAN:   1. Routine general medical examination at a health care facility    - lisinopril (PRINIVIL/ZESTRIL) 10 MG tablet; TAKE ONE TABLET (10 MG) BY MOUTH ONE TIME DAILY  Dispense: 90 tablet; Refill: 3  - propranolol (INDERAL LA) 80 MG 24 hr capsule; TAKE 1 CAPSULE (80 MG) BY MOUTH ONCE A DAY  Dispense: 90 capsule; Refill: 3  - omeprazole (PRILOSEC) 40 MG capsule; TAKE 1 CAPSULE (40 MG) BY MOUTH DAILY 30-60 MINUTES BEFORE A MEAL.  Dispense: 90 capsule; Refill: 3  - PSA, screen  - Lipid panel reflex to direct LDL Fasting  - Comprehensive metabolic panel    2. Hypertension goal BP (blood pressure) < 140/90    - lisinopril (PRINIVIL/ZESTRIL) 10 MG tablet; TAKE ONE TABLET (10 MG) BY MOUTH ONE TIME DAILY  Dispense: 90 tablet; Refill: 3    3. 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  Dispense: 90 capsule; " "Refill: 3    4. Long-term (current) use of anticoagulants [Z79.01]      5. Other pulmonary embolism without acute cor pulmonale, unspecified chronicity (H)      6. Benign familial tremor  Continue the same dose.  - propranolol (INDERAL LA) 80 MG 24 hr capsule; TAKE 1 CAPSULE (80 MG) BY MOUTH ONCE A DAY  Dispense: 90 capsule; Refill: 3    7. Need for vaccination    - Pneumococcal vaccine 13 valent PCV13 IM (Prevnar) [89338]  - ADMIN: Vaccine, Initial (76111)    End of Life Planning:  Patient currently has an advanced directive: No.  I have verified the patient's ablity to prepare an advanced directive/make health care decisions.  Literature was provided to assist patient in preparing an advanced directive.    COUNSELING:  Reviewed preventive health counseling, as reflected in patient instructions       Regular exercise       Healthy diet/nutrition        Estimated body mass index is 31.8 kg/(m^2) as calculated from the following:    Height as of this encounter: 5' 7.32\" (1.71 m).    Weight as of this encounter: 205 lb (93 kg).       reports that he has never smoked. He has never used smokeless tobacco.      Appropriate preventive services were discussed with this patient, including applicable screening as appropriate for cardiovascular disease, diabetes, osteopenia/osteoporosis, and glaucoma.  As appropriate for age/gender, discussed screening for colorectal cancer, prostate cancer, breast cancer, and cervical cancer. Checklist reviewing preventive services available has been given to the patient.    Reviewed patients plan of care and provided an AVS. The Basic Care Plan (routine screening as documented in Health Maintenance) for Rob meets the Care Plan requirement. This Care Plan has been established and reviewed with the Patient.    Counseling Resources:  ATP IV Guidelines  Pooled Cohorts Equation Calculator  Breast Cancer Risk Calculator  FRAX Risk Assessment  ICSI Preventive Guidelines  Dietary Guidelines for " Americans, 2010  USDA's MyPlate  ASA Prophylaxis  Lung CA Screening    Tian Santana MD  [facility]

## 2018-06-13 NOTE — MR AVS SNAPSHOT
After Visit Summary   6/13/2018    Rob Beavers    MRN: [id number]           Patient Information     Date Of Birth          1945        Visit Information        Provider Department      6/13/2018 9:40 AM Tian Santana MD [facility]        Today's Diagnoses     Routine general medical examination at a health care facility    -  1    Hypertension goal BP (blood pressure) < 140/90        Andrews's esophagus without dysplasia        Long-term (current) use of anticoagulants [Z79.01]        Other pulmonary embolism without acute cor pulmonale, unspecified chronicity (H)        Benign familial tremor        Need for vaccination          Care Instructions      Preventive Health Recommendations:       Male Ages 65 and over    Yearly exam:             See your health care provider every year in order to  o   Review health changes.   o   Discuss preventive care.    o   Review your medicines if your doctor has prescribed any.    Talk with your health care provider about whether you should have a test to screen for prostate cancer (PSA).    Every 3 years, have a diabetes test (fasting glucose). If you are at risk for diabetes, you should have this test more often.    Every 5 years, have a cholesterol test. Have this test more often if you are at risk for high cholesterol or heart disease.     Every 10 years, have a colonoscopy. Or, have a yearly FIT test (stool test). These exams will check for colon cancer.    Talk to with your health care provider about screening for Abdominal Aortic Aneurysm if you have a family history of AAA or have a history of smoking.  Shots:     Get a flu shot each year.     Get a tetanus shot every 10 years.     Talk to your doctor about your pneumonia vaccines. There are now two you should receive - Pneumovax (PPSV 23) and Prevnar (PCV 13).    Talk to your doctor about a shingles vaccine.     Talk to your doctor about the hepatitis B vaccine.  Nutrition:     Eat  at least 5 servings of fruits and vegetables each day.     Eat whole-grain bread, whole-wheat pasta and brown rice instead of white grains and rice.     Talk to your doctor about Calcium and Vitamin D.   Lifestyle    Exercise for at least 150 minutes a week (30 minutes a day, 5 days a week). This will help you control your weight and prevent disease.     Limit alcohol to one drink per day.     No smoking.     Wear sunscreen to prevent skin cancer.     See your dentist every six months for an exam and cleaning.     See your eye doctor every 1 to 2 years to screen for conditions such as glaucoma, macular degeneration and cataracts.          Follow-ups after your visit        Follow-up notes from your care team     Return in about 1 year (around 6/13/2019) for Physical Exam.      Your next 10 appointments already scheduled     Jun 20, 2018  8:30 AM CDT   Anticoagulation Visit with [facility]   [facility] [place]    [address] [place] [id number] [phone]              Who to contact     If you have questions or need follow up information about today's clinic visit or your schedule please contact [facility] directly at [phone].  Normal or non-critical lab and imaging results will be communicated to you by MyChart, letter or phone within 4 business days after the clinic has received the results. If you do not hear from us within 7 days, please contact the clinic through [employer] or phone. If you have a critical or abnormal lab result, we will notify you by phone as soon as possible.  Submit refill requests through [employer] or call your pharmacy and they will forward the refill request to us. Please allow 3 business days for your refill to be completed.          Additional Information About Your Visit        ZelgorhariPosition Information     [employer] gives you secure access to your electronic health record. If you see a primary  "care provider, you can also send messages to your care team and make appointments. If you have questions, please call your primary care clinic.  If you do not have a primary care provider, please call [phone] and they will assist you.        Care EveryWhere ID     This is your Care EveryWhere ID. This could be used by other organizations to access your [facility] medical records  [id number]        Your Vitals Were     Pulse Temperature Height BMI (Body Mass Index)          60 98  F (36.7  C) (Tympanic) 5' 7.32\" (1.71 m) 31.8 kg/m2         Blood Pressure from Last 3 Encounters:   06/13/18 114/70   07/05/17 124/79   07/01/17 142/89    Weight from Last 3 Encounters:   06/13/18 205 lb (93 kg)   07/05/17 207 lb (93.9 kg)   06/29/17 207 lb 11.2 oz (94.2 kg)              We Performed the Following     ADMIN: Vaccine, Initial (74222)     Comprehensive metabolic panel     Lipid panel reflex to direct LDL Fasting     Pneumococcal vaccine 13 valent PCV13 IM (Prevnar) [97307]     PSA, screen          Where to get your medicines      These medications were sent to [facility] [id number] [place] [address]  [address] [place] [address]     Phone:  [phone]     lisinopril 10 MG tablet    omeprazole 40 MG capsule    propranolol 80 MG 24 hr capsule          Primary Care Provider Office Phone # Fax #    Tian Santana [phone]       [address] [place] [address]        Equal Access to Services     [facility] AH: Hadii deana alegria hadasho Soelyse, waaxda luqadaha, qaybta kaalmagavi palomino. So [facility] [phone].    ATENCIÓN: Si habla español, tiene a lobo disposición servicios gratuitos de asistencia lingüística. Llame al [phone].    We comply with applicable federal civil rights laws and [place] laws. We do not discriminate on the basis of race, color, national origin, age, disability, sex, sexual orientation, or gender " identity.            Thank you!     Thank you for choosing [facility]  for your care. Our goal is always to provide you with excellent care. Hearing back from our patients is one way we can continue to improve our services. Please take a few minutes to complete the written survey that you may receive in the mail after your visit with us. Thank you!             Your Updated Medication List - Protect others around you: Learn how to safely use, store and throw away your medicines at www.disposemymeds.org.          This list is accurate as of 6/13/18 10:24 AM.  Always use your most recent med list.                   Brand Name Dispense Instructions for use Diagnosis    lisinopril 10 MG tablet    PRINIVIL/ZESTRIL    90 tablet    TAKE ONE TABLET (10 MG) BY MOUTH ONE TIME DAILY    Hypertension goal BP (blood pressure) < 140/90, Routine general medical examination at a health care facility       loratadine 10 MG tablet    CLARITIN     Take 10 mg by mouth daily        Multi-vitamin Tabs tablet   Generic drug:  multivitamin, therapeutic with minerals      ONE TABLET DAILY        omeprazole 40 MG capsule    priLOSEC    90 capsule    TAKE 1 CAPSULE (40 MG) BY MOUTH DAILY 30-60 MINUTES BEFORE A MEAL.    Routine general medical examination at a health care facility, Andrews's esophagus without dysplasia       propranolol 80 MG 24 hr capsule    INDERAL LA    90 capsule    TAKE 1 CAPSULE (80 MG) BY MOUTH ONCE A DAY    Routine general medical examination at a health care facility, Benign familial tremor       VITAMIN D (CHOLECALCIFEROL) PO      Take 1,000 Units by mouth daily        * warfarin 7.5 MG tablet    COUMADIN    2 tablet    Take 1 tablet (7.5 mg) by mouth daily    History of pulmonary embolism       * warfarin 5 MG tablet    COUMADIN    90 tablet    TAKE 1 TAB(5MG) BY MOUTH ON FRIDAYS AND TAKE 1/2 TAB(2.5MG)ALL OTHER DAY/OR AS DIRECTED BY [facility]    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 Read the directions carefully, and ask your doctor or other care provider to review them with you.

## 2018-06-14 LAB
ALBUMIN SERPL-MCNC: 3.7 G/DL (ref 3.4–5)
ALP SERPL-CCNC: 80 U/L (ref 40–150)
ALT SERPL W P-5'-P-CCNC: 31 U/L (ref 0–70)
ANION GAP SERPL CALCULATED.3IONS-SCNC: 7 MMOL/L (ref 3–14)
AST SERPL W P-5'-P-CCNC: 26 U/L (ref 0–45)
BILIRUB SERPL-MCNC: 1 MG/DL (ref 0.2–1.3)
BUN SERPL-MCNC: 17 MG/DL (ref 7–30)
CALCIUM SERPL-MCNC: 8.6 MG/DL (ref 8.5–10.1)
CHLORIDE SERPL-SCNC: 107 MMOL/L (ref 94–109)
CHOLEST SERPL-MCNC: 161 MG/DL
CO2 SERPL-SCNC: 27 MMOL/L (ref 20–32)
CREAT SERPL-MCNC: 0.91 MG/DL (ref 0.66–1.25)
GFR SERPL CREATININE-BSD FRML MDRD: 83 ML/MIN/1.7M2
GLUCOSE SERPL-MCNC: 95 MG/DL (ref 70–99)
HDLC SERPL-MCNC: 44 MG/DL
LDLC SERPL CALC-MCNC: 101 MG/DL
NONHDLC SERPL-MCNC: 117 MG/DL
POTASSIUM SERPL-SCNC: 4.4 MMOL/L (ref 3.4–5.3)
PROT SERPL-MCNC: 7.5 G/DL (ref 6.8–8.8)
PSA SERPL-ACNC: 0.76 UG/L (ref 0–4)
SODIUM SERPL-SCNC: 141 MMOL/L (ref 133–144)
TRIGL SERPL-MCNC: 78 MG/DL

## 2018-06-20 ENCOUNTER — TELEPHONE (OUTPATIENT)
Dept: FAMILY MEDICINE | Facility: CLINIC | Age: 73
End: 2018-06-20

## 2018-06-20 ENCOUNTER — ANTICOAGULATION THERAPY VISIT (OUTPATIENT)
Dept: NURSING | Facility: CLINIC | Age: 73
End: 2018-06-20
Payer: COMMERCIAL

## 2018-06-20 DIAGNOSIS — I26.99 PULMONARY EMBOLUS (H): ICD-10-CM

## 2018-06-20 DIAGNOSIS — Z79.01 LONG-TERM (CURRENT) USE OF ANTICOAGULANTS: Primary | ICD-10-CM

## 2018-06-20 DIAGNOSIS — Z79.01 LONG-TERM (CURRENT) USE OF ANTICOAGULANTS: ICD-10-CM

## 2018-06-20 LAB — INR POINT OF CARE: 2.8 (ref 0.86–1.14)

## 2018-06-20 PROCEDURE — 85610 PROTHROMBIN TIME: CPT | Mod: QW

## 2018-06-20 PROCEDURE — 36416 COLLJ CAPILLARY BLOOD SPEC: CPT

## 2018-06-20 PROCEDURE — 99207 ZZC NO CHARGE NURSE ONLY: CPT

## 2018-06-20 NOTE — PROGRESS NOTES
ANTICOAGULATION FOLLOW-UP CLINIC VISIT    Patient Name:  Rob Beavers  Date:  6/20/2018  Contact Type:  Face to Face    SUBJECTIVE:     Patient Findings     Positives No Problem Findings           OBJECTIVE    INR Protime   Date Value Ref Range Status   06/20/2018 2.8 (A) 0.86 - 1.14 Final       ASSESSMENT / PLAN  INR assessment THER    Recheck INR In: 6 WEEKS    INR Location Clinic      Anticoagulation Summary as of 6/20/2018     INR goal 2.0-3.0   Today's INR 2.8   Warfarin maintenance plan 5 mg (5 mg x 1) on Fri; 2.5 mg (5 mg x 0.5) all other days   Full warfarin instructions 5 mg on Fri; 2.5 mg all other days   Weekly warfarin total 20 mg   No change documented Elissa Portillo RN   Plan last modified Alecia Saavedra RN (3/15/2016)   Next INR check 8/1/2018   Priority INR   Target end date     Indications   Long-term (current) use of anticoagulants [Z79.01] [Z79.01]  Pulmonary embolus (H) [I26.99]         Anticoagulation Episode Summary     INR check location     Preferred lab     Send INR reminders to  [facility]    Comments       Anticoagulation Care Providers     Provider Role Specialty Phone number    Tian Santana MD [facility] [phone]            See the Encounter Report to view Anticoagulation Flowsheet and Dosing Calendar (Go to Encounters tab in chart review, and find the Anticoagulation Therapy Visit)    See anticoagulation summary above. INR today is therapeutic. Patient to continue warfarin 20 mg weekly.   Reviewed signs of bleeding, clotting and when to seek medical attention. Advised to call for possible need to recheck INR sooner if change in medical condition, infection,  medication change or diet or supplement changes.   Also reminded the patient to notify the Anticoagulation Clinic if a procedure is scheduled that may require a hold of warfarin. Patient agrees with plan.      Elissa Portillo, LOBITO

## 2018-06-20 NOTE — MR AVS SNAPSHOT
Rob Ruthann   6/20/2018 8:30 AM   Anticoagulation Therapy Visit    Description:  72 year old male   Provider:  [facility]   Department:  Ec Nurse           INR as of 6/20/2018     Today's INR 2.8      Anticoagulation Summary as of 6/20/2018     INR goal 2.0-3.0   Today's INR 2.8   Full warfarin instructions 5 mg on Fri; 2.5 mg all other days   Next INR check 8/1/2018    Indications   Long-term (current) use of anticoagulants [Z79.01] [Z79.01]  Pulmonary embolus (H) [I26.99]         Your next Anticoagulation Clinic appointment(s)     Aug 01, 2018  8:30 AM CDT   Anticoagulation Visit with [facility]   [facility]    [address] [place] [id number]   [phone]              Contact Numbers     Clinic Number:         June 2018 Details    Sun Mon Tue Wed Thu Fri Sat          1               2                 3               4               5               6               7               8               9                 10               11               12               13               14               15               16                 17               18               19               20      2.5 mg   See details      21      2.5 mg         22      5 mg         23      2.5 mg           24      2.5 mg         25      2.5 mg         26      2.5 mg         27      2.5 mg         28      2.5 mg         29      5 mg         30      2.5 mg          Date Details   06/20 This INR check               How to take your warfarin dose     To take:  2.5 mg Take 0.5 of a 5 mg tablet.    To take:  5 mg Take 1 of the 5 mg tablets.           July 2018 Details    Sun Mon Tue Wed Thu Fri Sat     1      2.5 mg         2      2.5 mg         3      2.5 mg         4      2.5 mg         5      2.5 mg         6      5 mg         7      2.5 mg           8      2.5 mg         9      2.5 mg         10      2.5 mg         11      2.5 mg          12      2.5 mg         13      5 mg         14      2.5 mg           15      2.5 mg         16      2.5 mg         17      2.5 mg         18      2.5 mg         19      2.5 mg         20      5 mg         21      2.5 mg           22      2.5 mg         23      2.5 mg         24      2.5 mg         25      2.5 mg         26      2.5 mg         27      5 mg         28      2.5 mg           29      2.5 mg         30      2.5 mg         31      2.5 mg              Date Details   No additional details            How to take your warfarin dose     To take:  2.5 mg Take 0.5 of a 5 mg tablet.    To take:  5 mg Take 1 of the 5 mg tablets.           August 2018 Details    Sun Mon Tue Wed Thu Fri Sat        1            2               3               4                 5               6               7               8               9               10               11                 12               13               14               15               16               17               18                 19               20               21               22               23               24               25                 26               27               28               29               30               31                 Date Details   No additional details    Date of next INR:  8/1/2018         How to take your warfarin dose     To take:  2.5 mg Take 0.5 of a 5 mg tablet.

## 2018-06-20 NOTE — TELEPHONE ENCOUNTER
Has the patient previously taken warfarin? yes  If yes, for what indication? Pulmonary embolus    Does the patient have any of the following indications for a higher range of 2.5-3.5:    Mitral position mechanical valve? no    Tenzin-Shiley, Ball and Cage or Monoleaflet valve (regardless of position) no    Other (if yes, please explain) no    INR referral order pended. Please sign.  Elissa Portillo RN

## 2018-08-06 ENCOUNTER — ANTICOAGULATION THERAPY VISIT (OUTPATIENT)
Dept: NURSING | Facility: CLINIC | Age: 73
End: 2018-08-06
Payer: COMMERCIAL

## 2018-08-06 DIAGNOSIS — Z79.01 LONG-TERM (CURRENT) USE OF ANTICOAGULANTS: ICD-10-CM

## 2018-08-06 DIAGNOSIS — I26.99 PULMONARY EMBOLUS (H): ICD-10-CM

## 2018-08-06 LAB — INR POINT OF CARE: 3.2 (ref 0.86–1.14)

## 2018-08-06 PROCEDURE — 85610 PROTHROMBIN TIME: CPT | Mod: QW

## 2018-08-06 PROCEDURE — 36416 COLLJ CAPILLARY BLOOD SPEC: CPT

## 2018-08-06 PROCEDURE — 99207 ZZC NO CHARGE NURSE ONLY: CPT

## 2018-08-06 NOTE — MR AVS SNAPSHOT
Rob Beavers   8/6/2018 8:30 AM   Anticoagulation Therapy Visit    Description:  72 year old male   Provider:  [facility]   Department:  Ec Nurse           INR as of 8/6/2018     Today's INR 3.2!      Anticoagulation Summary as of 8/6/2018     INR goal 2.0-3.0   Today's INR 3.2!   Full warfarin instructions 5 mg on Fri; 2.5 mg all other days   Next INR check 9/17/2018    Indications   Long-term (current) use of anticoagulants [Z79.01] [Z79.01]  Pulmonary embolus (H) [I26.99]         Your next Anticoagulation Clinic appointment(s)     Sep 19, 2018  8:30 AM CDT   Anticoagulation Visit with  ANTICOAGULATION CLINIC   [facility] ([facility]    [address] [place] [id number]   [phone]              Contact Numbers     Clinic Number:         August 2018 Details    Sun Mon Tue Wed Thu Fri Sat        1               2               3               4                 5               6      2.5 mg   See details      7      2.5 mg         8      2.5 mg         9      2.5 mg         10      5 mg         11      2.5 mg           12      2.5 mg         13      2.5 mg         14      2.5 mg         15      2.5 mg         16      2.5 mg         17      5 mg         18      2.5 mg           19      2.5 mg         20      2.5 mg         21      2.5 mg         22      2.5 mg         23      2.5 mg         24      5 mg         25      2.5 mg           26      2.5 mg         27      2.5 mg         28      2.5 mg         29      2.5 mg         30      2.5 mg         31      5 mg           Date Details   08/06 This INR check               How to take your warfarin dose     To take:  2.5 mg Take 0.5 of a 5 mg tablet.    To take:  5 mg Take 1 of the 5 mg tablets.           September 2018 Details    Sun Mon Tue Wed Thu Fri Sat           1      2.5 mg           2      2.5 mg         3      2.5 mg         4      2.5 mg         5      2.5 mg         6      2.5  mg         7      5 mg         8      2.5 mg           9      2.5 mg         10      2.5 mg         11      2.5 mg         12      2.5 mg         13      2.5 mg         14      5 mg         15      2.5 mg           16      2.5 mg         17            18               19               20               21               22                 23               24               25               26               27               28               29                 30                      Date Details   No additional details    Date of next INR:  9/17/2018         How to take your warfarin dose     To take:  2.5 mg Take 0.5 of a 5 mg tablet.    To take:  5 mg Take 1 of the 5 mg tablets.

## 2018-08-06 NOTE — PROGRESS NOTES
ANTICOAGULATION FOLLOW-UP CLINIC VISIT    Patient Name:  Rob Beavers  Date:  8/6/2018  Contact Type:  Face to Face    SUBJECTIVE:     Patient Findings     Positives Change in diet/appetite    Comments Over the weekend patient had been volunteering and not had same VK intake over the weekend.            OBJECTIVE    INR Protime   Date Value Ref Range Status   08/06/2018 3.2 (A) 0.86 - 1.14 Final       ASSESSMENT / PLAN  INR assessment SUPRA    Recheck INR In: 6 WEEKS    INR Location Clinic      Anticoagulation Summary as of 8/6/2018     INR goal 2.0-3.0   Today's INR 3.2!   Warfarin maintenance plan 5 mg (5 mg x 1) on Fri; 2.5 mg (5 mg x 0.5) all other days   Full warfarin instructions 5 mg on Fri; 2.5 mg all other days   Weekly warfarin total 20 mg   No change documented Deisy Swenson RN   Plan last modified Alecia Saavedra RN (3/15/2016)   Next INR check 9/17/2018   Priority INR   Target end date     Indications   Long-term (current) use of anticoagulants [Z79.01] [Z79.01]  Pulmonary embolus (H) [I26.99]         Anticoagulation Episode Summary     INR check location     Preferred lab     Send INR reminders to [facility]    Comments       Anticoagulation Care Providers     Provider Role Specialty Phone number    Tian Santana MD [facility] [phone]            See the Encounter Report to view Anticoagulation Flowsheet and Dosing Calendar (Go to Encounters tab in chart review, and find the Anticoagulation Therapy Visit)    Patient INR is supra therapeutic today.  Patient will increase greens next couple days and then resume maintenance dosing of 20 mg and follow up in 6 weeks or sooner if there are any concerns or problems.    Signs of bleeding and when appropriate to seek care for symptoms reviewed with patient.  Patient verbalized understanding.          Deisy Reyes, RN

## 2018-09-19 ENCOUNTER — ANTICOAGULATION THERAPY VISIT (OUTPATIENT)
Dept: NURSING | Facility: CLINIC | Age: 73
End: 2018-09-19
Payer: COMMERCIAL

## 2018-09-19 DIAGNOSIS — Z79.01 LONG-TERM (CURRENT) USE OF ANTICOAGULANTS: ICD-10-CM

## 2018-09-19 DIAGNOSIS — I26.99 PULMONARY EMBOLUS (H): ICD-10-CM

## 2018-09-19 LAB — INR POINT OF CARE: 3.2 (ref 0.86–1.14)

## 2018-09-19 PROCEDURE — 99207 ZZC NO CHARGE NURSE ONLY: CPT

## 2018-09-19 PROCEDURE — 85610 PROTHROMBIN TIME: CPT | Mod: QW

## 2018-09-19 PROCEDURE — 36416 COLLJ CAPILLARY BLOOD SPEC: CPT

## 2018-09-19 NOTE — PROGRESS NOTES
ANTICOAGULATION FOLLOW-UP CLINIC VISIT    Patient Name:  Rob Beavers  Date:  9/19/2018  Contact Type:  Face to Face    SUBJECTIVE:     Patient Findings     Positives No Problem Findings           OBJECTIVE    INR Protime   Date Value Ref Range Status   09/19/2018 3.2 (A) 0.86 - 1.14 Final       ASSESSMENT / PLAN  INR assessment THER    Recheck INR In: 6 WEEKS    INR Location Clinic      Anticoagulation Summary as of 9/19/2018     INR goal 2.0-3.0   Today's INR 3.2!   Warfarin maintenance plan 5 mg (5 mg x 1) on Fri; 2.5 mg (5 mg x 0.5) all other days   Full warfarin instructions 5 mg on Fri; 2.5 mg all other days   Weekly warfarin total 20 mg   No change documented Elissa Portillo, RN   Plan last modified Alecia Saavedra RN (3/15/2016)   Next INR check 10/31/2018   Priority INR   Target end date     Indications   Long-term (current) use of anticoagulants [Z79.01] [Z79.01]  Pulmonary embolus (H) [I26.99]         Anticoagulation Episode Summary     INR check location     Preferred lab     Send INR reminders to  [facility]    Comments       Anticoagulation Care Providers     Provider Role Specialty Phone number    Tian Santana MD Responsible Family Practice [phone]            See the Encounter Report to view Anticoagulation Flowsheet and Dosing Calendar (Go to Encounters tab in chart review, and find the Anticoagulation Therapy Visit)    See anticoagulation summary above. INR today is therapeutic. Patient to continue warfarin 20 mg weekly.   Reviewed signs of bleeding, clotting and when to seek medical attention.   Advised to call for possible need to recheck INR sooner if change in medical condition, infection, or medication, diet or supplement changes.  Reminded of 24/7 nurse line if questions or concerns.  Also reminded the patient to notify the Anticoagulation Clinic if a procedure is scheduled that may require a hold of warfarin. Patient agrees with plan.  Patient refused flu shot today due to golfing  later this week. States that he will get it later.    Elissa Portillo RN

## 2018-09-19 NOTE — MR AVS SNAPSHOT
Rob Ruthann   9/19/2018 8:30 AM   Anticoagulation Therapy Visit    Description:  73 year old male   Provider:  [facility]   Department:  Ec Nurse           INR as of 9/19/2018     Today's INR 3.2!      Anticoagulation Summary as of 9/19/2018     INR goal 2.0-3.0   Today's INR 3.2!   Full warfarin instructions 5 mg on Fri; 2.5 mg all other days   Next INR check 10/31/2018    Indications   Long-term (current) use of anticoagulants [Z79.01] [Z79.01]  Pulmonary embolus (H) [I26.99]         Your next Anticoagulation Clinic appointment(s)     Oct 31, 2018  8:30 AM CDT   Anticoagulation Visit with  ANTICOAGULATION CLINIC   [facility] [place]    [address] [place] [id number]   [phone]              Contact Numbers     Clinic Number:         September 2018 Details    Sun Mon Tue Wed Thu Fri Sat           1                 2               3               4               5               6               7               8                 9               10               11               12               13               14               15                 16               17               18               19      2.5 mg   See details      20      2.5 mg         21      5 mg         22      2.5 mg           23      2.5 mg         24      2.5 mg         25      2.5 mg         26      2.5 mg         27      2.5 mg         28      5 mg         29      2.5 mg           30      2.5 mg                Date Details   09/19 This INR check               How to take your warfarin dose     To take:  2.5 mg Take 0.5 of a 5 mg tablet.    To take:  5 mg Take 1 of the 5 mg tablets.           October 2018 Details    Sun Mon Tue Wed Thu Fri Sat      1      2.5 mg         2      2.5 mg         3      2.5 mg         4      2.5 mg         5      5 mg         6      2.5 mg           7      2.5 mg         8      2.5 mg         9      2.5 mg         10      2.5  mg         11      2.5 mg         12      5 mg         13      2.5 mg           14      2.5 mg         15      2.5 mg         16      2.5 mg         17      2.5 mg         18      2.5 mg         19      5 mg         20      2.5 mg           21      2.5 mg         22      2.5 mg         23      2.5 mg         24      2.5 mg         25      2.5 mg         26      5 mg         27      2.5 mg           28      2.5 mg         29      2.5 mg         30      2.5 mg         31                Date Details   No additional details    Date of next INR:  10/31/2018         How to take your warfarin dose     To take:  2.5 mg Take 0.5 of a 5 mg tablet.    To take:  5 mg Take 1 of the 5 mg tablets.

## 2018-10-19 DIAGNOSIS — I27.82 OTHER CHRONIC PULMONARY EMBOLISM WITH ACUTE COR PULMONALE (H): ICD-10-CM

## 2018-10-19 DIAGNOSIS — I26.09 OTHER CHRONIC PULMONARY EMBOLISM WITH ACUTE COR PULMONALE (H): ICD-10-CM

## 2018-10-19 DIAGNOSIS — Z79.01 LONG TERM CURRENT USE OF ANTICOAGULANT THERAPY: ICD-10-CM

## 2018-10-19 RX ORDER — WARFARIN SODIUM 5 MG/1
TABLET ORAL
Qty: 90 TABLET | Refills: 0 | Status: SHIPPED | OUTPATIENT
Start: 2018-10-19 | End: 2019-01-08

## 2018-10-19 NOTE — TELEPHONE ENCOUNTER
"Requested Prescriptions   Pending Prescriptions Disp Refills     warfarin (COUMADIN) 5 MG tablet [Pharmacy Med Name: WARFARIN SODIUM 5 MG TABLET]  Last Written Prescription Date:  5-8-2018  Last Fill Quantity: 90 tablet,  # refills: 0   Last office visit: 6/13/2018 with prescribing provider:     Future Office Visit:     90 tablet 0     Sig: TAKE 1 TAB(5MG) BY MOUTH ON FRIDAYS AND TAKE 1/2 TAB(2.5MG)ALL OTHER DAY/OR AS DIRECTED BY [facility]    Vitamin K Antagonists Failed    10/19/2018  9:58 AM       Failed - INR is within goal in the past 6 weeks    Confirm INR is within goal in the past 6 weeks.     Recent Labs   Lab Test 09/19/18   INR  3.2*                      Passed - Recent (12 mo) or future (30 days) visit within the authorizing provider's specialty    Patient had office visit in the last 12 months or has a visit in the next 30 days with authorizing provider or within the authorizing provider's specialty.  See \"Patient Info\" tab in inbasket, or \"Choose Columns\" in Meds & Orders section of the refill encounter.             Passed - Patient is 18 years of age or older          "

## 2018-10-19 NOTE — TELEPHONE ENCOUNTER
Prescription approved per [facility] Refill Protocol.    Rosangela ENRIQUE RN  EP Triage

## 2018-10-31 ENCOUNTER — ANTICOAGULATION THERAPY VISIT (OUTPATIENT)
Dept: NURSING | Facility: CLINIC | Age: 73
End: 2018-10-31
Payer: COMMERCIAL

## 2018-10-31 DIAGNOSIS — I26.99 PULMONARY EMBOLUS (H): ICD-10-CM

## 2018-10-31 LAB — INR POINT OF CARE: 2.7 (ref 0.86–1.14)

## 2018-10-31 PROCEDURE — 85610 PROTHROMBIN TIME: CPT | Mod: QW

## 2018-10-31 PROCEDURE — 36416 COLLJ CAPILLARY BLOOD SPEC: CPT

## 2018-10-31 PROCEDURE — 99207 ZZC NO CHARGE NURSE ONLY: CPT

## 2018-10-31 NOTE — MR AVS SNAPSHOT
Rob Ruthann   10/31/2018 8:30 AM   Anticoagulation Therapy Visit    Description:  73 year old male   Provider:  [facility]   Department:  [facility]           INR as of 10/31/2018     Today's INR 2.7      Anticoagulation Summary as of 10/31/2018     INR goal 2.0-3.0   Today's INR 2.7   Full warfarin instructions 5 mg on Fri; 2.5 mg all other days   Next INR check 12/12/2018    Indications   Long-term (current) use of anticoagulants [Z79.01] [Z79.01]  Pulmonary embolus (H) [I26.99]         Your next Anticoagulation Clinic appointment(s)     Dec 12, 2018  8:30 AM CST   Anticoagulation Visit with [facility]   [facility] ([facility])    [address] [place] [id number]   [phone]              Contact Numbers     Clinic Number:         October 2018 Details    Sun Mon Tue Wed Thu Fri Sat      1               2               3               4               5               6                 7               8               9               10               11               12               13                 14               15               16               17               18               19               20                 21               22               23               24               25               26               27                 28               29               30               31      2.5 mg   See details          Date Details   10/31 This INR check               How to take your warfarin dose     To take:  2.5 mg Take 0.5 of a 5 mg tablet.           November 2018 Details    Sun Mon Tue Wed Thu Fri Sat         1      2.5 mg         2      5 mg         3      2.5 mg           4      2.5 mg         5      2.5 mg         6      2.5 mg         7      2.5 mg         8      2.5 mg         9      5 mg         10      2.5 mg           11      2.5 mg         12      2.5 mg         13      2.5 mg         14      2.5 mg          15      2.5 mg         16      5 mg         17      2.5 mg           18      2.5 mg         19      2.5 mg         20      2.5 mg         21      2.5 mg         22      2.5 mg         23      5 mg         24      2.5 mg           25      2.5 mg         26      2.5 mg         27      2.5 mg         28      2.5 mg         29      2.5 mg         30      5 mg           Date Details   No additional details            How to take your warfarin dose     To take:  2.5 mg Take 0.5 of a 5 mg tablet.    To take:  5 mg Take 1 of the 5 mg tablets.           December 2018 Details    Sun Mon Tue Wed Thu Fri Sat           1      2.5 mg           2      2.5 mg         3      2.5 mg         4      2.5 mg         5      2.5 mg         6      2.5 mg         7      5 mg         8      2.5 mg           9      2.5 mg         10      2.5 mg         11      2.5 mg         12            13               14               15                 16               17               18               19               20               21               22                 23               24               25               26               27               28               29                 30               31                     Date Details   No additional details    Date of next INR:  12/12/2018         How to take your warfarin dose     To take:  2.5 mg Take 0.5 of a 5 mg tablet.    To take:  5 mg Take 1 of the 5 mg tablets.

## 2018-10-31 NOTE — PROGRESS NOTES
ANTICOAGULATION FOLLOW-UP CLINIC VISIT    Patient Name:  Rob Beavers  Date:  10/31/2018  Contact Type:  Face to Face    SUBJECTIVE:     Patient Findings     Positives No Problem Findings           OBJECTIVE    INR Protime   Date Value Ref Range Status   10/31/2018 2.7 (A) 0.86 - 1.14 Final       ASSESSMENT / PLAN  INR assessment THER    Recheck INR In: 6 WEEKS    INR Location Clinic      Anticoagulation Summary as of 10/31/2018     INR goal 2.0-3.0   Today's INR 2.7   Warfarin maintenance plan 5 mg (5 mg x 1) on Fri; 2.5 mg (5 mg x 0.5) all other days   Full warfarin instructions 5 mg on Fri; 2.5 mg all other days   Weekly warfarin total 20 mg   No change documented Dontae Barreto RN   Plan last modified Alecia Saavedra RN (3/15/2016)   Next INR check 12/12/2018   Priority INR   Target end date     Indications   Long-term (current) use of anticoagulants [Z79.01] [Z79.01]  Pulmonary embolus (H) [I26.99]         Anticoagulation Episode Summary     INR check location     Preferred lab     Send INR reminders to  [facility]    Comments       Anticoagulation Care Providers     Provider Role Specialty Phone number    Tian Santana MD Responsible [facility] [phone]            See the Encounter Report to view Anticoagulation Flowsheet and Dosing Calendar (Go to Encounters tab in chart review, and find the Anticoagulation Therapy Visit)    Patient INR is therapeutic today.  Will continue weekly maintenance dose of 20 mg and follow up in 6 weeks or sooner if there are any concerns or problems.      Dontae Barreto RN

## 2018-12-12 ENCOUNTER — ANTICOAGULATION THERAPY VISIT (OUTPATIENT)
Dept: NURSING | Facility: CLINIC | Age: 73
End: 2018-12-12
Payer: COMMERCIAL

## 2018-12-12 DIAGNOSIS — I26.99 PULMONARY EMBOLUS (H): ICD-10-CM

## 2018-12-12 LAB — INR POINT OF CARE: 2.2 (ref 0.86–1.14)

## 2018-12-12 PROCEDURE — 99207 ZZC NO CHARGE NURSE ONLY: CPT

## 2018-12-12 PROCEDURE — 36416 COLLJ CAPILLARY BLOOD SPEC: CPT

## 2018-12-12 PROCEDURE — 85610 PROTHROMBIN TIME: CPT | Mod: QW

## 2018-12-12 NOTE — PROGRESS NOTES
ANTICOAGULATION FOLLOW-UP CLINIC VISIT    Patient Name:  Rob Beavers  Date:  2018  Contact Type:  Face to Face    SUBJECTIVE:     Patient Findings     Positives:   No Problem Findings           OBJECTIVE    INR Protime   Date Value Ref Range Status   2018 2.2 (A) 0.86 - 1.14 Final       ASSESSMENT / PLAN  INR assessment THER    Recheck INR In: 6 WEEKS    INR Location Clinic      Anticoagulation Summary  As of 2018    INR goal:   2.0-3.0   TTR:   88.9 % (2.7 y)   INR used for dosin.2 (2018)   Warfarin maintenance plan:   5 mg (5 mg x 1) every Fri; 2.5 mg (5 mg x 0.5) all other days   Full warfarin instructions:   5 mg every Fri; 2.5 mg all other days   Weekly warfarin total:   20 mg   No change documented:   Dontae Barreto RN   Plan last modified:   Alecia Saavedra RN (3/15/2016)   Next INR check:   2019   Priority:   INR   Target end date:       Indications    Long-term (current) use of anticoagulants [Z79.01] [Z79.01]  Pulmonary embolus (H) [I26.99]             Anticoagulation Episode Summary     INR check location:       Preferred lab:       Send INR reminders to:   [facility]    Comments:         Anticoagulation Care Providers     Provider Role Specialty Phone number    Tian Santana MD [facility] [phone]            See the Encounter Report to view Anticoagulation Flowsheet and Dosing Calendar (Go to Encounters tab in chart review, and find the Anticoagulation Therapy Visit)    Patient INR is therapeutic today.  Will continue weekly maintenance dose of 20 mg and follow up in 6 weeks or sooner if there are any concerns or problems.      Dontae Barreto RN

## 2019-01-08 DIAGNOSIS — Z79.01 LONG TERM CURRENT USE OF ANTICOAGULANT THERAPY: ICD-10-CM

## 2019-01-08 DIAGNOSIS — I26.09 OTHER CHRONIC PULMONARY EMBOLISM WITH ACUTE COR PULMONALE (H): ICD-10-CM

## 2019-01-08 DIAGNOSIS — I27.82 OTHER CHRONIC PULMONARY EMBOLISM WITH ACUTE COR PULMONALE (H): ICD-10-CM

## 2019-01-08 NOTE — TELEPHONE ENCOUNTER
"Requested Prescriptions   Pending Prescriptions Disp Refills     warfarin (COUMADIN) 5 MG tablet [Pharmacy Med Name: WARFARIN SODIUM 5 MG TABLET] 90 tablet 0    Last Written Prescription Date:  10/19/18  Last Fill Quantity: 90,  # refills: 0   Last office visit: 6/13/2018 with prescribing provider:  YES    Future Office Visit:     Sig: TAKE 1 TAB(5MG) BY MOUTH ON FRIDAYS AND TAKE 1/2 TAB(2.5MG)ALL OTHER DAY/OR AS DIRECTED BY [facility]    Vitamin K Antagonists Failed - 1/8/2019 12:33 PM       Failed - INR is within goal in the past 6 weeks    Confirm INR is within goal in the past 6 weeks.     Recent Labs   Lab Test 12/12/18   INR 2.2*                      Passed - Recent (12 mo) or future (30 days) visit within the authorizing provider's specialty    Patient had office visit in the last 12 months or has a visit in the next 30 days with authorizing provider or within the authorizing provider's specialty.  See \"Patient Info\" tab in inbasket, or \"Choose Columns\" in Meds & Orders section of the refill encounter.             Passed - Medication is active on med list       Passed - Patient is 18 years of age or older          "

## 2019-01-09 RX ORDER — WARFARIN SODIUM 5 MG/1
TABLET ORAL
Qty: 90 TABLET | Refills: 0 | Status: SHIPPED | OUTPATIENT
Start: 2019-01-09 | End: 2019-04-01

## 2019-01-09 NOTE — TELEPHONE ENCOUNTER
Prescription approved per [facility] Refill Protocol.  Elissa Portillo RN

## 2019-01-23 ENCOUNTER — ANTICOAGULATION THERAPY VISIT (OUTPATIENT)
Dept: NURSING | Facility: CLINIC | Age: 74
End: 2019-01-23
Payer: COMMERCIAL

## 2019-01-23 LAB — INR POINT OF CARE: 2.4 (ref 0.86–1.14)

## 2019-01-23 PROCEDURE — 85610 PROTHROMBIN TIME: CPT | Mod: QW

## 2019-01-23 PROCEDURE — 99207 ZZC NO CHARGE NURSE ONLY: CPT

## 2019-01-23 PROCEDURE — 36416 COLLJ CAPILLARY BLOOD SPEC: CPT

## 2019-01-23 NOTE — PROGRESS NOTES
ANTICOAGULATION FOLLOW-UP CLINIC VISIT    Patient Name:  Rob Beavers  Date:  2019  Contact Type:  Face to Face    SUBJECTIVE:     Patient Findings     Positives:   No Problem Findings           OBJECTIVE    INR Protime   Date Value Ref Range Status   2019 2.4 (A) 0.86 - 1.14 Final       ASSESSMENT / PLAN  INR assessment THER    Recheck INR In: 7 WEEKS    INR Location Clinic      Anticoagulation Summary  As of 2019    INR goal:   2.0-3.0   TTR:   89.4 % (2.8 y)   INR used for dosin.4 (2019)   Warfarin maintenance plan:   5 mg (5 mg x 1) every Fri; 2.5 mg (5 mg x 0.5) all other days   Full warfarin instructions:   5 mg every Fri; 2.5 mg all other days   Weekly warfarin total:   20 mg   No change documented:   Rosangela Mays RN   Plan last modified:   Alecia Saavedra RN (3/15/2016)   Next INR check:   3/13/2019   Priority:   INR   Target end date:       Indications    Long-term (current) use of anticoagulants [Z79.01] [Z79.01]  Pulmonary embolus (H) [I26.99]             Anticoagulation Episode Summary     INR check location:       Preferred lab:       Send INR reminders to:    [facility]    Comments:         Anticoagulation Care Providers     Provider Role Specialty Phone number    Tian Santana MD [facility] [phone]            See the Encounter Report to view Anticoagulation Flowsheet and Dosing Calendar (Go to Encounters tab in chart review, and find the Anticoagulation Therapy Visit)    Patient INR is therapeutic today.  Will continue weekly maintenance dose of 20 mg and follow up in 7 weeks due to travel or sooner if there are any concerns or problems.  Gave business card with phone number to clinic for emergency use with travel if something comes up.      Rosangela Mays, LOBITO

## 2019-03-13 ENCOUNTER — ANTICOAGULATION THERAPY VISIT (OUTPATIENT)
Dept: NURSING | Facility: CLINIC | Age: 74
End: 2019-03-13
Payer: COMMERCIAL

## 2019-03-13 DIAGNOSIS — I26.99 PULMONARY EMBOLUS (H): ICD-10-CM

## 2019-03-13 LAB — INR POINT OF CARE: 2.2 (ref 0.86–1.14)

## 2019-03-13 PROCEDURE — 85610 PROTHROMBIN TIME: CPT | Mod: QW

## 2019-03-13 PROCEDURE — 36416 COLLJ CAPILLARY BLOOD SPEC: CPT

## 2019-03-13 NOTE — PROGRESS NOTES
ANTICOAGULATION FOLLOW-UP CLINIC VISIT    Patient Name:  Rob Beavers  Date:  3/13/2019  Contact Type:  Face to Face    SUBJECTIVE:     Patient Findings            OBJECTIVE    INR Protime   Date Value Ref Range Status   2019 2.2 (A) 0.86 - 1.14 Final       ASSESSMENT / PLAN  INR assessment THER    Recheck INR In: 6 WEEKS    INR Location Clinic      Anticoagulation Summary  As of 3/13/2019    INR goal:   2.0-3.0   TTR:   89.8 % (3 y)   INR used for dosin.2 (3/13/2019)   Warfarin maintenance plan:   5 mg (5 mg x 1) every Fri; 2.5 mg (5 mg x 0.5) all other days   Full warfarin instructions:   5 mg every Fri; 2.5 mg all other days   Weekly warfarin total:   20 mg   No change documented:   Dontae Barreto RN   Plan last modified:   Alecia Saavedra RN (3/15/2016)   Next INR check:   2019   Priority:   INR   Target end date:       Indications    Long-term (current) use of anticoagulants [Z79.01] [Z79.01]  Pulmonary embolus (H) [I26.99]             Anticoagulation Episode Summary     INR check location:       Preferred lab:       Send INR reminders to:    [facility]    Comments:         Anticoagulation Care Providers     Provider Role Specialty Phone number    Tian Santana MD [facility] [phone]            See the Encounter Report to view Anticoagulation Flowsheet and Dosing Calendar (Go to Encounters tab in chart review, and find the Anticoagulation Therapy Visit)    Patient INR is therapeutic.  Patient will continue to take 20 mg weekly dosing and follow up in 6 weeks or sooner for any problems or concerns.        Dontae Barreto RN

## 2019-04-01 DIAGNOSIS — Z79.01 LONG TERM CURRENT USE OF ANTICOAGULANT THERAPY: ICD-10-CM

## 2019-04-01 DIAGNOSIS — I27.82 OTHER CHRONIC PULMONARY EMBOLISM WITH ACUTE COR PULMONALE (H): ICD-10-CM

## 2019-04-01 DIAGNOSIS — I26.09 OTHER CHRONIC PULMONARY EMBOLISM WITH ACUTE COR PULMONALE (H): ICD-10-CM

## 2019-04-01 RX ORDER — WARFARIN SODIUM 5 MG/1
TABLET ORAL
Qty: 90 TABLET | Refills: 0 | Status: SHIPPED | OUTPATIENT
Start: 2019-04-01 | End: 2019-06-27

## 2019-04-01 NOTE — TELEPHONE ENCOUNTER
"Requested Prescriptions   Pending Prescriptions Disp Refills     warfarin (COUMADIN) 5 MG tablet [Pharmacy Med Name: WARFARIN SODIUM 5 MG TABLET] 90 tablet 0     Sig: TAKE 1 TAB(5MG) BY MOUTH ON FRIDAYS AND TAKE 1/2 TAB(2.5MG)ALL OTHER DAY/OR AS DIRECTED BY [facility]    Vitamin K Antagonists Failed - 4/1/2019  9:36 AM       Failed - INR is within goal in the past 6 weeks    Confirm INR is within goal in the past 6 weeks.     Recent Labs   Lab Test 03/13/19   INR 2.2*                      Passed - Recent (12 mo) or future (30 days) visit within the authorizing provider's specialty    Patient had office visit in the last 12 months or has a visit in the next 30 days with authorizing provider or within the authorizing provider's specialty.  See \"Patient Info\" tab in inbasket, or \"Choose Columns\" in Meds & Orders section of the refill encounter.             Passed - Medication is active on med list       Passed - Patient is 18 years of age or older        warfarin (COUMADIN) 5 MG tablet 90 tablet 0 1/9/2019       Last Written Prescription Date:  01/09/2019  Last Fill Quantity: 90,  # refills: 0   Last office visit: 6/13/2018 with prescribing provider:  Dr. Santana   Future Office Visit:  Unknown     "

## 2019-04-01 NOTE — TELEPHONE ENCOUNTER
Prescription approved per [facility]ill Protocol.  Ting Barreto RN   [facility] - Triage

## 2019-04-08 ENCOUNTER — TRANSFERRED RECORDS (OUTPATIENT)
Dept: HEALTH INFORMATION MANAGEMENT | Facility: CLINIC | Age: 74
End: 2019-04-08

## 2019-04-24 ENCOUNTER — ANTICOAGULATION THERAPY VISIT (OUTPATIENT)
Dept: NURSING | Facility: CLINIC | Age: 74
End: 2019-04-24
Payer: COMMERCIAL

## 2019-04-24 DIAGNOSIS — I26.99 PULMONARY EMBOLUS (H): ICD-10-CM

## 2019-04-24 LAB — INR POINT OF CARE: 3.2 (ref 0.86–1.14)

## 2019-04-24 PROCEDURE — 85610 PROTHROMBIN TIME: CPT | Mod: QW

## 2019-04-24 PROCEDURE — 99207 ZZC NO CHARGE NURSE ONLY: CPT

## 2019-04-24 PROCEDURE — 36416 COLLJ CAPILLARY BLOOD SPEC: CPT

## 2019-04-24 NOTE — PROGRESS NOTES
ANTICOAGULATION FOLLOW-UP CLINIC VISIT    Patient Name:  Rob Beavers  Date:  4/24/2019  Contact Type:  Face to Face    SUBJECTIVE:     Patient Findings     Positives:   Change in diet/appetite    Comments:   Patient reports having less greens than normal. He will increase green intake.            OBJECTIVE    INR Protime   Date Value Ref Range Status   04/24/2019 3.2 (A) 0.86 - 1.14 Final       ASSESSMENT / PLAN  INR assessment SUPRA    Recheck INR In: 6 WEEKS    INR Location Clinic      Anticoagulation Summary  As of 4/24/2019    INR goal:   2.0-3.0   TTR:   89.5 % (3.1 y)   INR used for dosing:   3.2! (4/24/2019)   Warfarin maintenance plan:   5 mg (5 mg x 1) every Fri; 2.5 mg (5 mg x 0.5) all other days   Full warfarin instructions:   5 mg every Fri; 2.5 mg all other days   Weekly warfarin total:   20 mg   No change documented:   Dontae Barreto RN   Plan last modified:   Alecia Saavedra RN (3/15/2016)   Next INR check:   6/5/2019   Priority:   INR   Target end date:       Indications    Long-term (current) use of anticoagulants [Z79.01] [Z79.01]  Pulmonary embolus (H) [I26.99]             Anticoagulation Episode Summary     INR check location:       Preferred lab:       Send INR reminders to:   [facility]    Comments:         Anticoagulation Care Providers     Provider Role Specialty Phone number    Tian Santana MD [facility] [phone]            See the Encounter Report to view Anticoagulation Flowsheet and Dosing Calendar (Go to Encounters tab in chart review, and find the Anticoagulation Therapy Visit)    Patient INR is supra therapeutic today.  Patient will increase green intake and continue maintenance dosing of 20 mg and follow up in 6 weeks or sooner if there are any concerns or problems.    Signs of bleeding and when appropriate to seek care for symptoms reviewed.    Adjustment Rational: 0%  Dosage adjustment made based on physician directed care plan.      Dontae  LOBITO Barreto

## 2019-06-05 ENCOUNTER — ANTICOAGULATION THERAPY VISIT (OUTPATIENT)
Dept: NURSING | Facility: CLINIC | Age: 74
End: 2019-06-05
Payer: COMMERCIAL

## 2019-06-05 DIAGNOSIS — I26.99 PULMONARY EMBOLUS (H): ICD-10-CM

## 2019-06-05 LAB — INR POINT OF CARE: 2.7 (ref 0.86–1.14)

## 2019-06-05 PROCEDURE — 99207 ZZC NO CHARGE NURSE ONLY: CPT

## 2019-06-05 PROCEDURE — 85610 PROTHROMBIN TIME: CPT | Mod: QW

## 2019-06-05 PROCEDURE — 36416 COLLJ CAPILLARY BLOOD SPEC: CPT

## 2019-06-05 NOTE — PROGRESS NOTES
ANTICOAGULATION FOLLOW-UP CLINIC VISIT    Patient Name:  Rob Beavers  Date:  2019  Contact Type:  Face to Face    SUBJECTIVE:  Patient Findings     Comments:   The patient was assessed for diet, medication, and activity level changes, missed or extra doses, bruising or bleeding, with no problem findings.          Clinical Outcomes     Negatives:   Major bleeding event, Thromboembolic event, Anticoagulation-related hospital admission, Anticoagulation-related ED visit, Anticoagulation-related fatality    Comments:   The patient was assessed for diet, medication, and activity level changes, missed or extra doses, bruising or bleeding, with no problem findings.             OBJECTIVE    INR Protime   Date Value Ref Range Status   2019 2.7 (A) 0.86 - 1.14 Final       ASSESSMENT / PLAN  INR assessment THER    Recheck INR In: 6 WEEKS    INR Location Clinic      Anticoagulation Summary  As of 2019    INR goal:   2.0-3.0   TTR:   88.4 % (3.2 y)   INR used for dosin.7 (2019)   Warfarin maintenance plan:   5 mg (5 mg x 1) every Fri; 2.5 mg (5 mg x 0.5) all other days   Full warfarin instructions:   5 mg every Fri; 2.5 mg all other days   Weekly warfarin total:   20 mg   No change documented:   Dontae Barreto RN   Plan last modified:   Alecia Saavedra RN (3/15/2016)   Next INR check:   2019   Priority:   INR   Target end date:       Indications    Long-term (current) use of anticoagulants [Z79.01] [Z79.01]  Pulmonary embolus (H) [I26.99]             Anticoagulation Episode Summary     INR check location:       Preferred lab:       Send INR reminders to:    [facility]    Comments:         Anticoagulation Care Providers     Provider Role Specialty Phone number    Tian Santana MD [facility] [phone]            See the Encounter Report to view Anticoagulation Flowsheet and Dosing Calendar (Go to Encounters tab in chart review, and find the Anticoagulation Therapy  Visit)    Patient INR is therapeutic.  Patient will continue to take 20 mg weekly dosing and follow up in 6 weeks or sooner for any problems or concerns.        Dontae Barreto RN

## 2019-06-07 DIAGNOSIS — G25.0 BENIGN FAMILIAL TREMOR: ICD-10-CM

## 2019-06-07 DIAGNOSIS — I10 HYPERTENSION GOAL BP (BLOOD PRESSURE) < 140/90: ICD-10-CM

## 2019-06-07 DIAGNOSIS — Z00.00 ROUTINE GENERAL MEDICAL EXAMINATION AT A HEALTH CARE FACILITY: ICD-10-CM

## 2019-06-07 RX ORDER — LISINOPRIL 10 MG/1
TABLET ORAL
Qty: 30 TABLET | Refills: 0 | Status: SHIPPED | OUTPATIENT
Start: 2019-06-07 | End: 2019-07-03

## 2019-06-07 RX ORDER — PROPRANOLOL HYDROCHLORIDE 80 MG/1
CAPSULE, EXTENDED RELEASE ORAL
Qty: 30 CAPSULE | Refills: 0 | Status: SHIPPED | OUTPATIENT
Start: 2019-06-07 | End: 2019-07-11

## 2019-06-07 NOTE — TELEPHONE ENCOUNTER
A 30 day supply is given, patient is due for an office visit.  Please call to  assist the patient in scheduling an appointment.  CHRISTOPHER Borjas, RN  [facility] Triage

## 2019-06-07 NOTE — TELEPHONE ENCOUNTER
"Requested Prescriptions   Pending Prescriptions Disp Refills     propranolol ER (INDERAL LA) 80 MG 24 hr capsule [Pharmacy Med Name: PROPRANOLOL ER 80 MG CAPSULE] 90 capsule 3     Sig: TAKE 1 CAPSULE (80 MG) BY MOUTH ONCE A DAY       Beta-Blockers Protocol Passed - 6/7/2019  1:28 AM        Passed - Blood pressure under 140/90 in past 12 months     BP Readings from Last 3 Encounters:   06/13/18 114/70   07/05/17 124/79   07/01/17 142/89                 Passed - Patient is age 6 or older        Passed - Recent (12 mo) or future (30 days) visit within the authorizing provider's specialty     Patient had office visit in the last 12 months or has a visit in the next 30 days with authorizing provider or within the authorizing provider's specialty.  See \"Patient Info\" tab in inbasket, or \"Choose Columns\" in Meds & Orders section of the refill encounter.              Passed - Medication is active on med list        lisinopril (PRINIVIL/ZESTRIL) 10 MG tablet [Pharmacy Med Name: LISINOPRIL 10 MG TABLET] 90 tablet 3     Sig: TAKE ONE TABLET (10 MG) BY MOUTH ONE TIME DAILY       ACE Inhibitors (Including Combos) Protocol Passed - 6/7/2019  1:28 AM        Passed - Blood pressure under 140/90 in past 12 months     BP Readings from Last 3 Encounters:   06/13/18 114/70   07/05/17 124/79   07/01/17 142/89                 Passed - Recent (12 mo) or future (30 days) visit within the authorizing provider's specialty     Patient had office visit in the last 12 months or has a visit in the next 30 days with authorizing provider or within the authorizing provider's specialty.  See \"Patient Info\" tab in inbasket, or \"Choose Columns\" in Meds & Orders section of the refill encounter.              Passed - Medication is active on med list        Passed - Patient is age 18 or older        Passed - Normal serum creatinine on file in past 12 months     Recent Labs   Lab Test 06/13/18  1012   CR 0.91             Passed - Normal serum potassium on " file in past 12 months     Recent Labs   Lab Test 06/13/18  1012   POTASSIUM 4.4             lisinopril (PRINIVIL/ZESTRIL) 10 MG tablet  Last Written Prescription Date:  6/13/18  Last Fill Quantity: 90,  # refills: 3   Last office visit: 6/13/2018 with prescribing provider:  RUMA Santana   Future Office Visit:      propranolol (INDERAL LA) 80 MG 24 hr capsule  Last Written Prescription Date:  6/13/18  Last Fill Quantity: 90,  # refills: 3   Last office visit: 6/13/2018 with prescribing provider:  RUMA Santana   Future Office Visit:

## 2019-06-07 NOTE — TELEPHONE ENCOUNTER
"Requested Prescriptions   Pending Prescriptions Disp Refills     lisinopril (PRINIVIL/ZESTRIL) 10 MG tablet [Pharmacy Med Name: LISINOPRIL 10 MG TABLET] 90 tablet 3     Sig: TAKE ONE TABLET (10 MG) BY MOUTH ONE TIME DAILY       ACE Inhibitors (Including Combos) Protocol Passed - 6/7/2019  9:42 AM        Passed - Blood pressure under 140/90 in past 12 months     BP Readings from Last 3 Encounters:   06/13/18 114/70   07/05/17 124/79   07/01/17 142/89            Last Written Prescription Date:  6/7/2019  Last Fill Quantity: 30,  # refills: 0   Last office visit: 6/13/2018 with prescribing provider:  RUMA Santana  Future Office Visit:         Passed - Recent (12 mo) or future (30 days) visit within the authorizing provider's specialty     Patient had office visit in the last 12 months or has a visit in the next 30 days with authorizing provider or within the authorizing provider's specialty.  See \"Patient Info\" tab in inbasket, or \"Choose Columns\" in Meds & Orders section of the refill encounter.              Passed - Medication is active on med list        Passed - Patient is age 18 or older        Passed - Normal serum creatinine on file in past 12 months     Recent Labs   Lab Test 06/13/18  1012   CR 0.91             Passed - Normal serum potassium on file in past 12 months     Recent Labs   Lab Test 06/13/18  1012   POTASSIUM 4.4           \    "

## 2019-06-10 RX ORDER — LISINOPRIL 10 MG/1
TABLET ORAL
Qty: 90 TABLET | Refills: 3 | OUTPATIENT
Start: 2019-06-10

## 2019-06-10 NOTE — TELEPHONE ENCOUNTER
Duplicate- sent - info sent to pharmacy.  Jennifer Bush,RN  [facility]  [phone]

## 2019-06-27 DIAGNOSIS — I27.82 OTHER CHRONIC PULMONARY EMBOLISM WITH ACUTE COR PULMONALE (H): ICD-10-CM

## 2019-06-27 DIAGNOSIS — I26.09 OTHER CHRONIC PULMONARY EMBOLISM WITH ACUTE COR PULMONALE (H): ICD-10-CM

## 2019-06-27 DIAGNOSIS — Z79.01 LONG TERM CURRENT USE OF ANTICOAGULANT THERAPY: ICD-10-CM

## 2019-06-28 NOTE — TELEPHONE ENCOUNTER
"Requested Prescriptions   Pending Prescriptions Disp Refills     warfarin (COUMADIN) 5 MG tablet [Pharmacy Med Name: WARFARIN SODIUM 5  Last Written Prescription Date:  4-1-2019  Last Fill Quantity: 90 tablet,  # refills: 0   Last office visit: 6/13/2018 with prescribing provider:     Future Office Visit:     MG TABLET] 52 tablet 1     Sig: TAKE 1 TAB(5MG) BY MOUTH ON FRIDAYS AND TAKE 1/2 TAB(2.5MG)ALL OTHER DAY/OR AS DIRECTED BY [facility]       Vitamin K Antagonists Failed - 6/27/2019 10:13 AM        Failed - INR is within goal in the past 6 weeks     Confirm INR is within goal in the past 6 weeks.     Recent Labs   Lab Test 06/05/19   INR 2.7*                       Passed - Recent (12 mo) or future (30 days) visit within the authorizing provider's specialty     Patient had office visit in the last 12 months or has a visit in the next 30 days with authorizing provider or within the authorizing provider's specialty.  See \"Patient Info\" tab in inbasket, or \"Choose Columns\" in Meds & Orders section of the refill encounter.              Passed - Medication is active on med list        Passed - Patient is 18 years of age or older          "

## 2019-06-30 DIAGNOSIS — K22.70 BARRETT'S ESOPHAGUS WITHOUT DYSPLASIA: ICD-10-CM

## 2019-06-30 DIAGNOSIS — Z00.00 ROUTINE GENERAL MEDICAL EXAMINATION AT A HEALTH CARE FACILITY: ICD-10-CM

## 2019-06-30 RX ORDER — WARFARIN SODIUM 5 MG/1
TABLET ORAL
Qty: 30 TABLET | Refills: 0 | Status: SHIPPED | OUTPATIENT
Start: 2019-06-30 | End: 2019-08-31

## 2019-07-01 RX ORDER — OMEPRAZOLE 40 MG/1
CAPSULE, DELAYED RELEASE ORAL
Qty: 30 CAPSULE | Refills: 0 | Status: SHIPPED | OUTPATIENT
Start: 2019-07-01 | End: 2019-07-11

## 2019-07-01 NOTE — TELEPHONE ENCOUNTER
"Requested Prescriptions   Pending Prescriptions Disp Refills     omeprazole (PRILOSEC) 40 MG DR capsule [Pharmacy Med Name: OMEPRAZOLE DR 40 MG CAPSULE] 90 capsule 3     Sig: TAKE 1 CAPSULE (40 MG) BY MOUTH DAILY 30-60 MINUTES BEFORE A MEAL.  Last Written Prescription Date:  6/13/18  Last Fill Quantity: 90,  # refills: 3   Last office visit: 6/13/2018 with prescribing provider:  Max   Future Office Visit:           PPI Protocol Passed - 6/30/2019 11:35 AM        Passed - Not on Clopidogrel (unless Pantoprazole ordered)        Passed - No diagnosis of osteoporosis on record        Passed - Recent (12 mo) or future (30 days) visit within the authorizing provider's specialty     Patient had office visit in the last 12 months or has a visit in the next 30 days with authorizing provider or within the authorizing provider's specialty.  See \"Patient Info\" tab in inbasket, or \"Choose Columns\" in Meds & Orders section of the refill encounter.              Passed - Medication is active on med list        Passed - Patient is age 18 or older          "

## 2019-07-01 NOTE — TELEPHONE ENCOUNTER
Nusrat Berumen contacted Rob on 07/01/19 and left a message. If patient calls back please schedule appointment as soon as possible for a Physical and lab work.        .Nusrat DANIEL    [facility] [place]

## 2019-07-01 NOTE — TELEPHONE ENCOUNTER
Patient due for fasting office visit- 30 days supply given.  Routing to team to schedule appointment     Jennifer Bush RN  [facility]  [phone]

## 2019-07-01 NOTE — TELEPHONE ENCOUNTER
30 day supply given.  Patient is due for yearly physical and lab work.  Please call and assist with scheduling appointment prior to next refill   Deisy GONSALES RN   [facility]

## 2019-07-02 NOTE — TELEPHONE ENCOUNTER
Nusrat Berumen contacted Rob on 07/02/19 and left a message. If patient calls back please schedule appointment as soon as possible for a Physical and lab work.      .Nusrat DANIEL    [facility] [place]

## 2019-07-11 ENCOUNTER — OFFICE VISIT (OUTPATIENT)
Dept: FAMILY MEDICINE | Facility: CLINIC | Age: 74
End: 2019-07-11
Payer: COMMERCIAL

## 2019-07-11 VITALS
OXYGEN SATURATION: 96 % | WEIGHT: 221 LBS | RESPIRATION RATE: 16 BRPM | TEMPERATURE: 97.3 F | HEART RATE: 65 BPM | DIASTOLIC BLOOD PRESSURE: 70 MMHG | HEIGHT: 67 IN | SYSTOLIC BLOOD PRESSURE: 118 MMHG | BODY MASS INDEX: 34.69 KG/M2

## 2019-07-11 DIAGNOSIS — Z79.01 LONG TERM CURRENT USE OF ANTICOAGULANT THERAPY: ICD-10-CM

## 2019-07-11 DIAGNOSIS — I26.99 OTHER PULMONARY EMBOLISM WITHOUT ACUTE COR PULMONALE, UNSPECIFIED CHRONICITY (H): ICD-10-CM

## 2019-07-11 DIAGNOSIS — Z13.6 CARDIOVASCULAR SCREENING; LDL GOAL LESS THAN 130: Primary | ICD-10-CM

## 2019-07-11 DIAGNOSIS — I48.91 ATRIAL FIBRILLATION, UNSPECIFIED TYPE (H): ICD-10-CM

## 2019-07-11 DIAGNOSIS — K22.70 BARRETT'S ESOPHAGUS WITHOUT DYSPLASIA: ICD-10-CM

## 2019-07-11 DIAGNOSIS — Z00.00 ROUTINE GENERAL MEDICAL EXAMINATION AT A HEALTH CARE FACILITY: ICD-10-CM

## 2019-07-11 DIAGNOSIS — G25.0 BENIGN FAMILIAL TREMOR: ICD-10-CM

## 2019-07-11 DIAGNOSIS — I10 HYPERTENSION GOAL BP (BLOOD PRESSURE) < 140/90: ICD-10-CM

## 2019-07-11 LAB
ALBUMIN SERPL-MCNC: 3.5 G/DL (ref 3.4–5)
ALP SERPL-CCNC: 79 U/L (ref 40–150)
ALT SERPL W P-5'-P-CCNC: 38 U/L (ref 0–70)
ANION GAP SERPL CALCULATED.3IONS-SCNC: 7 MMOL/L (ref 3–14)
AST SERPL W P-5'-P-CCNC: 36 U/L (ref 0–45)
BILIRUB SERPL-MCNC: 0.7 MG/DL (ref 0.2–1.3)
BUN SERPL-MCNC: 16 MG/DL (ref 7–30)
CALCIUM SERPL-MCNC: 8.5 MG/DL (ref 8.5–10.1)
CHLORIDE SERPL-SCNC: 108 MMOL/L (ref 94–109)
CHOLEST SERPL-MCNC: 173 MG/DL
CO2 SERPL-SCNC: 24 MMOL/L (ref 20–32)
CREAT SERPL-MCNC: 0.89 MG/DL (ref 0.66–1.25)
GFR SERPL CREATININE-BSD FRML MDRD: 86 ML/MIN/{1.73_M2}
GLUCOSE SERPL-MCNC: 108 MG/DL (ref 70–99)
HDLC SERPL-MCNC: 41 MG/DL
HGB BLD-MCNC: 15.5 G/DL (ref 13.3–17.7)
LDLC SERPL CALC-MCNC: 109 MG/DL
NONHDLC SERPL-MCNC: 132 MG/DL
POTASSIUM SERPL-SCNC: 4.6 MMOL/L (ref 3.4–5.3)
PROT SERPL-MCNC: 7.5 G/DL (ref 6.8–8.8)
SODIUM SERPL-SCNC: 139 MMOL/L (ref 133–144)
TRIGL SERPL-MCNC: 113 MG/DL

## 2019-07-11 PROCEDURE — 36415 COLL VENOUS BLD VENIPUNCTURE: CPT | Performed by: FAMILY MEDICINE

## 2019-07-11 PROCEDURE — 80061 LIPID PANEL: CPT | Performed by: FAMILY MEDICINE

## 2019-07-11 PROCEDURE — 99397 PER PM REEVAL EST PAT 65+ YR: CPT | Performed by: FAMILY MEDICINE

## 2019-07-11 PROCEDURE — 85018 HEMOGLOBIN: CPT | Performed by: FAMILY MEDICINE

## 2019-07-11 PROCEDURE — 80053 COMPREHEN METABOLIC PANEL: CPT | Performed by: FAMILY MEDICINE

## 2019-07-11 RX ORDER — LISINOPRIL 10 MG/1
TABLET ORAL
Qty: 90 TABLET | Refills: 3 | Status: SHIPPED | OUTPATIENT
Start: 2019-07-11 | End: 2020-07-02

## 2019-07-11 RX ORDER — PROPRANOLOL HYDROCHLORIDE 80 MG/1
CAPSULE, EXTENDED RELEASE ORAL
Qty: 90 CAPSULE | Refills: 3 | Status: SHIPPED | OUTPATIENT
Start: 2019-07-11 | End: 2020-07-02

## 2019-07-11 RX ORDER — OMEPRAZOLE 40 MG/1
CAPSULE, DELAYED RELEASE ORAL
Qty: 90 CAPSULE | Refills: 3 | Status: SHIPPED | OUTPATIENT
Start: 2019-07-11 | End: 2020-08-12

## 2019-07-11 ASSESSMENT — MIFFLIN-ST. JEOR: SCORE: 1720.84

## 2019-07-11 NOTE — PROGRESS NOTES
SUBJECTIVE:   Rob Beavers is a 73 year old male who presents for Preventive Visit.  Are you in the first 12 months of your Medicare coverage?  No    Healthy Habits:     In general, how would you rate your overall health?  Very good    Frequency of exercise:  None (light bike riding and golf )    Taking medications regularly:  Yes    Current function: Lives in [place] that does yard work.    Home Safety:  No safety concerns identified    Hearing Impairment:  No hearing concerns    In the past 6 months, have you been bothered by leaking of urine?  No    In general, how would you rate your overall mental or emotional health?  Very good      PHQ-2 Total Score: 0  Stable INR.  He denies any new concerns.  Slight increased discomfort in the knee while he was playing golf last winter but now works better.  Denies any swelling.  Do you feel safe in your environment? Yes    Do you have a Health Care Directive? No: Advance care planning reviewed with patient; information given to patient to review.      Fall risk  Fallen 2 or more times in the past year?: No  Any fall with injury in the past year?: No    Cognitive Screening   1) Repeat 3 items (Leader, Season, Table)    2) Clock draw: NORMAL  3) 3 item recall: Recalls 2 objects   Results: NORMAL clock, 1-2 items recalled: COGNITIVE IMPAIRMENT LESS LIKELY    Mini-CogTM Copyright S [employer]. Licensed by the author for use in [facility]; reprinted with permission (calvin@.[facility]). All rights reserved.          Reviewed and updated as needed this visit by clinical staff  Tobacco  Allergies  Meds  Fam Hx  Soc Hx        Reviewed and updated as needed this visit by Provider        Social History     Tobacco Use     Smoking status: Never Smoker     Smokeless tobacco: Never Used   Substance Use Topics     Alcohol use: Yes     Alcohol/week: 0.0 oz     Comment: moderate         Alcohol Use 4/26/2017   Prescreen: >3 drinks/day or >7 drinks/week? The patient does not drink  >3 drinks per day nor >7 drinks per week.                 Social History     Tobacco Use     Smoking status: Never Smoker     Smokeless tobacco: Never Used   Substance Use Topics     Alcohol use: Yes     Alcohol/week: 0.0 oz     Comment: moderate        Current providers sharing in care for this patient include:  Patient Care Team:  Tian Santana MD as PCP - General (Family Practice)  Tian Santana MD as Assigned PCP    The following health maintenance items are reviewed in Epic and correct as of today:  Health Maintenance   Topic Date Due     ZOSTER IMMUNIZATION (1 of 2) 08/27/1997     AORTIC ANEURYSM SCREENING (SYSTEM ASSIGNED)  08/27/2012     OP ANNUAL INR REFERRAL  02/20/2015     PHQ-2  01/01/2019     FALL RISK ASSESSMENT  06/13/2019     MEDICARE ANNUAL WELLNESS VISIT  06/13/2019     INFLUENZA VACCINE (1) 09/01/2019     DTAP/TDAP/TD IMMUNIZATION (3 - Td) 12/13/2020     COLONOSCOPY  05/25/2021     ADVANCE CARE PLANNING  04/26/2022     LIPID  06/13/2023     HEPATITIS C SCREENING  Completed     PNEUMOCOCCAL IMMUNIZATION 65+ LOW/MEDIUM RISK  Completed     IPV IMMUNIZATION  Aged Out     MENINGITIS IMMUNIZATION  Aged Out     Lab work is in process  Pneumonia Vaccine:Adults age 65+ who received Pneumovax (PPSV23) at 65 years or older: Should be given PCV13 > 1 year after their most recent PPSV23    Review of Systems  CONSTITUTIONAL: NEGATIVE for fever, chills, change in weight  INTEGUMENTARY/SKIN: NEGATIVE for worrisome rashes, moles or lesions  EYES: NEGATIVE for vision changes or irritation  ENT/MOUTH: NEGATIVE for ear, mouth and throat problems  RESP: NEGATIVE for significant cough or SOB  BREAST: NEGATIVE for masses, tenderness or discharge  CV: NEGATIVE for chest pain, palpitations or peripheral edema  GI: NEGATIVE for nausea, abdominal pain, heartburn, or change in bowel habits  : NEGATIVE for frequency, dysuria, or hematuria  MUSCULOSKELETAL: NEGATIVE for significant arthralgias or myalgia  NEURO: NEGATIVE for  "weakness, dizziness or paresthesias  ENDOCRINE: NEGATIVE for temperature intolerance, skin/hair changes  HEME: NEGATIVE for bleeding problems  PSYCHIATRIC: NEGATIVE for changes in mood or affect    OBJECTIVE:   /70   Pulse 65   Temp 97.3  F (36.3  C) (Tympanic)   Resp 16   Ht 1.709 m (5' 7.3\")   Wt 100.2 kg (221 lb)   SpO2 96%   BMI 34.31 kg/m   Estimated body mass index is 34.31 kg/m  as calculated from the following:    Height as of this encounter: 1.709 m (5' 7.3\").    Weight as of this encounter: 100.2 kg (221 lb).  Physical Exam  GENERAL: healthy, alert and no distress  EYES: Eyes grossly normal to inspection, PERRL and conjunctivae and sclerae normal  HENT: ear canals and TM's normal, nose and mouth without ulcers or lesions  NECK: no adenopathy, no asymmetry, masses, or scars and thyroid normal to palpation  RESP: lungs clear to auscultation - no rales, rhonchi or wheezes  CV: regular rate and rhythm, normal S1 S2, no S3 or S4, no murmur, click or rub, no peripheral edema and peripheral pulses strong  ABDOMEN: soft, nontender, no hepatosplenomegaly, no masses and bowel sounds normal  MS: no gross musculoskeletal defects noted, no edema  SKIN: no suspicious lesions or rashes  NEURO: Normal strength and tone, mentation intact and speech normal  PSYCH: mentation appears normal, affect normal/bright    Diagnostic Test Results:  Labs reviewed in Epic    ASSESSMENT / PLAN:   1. Routine general medical examination at a health care facility    - Lipid panel reflex to direct LDL Fasting  - Hemoglobin  - Comprehensive metabolic panel    2. Benign familial tremor  Well controlled with the use of propanolol.  - propranolol ER (INDERAL LA) 80 MG 24 hr capsule; Take 1 tab every day.  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  He is advised to get endoscopy every 3 to 5 years to make sure it " "stable.  - omeprazole (PRILOSEC) 40 MG DR capsule; Take 1 tab every day.  Dispense: 90 capsule; Refill: 3    4. Hypertension goal BP (blood pressure) < 140/90    - lisinopril (PRINIVIL/ZESTRIL) 10 MG tablet; TAKE 1 TABLET BY MOUTH EVERY DAY  Dispense: 90 tablet; Refill: 3  - Comprehensive metabolic panel    5. Other pulmonary embolism without acute cor pulmonale, unspecified chronicity (H)      6. Atrial fibrillation, unspecified type (H)  Currently on warfarin which is stable.  He is in sinus rhythm currently.  He is taking warfarin for also PE.    7. CARDIOVASCULAR SCREENING; LDL GOAL LESS THAN 130    - Lipid panel reflex to direct LDL Fasting  - Comprehensive metabolic panel    8. Long-term (current) use of anticoagulants [Z79.01]        End of Life Planning:  Patient currently has an advanced directive: Yes.  Practitioner is supportive of decision.    COUNSELING:  Reviewed preventive health counseling, as reflected in patient instructions       Regular exercise       Healthy diet/nutrition    Estimated body mass index is 34.31 kg/m  as calculated from the following:    Height as of this encounter: 1.709 m (5' 7.3\").    Weight as of this encounter: 100.2 kg (221 lb).         reports that he has never smoked. He has never used smokeless tobacco.      Appropriate preventive services were discussed with this patient, including applicable screening as appropriate for cardiovascular disease, diabetes, osteopenia/osteoporosis, and glaucoma.  As appropriate for age/gender, discussed screening for colorectal cancer, prostate cancer, breast cancer, and cervical cancer. Checklist reviewing preventive services available has been given to the patient.    Reviewed patients plan of care and provided an AVS. The Basic Care Plan (routine screening as documented in Health Maintenance) for Rob meets the Care Plan requirement. This Care Plan has been established and reviewed with the Patient.    Counseling Resources:  ATP IV " Guidelines  Pooled Cohorts Equation Calculator  Breast Cancer Risk Calculator  FRAX Risk Assessment  ICSI Preventive Guidelines  Dietary Guidelines for Americans, 2010  [employer]'s MyPlate  ASA Prophylaxis  Lung CA Screening    Tian Santana MD  [facility] [place]    Identified Health Risks:

## 2019-07-17 ENCOUNTER — TELEPHONE (OUTPATIENT)
Dept: FAMILY MEDICINE | Facility: CLINIC | Age: 74
End: 2019-07-17

## 2019-07-17 ENCOUNTER — ANTICOAGULATION THERAPY VISIT (OUTPATIENT)
Dept: NURSING | Facility: CLINIC | Age: 74
End: 2019-07-17
Payer: COMMERCIAL

## 2019-07-17 DIAGNOSIS — I26.99 OTHER PULMONARY EMBOLISM WITHOUT ACUTE COR PULMONALE, UNSPECIFIED CHRONICITY (H): Primary | ICD-10-CM

## 2019-07-17 DIAGNOSIS — Z79.01 LONG TERM CURRENT USE OF ANTICOAGULANT THERAPY: ICD-10-CM

## 2019-07-17 DIAGNOSIS — I26.99 PULMONARY EMBOLUS (H): ICD-10-CM

## 2019-07-17 LAB — INR POINT OF CARE: 3 (ref 0.86–1.14)

## 2019-07-17 PROCEDURE — 36416 COLLJ CAPILLARY BLOOD SPEC: CPT

## 2019-07-17 PROCEDURE — 85610 PROTHROMBIN TIME: CPT | Mod: QW

## 2019-07-17 PROCEDURE — 99207 ZZC NO CHARGE NURSE ONLY: CPT

## 2019-07-17 NOTE — PROGRESS NOTES
ANTICOAGULATION FOLLOW-UP CLINIC VISIT    Patient Name:  Rob Beavers  Date:  7/17/2019  Contact Type:  Face to Face    SUBJECTIVE:  Patient Findings     Comments:   The patient was assessed for diet, medication, and activity level changes, missed or extra doses, bruising or bleeding, with no problem findings.          Clinical Outcomes     Negatives:   Major bleeding event, Thromboembolic event, Anticoagulation-related hospital admission, Anticoagulation-related ED visit, Anticoagulation-related fatality    Comments:   The patient was assessed for diet, medication, and activity level changes, missed or extra doses, bruising or bleeding, with no problem findings.             OBJECTIVE    INR Protime   Date Value Ref Range Status   07/17/2019 3.0 (A) 0.86 - 1.14 Final       ASSESSMENT / PLAN  INR assessment THER    Recheck INR In: 6 WEEKS    INR Location Clinic      Anticoagulation Summary  As of 7/17/2019    INR goal:   2.0-3.0   TTR:   88.8 % (3.3 y)   INR used for dosing:   3.0 (7/17/2019)   Warfarin maintenance plan:   5 mg (5 mg x 1) every Fri; 2.5 mg (5 mg x 0.5) all other days   Full warfarin instructions:   5 mg every Fri; 2.5 mg all other days   Weekly warfarin total:   20 mg   No change documented:   Rosangela Mays RN   Plan last modified:   Alecia Saavedra, RN (3/15/2016)   Next INR check:   8/28/2019   Priority:   INR   Target end date:       Indications    Long-term (current) use of anticoagulants [Z79.01] [Z79.01]  Pulmonary embolus (H) [I26.99]             Anticoagulation Episode Summary     INR check location:       Preferred lab:       Send INR reminders to:   [facility] [place]    Comments:         Anticoagulation Care Providers     Provider Role Specialty Phone number    Tian Santana MD [facility] [phone]            See the Encounter Report to view Anticoagulation Flowsheet and Dosing Calendar (Go to Encounters tab in chart review, and find the Anticoagulation Therapy  Visit)    Patient INR is therapeutic.  Patient will continue to take 20 mg weekly dosing and follow up in 6 weeks or sooner for any problems or concerns.      Rosangela Mays RN

## 2019-07-17 NOTE — TELEPHONE ENCOUNTER
Has the patient previously taken warfarin? yes  If yes, for what indication? Pulmonary embolus    Does the patient have any of the following indications for a higher range of 2.5-3.5:    Mitral position mechanical valve? no    Tenzin-Shiley, Ball and Cage or Monoleaflet valve (regardless of position) no    Other (if yes, please explain) no    Order pended.  Please sign  Rosangela ENRIQUE RN  EP Triage

## 2019-08-28 ENCOUNTER — ANTICOAGULATION THERAPY VISIT (OUTPATIENT)
Dept: NURSING | Facility: CLINIC | Age: 74
End: 2019-08-28
Payer: COMMERCIAL

## 2019-08-28 DIAGNOSIS — Z79.01 LONG TERM CURRENT USE OF ANTICOAGULANT THERAPY: ICD-10-CM

## 2019-08-28 DIAGNOSIS — I26.99 PULMONARY EMBOLUS (H): ICD-10-CM

## 2019-08-28 LAB — INR POINT OF CARE: 3.3 (ref 0.86–1.14)

## 2019-08-28 PROCEDURE — 36416 COLLJ CAPILLARY BLOOD SPEC: CPT

## 2019-08-28 PROCEDURE — 99207 ZZC NO CHARGE NURSE ONLY: CPT

## 2019-08-28 PROCEDURE — 85610 PROTHROMBIN TIME: CPT | Mod: QW

## 2019-08-28 NOTE — PROGRESS NOTES
ANTICOAGULATION FOLLOW-UP CLINIC VISIT    Patient Name:  Rob Beavers  Date:  8/28/2019  Contact Type:  Face to Face    SUBJECTIVE:  Patient Findings     Positives:   Change in alcohol use, Missed doses    Comments:   Patient missed a dose on Friday, made up for it with an extra dose on Saturday (see calendar). He also had been out of town fishing/ [employer] and had some increased alcohol consumption.         Clinical Outcomes     Negatives:   Major bleeding event, Thromboembolic event, Anticoagulation-related hospital admission, Anticoagulation-related ED visit, Anticoagulation-related fatality    Comments:   Patient missed a dose on Friday, made up for it with an extra dose on Saturday (see calendar). He also had been out of town fishing/ [employer] and had some increased alcohol consumption.            OBJECTIVE    INR Protime   Date Value Ref Range Status   08/28/2019 3.3 (A) 0.86 - 1.14 Final       ASSESSMENT / PLAN  INR assessment SUPRA    Recheck INR In: 3 WEEKS    INR Location Clinic      Anticoagulation Summary  As of 8/28/2019    INR goal:   2.0-3.0   TTR:   85.8 % (3.4 y)   INR used for dosing:   3.3! (8/28/2019)   Warfarin maintenance plan:   5 mg (5 mg x 1) every Fri; 2.5 mg (5 mg x 0.5) all other days   Full warfarin instructions:   5 mg every Fri; 2.5 mg all other days   Weekly warfarin total:   20 mg   No change documented:   Dontae Barreto RN   Plan last modified:   Alecia Saavedra RN (3/15/2016)   Next INR check:   9/18/2019   Priority:   INR   Target end date:       Indications    Long-term (current) use of anticoagulants [Z79.01] [Z79.01]  Pulmonary embolus (H) [I26.99]             Anticoagulation Episode Summary     INR check location:       Preferred lab:       Send INR reminders to:   [facility] [place]    Comments:         Anticoagulation Care Providers     Provider Role Specialty Phone number    Tian Santana MD [facility] [phone]            See the Encounter  Report to view Anticoagulation Flowsheet and Dosing Calendar (Go to Encounters tab in chart review, and find the Anticoagulation Therapy Visit)    Patient INR is supra therapeutic today.  Patient will continue maintenance dosing of 20 mg and follow up in 3 weeks or sooner if there are any concerns or problems. Patient will also eat extra helping of green vegetables today. Wanted to see patient back in 2 weeks, he was unable to return then so will recheck in 3 weeks.     Signs of bleeding and when appropriate to seek care for symptoms reviewed.    Adjustment Rational: 0%  Dosage adjustment made based on physician directed care plan.      Dontae Barreto RN

## 2019-08-31 DIAGNOSIS — I26.09 OTHER CHRONIC PULMONARY EMBOLISM WITH ACUTE COR PULMONALE (H): ICD-10-CM

## 2019-08-31 DIAGNOSIS — I27.82 OTHER CHRONIC PULMONARY EMBOLISM WITH ACUTE COR PULMONALE (H): ICD-10-CM

## 2019-08-31 DIAGNOSIS — Z79.01 LONG TERM CURRENT USE OF ANTICOAGULANT THERAPY: ICD-10-CM

## 2019-09-03 RX ORDER — WARFARIN SODIUM 5 MG/1
TABLET ORAL
Qty: 30 TABLET | Refills: 1 | Status: SHIPPED | OUTPATIENT
Start: 2019-09-03 | End: 2020-01-06

## 2019-09-03 NOTE — TELEPHONE ENCOUNTER
Prescription approved per [facility] Refill Protocol.      Rosangela ENRIQUE RN  EP Triage

## 2019-09-03 NOTE — TELEPHONE ENCOUNTER
"Requested Prescriptions   Pending Prescriptions Disp Refills     warfarin (COUMADIN) 5 MG tablet [Pharmacy Med Name: WARFARIN SODIUM 5 MG TABLET] 30 tablet 0     Sig: TAKE 1 TABLET BY MOUTH ON FRIDAYS AND TAKE 1/2 TAB ALL OTHER DAY/OR AS DIRECTED BY [facility]  Last Written Prescription Date:  06/30/2019  Last Fill Quantity: 30 tablet,  # refills: 0   Last Office Visit: 7/11/2019 Max  Future Office Visit:            Vitamin K Antagonists Failed - 8/31/2019 12:24 AM        Failed - INR is within goal in the past 6 weeks     Confirm INR is within goal in the past 6 weeks.     Recent Labs   Lab Test 08/28/19   INR 3.3*                       Passed - Recent (12 mo) or future (30 days) visit within the authorizing provider's specialty     Patient had office visit in the last 12 months or has a visit in the next 30 days with authorizing provider or within the authorizing provider's specialty.  See \"Patient Info\" tab in inbasket, or \"Choose Columns\" in Meds & Orders section of the refill encounter.              Passed - Medication is active on med list        Passed - Patient is 18 years of age or older        "

## 2019-09-18 ENCOUNTER — ANTICOAGULATION THERAPY VISIT (OUTPATIENT)
Dept: NURSING | Facility: CLINIC | Age: 74
End: 2019-09-18
Payer: COMMERCIAL

## 2019-09-18 DIAGNOSIS — I26.99 PULMONARY EMBOLUS (H): ICD-10-CM

## 2019-09-18 DIAGNOSIS — Z79.01 LONG TERM CURRENT USE OF ANTICOAGULANT THERAPY: ICD-10-CM

## 2019-09-18 LAB — INR POINT OF CARE: 2.4 (ref 0.86–1.14)

## 2019-09-18 PROCEDURE — 99207 ZZC NO CHARGE NURSE ONLY: CPT

## 2019-09-18 PROCEDURE — 36416 COLLJ CAPILLARY BLOOD SPEC: CPT

## 2019-09-18 PROCEDURE — 85610 PROTHROMBIN TIME: CPT | Mod: QW

## 2019-09-18 NOTE — PROGRESS NOTES
ANTICOAGULATION FOLLOW-UP CLINIC VISIT    Patient Name:  Rob Beavers  Date:  2019  Contact Type:  Face to Face    SUBJECTIVE:  Patient Findings     Comments:   The patient was assessed for diet, medication, and activity level changes, missed or extra doses, bruising or bleeding, with no problem findings.            Clinical Outcomes     Negatives:   Major bleeding event, Thromboembolic event, Anticoagulation-related hospital admission, Anticoagulation-related ED visit, Anticoagulation-related fatality    Comments:   The patient was assessed for diet, medication, and activity level changes, missed or extra doses, bruising or bleeding, with no problem findings.               OBJECTIVE    INR Protime   Date Value Ref Range Status   2019 2.4 (A) 0.86 - 1.14 Final       ASSESSMENT / PLAN  INR assessment THER    Recheck INR In: 6 WEEKS    INR Location Clinic      Anticoagulation Summary  As of 2019    INR goal:   2.0-3.0   TTR:   85.5 % (3.5 y)   INR used for dosin.4 (2019)   Warfarin maintenance plan:   5 mg (5 mg x 1) every Fri; 2.5 mg (5 mg x 0.5) all other days   Full warfarin instructions:   5 mg every Fri; 2.5 mg all other days   Weekly warfarin total:   20 mg   No change documented:   Dontae Barreto RN   Plan last modified:   Alecia Saavedra RN (3/15/2016)   Next INR check:   10/30/2019   Priority:   INR   Target end date:       Indications    Long-term (current) use of anticoagulants [Z79.01] [Z79.01]  Pulmonary embolus (H) [I26.99]             Anticoagulation Episode Summary     INR check location:       Preferred lab:       Send INR reminders to:   [facility] [place]    Comments:         Anticoagulation Care Providers     Provider Role Specialty Phone number    Tian Santana MD [facility] [phone]            See the Encounter Report to view Anticoagulation Flowsheet and Dosing Calendar (Go to Encounters tab in chart review, and find the  Anticoagulation Therapy Visit)    Patient INR is therapeutic.  Patient will continue to take 20 mg weekly dosing and follow up in 6 weeks or sooner for any problems or concerns.        Dontae Barreto RN

## 2019-10-30 ENCOUNTER — ANTICOAGULATION THERAPY VISIT (OUTPATIENT)
Dept: NURSING | Facility: CLINIC | Age: 74
End: 2019-10-30
Payer: COMMERCIAL

## 2019-10-30 DIAGNOSIS — Z79.01 LONG TERM CURRENT USE OF ANTICOAGULANT THERAPY: ICD-10-CM

## 2019-10-30 DIAGNOSIS — I26.99 PULMONARY EMBOLUS (H): ICD-10-CM

## 2019-10-30 LAB — INR POINT OF CARE: 2.3 (ref 0.86–1.14)

## 2019-10-30 PROCEDURE — 99207 ZZC NO CHARGE NURSE ONLY: CPT

## 2019-10-30 PROCEDURE — 36416 COLLJ CAPILLARY BLOOD SPEC: CPT

## 2019-10-30 PROCEDURE — 85610 PROTHROMBIN TIME: CPT | Mod: QW

## 2019-10-30 NOTE — PROGRESS NOTES
ANTICOAGULATION FOLLOW-UP CLINIC VISIT    Patient Name:  Rob Beavers  Date:  10/30/2019  Contact Type:  Face to Face    SUBJECTIVE:  Patient Findings     Comments:   The patient was assessed for diet, medication, and activity level changes, missed or extra doses, bruising or bleeding, with no problem findings.          Clinical Outcomes     Negatives:   Major bleeding event, Thromboembolic event, Anticoagulation-related hospital admission, Anticoagulation-related ED visit, Anticoagulation-related fatality    Comments:   The patient was assessed for diet, medication, and activity level changes, missed or extra doses, bruising or bleeding, with no problem findings.             OBJECTIVE    INR Protime   Date Value Ref Range Status   10/30/2019 2.3 (A) 0.86 - 1.14 Final       ASSESSMENT / PLAN  INR assessment THER    Recheck INR In: 6 WEEKS    INR Location Clinic      Anticoagulation Summary  As of 10/30/2019    INR goal:   2.0-3.0   TTR:   86.0 % (3.6 y)   INR used for dosin.3 (10/30/2019)   Warfarin maintenance plan:   5 mg (5 mg x 1) every Fri; 2.5 mg (5 mg x 0.5) all other days   Full warfarin instructions:   5 mg every Fri; 2.5 mg all other days   Weekly warfarin total:   20 mg   No change documented:   Dontae Barreto RN   Plan last modified:   Alecia Saavedra RN (3/15/2016)   Next INR check:   2019   Priority:   INR   Target end date:       Indications    Long-term (current) use of anticoagulants [Z79.01] [Z79.01]  Pulmonary embolus (H) [I26.99]             Anticoagulation Episode Summary     INR check location:       Preferred lab:       Send INR reminders to:   [facility] [place]    Comments:         Anticoagulation Care Providers     Provider Role Specialty Phone number    Tian Santana MD [facility] [phone]            See the Encounter Report to view Anticoagulation Flowsheet and Dosing Calendar (Go to Encounters tab in chart review, and find the  Anticoagulation Therapy Visit)    Patient INR is therapeutic.  Patient will continue to take 20 mg weekly dosing and follow up in 6 weeks or sooner for any problems or concerns.        Dontae Barreto RN

## 2019-11-05 ENCOUNTER — HEALTH MAINTENANCE LETTER (OUTPATIENT)
Age: 74
End: 2019-11-05

## 2019-12-11 ENCOUNTER — ANTICOAGULATION THERAPY VISIT (OUTPATIENT)
Dept: NURSING | Facility: CLINIC | Age: 74
End: 2019-12-11
Payer: COMMERCIAL

## 2019-12-11 DIAGNOSIS — Z79.01 LONG TERM CURRENT USE OF ANTICOAGULANT THERAPY: ICD-10-CM

## 2019-12-11 DIAGNOSIS — I26.99 PULMONARY EMBOLUS (H): ICD-10-CM

## 2019-12-11 LAB — INR POINT OF CARE: 2.7 (ref 0.86–1.14)

## 2019-12-11 PROCEDURE — 36416 COLLJ CAPILLARY BLOOD SPEC: CPT

## 2019-12-11 PROCEDURE — 99207 ZZC NO CHARGE NURSE ONLY: CPT

## 2019-12-11 PROCEDURE — 85610 PROTHROMBIN TIME: CPT | Mod: QW

## 2019-12-11 NOTE — PROGRESS NOTES
ANTICOAGULATION FOLLOW-UP CLINIC VISIT    Patient Name:  Rob Beavers  Date:  2019  Contact Type:  Face to Face    SUBJECTIVE:  Patient Findings     Comments:   The patient was assessed for diet, medication, and activity level changes, missed or extra doses, bruising or bleeding, with no problem findings.          Clinical Outcomes     Negatives:   Major bleeding event, Thromboembolic event, Anticoagulation-related hospital admission, Anticoagulation-related ED visit, Anticoagulation-related fatality    Comments:   The patient was assessed for diet, medication, and activity level changes, missed or extra doses, bruising or bleeding, with no problem findings.             OBJECTIVE    INR Protime   Date Value Ref Range Status   2019 2.7 (A) 0.86 - 1.14 Final       ASSESSMENT / PLAN  No question data found.  Anticoagulation Summary  As of 2019    INR goal:   2.0-3.0   TTR:   79.7 % (1 y)   INR used for dosin.7 (2019)   Warfarin maintenance plan:   5 mg (5 mg x 1) every Fri; 2.5 mg (5 mg x 0.5) all other days   Full warfarin instructions:   5 mg every Fri; 2.5 mg all other days   Weekly warfarin total:   20 mg   No change documented:   Dontae Barreto RN   Plan last modified:   Alecia Saavedra RN (3/15/2016)   Next INR check:   2020   Priority:   Maintenance   Target end date:       Indications    Long-term (current) use of anticoagulants [Z79.01] [Z79.01]  Pulmonary embolus (H) [I26.99]             Anticoagulation Episode Summary     INR check location:       Preferred lab:       Send INR reminders to:   [facility] [place]    Comments:         Anticoagulation Care Providers     Provider Role Specialty Phone number    Tian Santana MD [facility] [phone]            See the Encounter Report to view Anticoagulation Flowsheet and Dosing Calendar (Go to Encounters tab in chart review, and find the Anticoagulation Therapy Visit)    Patient INR is  therapeutic.  Patient will continue to take 20 mg weekly dosing and follow up in 6 weeks or sooner for any problems or concerns.        Dontae Barreto RN

## 2020-01-07 ENCOUNTER — MYC REFILL (OUTPATIENT)
Dept: FAMILY MEDICINE | Facility: CLINIC | Age: 75
End: 2020-01-07

## 2020-01-07 DIAGNOSIS — Z79.01 LONG TERM CURRENT USE OF ANTICOAGULANT THERAPY: ICD-10-CM

## 2020-01-07 DIAGNOSIS — I26.09 OTHER CHRONIC PULMONARY EMBOLISM WITH ACUTE COR PULMONALE (H): ICD-10-CM

## 2020-01-07 DIAGNOSIS — I27.82 OTHER CHRONIC PULMONARY EMBOLISM WITH ACUTE COR PULMONALE (H): ICD-10-CM

## 2020-01-07 RX ORDER — WARFARIN SODIUM 5 MG/1
5 TABLET ORAL DAILY
Qty: 45 TABLET | Refills: 1 | Status: SHIPPED | OUTPATIENT
Start: 2020-01-07 | End: 2020-09-08

## 2020-01-07 NOTE — TELEPHONE ENCOUNTER
Prescription approved per [facility] Refill Protocol.    Rosangela ENRIQUE RN  EP Triage

## 2020-01-22 ENCOUNTER — ANTICOAGULATION THERAPY VISIT (OUTPATIENT)
Dept: NURSING | Facility: CLINIC | Age: 75
End: 2020-01-22
Payer: COMMERCIAL

## 2020-01-22 ENCOUNTER — TELEPHONE (OUTPATIENT)
Dept: FAMILY MEDICINE | Facility: CLINIC | Age: 75
End: 2020-01-22

## 2020-01-22 DIAGNOSIS — I26.99 PULMONARY EMBOLUS (H): ICD-10-CM

## 2020-01-22 DIAGNOSIS — Z79.01 LONG TERM CURRENT USE OF ANTICOAGULANT THERAPY: ICD-10-CM

## 2020-01-22 LAB — INR POINT OF CARE: 2.3 (ref 0.86–1.14)

## 2020-01-22 PROCEDURE — 85610 PROTHROMBIN TIME: CPT | Mod: QW

## 2020-01-22 PROCEDURE — 36416 COLLJ CAPILLARY BLOOD SPEC: CPT

## 2020-01-22 PROCEDURE — 99207 ZZC NO CHARGE NURSE ONLY: CPT

## 2020-01-22 NOTE — TELEPHONE ENCOUNTER
Pt was seen today in INR and was 2.3 (2.0-3.0 range).  Pt states he is going to [place] for a couple of months and will be back first part of April.  Pt is wondering if he can extend his INR recheck until the week of April 6th?  This goes outside the parameters that nurses can schedule which is 6 weeks without md approval.  Pt has been stable on his dosing of 20 mg for years.  Or should pt be given lab letter to take him and have INR checked while on vacation?    Pt can be reached at [phone].    Rosangela ENRIQUE RN  EP Triage

## 2020-01-22 NOTE — PROGRESS NOTES
ANTICOAGULATION FOLLOW-UP CLINIC VISIT    Patient Name:  Rob Beavers  Date:  2020  Contact Type:  Face to Face    SUBJECTIVE:  Patient Findings     Comments:   The patient was assessed for diet, medication, and activity level changes, missed or extra doses, bruising or bleeding, with no problem findings.          Clinical Outcomes     Negatives:   Major bleeding event, Thromboembolic event, Anticoagulation-related hospital admission, Anticoagulation-related ED visit, Anticoagulation-related fatality    Comments:   The patient was assessed for diet, medication, and activity level changes, missed or extra doses, bruising or bleeding, with no problem findings.             OBJECTIVE    INR Protime   Date Value Ref Range Status   2020 2.3 (A) 0.86 - 1.14 Final       ASSESSMENT / PLAN  INR assessment THER    Recheck INR In: 12 WEEKS    INR Location Clinic      Anticoagulation Summary  As of 2020    INR goal:   2.0-3.0   TTR:   79.7 % (1 y)   INR used for dosin.3 (2020)   Warfarin maintenance plan:   5 mg (5 mg x 1) every Fri; 2.5 mg (5 mg x 0.5) all other days   Full warfarin instructions:   5 mg every Fri; 2.5 mg all other days   Weekly warfarin total:   20 mg   No change documented:   Rosangela Mays RN   Plan last modified:   Alecia Saavedra, RN (3/15/2016)   Next INR check:   2020   Priority:   Maintenance   Target end date:       Indications    Long-term (current) use of anticoagulants [Z79.01] [Z79.01]  Pulmonary embolus (H) [I26.99]             Anticoagulation Episode Summary     INR check location:       Preferred lab:       Send INR reminders to:   [facility]    Comments:         Anticoagulation Care Providers     Provider Role Specialty Phone number    Tian Santana MD [facility] [phone]            See the Encounter Report to view Anticoagulation Flowsheet and Dosing Calendar (Go to Encounters tab in chart review, and find the Anticoagulation  Therapy Visit)    Patient INR is therapeutic.  Patient will continue to take 20  mg weekly dosing and follow up in 12 weeks or sooner for any problems or concerns.  Pt states he is leaving for [place] on 1/28 and will not be returning until first week in April.  Sent a message to Dr. Santana PCP and asked if could recheck when pt got back and Dr. Santana stated pt can check when returns.  Advised pt of Dr. Satnana's reply and pt opted not to take a lab letter with him and scheduled for 4/8 at 9 am for recheck.      Rosangela Mays  EP [facility] RN

## 2020-01-22 NOTE — TELEPHONE ENCOUNTER
S/w pt and gave Dr. Santana's message below.  Pt does not want to bring a lab letter with him.  Scheduled pt for 4/8 at 9 am for INR recheck.    Pt states understanding.    Rosangela ENRIQUE RN  EP Triage

## 2020-01-22 NOTE — TELEPHONE ENCOUNTER
As long as patient INR has been stable in the past it should be fine.  However also gave patient an option if he want to get the labs checked while in [place] and send the results to us so that we can adjust, that is another option as well.

## 2020-04-07 ENCOUNTER — TELEPHONE (OUTPATIENT)
Dept: LAB | Facility: CLINIC | Age: 75
End: 2020-04-07

## 2020-04-07 DIAGNOSIS — I26.99 PULMONARY EMBOLUS (H): Primary | ICD-10-CM

## 2020-04-07 DIAGNOSIS — Z79.01 LONG TERM CURRENT USE OF ANTICOAGULANT THERAPY: ICD-10-CM

## 2020-04-07 DIAGNOSIS — I48.91 ATRIAL FIBRILLATION, UNSPECIFIED TYPE (H): ICD-10-CM

## 2020-04-07 NOTE — TELEPHONE ENCOUNTER
S/w pt and gave Dr. Santana's message below.  Pt still does not want to come in for INR check at this time.  Per Dr. Santana ok to skip April but will have to be seen in May.  Pt scheduled for 5/6 at 9 am for lab appt.    Rosangela ENRIQUE RN  EP Triage

## 2020-04-07 NOTE — TELEPHONE ENCOUNTER
I called Pt to prescreen before his appointment and patient was very hesitant and does not want to come in tomorrow for his INR.    He states he has been stable and really would rather not ome in and potentially expose himself.    I talked to Rosangela in the [facility] clinic and she said he had to come in since he has not been seen since January.    Called patient but he still just feels that the doesn't want to put himself at any risk.    I tried to reassure him that we were talking all the precautions possible but he is still weary.    Told him I would route a message, not sure what more we can do.    Please have care team follow-up.    Thank you   Lab

## 2020-04-07 NOTE — TELEPHONE ENCOUNTER
It seems like patient has been stable in the past 3 to 4 months though it is necessary if patient want to skip this month we should be okay however he should come next month for sure to check his INR which would be mandatory.

## 2020-05-06 ENCOUNTER — ANTICOAGULATION THERAPY VISIT (OUTPATIENT)
Dept: FAMILY MEDICINE | Facility: CLINIC | Age: 75
End: 2020-05-06

## 2020-05-06 DIAGNOSIS — I26.99 PULMONARY EMBOLUS (H): ICD-10-CM

## 2020-05-06 DIAGNOSIS — I48.91 ATRIAL FIBRILLATION, UNSPECIFIED TYPE (H): ICD-10-CM

## 2020-05-06 DIAGNOSIS — Z79.01 LONG TERM CURRENT USE OF ANTICOAGULANT THERAPY: ICD-10-CM

## 2020-05-06 LAB
CAPILLARY BLOOD COLLECTION: NORMAL
INR BLD: 2.4 (ref 0.86–1.14)

## 2020-05-06 PROCEDURE — 85610 PROTHROMBIN TIME: CPT | Mod: QW | Performed by: FAMILY MEDICINE

## 2020-05-06 PROCEDURE — 36416 COLLJ CAPILLARY BLOOD SPEC: CPT | Performed by: FAMILY MEDICINE

## 2020-05-06 PROCEDURE — 99207 ZZC NO CHARGE NURSE ONLY: CPT | Performed by: FAMILY MEDICINE

## 2020-05-06 NOTE — PROGRESS NOTES
ANTICOAGULATION FOLLOW-UP CLINIC VISIT    Patient Name:  Rob Beavers  Date:  2020  Contact Type:  Telephone/ Rob    SUBJECTIVE:  Patient Findings     Comments:   The patient was assessed for diet, medication, and activity level changes, missed or extra doses, bruising or bleeding, with no problem findings.          Clinical Outcomes     Negatives:   Major bleeding event, Thromboembolic event, Anticoagulation-related hospital admission, Anticoagulation-related ED visit, Anticoagulation-related fatality    Comments:   The patient was assessed for diet, medication, and activity level changes, missed or extra doses, bruising or bleeding, with no problem findings.             OBJECTIVE    INR Point of Care   Date Value Ref Range Status   2020 2.4 (H) 0.86 - 1.14 Final     Comment:     This test is intended for monitoring Coumadin therapy.  Results are not   accurate in patients with prolonged INR due to factor deficiency.         ASSESSMENT / PLAN  INR assessment THER    Recheck INR In: 12 WEEKS    INR Location Clinic      Anticoagulation Summary  As of 2020    INR goal:   2.0-3.0   TTR:   79.7 % (8.7 mo)   INR used for dosin.4 (2020)   Warfarin maintenance plan:   5 mg (5 mg x 1) every Fri; 2.5 mg (5 mg x 0.5) all other days   Full warfarin instructions:   5 mg every Fri; 2.5 mg all other days   Weekly warfarin total:   20 mg   No change documented:   Dontae Barreto RN   Plan last modified:   Alecia Saavedra RN (3/15/2016)   Next INR check:   2020   Priority:   Maintenance   Target end date:       Indications    Long-term (current) use of anticoagulants [Z79.01] [Z79.01]  Pulmonary embolus (H) [I26.99]             Anticoagulation Episode Summary     INR check location:       Preferred lab:       Send INR reminders to:   [facility]    Comments:         Anticoagulation Care Providers     Provider Role Specialty Phone number    Tian Santana MD Responsible Family  Practice [phone]            See the Encounter Report to view Anticoagulation Flowsheet and Dosing Calendar (Go to Encounters tab in chart review, and find the Anticoagulation Therapy Visit)    Patient INR is therapeutic.  Patient will continue to take 20 mg weekly dosing and follow up in 12 weeks or sooner for any problems or concerns.    Patient was pre-approved for 12 weeks at last INR check from PCP due to vacation. Will extend for 1 more interval due to Covid.     Dontae Barreto RN

## 2020-07-29 ENCOUNTER — ANTICOAGULATION THERAPY VISIT (OUTPATIENT)
Dept: FAMILY MEDICINE | Facility: CLINIC | Age: 75
End: 2020-07-29

## 2020-07-29 ENCOUNTER — TELEPHONE (OUTPATIENT)
Dept: FAMILY MEDICINE | Facility: CLINIC | Age: 75
End: 2020-07-29

## 2020-07-29 DIAGNOSIS — Z79.01 LONG TERM CURRENT USE OF ANTICOAGULANT THERAPY: ICD-10-CM

## 2020-07-29 DIAGNOSIS — I48.91 ATRIAL FIBRILLATION, UNSPECIFIED TYPE (H): ICD-10-CM

## 2020-07-29 DIAGNOSIS — I26.99 PULMONARY EMBOLUS (H): ICD-10-CM

## 2020-07-29 DIAGNOSIS — G25.0 BENIGN FAMILIAL TREMOR: ICD-10-CM

## 2020-07-29 DIAGNOSIS — Z79.01 LONG TERM CURRENT USE OF ANTICOAGULANT THERAPY: Primary | ICD-10-CM

## 2020-07-29 LAB
CAPILLARY BLOOD COLLECTION: NORMAL
INR BLD: 2.4 (ref 0.86–1.14)

## 2020-07-29 PROCEDURE — 85610 PROTHROMBIN TIME: CPT | Mod: QW | Performed by: FAMILY MEDICINE

## 2020-07-29 PROCEDURE — 36416 COLLJ CAPILLARY BLOOD SPEC: CPT | Performed by: FAMILY MEDICINE

## 2020-07-29 PROCEDURE — 99207 ZZC NO CHARGE NURSE ONLY: CPT | Performed by: FAMILY MEDICINE

## 2020-07-29 NOTE — TELEPHONE ENCOUNTER
Patient is overdue for office/virtual visit. Last office visit was July of 2019. Routing to team to inform and assist with scheduling virtual visit with provider. Once patient is scheduled for visit, route back to triage to address refill. Thank you very much.    Maris Lundberg RN, BSN  [facility] [place]

## 2020-07-29 NOTE — TELEPHONE ENCOUNTER
PCP see referral note below. Patient is also asking about continuing 12 week INR intervals for testing as this was temporarily granted due to Covid and him being nervous about coming to the clinic frequently. Routing to PCP and Gianna Mo, PharmD to advise. If 12 week intervals are not appropriate, can he be given an 8 or 10 week extension?    Ting Barreto RN   [facility] - Triage

## 2020-07-29 NOTE — PROGRESS NOTES
ANTICOAGULATION FOLLOW-UP CLINIC VISIT    Patient Name:  Rob Beavers  Date:  2020  Contact Type:  Telephone/ Rob    SUBJECTIVE:  Patient Findings     Comments:   The patient was assessed for diet, medication, and activity level changes, missed or extra doses, bruising or bleeding, with no problem findings.          Clinical Outcomes     Negatives:   Major bleeding event, Thromboembolic event, Anticoagulation-related hospital admission, Anticoagulation-related ED visit, Anticoagulation-related fatality    Comments:   The patient was assessed for diet, medication, and activity level changes, missed or extra doses, bruising or bleeding, with no problem findings.             OBJECTIVE    Recent labs: (last 7 days)     20  0903   INR 2.4*       ASSESSMENT / PLAN  INR assessment THER    Recheck INR In: 12 WEEKS    INR Location Clinic      Anticoagulation Summary  As of 2020    INR goal:   2.0-3.0   TTR:   86.2 % (8.7 mo)   INR used for dosin.4 (2020)   Warfarin maintenance plan:   5 mg (5 mg x 1) every Fri; 2.5 mg (5 mg x 0.5) all other days   Full warfarin instructions:   5 mg every Fri; 2.5 mg all other days   Weekly warfarin total:   20 mg   No change documented:   Dontae Barreto RN   Plan last modified:   Alecia Saavedra RN (3/15/2016)   Next INR check:   10/21/2020   Priority:   Maintenance   Target end date:       Indications    Long-term (current) use of anticoagulants [Z79.01] [Z79.01]  Pulmonary embolus (H) [I26.99]             Anticoagulation Episode Summary     INR check location:       Preferred lab:       Send INR reminders to:   [facility]    Comments:         Anticoagulation Care Providers     Provider Role Specialty Phone number    Tian Santana MD [facility] [phone]            See the Encounter Report to view Anticoagulation Flowsheet and Dosing Calendar (Go to Encounters tab in chart review, and find the Anticoagulation Therapy  Visit)    Patient INR is therapeutic.  Patient will continue to take 20 mg weekly dosing and follow up in 12 weeks or sooner for any problems or concerns.    Patient had been temporarily approved for 12 week intervals. Wondering if he can continue this due to Covid? See 7/29/20 telephone encounter.     Dontae Barreto RN

## 2020-07-29 NOTE — TELEPHONE ENCOUNTER
Has the patient previously taken warfarin? yes  If yes, for what indication? Atrial fib, PE    Does the patient have any of the following indications for a higher range of 2.5-3.5:    Mitral position mechanical valve? no    Tenzin-Shiley, Ball and Cage or Monoleaflet valve (regardless of position) no    Other (if yes, please explain) no        Ting Barreto RN   [facility] - Triage

## 2020-07-30 NOTE — TELEPHONE ENCOUNTER
[employer] message sent.    Patient is overdue for office/virtual visit. Last office visit was July of 2019    .Nusrat DANIEL    [facility] [place]

## 2020-08-11 ASSESSMENT — ACTIVITIES OF DAILY LIVING (ADL): CURRENT_FUNCTION: NO ASSISTANCE NEEDED

## 2020-08-12 ENCOUNTER — OFFICE VISIT (OUTPATIENT)
Dept: FAMILY MEDICINE | Facility: CLINIC | Age: 75
End: 2020-08-12
Payer: COMMERCIAL

## 2020-08-12 VITALS
TEMPERATURE: 97.1 F | BODY MASS INDEX: 32.65 KG/M2 | DIASTOLIC BLOOD PRESSURE: 72 MMHG | HEIGHT: 67 IN | WEIGHT: 208 LBS | SYSTOLIC BLOOD PRESSURE: 126 MMHG | OXYGEN SATURATION: 97 % | HEART RATE: 63 BPM

## 2020-08-12 DIAGNOSIS — Z00.00 ENCOUNTER FOR ANNUAL PHYSICAL EXAM: ICD-10-CM

## 2020-08-12 DIAGNOSIS — I48.91 ATRIAL FIBRILLATION, UNSPECIFIED TYPE (H): ICD-10-CM

## 2020-08-12 DIAGNOSIS — K22.70 BARRETT'S ESOPHAGUS WITHOUT DYSPLASIA: ICD-10-CM

## 2020-08-12 DIAGNOSIS — Z13.6 CARDIOVASCULAR SCREENING; LDL GOAL LESS THAN 130: ICD-10-CM

## 2020-08-12 DIAGNOSIS — Z79.01 LONG TERM CURRENT USE OF ANTICOAGULANT THERAPY: ICD-10-CM

## 2020-08-12 DIAGNOSIS — G25.0 BENIGN FAMILIAL TREMOR: ICD-10-CM

## 2020-08-12 DIAGNOSIS — Z12.5 SCREENING FOR PROSTATE CANCER: ICD-10-CM

## 2020-08-12 DIAGNOSIS — I10 HYPERTENSION GOAL BP (BLOOD PRESSURE) < 140/90: Primary | ICD-10-CM

## 2020-08-12 LAB
ALBUMIN SERPL-MCNC: 3.8 G/DL (ref 3.4–5)
ALP SERPL-CCNC: 83 U/L (ref 40–150)
ALT SERPL W P-5'-P-CCNC: 36 U/L (ref 0–70)
ANION GAP SERPL CALCULATED.3IONS-SCNC: 5 MMOL/L (ref 3–14)
AST SERPL W P-5'-P-CCNC: 31 U/L (ref 0–45)
BILIRUB SERPL-MCNC: 1 MG/DL (ref 0.2–1.3)
BUN SERPL-MCNC: 17 MG/DL (ref 7–30)
CALCIUM SERPL-MCNC: 8.9 MG/DL (ref 8.5–10.1)
CHLORIDE SERPL-SCNC: 107 MMOL/L (ref 94–109)
CHOLEST SERPL-MCNC: 172 MG/DL
CO2 SERPL-SCNC: 25 MMOL/L (ref 20–32)
CREAT SERPL-MCNC: 0.84 MG/DL (ref 0.66–1.25)
GFR SERPL CREATININE-BSD FRML MDRD: 87 ML/MIN/{1.73_M2}
GLUCOSE SERPL-MCNC: 104 MG/DL (ref 70–99)
HDLC SERPL-MCNC: 45 MG/DL
LDLC SERPL CALC-MCNC: 105 MG/DL
NONHDLC SERPL-MCNC: 127 MG/DL
POTASSIUM SERPL-SCNC: 4.2 MMOL/L (ref 3.4–5.3)
PROT SERPL-MCNC: 8.1 G/DL (ref 6.8–8.8)
SODIUM SERPL-SCNC: 137 MMOL/L (ref 133–144)
TRIGL SERPL-MCNC: 109 MG/DL

## 2020-08-12 PROCEDURE — 36415 COLL VENOUS BLD VENIPUNCTURE: CPT | Performed by: FAMILY MEDICINE

## 2020-08-12 PROCEDURE — 99397 PER PM REEVAL EST PAT 65+ YR: CPT | Performed by: FAMILY MEDICINE

## 2020-08-12 PROCEDURE — 80061 LIPID PANEL: CPT | Performed by: FAMILY MEDICINE

## 2020-08-12 PROCEDURE — G0103 PSA SCREENING: HCPCS | Performed by: FAMILY MEDICINE

## 2020-08-12 PROCEDURE — 80053 COMPREHEN METABOLIC PANEL: CPT | Performed by: FAMILY MEDICINE

## 2020-08-12 RX ORDER — LISINOPRIL 10 MG/1
10 TABLET ORAL DAILY
Qty: 90 TABLET | Refills: 3 | Status: SHIPPED | OUTPATIENT
Start: 2020-08-12 | End: 2021-08-26

## 2020-08-12 RX ORDER — PROPRANOLOL HYDROCHLORIDE 80 MG/1
CAPSULE, EXTENDED RELEASE ORAL
Qty: 90 CAPSULE | Refills: 3 | Status: SHIPPED | OUTPATIENT
Start: 2020-08-12 | End: 2021-08-10

## 2020-08-12 RX ORDER — OMEPRAZOLE 40 MG/1
CAPSULE, DELAYED RELEASE ORAL
Qty: 90 CAPSULE | Refills: 3 | Status: SHIPPED | OUTPATIENT
Start: 2020-08-12 | End: 2021-12-22

## 2020-08-12 ASSESSMENT — MIFFLIN-ST. JEOR: SCORE: 1652.11

## 2020-08-12 NOTE — PROGRESS NOTES
"SUBJECTIVE:   Rob Beavers is a 74 year old male who presents for Preventive Visit.  click delete button to remove this line now  click delete button to remove this line now  Are you in the first 12 months of your Medicare coverage?  No    HPI   Healthy Habits:     In general, how would you rate your overall health?  Good    Frequency of exercise:  2-3 days/week    Duration of exercise:  Other    Do you usually eat at least 4 servings of fruit and vegetables a day, include whole grains    & fiber and avoid regularly eating high fat or \"junk\" foods?  No    Taking medications regularly:  Yes    Medication side effects:  None    Ability to successfully perform activities of daily living:  No assistance needed    Home Safety:  Lack of grab bars in the bathroom    Hearing Impairment:  Difficulty understanding soft or whispered speech    In the past 6 months, have you been bothered by leaking of urine?  No    In general, how would you rate your overall mental or emotional health?  Good      PHQ-2 Total Score: 0    Additional concerns today:  No  Do you feel safe in your environment? Yes    Have you ever done Advance Care Planning? (For example, a Health Directive, POLST, or a discussion with a medical provider or your loved ones about your wishes): Yes, advance care planning is on file.      Fall risk  Fallen 2 or more times in the past year?: No  Any fall with injury in the past year?: No  click delete button to remove this line now  Cognitive Screening   1) Repeat 3 items (Leader, Season, Table)    2) Clock draw: NORMAL  3) 3 item recall: Recalls 3 objects  Results: 3 items recalled: COGNITIVE IMPAIRMENT LESS LIKELY    Mini-CogTM Copyright [employer]. Licensed by the author for use in [facility]; reprinted with permission (calvin@.[facility]). All rights reserved.      Do you have sleep apnea, excessive snoring or daytime drowsiness?: no    Reviewed and updated as needed this visit by clinical staff     "     Reviewed and updated as needed this visit by Provider        Social History     Tobacco Use     Smoking status: Never Smoker     Smokeless tobacco: Never Used   Substance Use Topics     Alcohol use: Yes     Alcohol/week: 0.0 standard drinks     Comment: moderate     If you drink alcohol do you typically have >3 drinks per day or >7 drinks per week? No    Alcohol Use 8/11/2020   Prescreen: >3 drinks/day or >7 drinks/week? No   Prescreen: >3 drinks/day or >7 drinks/week? -   No flowsheet data found.            Current providers sharing in care for this patient include:   Patient Care Team:  Tian Santana MD as PCP - General (Family Practice)  Tian Santana MD as Assigned PCP    The following health maintenance items are reviewed in Epic and correct as of today:  Health Maintenance   Topic Date Due     ZOSTER IMMUNIZATION (1 of 2) 08/27/1997     AORTIC ANEURYSM SCREENING (SYSTEM ASSIGNED)  08/27/2012     MEDICARE ANNUAL WELLNESS VISIT  07/11/2020     FALL RISK ASSESSMENT  07/11/2020     INFLUENZA VACCINE (1) 09/01/2020     DTAP/TDAP/TD IMMUNIZATION (3 - Td) 12/13/2020     COLORECTAL CANCER SCREENING  05/25/2021     ADVANCE CARE PLANNING  04/26/2022     LIPID  07/11/2024     HEPATITIS C SCREENING  Completed     PHQ-2  Completed     PNEUMOCOCCAL IMMUNIZATION 65+ LOW/MEDIUM RISK  Completed     IPV IMMUNIZATION  Aged Out     MENINGITIS IMMUNIZATION  Aged Out     HEPATITIS B IMMUNIZATION  Aged Out       Pneumonia Vaccine:Adults age 65+ who received Pneumovax (PPSV23) at 65 years or older: Should be given PCV13 > 1 year after their most recent PPSV23    Review of Systems  CONSTITUTIONAL: NEGATIVE for fever, chills, change in weight  INTEGUMENTARY/SKIN: NEGATIVE for worrisome rashes, moles or lesions  EYES: NEGATIVE for vision changes or irritation  ENT/MOUTH: NEGATIVE for ear, mouth and throat problems  RESP: NEGATIVE for significant cough or SOB  BREAST: NEGATIVE for masses, tenderness or discharge  CV: NEGATIVE for chest  "pain, palpitations or peripheral edema  GI: NEGATIVE for nausea, abdominal pain, heartburn, or change in bowel habits  : NEGATIVE for frequency, dysuria, or hematuria  MUSCULOSKELETAL: NEGATIVE for significant arthralgias or myalgia  NEURO: NEGATIVE for weakness, dizziness or paresthesias  ENDOCRINE: NEGATIVE for temperature intolerance, skin/hair changes  HEME: NEGATIVE for bleeding problems  PSYCHIATRIC: NEGATIVE for changes in mood or affect    OBJECTIVE:   There were no vitals taken for this visit. Estimated body mass index is 34.31 kg/m  as calculated from the following:    Height as of 7/11/19: 1.709 m (5' 7.3\").    Weight as of 7/11/19: 100.2 kg (221 lb).  Physical Exam  GENERAL: healthy, alert and no distress  EYES: Eyes grossly normal to inspection, PERRL and conjunctivae and sclerae normal  HENT: ear canals and TM's normal, nose and mouth without ulcers or lesions  NECK: no adenopathy, no asymmetry, masses, or scars and thyroid normal to palpation  RESP: lungs clear to auscultation - no rales, rhonchi or wheezes  CV: regular rate and rhythm, normal S1 S2, no S3 or S4, no murmur, click or rub, no peripheral edema and peripheral pulses strong  ABDOMEN: soft, nontender, no hepatosplenomegaly, no masses and bowel sounds normal  MS: no gross musculoskeletal defects noted, no edema  SKIN: no suspicious lesions or rashes  NEURO: Normal strength and tone, mentation intact and speech normal  PSYCH: mentation appears normal, affect normal/bright    Diagnostic Test Results:  Labs reviewed in Epic    ASSESSMENT / PLAN:   1. Hypertension goal BP (blood pressure) < 140/90    - lisinopril (ZESTRIL) 10 MG tablet; Take 1 tablet (10 mg) by mouth daily  Dispense: 90 tablet; Refill: 3  - Comprehensive metabolic panel    2. CARDIOVASCULAR SCREENING; LDL GOAL LESS THAN 130    - Lipid panel reflex to direct LDL Fasting  - Comprehensive metabolic panel    3. Atrial fibrillation, unspecified type (H)    On stable dose of " "warfarin. INR well in range.  4. Encounter for annual physical exam    - Lipid panel reflex to direct LDL Fasting  - Comprehensive metabolic panel    5. Long-term (current) use of anticoagulants [Z79.01]      6. Benign familial tremor    - propranolol ER (INDERAL LA) 80 MG 24 hr capsule; TAKE ONE CAPSULE BY MOUTH EVERY DAY  Dispense: 90 capsule; Refill: 3    7. 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  Dispense: 90 capsule; Refill: 3    8. Screening for prostate cancer    - PSA, screen    COUNSELING:  Reviewed preventive health counseling, as reflected in patient instructions       Regular exercise       Healthy diet/nutrition    Estimated body mass index is 34.31 kg/m  as calculated from the following:    Height as of 7/11/19: 1.709 m (5' 7.3\").    Weight as of 7/11/19: 100.2 kg (221 lb).         reports that he has never smoked. He has never used smokeless tobacco.      Appropriate preventive services were discussed with this patient, including applicable screening as appropriate for cardiovascular disease, diabetes, osteopenia/osteoporosis, and glaucoma.  As appropriate for age/gender, discussed screening for colorectal cancer, prostate cancer, breast cancer, and cervical cancer. Checklist reviewing preventive services available has been given to the patient.    Reviewed patients plan of care and provided an AVS. The Basic Care Plan (routine screening as documented in Health Maintenance) for Rob meets the Care Plan requirement. This Care Plan has been established and reviewed with the Patient.    Counseling Resources:  ATP IV Guidelines  Pooled Cohorts Equation Calculator  Breast Cancer Risk Calculator  FRAX Risk Assessment  ICSI Preventive Guidelines  Dietary Guidelines for Americans, 2010  [employer]'s MyPlate  ASA Prophylaxis  Lung CA Screening    Tian Santana MD  [facility] [place] [facility]    Identified Health Risks:  "

## 2020-08-13 LAB — PSA SERPL-ACNC: 1.24 UG/L (ref 0–4)

## 2020-08-17 ENCOUNTER — TELEPHONE (OUTPATIENT)
Dept: FAMILY MEDICINE | Facility: CLINIC | Age: 75
End: 2020-08-17

## 2020-08-17 DIAGNOSIS — Z79.01 LONG TERM CURRENT USE OF ANTICOAGULANT THERAPY: Primary | ICD-10-CM

## 2020-08-17 DIAGNOSIS — I48.91 ATRIAL FIBRILLATION, UNSPECIFIED TYPE (H): ICD-10-CM

## 2020-08-17 NOTE — TELEPHONE ENCOUNTER
Order/Referral Request:    Who is requesting: [facility]    Orders being requested: [facility] needs to know if it's okay for PT to stop taking coumadin 4 days prior to endoscopy    Reason service is needed/diagnosis: Blood thinners may complicate procedure but [facility] doesn't want to suggest PT stop taking coumadin without doctor's approval.    When are orders needed by: Next week    Has this been discussed with Provider: No    Does patient have a preference on a Group/Provider/Facility? [facility]    Does patient have an appointment scheduled: Yes: Endoscopy is scheduled 8/31    Where to send Orders: Fax at [phone]    Okay to leave detailed message?  Yes at Other phone number:  [phone]    Routing

## 2020-08-17 NOTE — TELEPHONE ENCOUNTER
Routing to PCP to advise on bridging.   Ting Barreto RN   [facility]  [place], [place], [place]

## 2020-08-18 RX ORDER — PROPRANOLOL HYDROCHLORIDE 80 MG/1
CAPSULE, EXTENDED RELEASE ORAL
Qty: 30 CAPSULE | Refills: 0 | OUTPATIENT
Start: 2020-08-18

## 2020-08-18 NOTE — TELEPHONE ENCOUNTER
[facility] informed. Left non detailed message for patient to return call. Patient does have current Cr on file. Need to discuss holding/ bridging/ INR rechecks etc.     Ting Barreto RN   [facility]   [place], [place], [place]

## 2020-08-18 NOTE — TELEPHONE ENCOUNTER
I spoke with patient and informed of below. He has bridged with lovenox in the past and is comfortable with this. I told him tomorrow I will order lovenox and send him a MyChart encounter with day by day instructions on holding/ bridging and when to recheck. Patient/ parent verbalized understanding and agrees with plan.    Ting Barreto RN   [facility]  [place], [place], [place]

## 2020-08-19 NOTE — TELEPHONE ENCOUNTER
Patient is having surgery on 8/31/20 for endoscopy  Provider has been notified and noted that patient need to bridge with Lovenox: See provider notes on encounter date this encounter.    LOVENOX DOSING:  (If Cr. Is more than 3 months old, order lab)  Estimated Creatinine Clearance: 87 mL/min (based on SCr of 0.84 mg/dL).  IF CRCL is calculated > 30 use 1 mg /kg Q12H  If CRCL is calculated < 30 ml/min, use 1 mg/kg QD  If CRCL calculated is < 15 ml/min must use heparin   -Does the patient have Kidney disorder no - Heparin must be used in renal patients 333u/kg first dose, then 250u/kg Q12    - If patient needs bridging PRIOR to procedure use therapeutic bridging pre and post op.  - If patient is only at clot risk after procedure and not in willie risk column, can use prophylactic dosing for after procedure.    Does patient have diagnosis of factor C or S deficiency? no   If yes, patient will need to start warfarin and Lovenox together to decrease patient risk for bleeding.      (Fill out below information and copy and paste into patient instructions to print on AVS if needed):  PATIENT INSTRUCTIONS:  8/26/20 5 days before procedure: STOP warfarin.  NO Lovenox.   8/27/20 4 days before procedure: NO warfarin, NO Lovenox  8/28/20 3 days before procedure: No warfarin, BEGINS Lovenox injection 90 mg  every 12 hours.(2 times per day)  8/29/20 2 days before procedure: No warfarin, Lovenox injection 90 mg every 12 hours. (2 times per day)  8/30/20 1 day before procedure: No warfarin, Lovenox injection in AM only. NO PM dose.      8/31/20 Day of surgery: No Lovenox, warfarin 2.5 mg in the PM after surgery/procedure. (Unless instructed different by provider or surgeon)      9/1/20 Day 1 after procedure: takes Warfarin 2.5 mg, Lovenox injection 90 mg  every 12 hours (2 times per day)   9/2/20 Day 2 after procedure: takes Warfarin 2.5 mg, Lovenox injection 90 mg every 12 hours.(2 times per day)   9/3/20 Day 3 after procedure:  takes Warfarin 2.5 mg, Lovenox injection  90 mg every 12 hours.(2 times per day)   9/4/20 Day 4 after procedure: takes Warfarin 5 mg, Lovenox injection 90 mg every 12 hours.(2 times per day)   9/5/20 Day 5 after procedure: takes Warfarin 2.5 mg, Lovenox injection 90 mg every 12 hours.(2 times per day)   9/6/20 Day 6 after procedure: takes Warfarin 2.5 mg, Lovenox injection 90 mg every 12 hours.(2 times per day)   9/7/20 Day 7 after procedure: takes Warfarin 2.5 mg, Lovenox injection 90 mg every 12 hours.(2 times per day)   9/8/20 Day 8 after procedure: takes Warfarin 2.5 mg, Lovenox injection 90 mg every 12 hours.(2 times per day)    Next INR recheck scheduled on 9/9/20 8:45 am    Ting Barreto RN   [facility]  [place], [place], [place]

## 2020-09-05 DIAGNOSIS — I27.82 OTHER CHRONIC PULMONARY EMBOLISM WITH ACUTE COR PULMONALE (H): ICD-10-CM

## 2020-09-05 DIAGNOSIS — I26.09 OTHER CHRONIC PULMONARY EMBOLISM WITH ACUTE COR PULMONALE (H): ICD-10-CM

## 2020-09-05 DIAGNOSIS — Z79.01 LONG TERM CURRENT USE OF ANTICOAGULANT THERAPY: ICD-10-CM

## 2020-09-08 RX ORDER — WARFARIN SODIUM 5 MG/1
TABLET ORAL
Qty: 45 TABLET | Refills: 1 | Status: SHIPPED | OUTPATIENT
Start: 2020-09-08 | End: 2021-03-10

## 2020-09-08 NOTE — TELEPHONE ENCOUNTER
Prescription approved per [facility] Refill Protocol.  Ting Barreto RN   [facility]  [place], [place], [place]

## 2020-09-23 ENCOUNTER — ANTICOAGULATION THERAPY VISIT (OUTPATIENT)
Dept: FAMILY MEDICINE | Facility: CLINIC | Age: 75
End: 2020-09-23

## 2020-09-23 DIAGNOSIS — I26.99 PULMONARY EMBOLUS (H): ICD-10-CM

## 2020-09-23 DIAGNOSIS — I48.91 ATRIAL FIBRILLATION, UNSPECIFIED TYPE (H): ICD-10-CM

## 2020-09-23 DIAGNOSIS — Z79.01 LONG TERM CURRENT USE OF ANTICOAGULANT THERAPY: ICD-10-CM

## 2020-09-23 NOTE — PROGRESS NOTES
[facility]-PROCED[facility] ANTICOAGULATION  MANAGEMENT    Assessment     Warfarin interruption plan for upper GI on 10/07/2020.    A. Fib, PE 2/18/2014      Risk stratification for thromboembolism: moderate (2017 [facility] periprocedure pathway for NVAF Expert Consensus)      [id number] = 4     PE > 1 year ago    NVAF: 2017 ACC periprocedure pathway for NVAF advises likely bridge for moderate risk stratification ([id number] score 5-6)  with a hx of stroke, TIA or systemic embolism    Due to HX PE and A fib, reasonable to bridge  Plan       Pre-Procedure:    Hold warfarin until after procedure starting: 10/02/2020     Lovenox 90 mg subq Q 12 hrs (1 mg/kg Q 12 hr for CrCl 85ml/min)     Start Lovenox: 10/04/2020    Last dose of Lovenox prior to procedure: 10/06/2020 AM       Post-Procedure:    Resume home warfarin dose if okay with provider doing procedure on night of procedure, 10/07/2020 PM: 5mg (2x boost)    Resume Lovenox ~ 24-48 hrs post procedure when okay with provider doing procedure. Continue until INR >= 2.3    Recheck INR 5-7 days after resuming warfarin   ?   Gianna Mo [facility]    Subjective/Objective:      Rob Beavers, a 75 year old male    Reason for Anticoagulation: A. Fib and PE    Goal INR Range: 2.0-3.0    Patient bridged in past: Yes: with past procedures    Pertinent History: N/A    Wt Readings from Last 3 Encounters:   08/12/20 94.3 kg (208 lb)   07/11/19 100.2 kg (221 lb)   06/13/18 93 kg (205 lb)        Ideal body weight: 66.1 kg (145 lb 11.6 oz)  Adjusted ideal body weight: 77.4 kg (170 lb 10.1 oz)     Lab Results   Component Value Date    INR 2.4 (H) 07/29/2020    INR 2.4 (H) 05/06/2020    INR 2.3 (A) 01/22/2020     Lab Results   Component Value Date    HGB 15.5 07/11/2019    HCT 35.3 06/30/2017     06/30/2017     Lab Results   Component Value Date    CR 0.84 08/12/2020    CR 0.89 07/11/2019    CR 0.91 06/13/2018     CrCl cannot be calculated (Patient's most recent lab result is older than the  maximum 10 days allowed.).

## 2020-09-23 NOTE — PROGRESS NOTES
Patient is scheduled for upper endoscopy at [facility] on 10/7  [facility] asking for hold/bridge orders.   Requesting to hold for 4 days prior.     Please call back with information to LOBITO Villaseñor @ [facility] [phone] ext.[phone]      Alexa Lorenz RN, BSN, PHN

## 2020-09-24 NOTE — PROGRESS NOTES
Orders given to Kasi at [facility], they will address warfarin and Lovenox restart based on exam findings.  Lovenox RX sent to [facility].    Gianna Mo, PharmD BCACP  Anticoagulation Clinical Pharmacist

## 2020-09-24 NOTE — PROGRESS NOTES
Left non detailed message for patient to review below instructions.     Patient is having surgery on 10/7/20 for endoscopy    PATIENT INSTRUCTIONS:  10/2/20 5 days before procedure: STOP warfarin.  NO Lovenox.   10/3/20 4 days before procedure: NO warfarin, NO Lovenox  10/4/20 3 days before procedure: No warfarin, BEGINS Lovenox injection 90 mg  every 12 hours.(2 times per day)  10/5/20 2 days before procedure: No warfarin, Lovenox injection 90 mg every 12 hours. (2 times per day)  10/6/20 1 day before procedure: No warfarin, Lovenox injection in AM only. NO PM dose.      10/7/20 Day of surgery: No Lovenox, warfarin 5 mg in the PM after surgery/procedure. (Unless instructed different by provider or surgeon)      10/8/20 Day 1 after procedure: take Warfarin 2.5 mg, Lovenox injection 90 mg  every 12 hours (2 times per day)   10/9/20 Day 2 after procedure: take Warfarin 5 mg, Lovenox injection 90 mg every 12 hours.(2 times per day)   10/10/20 Day 3 after procedure: take Warfarin 2.5 mg, Lovenox injection 90 mg every 12 hours.(2 times per day)   10/11/20 Day 4 after procedure: take Warfarin 2.5 mg, Lovenox injection 90 mg every 12 hours.(2 times per day)   10/12/20 Day 5 after procedure: take Warfarin 2.5 mg, Lovenox injection 90 mg every 12 hours.(2 times per day)    10/13/20 Day 6 after procedurue: takes Warfarin 2.5 mg, Lovenox injection 90 mg every 12 hours.(2 times per day)    Next INR recheck scheduled on 10/14/20    Ting Barreto RN   [facility]  [place], [place], [place]

## 2020-09-25 NOTE — PROGRESS NOTES
Left non detailed message for patient to return call.    Ting Barreto RN   [facility]   [place], [place], [place]

## 2020-09-28 NOTE — PROGRESS NOTES
See MyChart encounter.   Ting Barreto RN   [facility]  [place], [place], [place]

## 2020-10-06 ENCOUNTER — ANTICOAGULATION THERAPY VISIT (OUTPATIENT)
Dept: FAMILY MEDICINE | Facility: CLINIC | Age: 75
End: 2020-10-06

## 2020-10-06 DIAGNOSIS — I48.91 ATRIAL FIBRILLATION, UNSPECIFIED TYPE (H): ICD-10-CM

## 2020-10-06 DIAGNOSIS — Z79.01 LONG TERM CURRENT USE OF ANTICOAGULANT THERAPY: ICD-10-CM

## 2020-10-06 DIAGNOSIS — I26.99 PULMONARY EMBOLUS (H): ICD-10-CM

## 2020-10-06 LAB
CAPILLARY BLOOD COLLECTION: NORMAL
INR PPP: 1.1 (ref 0.86–1.14)

## 2020-10-06 PROCEDURE — 99207 PR NO CHARGE NURSE ONLY: CPT | Performed by: FAMILY MEDICINE

## 2020-10-06 PROCEDURE — 85610 PROTHROMBIN TIME: CPT | Performed by: FAMILY MEDICINE

## 2020-10-06 PROCEDURE — 36416 COLLJ CAPILLARY BLOOD SPEC: CPT | Performed by: FAMILY MEDICINE

## 2020-10-06 NOTE — PROGRESS NOTES
ANTICOAGULATION FOLLOW-UP CLINIC VISIT    Patient Name:  Rob Beavers  Date:  10/6/2020  Contact Type:  Left detailed message for Rob    SUBJECTIVE:  Patient Findings     Positives:  Upcoming invasive procedure    Comments:  Intentional hold for endoscopy tomorrow.         Clinical Outcomes     Negatives:  Major bleeding event, Thromboembolic event, Anticoagulation-related hospital admission, Anticoagulation-related ED visit, Anticoagulation-related fatality    Comments:  Intentional hold for endoscopy tomorrow.            OBJECTIVE    Recent labs: (last 7 days)     10/06/20  0853   INR 1.10       ASSESSMENT / PLAN  INR assessment SUB    Recheck INR In: 1 WEEK    INR Location Clinic      Anticoagulation Summary  As of 10/6/2020    INR goal:  2.0-3.0   TTR:  81.6 % (8.6 mo)   INR used for dosin.10 (10/6/2020)   Warfarin maintenance plan:  5 mg (5 mg x 1) every Fri; 2.5 mg (5 mg x 0.5) all other days   Full warfarin instructions:  10/6: Hold; 10/7: 5 mg; Otherwise 5 mg every Fri; 2.5 mg all other days   Weekly warfarin total:  20 mg   No change documented:  Dontae Barreto RN   Plan last modified:  Alecia Saavedra RN (3/15/2016)   Next INR check:  10/14/2020   Priority:  Maintenance   Target end date:  Indefinite    Indications    Long-term (current) use of anticoagulants [Z79.01] [Z79.01]  Pulmonary embolus (H) [I26.99]  Atrial fibrillation  unspecified type (H) [I48.91]             Anticoagulation Episode Summary     INR check location:      Preferred lab:      Send INR reminders to:  [facility]    Comments:        Anticoagulation Care Providers     Provider Role Specialty Phone number    Tian Santana MD Referring [facility] [phone]            See the Encounter Report to view Anticoagulation Flowsheet and Dosing Calendar (Go to Encounters tab in chart review, and find the Anticoagulation Therapy Visit)    Patient INR is sub therapeutic today.  Patient will continue hold/  bridge plan.  Will take 5 mg boost per plan tomorrow after procedure unless advised otherwise by GI doctor then continue weekly maintenance dose of 20 mg and follow up in 1 week or sooner if there are any concerns or problems.     Discussed signs of clotting with patient and when to seek care for concerns.  Patient verbalized understanding    Rationale to adjustments: Dosage adjustment made based on physician directed care plan.      Dontae Barreto RN

## 2020-10-07 ENCOUNTER — TRANSFERRED RECORDS (OUTPATIENT)
Dept: HEALTH INFORMATION MANAGEMENT | Facility: CLINIC | Age: 75
End: 2020-10-07

## 2020-10-09 ENCOUNTER — TELEPHONE (OUTPATIENT)
Dept: FAMILY MEDICINE | Facility: CLINIC | Age: 75
End: 2020-10-09

## 2020-10-09 DIAGNOSIS — I26.99 PULMONARY EMBOLUS (H): ICD-10-CM

## 2020-10-09 DIAGNOSIS — I48.91 ATRIAL FIBRILLATION, UNSPECIFIED TYPE (H): ICD-10-CM

## 2020-10-09 DIAGNOSIS — Z79.01 LONG TERM CURRENT USE OF ANTICOAGULANT THERAPY: ICD-10-CM

## 2020-10-09 NOTE — TELEPHONE ENCOUNTER
Incoming VM from [facility] requesting hold orders.     Patient is scheduled for EGD with ablation on 11/5.     Note that patient just had an EGD in September, hold orders in encounter 8/17/20 and 9/23/20.    Please call with orders, okay to leave a detailed VM.     Routing to [facility] pharmacist Gianna, please advise if hold/bridge orders remain the same?

## 2020-10-12 NOTE — TELEPHONE ENCOUNTER
Spoke with patient and confirmed procedure. He wants instructions sent via [employer] again. He still has about 10-12 syringes of lovenox left so asks for another 10. Told him I will put together plan and send when complete. Patient/ parent verbalized understanding and agrees with plan.      Ting Barreto RN   [facility]  [place], [place], [place]

## 2020-10-12 NOTE — TELEPHONE ENCOUNTER
See bridge plan 09/23/2020 for previous procedure, OK to repeat.    Gianna Mo, PharmD BCACP  Anticoagulation Clinical Pharmacist

## 2020-10-13 NOTE — TELEPHONE ENCOUNTER
Sent via [employer] as requested.     Patient is having surgery on 11/5/20 for EGD  Provider has been notified and noted that patient need to bridge with Lovenox: See provider notes below    PATIENT INSTRUCTIONS:  10/31/20 5 days before procedure: STOP warfarin.  NO Lovenox.   11/1/20 4 days before procedure: NO warfarin, NO Lovenox  11/2/20 3 days before procedure: No warfarin, BEGINS Lovenox injection 90 mg  every 12 hours.(2 times per day)  11/3/20 2 days before procedure: No warfarin, Lovenox injection 90 mg every 12 hours. (2 times per day)  11/4/20 1 day before procedure: No warfarin, Lovenox injection in AM only. NO PM dose.         11/5/20 Day of surgery: No Lovenox, warfarin 2.5 mg in the PM after surgery/procedure. (Unless instructed different by provider or surgeon)        11/6/20 Day 1 after procedure: take Warfarin 5 mg, Lovenox injection 90 mg  every 12 hours (2 times per day)  11/7/20 Day 2 after procedure: take Warfarin 2.5 mg, Lovenox injection 90 mg every 12 hours.(2 times per day)  11/8/20 Day 3 after procedure: take Warfarin 2.5 mg, Lovenox injection  90 mg every 12 hours.(2 times per day)  11/9/20 Day 4 after procedure: take Warfarin 2.5 mg, Lovenox injection 90 mg every 12 hours.(2 times per day)  11/10/20 Day 5 after procedure: take Warfarin 2.5 mg, Lovenox injection 90 mg every 12 hours.(2 times per day)  11/11/20 Day 6 after procedure: take Warfarin 2.5 mg, Lovenox injection 90 mg every 12 hours.(2 times per day)       Next INR recheck scheduled on 11/12/20    Ting Barreto RN   [facility]  [place], [place], [place]

## 2020-10-14 ENCOUNTER — ANTICOAGULATION THERAPY VISIT (OUTPATIENT)
Dept: FAMILY MEDICINE | Facility: CLINIC | Age: 75
End: 2020-10-14

## 2020-10-14 DIAGNOSIS — I26.99 PULMONARY EMBOLUS (H): ICD-10-CM

## 2020-10-14 DIAGNOSIS — I48.91 ATRIAL FIBRILLATION, UNSPECIFIED TYPE (H): ICD-10-CM

## 2020-10-14 DIAGNOSIS — Z79.01 LONG TERM CURRENT USE OF ANTICOAGULANT THERAPY: ICD-10-CM

## 2020-10-14 LAB
CAPILLARY BLOOD COLLECTION: NORMAL
INR PPP: 1.8 (ref 0.86–1.14)

## 2020-10-14 PROCEDURE — 85610 PROTHROMBIN TIME: CPT | Performed by: FAMILY MEDICINE

## 2020-10-14 PROCEDURE — 99207 PR NO CHARGE NURSE ONLY: CPT | Performed by: FAMILY MEDICINE

## 2020-10-14 PROCEDURE — 36416 COLLJ CAPILLARY BLOOD SPEC: CPT | Performed by: FAMILY MEDICINE

## 2020-10-14 NOTE — PROGRESS NOTES
ANTICOAGULATION FOLLOW-UP CLINIC VISIT    Patient Name:  Rob Beavers  Date:  10/14/2020  Contact Type:  Telephone/ Rob    SUBJECTIVE:  Patient Findings     Comments:  Patient just had EGD and not quite 1 week out back on warfarin. Has another EDG with ablation in 2 weeks        Clinical Outcomes     Negatives:  Major bleeding event, Thromboembolic event, Anticoagulation-related hospital admission, Anticoagulation-related ED visit, Anticoagulation-related fatality    Comments:  Patient just had EGD and not quite 1 week out back on warfarin. Has another EDG with ablation in 2 weeks           OBJECTIVE    Recent labs: (last 7 days)     10/14/20  0859   INR 1.80*       ASSESSMENT / PLAN  INR assessment SUB    Recheck INR In: 4 WEEKS    INR Location Clinic      Anticoagulation Summary  As of 10/14/2020    INR goal:  2.0-3.0   TTR:  78.6 % (8.7 mo)   INR used for dosin.80 (10/14/2020)   Warfarin maintenance plan:  5 mg (5 mg x 1) every Fri; 2.5 mg (5 mg x 0.5) all other days   Full warfarin instructions:  10/14: 5 mg; 10/31: Hold; : Hold; : Hold; 11/3: Hold; : Hold; Otherwise 5 mg every Fri; 2.5 mg all other days   Weekly warfarin total:  20 mg   Plan last modified:  Alecia Saavedra RN (3/15/2016)   Next INR check:  2020   Priority:  Maintenance   Target end date:  Indefinite    Indications    Long-term (current) use of anticoagulants [Z79.01] [Z79.01]  Pulmonary embolus (H) [I26.99]  Atrial fibrillation  unspecified type (H) [I48.91]             Anticoagulation Episode Summary     INR check location:      Preferred lab:      Send INR reminders to:  [facility]    Comments:        Anticoagulation Care Providers     Provider Role Specialty Phone number    Tian Santana MD Referring [facility] [phone]            See the Encounter Report to view Anticoagulation Flowsheet and Dosing Calendar (Go to Encounters tab in chart review, and find the Anticoagulation Therapy  Visit)    Patient INR is sub therapeutic today.  Patient will take 5 mg today, continue to bridge.  Will then continue weekly maintenance dose of 20 mg and follow up in 2 days or sooner if there are any concerns or problems with hopes to discontinue lovenox by the weekend. Patient has bridge plan for upcoming procedure in 2 weeks.     Discussed signs of clotting with patient and when to seek care for concerns.  Patient verbalized understanding    Rationale to adjustments: Dosage adjustment made based on physician directed care plan.'    Dontae Barreto RN

## 2020-10-16 ENCOUNTER — ANTICOAGULATION THERAPY VISIT (OUTPATIENT)
Dept: FAMILY MEDICINE | Facility: CLINIC | Age: 75
End: 2020-10-16

## 2020-10-16 DIAGNOSIS — I48.91 ATRIAL FIBRILLATION, UNSPECIFIED TYPE (H): ICD-10-CM

## 2020-10-16 DIAGNOSIS — I27.82 CHRONIC SEPTIC PULMONARY EMBOLISM WITH ACUTE COR PULMONALE (H): ICD-10-CM

## 2020-10-16 DIAGNOSIS — I26.01 CHRONIC SEPTIC PULMONARY EMBOLISM WITH ACUTE COR PULMONALE (H): ICD-10-CM

## 2020-10-16 DIAGNOSIS — Z79.01 LONG TERM CURRENT USE OF ANTICOAGULANT THERAPY: ICD-10-CM

## 2020-10-16 DIAGNOSIS — I26.99 PULMONARY EMBOLUS (H): ICD-10-CM

## 2020-10-16 LAB
CAPILLARY BLOOD COLLECTION: NORMAL
INR PPP: 2.2 (ref 0.86–1.14)

## 2020-10-16 PROCEDURE — 36416 COLLJ CAPILLARY BLOOD SPEC: CPT | Performed by: FAMILY MEDICINE

## 2020-10-16 PROCEDURE — 85610 PROTHROMBIN TIME: CPT | Performed by: FAMILY MEDICINE

## 2020-10-16 NOTE — PROGRESS NOTES
HOLD/BRIDGE Recommendations:    Procedure: Upper Endoscopy with Halo ablation [place]  Date: 11/5/20     Please provide Hold/Bridge Recommendations.    [facility] asking for 4 day coumadin hold.    Thank you.  Adelaide Madrid, RN  Anticoagulation Nurse - [facility]

## 2020-10-16 NOTE — PROGRESS NOTES
Chart reviewed with [facility] RN, OK to stop Lovenox today since 5mg dose scheduled per maintenance plan.    Ginana Mo, PharmD BCACP  Anticoagulation Clinical Pharmacist

## 2020-10-16 NOTE — PROGRESS NOTES
ANTICOAGULATION MANAGEMENT     Patient Name:  Rob Beavers  Date:  10/16/2020    ASSESSMENT /SUBJECTIVE:    Today's INR result of 2.2 is therapeutic. Goal INR of 2.0-3.0      Warfarin dose taken: Warfarin taken as instructed    Diet: No new diet changes affecting INR    Medication changes/ interactions: No new medications/supplements affecting INR    Previous INR: Subtherapeutic     S/S of bleeding or thromboembolism: No    New injury or illness: No    Upcoming surgery, procedure or cardioversion: Yes: Upper GI next month    Additional findings: None      PLAN:    Telephone call with Rob regarding INR result and instructed:     Warfarin Dosing Instructions: Continue maintenance dose until 10/30 and then follow hold instructions sent via [employer]    Instructed patient to follow up no later than: 3 weeks  Lab visit scheduled    Education provided: Target INR goal and significance of current INR result      Rob verbalizes understanding and agrees to warfarin dosing plan.    Instructed to call the Anticoagulation Clinic for any changes, questions or concerns. (#[phone])        Aruna Crawford RN      OBJECTIVE:  Recent labs: (last 7 days)     10/14/20  0859 10/16/20  1333   INR 1.80* 2.20*         No question data found.  Anticoagulation Summary  As of 10/16/2020    INR goal:  2.0-3.0   TTR:  78.1 % (8.7 mo)   INR used for dosin.20 (10/16/2020)   Warfarin maintenance plan:  5 mg (5 mg x 1) every Fri; 2.5 mg (5 mg x 0.5) all other days   Full warfarin instructions:  10/31: Hold; : Hold; : Hold; 11/3: Hold; : Hold; Otherwise 5 mg every Fri; 2.5 mg all other days   Weekly warfarin total:  20 mg   No change documented:  Aruna Crawford RN   Plan last modified:  Alecia Saavedra RN (3/15/2016)   Next INR check:  2020   Priority:  Maintenance   Target end date:  Indefinite    Indications    Long-term (current) use of anticoagulants [Z79.01] [Z79.01]  Pulmonary embolus (H) [I26.99]  Atrial  fibrillation  unspecified type (H) [I48.91]             Anticoagulation Episode Summary     INR check location:      Preferred lab:      Send INR reminders to:  [facility]    Comments:        Anticoagulation Care Providers     Provider Role Specialty Phone number    Tian Santana MD Referring [facility] [phone]

## 2020-10-16 NOTE — PROGRESS NOTES
Plan already sent to patient for upcoming procedure see 10/13/2020 my chart, [facility] called to relay orders.      Stop warfarin 10/31/2020, start Lovenox 90mg BID 11/02/2020, last dose 11/04/2020 AM    Resume warfarin 11/05/2020 PM, Lovenox 11/06/2020 if OK'd by provider doing exam.    Gianna Mo, PharmD BCACP  Anticoagulation Clinical Pharmacist

## 2020-10-29 ENCOUNTER — OFFICE VISIT (OUTPATIENT)
Dept: FAMILY MEDICINE | Facility: CLINIC | Age: 75
End: 2020-10-29
Payer: COMMERCIAL

## 2020-10-29 ENCOUNTER — MYC MEDICAL ADVICE (OUTPATIENT)
Dept: FAMILY MEDICINE | Facility: CLINIC | Age: 75
End: 2020-10-29

## 2020-10-29 VITALS
WEIGHT: 208 LBS | DIASTOLIC BLOOD PRESSURE: 76 MMHG | HEIGHT: 67 IN | OXYGEN SATURATION: 98 % | SYSTOLIC BLOOD PRESSURE: 128 MMHG | BODY MASS INDEX: 32.65 KG/M2 | HEART RATE: 69 BPM | TEMPERATURE: 97.8 F

## 2020-10-29 DIAGNOSIS — K22.70 BARRETT'S ESOPHAGUS WITHOUT DYSPLASIA: Primary | ICD-10-CM

## 2020-10-29 DIAGNOSIS — I26.99 PULMONARY EMBOLISM, UNSPECIFIED CHRONICITY, UNSPECIFIED PULMONARY EMBOLISM TYPE, UNSPECIFIED WHETHER ACUTE COR PULMONALE PRESENT (H): ICD-10-CM

## 2020-10-29 DIAGNOSIS — I10 HYPERTENSION GOAL BP (BLOOD PRESSURE) < 140/90: ICD-10-CM

## 2020-10-29 DIAGNOSIS — Z79.01 LONG TERM CURRENT USE OF ANTICOAGULANT THERAPY: ICD-10-CM

## 2020-10-29 PROCEDURE — 99214 OFFICE O/P EST MOD 30 MIN: CPT | Performed by: FAMILY MEDICINE

## 2020-10-29 ASSESSMENT — MIFFLIN-ST. JEOR: SCORE: 1647.11

## 2020-10-29 NOTE — PROGRESS NOTES
[address] [address]  Phone: [phone]  Primary Provider: Marialuisa Santana  Pre-op Performing Provider: MARIALUISA SANTANA    PREOPERATIVE EVALUATION:  Today's date: 10/29/2020    Rob Beavers is a 75 year old male who presents for a preoperative evaluation.    Surgical Information:  Surgery/Procedure: EGD with Ablation  Surgery Location: [facility]  Surgeon: Adair  Surgery Date: 11/5/2020  Time of Surgery: 8:00 AM  Where patient plans to recover: At home with family  Fax number for surgical facility: Will call with fax number     Type of Anesthesia Anticipated: General    Subjective     HPI related to upcoming procedure:     Preop Questions 10/26/2020   1. Have you ever had a heart attack or stroke? No   2. Have you ever had surgery on your heart or blood vessels, such as a stent placement, a coronary artery bypass, or surgery on an artery in your head, neck, heart, or legs? No   3. Do you have chest pain with activity? No   4. Do you have a history of  heart failure? No   5. Do you currently have a cold, bronchitis or symptoms of other infection? No   6. Do you have a cough, shortness of breath, or wheezing? No   7. Do you or anyone in your family have previous history of blood clots? UNKNOWN -    8. Do you or does anyone in your family have a serious bleeding problem such as prolonged bleeding following surgeries or cuts? YES -    9. Have you ever had problems with anemia or been told to take iron pills? No   10. Have you had any abnormal blood loss such as black, tarry or bloody stools? No   11. Have you ever had a blood transfusion? No   12. Are you willing to have a blood transfusion if it is medically needed before, during, or after your surgery? Yes   13. Have you or any of your relatives ever had problems with anesthesia? No   14. Do you have sleep apnea, excessive snoring or daytime drowsiness? No   15. Do you have any artifical heart valves  or other implanted medical devices like a pacemaker, defibrillator, or continuous glucose monitor? No   16. Do you have artificial joints? YES -    17. Are you allergic to latex? No       Health Care Directive:      Preoperative Review of :  No issues.    Review of Systems  CONSTITUTIONAL: NEGATIVE for fever, chills, change in weight  ENT/MOUTH: NEGATIVE for ear, mouth and throat problems  RESP: NEGATIVE for significant cough or SOB  CV: NEGATIVE for chest pain, palpitations or peripheral edema    Patient Active Problem List    Diagnosis Date Noted     Prostate nodule 11/14/2012     Priority: High     Hypertension goal BP (blood pressure) < 140/90 01/31/2012     Priority: High     Atrial fibrillation, unspecified type (H) 07/11/2019     Priority: Medium     Andrews's esophagus without dysplasia 04/26/2017     Priority: Medium     Long-term (current) use of anticoagulants [Z79.01] 03/15/2016     Priority: Medium     Pulmonary embolus (H) 02/18/2014     Priority: Medium     Benign familial tremor 01/23/2014     Priority: Medium     CARDIOVASCULAR SCREENING; LDL GOAL LESS THAN 130 10/31/2010     Priority: Medium     Advanced directives, counseling/discussion 01/30/2012     Priority: Low     Advance Care Planning 4/26/2017: ACP Review of Chart / Resources Provided:  Reviewed chart for advance care plan.  Rob Beavers has been provided information and resources to begin or update their advance care plan.  Added by Zayda Nazario      Advance Care Planning 8/5/2015: ACP Review and Resources Provided:  Reviewed chart for advance care plan.  Rob Beavers has no plan or code status on file. Discussed available resources and provided with information. Confirmed code status reflects current choices pending further ACP discussions.  Confirmed/documented legally designated decision maker(s). Added by Kaye Leal          Advance Directive Problem List Overview:   Name Relationship Phone    Primary Health Care Agent             Alternative Health Care Agent          Discussed advance care planning with patient; information given to patient to review. 1/30/2012  Karen Loera CMA            Past Medical History:   Diagnosis Date     Andrews's esophagus without dysplasia 4/26/2017     CARDIOVASCULAR SCREENING; LDL GOAL LESS THAN 130 10/31/2010     Hypertension goal BP (blood pressure) < 140/90 1/31/2012     Past Surgical History:   Procedure Laterality Date     C TOTAL HIP ARTHROPLASTY  2010     DENTAL SURGERY       LAPAROSCOPIC CHOLECYSTECTOMY WITH CHOLANGIOGRAMS N/A 6/29/2017    Procedure: LAPAROSCOPIC CHOLECYSTECTOMY WITH CHOLANGIOGRAMS;  LAPAROSCOPIC CHOLECYSTECTOMY WITH INTROPERATIVE CHOLANGIOGRAMS.;  Surgeon: Gian Yin MD;  Location: [facility] OR     TONSILLECTOMY  1949     Current Outpatient Medications   Medication Sig Dispense Refill     enoxaparin ANTICOAGULANT (LOVENOX) 100 MG/ML syringe Inject 0.9 mLs (90 mg) Subcutaneous every 12 hours Starting 10/04/2020, last dose 10/06/2020 AM, resume after procedure as directed 10 Syringe 0     lisinopril (ZESTRIL) 10 MG tablet Take 1 tablet (10 mg) by mouth daily 90 tablet 3     loratadine (CLARITIN) 10 MG tablet Take 10 mg by mouth daily       MULTI-VITAMIN PO TABS ONE TABLET DAILY       omeprazole (PRILOSEC) 40 MG DR capsule Take 1 tab every day. 90 capsule 3     propranolol ER (INDERAL LA) 80 MG 24 hr capsule TAKE ONE CAPSULE BY MOUTH EVERY DAY 90 capsule 3     VITAMIN D, CHOLECALCIFEROL, PO Take 1,000 Units by mouth daily       warfarin ANTICOAGULANT (COUMADIN) 5 MG tablet TAKE 1 TABLET BY MOUTH ON FRIDAYS AND TAKE 1/2 TAB ALL OTHER DAYS OR AS DIRECTED BY ACC 45 tablet 1       No Known Allergies     Social History     Tobacco Use     Smoking status: Never Smoker     Smokeless tobacco: Never Used   Substance Use Topics     Alcohol use: Yes     Alcohol/week: 0.0 standard drinks     Comment: moderate     Family History   Problem Relation Age of Onset     Hypertension Mother          passed away at 89     Eye Disorder Mother         macular degeneration     Blood Disease Mother         She is on warfarin     Dementia Father 80        passed away at 88 yrs old     Breast Cancer Sister      Diabetes Paternal Uncle      Asthma No family hx of      C.A.D. No family hx of      Cancer - colorectal No family hx of      Prostate Cancer No family hx of      Anesthesia Reaction No family hx of      Thyroid Disease No family hx of      History   Drug Use No         Objective     There were no vitals taken for this visit.    Physical Exam  GENERAL APPEARANCE: healthy, alert and no distress  HENT: ear canals and TM's normal and nose and mouth without ulcers or lesions  RESP: lungs clear to auscultation - no rales, rhonchi or wheezes  CV: regular rate and rhythm, normal S1 S2, no S3 or S4 and no murmur, click or rub   ABDOMEN: soft, nontender, no HSM or masses and bowel sounds normal  NEURO: Normal strength and tone, sensory exam grossly normal, mentation intact and speech normal    Recent Labs   Lab Test 10/16/20  1333 10/14/20  0859 08/12/20  1007 08/12/20  1007 07/11/19  0906 07/11/19  0906   HGB  --   --   --   --   --  15.5   INR 2.20* 1.80*   < >  --    < >  --    NA  --   --   --  137  --  139   POTASSIUM  --   --   --  4.2  --  4.6   CR  --   --   --  0.84  --  0.89    < > = values in this interval not displayed.        Diagnosti  EKG not indicated    Revised Cardiac Risk Index (RCRI):  The patient has the following serious cardiovascular risks for perioperative complications:   - No serious cardiac risks = 0 points     RCRI Interpretation: 0 points: Class I (very low risk - 0.4% complication rate)             Assessment & Plan   The proposed surgical procedure is considered LOW risk.    Andrews's esophagus without dysplasia      Long-term (current) use of anticoagulants [Z79.01]  Patient is on anticoagulant warfarin.  He is advised bridging.  Schedule given.    Pulmonary embolism, unspecified  chronicity, unspecified pulmonary embolism type, unspecified whether acute cor pulmonale present (H)  Current chest pain no shortness of breath.  Hypertension goal BP (blood pressure) < 140/90  Blood pressure stable he should continue his medication.  If surgery is in the morning he can hold and restart the medication once he is able to do that             Medication Instructions:  Patient is to take all scheduled medications on the day of surgery.  Holding of warfarin and bridging is discussed with the patient.  If surgery is later part of the day he can take his morning blood pressure medication otherwise he can take it once his surgery is done in the morning.    RECOMMENDATION:  APPROVAL GIVEN to proceed with proposed procedure, without further diagnostic evaluation.    Signed Electronically by: Tian Santana MD    Copy of this evaluation report is provided to requesting physician.    [facility]    Revised Cardiac Risk Index

## 2020-10-29 NOTE — TELEPHONE ENCOUNTER
H&P has been faxed as requested by patient to [phone].    .Nusrat DANIEL    [facility] Lola Harmon

## 2020-11-13 ENCOUNTER — ANTICOAGULATION THERAPY VISIT (OUTPATIENT)
Dept: FAMILY MEDICINE | Facility: CLINIC | Age: 75
End: 2020-11-13

## 2020-11-13 DIAGNOSIS — I48.91 ATRIAL FIBRILLATION, UNSPECIFIED TYPE (H): ICD-10-CM

## 2020-11-13 DIAGNOSIS — Z79.01 LONG TERM CURRENT USE OF ANTICOAGULANT THERAPY: ICD-10-CM

## 2020-11-13 DIAGNOSIS — I26.99 PULMONARY EMBOLUS (H): ICD-10-CM

## 2020-11-13 LAB
CAPILLARY BLOOD COLLECTION: NORMAL
INR PPP: 1.5 (ref 0.86–1.14)

## 2020-11-13 PROCEDURE — 85610 PROTHROMBIN TIME: CPT | Performed by: FAMILY MEDICINE

## 2020-11-13 PROCEDURE — 99207 PR NO CHARGE NURSE ONLY: CPT | Performed by: FAMILY MEDICINE

## 2020-11-13 PROCEDURE — 36416 COLLJ CAPILLARY BLOOD SPEC: CPT | Performed by: FAMILY MEDICINE

## 2020-11-13 NOTE — PROGRESS NOTES
ANTICOAGULATION FOLLOW-UP CLINIC VISIT    Patient Name:  Rob Beavers  Date:  2020  Contact Type:  Telephone  Spoke to patient he stopped his lovenox on his own 3 days ago. explained the risk with him for potential l clots and other risks suggested taking until therapeutic with inr.      SUBJECTIVE:  Patient Findings     Comments:    Assessed for S/S bleeding, clotting, medication, diet, health, activity and alcohol changes          Clinical Outcomes     Negatives:  Major bleeding event, Thromboembolic event, Anticoagulation-related hospital admission, Anticoagulation-related ED visit, Anticoagulation-related fatality    Comments:    Assessed for S/S bleeding, clotting, medication, diet, health, activity and alcohol changes             OBJECTIVE    Recent labs: (last 7 days)     20  0856   INR 1.50*       ASSESSMENT / PLAN  INR assessment SUB intentional hold with surgical proc.   Recheck INR In: 1 WEEK    INR Location Outside lab      Anticoagulation Summary  As of 2020    INR goal:  2.0-3.0   TTR:  70.5 % (8.6 mo)   INR used for dosin.50 (2020)   Warfarin maintenance plan:  5 mg (5 mg x 1) every Fri; 2.5 mg (5 mg x 0.5) all other days   Full warfarin instructions:  : 10 mg; Otherwise 5 mg every Fri; 2.5 mg all other days   Weekly warfarin total:  20 mg   Plan last modified:  Alecia Saavedra RN (3/15/2016)   Next INR check:  2020   Priority:  Maintenance   Target end date:  Indefinite    Indications    Long-term (current) use of anticoagulants [Z79.01] [Z79.01]  Pulmonary embolus (H) [I26.99]  Atrial fibrillation  unspecified type (H) [I48.91]             Anticoagulation Episode Summary     INR check location:      Preferred lab:      Send INR reminders to:  [facility]    Comments:        Anticoagulation Care Providers     Provider Role Specialty Phone number    Tian Santana MD Referring Family Medicine [phone]            See the Encounter Report to view  Anticoagulation Flowsheet and Dosing Calendar (Go to Encounters tab in chart review, and find the Anticoagulation Therapy Visit)    Dosage adjustment made based on physician directed care plan.  Some signs and symptoms of clots include: pain or tenderness in arm or leg, swelling in arm or leg, changes in skin color, or area is warm to touch, shortness or breath, trouble breathing.  Numbness or weakness especially on 1 side of the body, sudden trouble speaking or swallowing, sudden trouble seeing, sudden confusion, dizzy spells or headache.  If you have these please call 911 or seek medical care immediately.    Zaira Fuller RN

## 2020-11-19 ENCOUNTER — ANTICOAGULATION THERAPY VISIT (OUTPATIENT)
Dept: FAMILY MEDICINE | Facility: CLINIC | Age: 75
End: 2020-11-19

## 2020-11-19 DIAGNOSIS — I26.99 PULMONARY EMBOLUS (H): ICD-10-CM

## 2020-11-19 DIAGNOSIS — Z79.01 LONG TERM CURRENT USE OF ANTICOAGULANT THERAPY: ICD-10-CM

## 2020-11-19 DIAGNOSIS — I48.91 ATRIAL FIBRILLATION, UNSPECIFIED TYPE (H): ICD-10-CM

## 2020-11-19 LAB
CAPILLARY BLOOD COLLECTION: NORMAL
INR PPP: 2.1 (ref 0.86–1.14)

## 2020-11-19 PROCEDURE — 85610 PROTHROMBIN TIME: CPT | Performed by: FAMILY MEDICINE

## 2020-11-19 PROCEDURE — 99207 PR NO CHARGE NURSE ONLY: CPT | Performed by: FAMILY MEDICINE

## 2020-11-19 PROCEDURE — 36416 COLLJ CAPILLARY BLOOD SPEC: CPT | Performed by: FAMILY MEDICINE

## 2020-11-19 NOTE — PROGRESS NOTES
Anticoagulation Management    Unable to reach Rob today.    Today's INR result of 2.1 is therapeutic (goal INR of 2.0-3.0).  Result received from: Clinic Lab    Follow up required to assess for changes     Left message to call [phone]. Transfer to [phone].      Anticoagulation clinic to follow up    Dontae Barreto RN

## 2020-11-19 NOTE — PROGRESS NOTES
ANTICOAGULATION FOLLOW-UP CLINIC VISIT    Patient Name:  Rob Beavers  Date:  2020  Contact Type:  Telephone/ Rob    SUBJECTIVE:  Patient Findings     Comments:  The patient was assessed for diet, medication, and activity level changes, missed or extra doses, bruising or bleeding, with no problem findings. No longer bridging with lovenox.           Clinical Outcomes     Negatives:  Major bleeding event, Thromboembolic event, Anticoagulation-related hospital admission, Anticoagulation-related ED visit, Anticoagulation-related fatality    Comments:  The patient was assessed for diet, medication, and activity level changes, missed or extra doses, bruising or bleeding, with no problem findings. No longer bridging with lovenox.              OBJECTIVE    Recent labs: (last 7 days)     20  1041   INR 2.10*       ASSESSMENT / PLAN  INR assessment THER    Recheck INR In: 3 WEEKS    INR Location Clinic      Anticoagulation Summary  As of 2020    INR goal:  2.0-3.0   TTR:  68.6 % (8.7 mo)   INR used for dosin.10 (2020)   Warfarin maintenance plan:  5 mg (5 mg x 1) every Fri; 2.5 mg (5 mg x 0.5) all other days   Full warfarin instructions:  5 mg every Fri; 2.5 mg all other days   Weekly warfarin total:  20 mg   No change documented:  Dontae Barreto RN   Plan last modified:  Alecia Saavedra RN (3/15/2016)   Next INR check:  12/10/2020   Priority:  Maintenance   Target end date:  Indefinite    Indications    Long-term (current) use of anticoagulants [Z79.01] [Z79.01]  Pulmonary embolus (H) [I26.99]  Atrial fibrillation  unspecified type (H) [I48.91]             Anticoagulation Episode Summary     INR check location:      Preferred lab:      Send INR reminders to:  [facility]    Comments:        Anticoagulation Care Providers     Provider Role Specialty Phone number    Tian Santana MD Referring Family Medicine [phone]            See the Encounter Report to view  Anticoagulation Flowsheet and Dosing Calendar (Go to Encounters tab in chart review, and find the Anticoagulation Therapy Visit)    Patient INR is therapeutic.  Patient will continue to take 20 mg weekly dosing and follow up in 3 weeks or sooner for any problems or concerns.        Dontae Barreto RN

## 2020-12-10 ENCOUNTER — NURSE TRIAGE (OUTPATIENT)
Dept: FAMILY MEDICINE | Facility: CLINIC | Age: 75
End: 2020-12-10

## 2020-12-10 ENCOUNTER — ANTICOAGULATION THERAPY VISIT (OUTPATIENT)
Dept: FAMILY MEDICINE | Facility: CLINIC | Age: 75
End: 2020-12-10

## 2020-12-10 ENCOUNTER — TELEPHONE (OUTPATIENT)
Dept: FAMILY MEDICINE | Facility: CLINIC | Age: 75
End: 2020-12-10

## 2020-12-10 DIAGNOSIS — I26.99 PULMONARY EMBOLUS (H): ICD-10-CM

## 2020-12-10 DIAGNOSIS — Z79.01 LONG TERM CURRENT USE OF ANTICOAGULANT THERAPY: ICD-10-CM

## 2020-12-10 DIAGNOSIS — I48.91 ATRIAL FIBRILLATION, UNSPECIFIED TYPE (H): ICD-10-CM

## 2020-12-10 LAB
CAPILLARY BLOOD COLLECTION: NORMAL
INR PPP: 2.3 (ref 0.86–1.14)

## 2020-12-10 PROCEDURE — 36416 COLLJ CAPILLARY BLOOD SPEC: CPT | Performed by: FAMILY MEDICINE

## 2020-12-10 PROCEDURE — 99207 PR NO CHARGE NURSE ONLY: CPT | Performed by: FAMILY MEDICINE

## 2020-12-10 PROCEDURE — 85610 PROTHROMBIN TIME: CPT | Performed by: FAMILY MEDICINE

## 2020-12-10 NOTE — PROGRESS NOTES
ANTICOAGULATION FOLLOW-UP CLINIC VISIT    Patient Name:  Rob Beavers  Date:  12/10/2020  Contact Type:  Telephone/ Rob    SUBJECTIVE:  Patient Findings     Positives:  Upcoming invasive procedure    Comments:  Has EGD 20 see TE        Clinical Outcomes     Negatives:  Major bleeding event, Thromboembolic event, Anticoagulation-related hospital admission, Anticoagulation-related ED visit, Anticoagulation-related fatality    Comments:  Has EGD 20 see TE           OBJECTIVE    Recent labs: (last 7 days)     12/10/20  0853   INR 2.30*       ASSESSMENT / PLAN  INR assessment THER    Recheck INR In: 6 WEEKS    INR Location Clinic      Anticoagulation Summary  As of 12/10/2020    INR goal:  2.0-3.0   TTR:  68.5 % (8.6 mo)   INR used for dosin.30 (12/10/2020)   Warfarin maintenance plan:  5 mg (5 mg x 1) every Fri; 2.5 mg (5 mg x 0.5) all other days   Full warfarin instructions:  5 mg every Fri; 2.5 mg all other days   Weekly warfarin total:  20 mg   No change documented:  Dontae Barreto RN   Plan last modified:  Alecia Saavedra RN (3/15/2016)   Next INR check:  2021   Priority:  Maintenance   Target end date:  Indefinite    Indications    Long-term (current) use of anticoagulants [Z79.01] [Z79.01]  Pulmonary embolus (H) [I26.99]  Atrial fibrillation  unspecified type (H) [I48.91]             Anticoagulation Episode Summary     INR check location:      Preferred lab:      Send INR reminders to:  [facility]    Comments:        Anticoagulation Care Providers     Provider Role Specialty Phone number    Tian Santana MD Referring Family Medicine [phone]            See the Encounter Report to view Anticoagulation Flowsheet and Dosing Calendar (Go to Encounters tab in chart review, and find the Anticoagulation Therapy Visit)    Patient INR is therapeutic.  Patient will continue to take 20 mg weekly dosing and follow up in 6 weeks or sooner for any problems or  concerns.        Dontae Barreto RN

## 2020-12-10 NOTE — TELEPHONE ENCOUNTER
Patient has EGD 1/21/20. Please advise on hold/ bridge. Should he continue previous plan from 10/9/20 encounter?    Patient still has 10 syringes of lovenox on hand. MyChart patient with plan.     Ting Barreto RN   [facility]  [place], [place], [place]

## 2020-12-15 NOTE — TELEPHONE ENCOUNTER
Sent to patient via [employer].   Ting Barreto RN   [facility]  [place], [place], [place]

## 2020-12-15 NOTE — TELEPHONE ENCOUNTER
OK to proceed with same plan as before for 5 day hold and full therapeutic bridge.  More aggressive boost planned, based on recent response to restart after hold, and patient stopping Lovenox before fully therapeutic.     Lovenox has been re-ordered for file at the pharmacy.    Last warfarin dose: 01/15/2021  01/16/2021, NO warfarin  01/17/2021, NO warfarin  01/18/2021, NO warfarin, begin enoxaparin injections into the abdomen every 12 hours (AM and PM)  01/19/2021, NO warfarin, enoxaparin injection into the abdomen every 12 hours (AM and PM)  01/20/2021, NO warfarin, enoxaparin injection into the abdomen AM only (no enoxaparin 24 hours prior to surgery)  01/21/2021, DAY OF PROCEDURE, NO enoxaparin. Restart warfarin 7.5mg (1.5 tabs) in the evening, unless instructed otherwise by the physician.  01/22/2021, Restart enoxaparin injections into the abdomen every 12 hours (AM and PM), unless instructed otherwise by the physician, and 7.5mg (1.5 tabs) warfarin in the evening.   01/23/2021, Enoxaparin injection into the abdomen every 12 hours (AM and PM) and 2.5mg (0.5 tabs) warfarin in the evening.  01/24/2021, Enoxaparin injection into the abdomen every 12 hours (AM and PM) and 2.5mg (0.5 tabs) warfarin in the evening.  01/25/2021, Enoxaparin injection into the abdomen in the AM. Recheck INR at the for further dosing instructions.   If you have any questions please call the Anticoagulation Clinic at [phone].      Gianna Mo, PharmD BCACP  Anticoagulation Clinical Pharmacist

## 2020-12-17 NOTE — TELEPHONE ENCOUNTER
Script clarification:    Is Pt using this again? Please resend with new dates if so, or call and cancel script. Thanks.

## 2020-12-31 ENCOUNTER — TELEPHONE (OUTPATIENT)
Dept: FAMILY MEDICINE | Facility: CLINIC | Age: 75
End: 2020-12-31

## 2020-12-31 NOTE — TELEPHONE ENCOUNTER
Spoke with [facility] and informed them okay for hold and we have a bridge plan already set up for the patient for this procedure. See 12/10/20 encounter.     Ting Barreto RN   [facility]  [place], [place], [place]

## 2020-12-31 NOTE — TELEPHONE ENCOUNTER
[facility] called, Pt is having EGD on 1/21 and they are asking for 4 day hold on warfarin before procedure.    [phone] ok to lv detailed message

## 2021-01-12 ENCOUNTER — OFFICE VISIT (OUTPATIENT)
Dept: FAMILY MEDICINE | Facility: CLINIC | Age: 76
End: 2021-01-12
Payer: COMMERCIAL

## 2021-01-12 VITALS
HEIGHT: 67 IN | SYSTOLIC BLOOD PRESSURE: 120 MMHG | DIASTOLIC BLOOD PRESSURE: 74 MMHG | WEIGHT: 210 LBS | BODY MASS INDEX: 32.96 KG/M2 | TEMPERATURE: 98.2 F | OXYGEN SATURATION: 97 % | HEART RATE: 64 BPM

## 2021-01-12 DIAGNOSIS — Z79.01 LONG TERM CURRENT USE OF ANTICOAGULANT THERAPY: ICD-10-CM

## 2021-01-12 DIAGNOSIS — I48.91 ATRIAL FIBRILLATION, UNSPECIFIED TYPE (H): Primary | ICD-10-CM

## 2021-01-12 DIAGNOSIS — K22.719 BARRETT'S ESOPHAGUS WITH DYSPLASIA: ICD-10-CM

## 2021-01-12 PROCEDURE — 99214 OFFICE O/P EST MOD 30 MIN: CPT | Performed by: FAMILY MEDICINE

## 2021-01-12 ASSESSMENT — MIFFLIN-ST. JEOR: SCORE: 1656.18

## 2021-01-12 NOTE — PROGRESS NOTES
[address] [address]  Phone: [phone]  Primary Provider: Marialuisa Santana  Pre-op Performing Provider: MARIALUISA SANTANA    PREOPERATIVE EVALUATION:  Today's date: 1/12/2021    Rob Beavers is a 75 year old male who presents for a preoperative evaluation.    Surgical Information:  Surgery/Procedure: ESOPHAGOGASTRODUODENOSCOPY 360 DEGREE ABLATION  Surgery Location: [facility]  Surgeon: Abi  Surgery Date: 1/21/21  Time of Surgery: 8:00 AM   Where patient plans to recover: At home alone  Fax number for surgical facility: [phone]    Type of Anesthesia Anticipated: General    Subjective     HPI related to upcoming procedure: no issues    Preop Questions 1/11/2021   1. Have you ever had a heart attack or stroke? No   2. Have you ever had surgery on your heart or blood vessels, such as a stent placement, a coronary artery bypass, or surgery on an artery in your head, neck, heart, or legs? No   3. Do you have chest pain with activity? No   4. Do you have a history of  heart failure? No   5. Do you currently have a cold, bronchitis or symptoms of other infection? No   6. Do you have a cough, shortness of breath, or wheezing? No   7. Do you or anyone in your family have previous history of blood clots? YES -    8. Do you or does anyone in your family have a serious bleeding problem such as prolonged bleeding following surgeries or cuts? No   9. Have you ever had problems with anemia or been told to take iron pills? No   10. Have you had any abnormal blood loss such as black, tarry or bloody stools? No   11. Have you ever had a blood transfusion? No   12. Are you willing to have a blood transfusion if it is medically needed before, during, or after your surgery? Yes   13. Have you or any of your relatives ever had problems with anesthesia? No   14. Do you have sleep apnea, excessive snoring or daytime drowsiness? No   15. Do you have any artifical heart  valves or other implanted medical devices like a pacemaker, defibrillator, or continuous glucose monitor? No   16. Do you have artificial joints? YES - right hip   17. Are you allergic to latex? No       Health Care Directive:  Patient does not have a Health Care Directive or Living Will: Discussed advance care planning with patient; information given to patient to review.    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  Rob Beavers has been provided information and resources to begin or update their advance care plan.  Added by Zayda Nazario      Advance Care Planning 8/5/2015: ACP Review and Resources Provided:  Reviewed chart for advance care plan.  Rob Beavers has no plan or code status on file. Discussed available resources and provided with information. Confirmed code status reflects current choices pending further ACP discussions.  Confirmed/documented legally  designated decision maker(s). Added by Kaye Leal          Advance Directive Problem List Overview:   Name Relationship Phone    Primary Health Care Agent            Alternative Health Care Agent          Discussed advance care planning with patient; information given to patient to review. 1/30/2012  Karen Loera CMA            Past Medical History:   Diagnosis Date     Andrews's esophagus without dysplasia 4/26/2017     CARDIOVASCULAR SCREENING; LDL GOAL LESS THAN 130 10/31/2010     Hypertension goal BP (blood pressure) < 140/90 1/31/2012     Past Surgical History:   Procedure Laterality Date     C TOTAL HIP ARTHROPLASTY  2010     DENTAL SURGERY       LAPAROSCOPIC CHOLECYSTECTOMY WITH CHOLANGIOGRAMS N/A 6/29/2017    Procedure: LAPAROSCOPIC CHOLECYSTECTOMY WITH CHOLANGIOGRAMS;  LAPAROSCOPIC CHOLECYSTECTOMY WITH INTROPERATIVE CHOLANGIOGRAMS.;  Surgeon: Gian Yin MD;  Location: [facility] OR     TONSILLECTOMY  1949     Current Outpatient Medications   Medication Sig Dispense Refill     enoxaparin ANTICOAGULANT (LOVENOX) 100 MG/ML syringe Inject 0.9 mLs (90 mg) Subcutaneous every 12 hours Starting 10/04/2020, last dose 10/06/2020 AM, resume after procedure as directed 10 Syringe 0     lisinopril (ZESTRIL) 10 MG tablet Take 1 tablet (10 mg) by mouth daily 90 tablet 3     loratadine (CLARITIN) 10 MG tablet Take 10 mg by mouth daily       MULTI-VITAMIN PO TABS ONE TABLET DAILY       omeprazole (PRILOSEC) 40 MG DR capsule Take 1 tab every day. 90 capsule 3     propranolol ER (INDERAL LA) 80 MG 24 hr capsule TAKE ONE CAPSULE BY MOUTH EVERY DAY 90 capsule 3     VITAMIN D, CHOLECALCIFEROL, PO Take 1,000 Units by mouth daily       warfarin ANTICOAGULANT (COUMADIN) 5 MG tablet TAKE 1 TABLET BY MOUTH ON FRIDAYS AND TAKE 1/2 TAB ALL OTHER DAYS OR AS DIRECTED BY ACC 45 tablet 1       No Known Allergies     Social History     Tobacco Use     Smoking status: Never Smoker     Smokeless tobacco: Never Used   Substance Use  Topics     Alcohol use: Yes     Alcohol/week: 0.0 standard drinks     Comment: moderate     Family History   Problem Relation Age of Onset     Hypertension Mother         passed away at 89     Eye Disorder Mother         macular degeneration     Blood Disease Mother         She is on warfarin     Dementia Father 80        passed away at 88 yrs old     Breast Cancer Sister      Diabetes Paternal Uncle      Asthma No family hx of      C.A.D. No family hx of      Cancer - colorectal No family hx of      Prostate Cancer No family hx of      Anesthesia Reaction No family hx of      Thyroid Disease No family hx of      History   Drug Use No         Objective     There were no vitals taken for this visit.    Physical Exam  GENERAL APPEARANCE: healthy, alert and no distress  HENT: ear canals and TM's normal and nose and mouth without ulcers or lesions  RESP: lungs clear to auscultation - no rales, rhonchi or wheezes  CV: regular rate and rhythm, normal S1 S2, no S3 or S4 and no murmur, click or rub   ABDOMEN: soft, nontender, no HSM or masses and bowel sounds normal  NEURO: Normal strength and tone, sensory exam grossly normal, mentation intact and speech normal    Recent Labs   Lab Test 12/10/20  0853 11/19/20  1041 08/12/20  1007 08/12/20  1007 07/11/19  0906 07/11/19  0906   HGB  --   --   --   --   --  15.5   INR 2.30* 2.10*   < >  --    < >  --    NA  --   --   --  137  --  139   POTASSIUM  --   --   --  4.2  --  4.6   CR  --   --   --  0.84  --  0.89    < > = values in this interval not displayed.        Diagnostics:  No labs were ordered during this visit.       Revised Cardiac Risk Index (RCRI):  The patient has the following serious cardiovascular risks for perioperative complications:   - No serious cardiac risks = 0 points   No recent changes     56}     Assessment & Plan   The proposed surgical procedure is considered LOW risk.    Atrial fibrillation, unspecified type (H)      Long-term (current) use of  anticoagulants [Z79.01]  Patient is currently on warfarin he has been following up with INR clinic and he has recommendation on bridging.  Patient has all the information when to stop warfarin  and restarted.  Rest of the medication he should continue.  No other contraindication.    Andrews's esophagus with dysplasia                 RECOMMENDATION:  APPROVAL GIVEN to proceed with proposed procedure, without further diagnostic evaluation.    Signed Electronically by: Tian Santana MD    Copy of this evaluation report is provided to requesting physician.    Replaced by [facility] Preop Guidelines    Revised Cardiac Risk Index

## 2021-01-28 ENCOUNTER — ANTICOAGULATION THERAPY VISIT (OUTPATIENT)
Dept: FAMILY MEDICINE | Facility: CLINIC | Age: 76
End: 2021-01-28

## 2021-01-28 DIAGNOSIS — I26.99 PULMONARY EMBOLUS (H): ICD-10-CM

## 2021-01-28 DIAGNOSIS — I48.91 ATRIAL FIBRILLATION, UNSPECIFIED TYPE (H): ICD-10-CM

## 2021-01-28 DIAGNOSIS — Z79.01 LONG TERM CURRENT USE OF ANTICOAGULANT THERAPY: ICD-10-CM

## 2021-01-28 LAB
CAPILLARY BLOOD COLLECTION: NORMAL
INR PPP: 1.6 (ref 0.86–1.14)

## 2021-01-28 PROCEDURE — 85610 PROTHROMBIN TIME: CPT | Performed by: FAMILY MEDICINE

## 2021-01-28 PROCEDURE — 99207 PR NO CHARGE LOS: CPT | Performed by: FAMILY MEDICINE

## 2021-01-28 PROCEDURE — 36416 COLLJ CAPILLARY BLOOD SPEC: CPT | Performed by: FAMILY MEDICINE

## 2021-01-28 NOTE — PROGRESS NOTES
"ANTICOAGULATION FOLLOW-UP CLINIC VISIT    Patient Name:  Rob Beavers  Date:  2021  Contact Type:  Telephone    SUBJECTIVE:  Patient Findings     Positives:  Change in medications (stopped taking prescribed Lovenox injections \"a couple days ago\")    Comments:  Called patient to discuss today's INR results: Patient had invasive procedure 1 week ago today. He has been taking warfarin as prescribed, but stopped taking the Lovenox injections \"a couple days ago\". Patient stated that he is aware of greater risk of DVT/PE. Per protocol, will give patient boost of twice his regular dose and then he should continue with maintenance dosing. He plans to recheck INR in 1 week, though [facility] RN advised 4 days. Routing to PCP as FYI regarding patient's stopping Lovenox bridging.   Deisy SUAREZ RN  Anticoagulation Team          Clinical Outcomes     Negatives:  Major bleeding event, Thromboembolic event, Anticoagulation-related hospital admission, Anticoagulation-related ED visit, Anticoagulation-related fatality    Comments:  Called patient to discuss today's INR results: Patient had invasive procedure 1 week ago today. He has been taking warfarin as prescribed, but stopped taking the Lovenox injections \"a couple days ago\". Patient stated that he is aware of greater risk of DVT/PE. Per protocol, will give patient boost of twice his regular dose and then he should continue with maintenance dosing. He plans to recheck INR in 1 week, though [facility] RN advised 4 days. Routing to PCP as FYI regarding patient's stopping Lovenox bridging.   Deisy SUAREZ RN  Anticoagulation Team             OBJECTIVE    Recent labs: (last 7 days)     21  0907   INR 1.60*       ASSESSMENT / PLAN  INR assessment SUB    Recheck INR In: 1 WEEK    INR Location Clinic      Anticoagulation Summary  As of 2021    INR goal:  2.0-3.0   TTR:  58.9 % (8.9 mo)   INR used for dosin.60 (2021)   Warfarin maintenance plan:  5 mg (5 mg x 1) every Fri; " 2.5 mg (5 mg x 0.5) all other days   Full warfarin instructions:  1/28: 5 mg; Otherwise 5 mg every Fri; 2.5 mg all other days   Weekly warfarin total:  20 mg   Plan last modified:  Alecia Saavedra RN (3/15/2016)   Next INR check:  2/4/2021   Priority:  High   Target end date:  Indefinite    Indications    Long-term (current) use of anticoagulants [Z79.01] [Z79.01]  Pulmonary embolus (H) [I26.99]  Atrial fibrillation  unspecified type (H) [I48.91]             Anticoagulation Episode Summary     INR check location:      Preferred lab:      Send INR reminders to:  [facility]    Comments:        Anticoagulation Care Providers     Provider Role Specialty Phone number    Tian Santana MD Referring Family Medicine [phone]            See the Encounter Report to view Anticoagulation Flowsheet and Dosing Calendar (Go to Encounters tab in chart review, and find the Anticoagulation Therapy Visit)    Dosage adjustment made based on physician directed care plan.    Deisy Acevedo RN

## 2021-02-04 ENCOUNTER — ANTICOAGULATION THERAPY VISIT (OUTPATIENT)
Dept: FAMILY MEDICINE | Facility: CLINIC | Age: 76
End: 2021-02-04

## 2021-02-04 DIAGNOSIS — Z79.01 LONG TERM CURRENT USE OF ANTICOAGULANT THERAPY: ICD-10-CM

## 2021-02-04 DIAGNOSIS — I48.91 ATRIAL FIBRILLATION, UNSPECIFIED TYPE (H): ICD-10-CM

## 2021-02-04 DIAGNOSIS — I26.99 PULMONARY EMBOLUS (H): ICD-10-CM

## 2021-02-04 LAB
CAPILLARY BLOOD COLLECTION: NORMAL
INR PPP: 2.3 (ref 0.86–1.14)

## 2021-02-04 PROCEDURE — 99207 PR NO CHARGE NURSE ONLY: CPT | Performed by: FAMILY MEDICINE

## 2021-02-04 PROCEDURE — 85610 PROTHROMBIN TIME: CPT | Performed by: FAMILY MEDICINE

## 2021-02-04 PROCEDURE — 36416 COLLJ CAPILLARY BLOOD SPEC: CPT | Performed by: FAMILY MEDICINE

## 2021-02-04 NOTE — PROGRESS NOTES
ANTICOAGULATION FOLLOW-UP CLINIC VISIT    Patient Name:  Rob Beavers  Date:  2021  Contact Type:  Telephone    SUBJECTIVE:  Patient Findings     Comments:  Called patient to discuss today's INR results: The patient was assessed for diet, medication, and activity level changes, missed or extra doses, bruising or bleeding, with no problem findings. Reviewed maintenance warfarin dosing with patient. Patient will remain on the same dose until next INR check. No other questions or concerns. Scheduled next lab-only INR in 2 weeks.  Deisy SUAREZ RN  Anticoagulation Team          Clinical Outcomes     Negatives:  Major bleeding event, Thromboembolic event, Anticoagulation-related hospital admission, Anticoagulation-related ED visit, Anticoagulation-related fatality    Comments:  Called patient to discuss today's INR results: The patient was assessed for diet, medication, and activity level changes, missed or extra doses, bruising or bleeding, with no problem findings. Reviewed maintenance warfarin dosing with patient. Patient will remain on the same dose until next INR check. No other questions or concerns. Scheduled next lab-only INR in 2 weeks.  Deisy SUAREZ RN  Anticoagulation Team             OBJECTIVE    Recent labs: (last 7 days)     21  1015   INR 2.30*       ASSESSMENT / PLAN  INR assessment THER    Recheck INR In: 2 WEEKS    INR Location Clinic      Anticoagulation Summary  As of 2021    INR goal:  2.0-3.0   TTR:  58.5 % (9.1 mo)   INR used for dosin.30 (2021)   Warfarin maintenance plan:  5 mg (5 mg x 1) every Fri; 2.5 mg (5 mg x 0.5) all other days   Full warfarin instructions:  5 mg every Fri; 2.5 mg all other days   Weekly warfarin total:  20 mg   No change documented:  Deisy Acevedo RN   Plan last modified:  Alecia Saavedra RN (3/15/2016)   Next INR check:  2021   Priority:  Maintenance   Target end date:  Indefinite    Indications    Long-term (current) use of  anticoagulants [Z79.01] [Z79.01]  Pulmonary embolus (H) [I26.99]  Atrial fibrillation  unspecified type (H) [I48.91]             Anticoagulation Episode Summary     INR check location:      Preferred lab:      Send INR reminders to:  [facility]    Comments:        Anticoagulation Care Providers     Provider Role Specialty Phone number    Tian Santana MD Referring Family Medicine [phone]            See the Encounter Report to view Anticoagulation Flowsheet and Dosing Calendar (Go to Encounters tab in chart review, and find the Anticoagulation Therapy Visit)    Deisy Acevedo RN

## 2021-02-18 ENCOUNTER — ANTICOAGULATION THERAPY VISIT (OUTPATIENT)
Dept: FAMILY MEDICINE | Facility: CLINIC | Age: 76
End: 2021-02-18

## 2021-02-18 DIAGNOSIS — Z79.01 LONG TERM CURRENT USE OF ANTICOAGULANT THERAPY: ICD-10-CM

## 2021-02-18 DIAGNOSIS — I26.99 PULMONARY EMBOLUS (H): ICD-10-CM

## 2021-02-18 DIAGNOSIS — I48.91 ATRIAL FIBRILLATION, UNSPECIFIED TYPE (H): ICD-10-CM

## 2021-02-18 LAB
CAPILLARY BLOOD COLLECTION: NORMAL
INR PPP: 2.3 (ref 0.86–1.14)

## 2021-02-18 PROCEDURE — 85610 PROTHROMBIN TIME: CPT | Performed by: FAMILY MEDICINE

## 2021-02-18 PROCEDURE — 36416 COLLJ CAPILLARY BLOOD SPEC: CPT | Performed by: FAMILY MEDICINE

## 2021-02-18 PROCEDURE — 99207 PR NO CHARGE NURSE ONLY: CPT | Performed by: FAMILY MEDICINE

## 2021-02-18 NOTE — PROGRESS NOTES
ANTICOAGULATION FOLLOW-UP CLINIC VISIT    Patient Name:  Rob Beavers  Date:  2021  Contact Type:  Telephone/ Rob    SUBJECTIVE:  Patient Findings     Comments:  The patient was assessed for diet, medication, and activity level changes, missed or extra doses, bruising or bleeding, with no problem findings.          Clinical Outcomes     Negatives:  Major bleeding event, Thromboembolic event, Anticoagulation-related hospital admission, Anticoagulation-related ED visit, Anticoagulation-related fatality    Comments:  The patient was assessed for diet, medication, and activity level changes, missed or extra doses, bruising or bleeding, with no problem findings.             OBJECTIVE    Recent labs: (last 7 days)     21  0909   INR 2.30*       ASSESSMENT / PLAN  INR assessment THER    Recheck INR In: 6 WEEKS    INR Location Clinic      Anticoagulation Summary  As of 2021    INR goal:  2.0-3.0   TTR:  60.5 % (9.6 mo)   INR used for dosin.30 (2021)   Warfarin maintenance plan:  5 mg (5 mg x 1) every Fri; 2.5 mg (5 mg x 0.5) all other days   Full warfarin instructions:  5 mg every Fri; 2.5 mg all other days   Weekly warfarin total:  20 mg   No change documented:  Dontae Barreto RN   Plan last modified:  Alecia Saavedra RN (3/15/2016)   Next INR check:  2021   Priority:  Maintenance   Target end date:  Indefinite    Indications    Long-term (current) use of anticoagulants [Z79.01] [Z79.01]  Pulmonary embolus (H) [I26.99]  Atrial fibrillation  unspecified type (H) [I48.91]             Anticoagulation Episode Summary     INR check location:      Preferred lab:      Send INR reminders to:  [facility]    Comments:        Anticoagulation Care Providers     Provider Role Specialty Phone number    Tian Santana MD Referring Family Medicine [phone]            See the Encounter Report to view Anticoagulation Flowsheet and Dosing Calendar (Go to Encounters tab in chart  review, and find the Anticoagulation Therapy Visit)    Patient INR is therapeutic.  Patient will continue to take 20 mg weekly dosing and follow up in 6 weeks or sooner for any problems or concerns.        Dontae Barreto RN

## 2021-03-10 DIAGNOSIS — I26.09 OTHER CHRONIC PULMONARY EMBOLISM WITH ACUTE COR PULMONALE (H): ICD-10-CM

## 2021-03-10 DIAGNOSIS — Z79.01 LONG TERM CURRENT USE OF ANTICOAGULANT THERAPY: ICD-10-CM

## 2021-03-10 DIAGNOSIS — I27.82 OTHER CHRONIC PULMONARY EMBOLISM WITH ACUTE COR PULMONALE (H): ICD-10-CM

## 2021-03-10 RX ORDER — WARFARIN SODIUM 5 MG/1
TABLET ORAL
Qty: 45 TABLET | Refills: 1 | Status: SHIPPED | OUTPATIENT
Start: 2021-03-10 | End: 2021-09-07

## 2021-03-23 ENCOUNTER — TELEPHONE (OUTPATIENT)
Dept: FAMILY MEDICINE | Facility: CLINIC | Age: 76
End: 2021-03-23

## 2021-03-23 DIAGNOSIS — I26.99 PULMONARY EMBOLUS (H): Primary | ICD-10-CM

## 2021-03-23 DIAGNOSIS — I48.91 ATRIAL FIBRILLATION, UNSPECIFIED TYPE (H): ICD-10-CM

## 2021-03-23 DIAGNOSIS — K22.70 BARRETT'S ESOPHAGUS WITHOUT DYSPLASIA: ICD-10-CM

## 2021-03-23 DIAGNOSIS — Z79.01 LONG TERM CURRENT USE OF ANTICOAGULANT THERAPY: ICD-10-CM

## 2021-03-23 NOTE — TELEPHONE ENCOUNTER
Call from Karina with [facility] requesting bridging plan for this patient who will be having a HALO 360 and upper endoscopy on 4/22/21.    Last endoscopy: 10/7/20  Last bridge plan with enoxaparin: see 9/23/20 note    Wt: 95.3 kg on 1/12/21  Serum creatinine: .84 on 8/12/20    Will route to [facility] for review. Rosario Cerrato RN March 23, 2021 2:41 PM

## 2021-03-23 NOTE — TELEPHONE ENCOUNTER
[facility]-PROCED[facility] ANTICOAGULATION  MANAGEMENT    Assessment     Warfarin interruption plan for HALO 360 & upper endoscopy on 2021.    A. Fib, PE       Risk stratification for thromboembolism: moderate (2012 Chest guidelines and 2017 ACC periprocedure pathway for NVAF Expert Consensus)      [id number] = 4 (age+, HTN, VTE++)    NVAF: 2017 ACC periprocedure pathway for NVAF advises likely bridge for moderate risk stratification ([id number]-FEDy score 5-6)  with a hx of stroke, TIA or systemic embolism and AFIB     AHA/ACC/HRS update of Afib guidelines recommend for patients with Afib without mechanical valves who required interruption of warfarin decisions about bridging should balance risks of stroke and bleeding and the duration of time a patient will not be anticoagulated.  They note that bridging maybe be appropriate only in patients with a very high thromboembolic risk    Plan       Pre-Procedure:    Hold warfarin until after procedure startin2021     Lovenox 100 mg subq Q 12 hrs (1 mg/kg Q 12 hr for CrCl >30ml/min)     Start Lovenox: 2021    Last dose of Lovenox prior to procedure: 2021 AM       Post-Procedure:    Resume home warfarin dose if okay with provider doing procedure on night of procedure, 2021 PM: 10mg, 2021 7.5mg, 2021 5mg    Resume Lovenox ~ 24-48 hrs post procedure when okay with provider doing procedure. Continue until INR >= 2.3    Recheck INR 5-7 days after resuming warfarin   ?   Gianna Mo [facility]    Subjective/Objective:      Rob Beavers, a 75 year old male    Reason for Anticoagulation: A. Fib and PE    Goal INR Range: 2.0-3.0    Patient bridged in past: Yes: 2021, patient stopped Lovenox before INR in range post procedure    Pertinent History: N/A    Wt Readings from Last 3 Encounters:   21 95.3 kg (210 lb)   10/29/20 94.3 kg (208 lb)   20 94.3 kg (208 lb)        Ideal body weight: 66.1 kg (145 lb 11.6 oz)  Adjusted  ideal body weight: 77.8 kg (171 lb 6.9 oz)     Lab Results   Component Value Date    INR 2.30 (H) 02/18/2021    INR 2.30 (H) 02/04/2021    INR 1.60 (H) 01/28/2021     Lab Results   Component Value Date    HGB 15.5 07/11/2019    HCT 35.3 06/30/2017     06/30/2017     Lab Results   Component Value Date    CR 0.84 08/12/2020    CR 0.89 07/11/2019    CR 0.91 06/13/2018     CrCl cannot be calculated (Patient's most recent lab result is older than the maximum 10 days allowed.).

## 2021-03-31 NOTE — TELEPHONE ENCOUNTER
Last warfarin dose: 04/16/2021  0417/2021, NO warfarin  04/18/2021, NO warfarin  04/19/2021, NO warfarin, begin enoxaparin injections into the abdomen every 12 hours (AM and PM)  04/20/2021, NO warfarin, enoxaparin injection into the abdomen every 12 hours (AM and PM)  04/21/2021, NO warfarin, enoxaparin injection into the abdomen AM only (no enoxaparin 24 hours prior to surgery)  04/22/2021, DAY OF PROCEDURE, NO enoxaparin. Restart warfarin 5mg (1 tabs) in the evening, unless instructed otherwise by the physician.  04/23/2021, Restart enoxaparin injections into the abdomen every 12 hours (AM and PM), unless instructed otherwise by the physician, and 2.5mg (0.5 tabs) warfarin in the evening.   04/24/2021, Enoxaparin injection into the abdomen every 12 hours (AM and PM) and 2.5mg (0.5 tabs) warfarin in the evening.  04/25/2021, Enoxaparin injection into the abdomen every 12 hours (AM and PM) and 2.5mg (0.5 tabs) warfarin in the evening.  04/26/2021, Enoxaparin injection into the abdomen in the AM. Recheck INR at the  Lab for further dosing instructions.   If you have any questions please call the Anticoagulation Clinic at [phone].

## 2021-04-01 ENCOUNTER — ANTICOAGULATION THERAPY VISIT (OUTPATIENT)
Dept: FAMILY MEDICINE | Facility: CLINIC | Age: 76
End: 2021-04-01

## 2021-04-01 DIAGNOSIS — I26.99 PULMONARY EMBOLUS (H): ICD-10-CM

## 2021-04-01 DIAGNOSIS — I48.91 ATRIAL FIBRILLATION, UNSPECIFIED TYPE (H): ICD-10-CM

## 2021-04-01 DIAGNOSIS — Z79.01 LONG TERM CURRENT USE OF ANTICOAGULANT THERAPY: ICD-10-CM

## 2021-04-01 LAB
CAPILLARY BLOOD COLLECTION: NORMAL
INR PPP: 2.2 (ref 0.86–1.14)

## 2021-04-01 PROCEDURE — 36416 COLLJ CAPILLARY BLOOD SPEC: CPT | Performed by: FAMILY MEDICINE

## 2021-04-01 PROCEDURE — 85610 PROTHROMBIN TIME: CPT | Performed by: FAMILY MEDICINE

## 2021-04-01 NOTE — PROGRESS NOTES
ANTICOAGULATION MANAGEMENT     Patient Name:  Rob Beavers  Date:  2021    ASSESSMENT /SUBJECTIVE:    Today's INR result of 2.2 is therapeutic. Goal INR of 2.0-3.0      Warfarin dose taken: Warfarin taken as instructed    Diet: No new diet changes affecting INR    Medication changes/ interactions: No new medications/supplements affecting INR    Previous INR: Therapeutic     S/S of bleeding or thromboembolism: No    New injury or illness: No    Upcoming surgery, procedure or cardioversion: Yes: upper endoscopy 21; sending bridging instructions via [employer] today.    Additional findings: None      PLAN:    Telephone call with Rob regarding INR result and instructed:     Warfarin Dosing Instructions: Continue your current warfarin dose 5 mg F; 2.5 mg ROW    Instructed patient to follow up no later than: 4 weeks  Lab visit scheduled    Education provided: None required      Rob verbalizes understanding and agrees to warfarin dosing plan.    Instructed to call the Anticoagulation Clinic for any changes, questions or concerns. (#[phone])        Blossom Maurer RN      OBJECTIVE:  Recent labs: (last 7 days)     21  0859   INR 2.20*         INR assessment THER    Recheck INR In: 4 WEEKS    INR Location Clinic      Anticoagulation Summary  As of 2021    INR goal:  2.0-3.0   TTR:  65.5 % (11 mo)   INR used for dosin.20 (2021)   Warfarin maintenance plan:  5 mg (5 mg x 1) every Fri; 2.5 mg (5 mg x 0.5) all other days   Full warfarin instructions:  : Hold; : Hold; : Hold; : Hold; : Hold; : 5 mg; : 2.5 mg; Otherwise 5 mg every Fri; 2.5 mg all other days   Weekly warfarin total:  20 mg   Plan last modified:  Alecia Saavedra RN (3/15/2016)   Next INR check:  2021   Priority:  Maintenance   Target end date:  Indefinite    Indications    Long-term (current) use of anticoagulants [Z79.01] [Z79.01]  Pulmonary embolus (H) [I26.99]  Atrial  fibrillation  unspecified type (H) [I48.91]             Anticoagulation Episode Summary     INR check location:      Preferred lab:      Send INR reminders to:  [facility]    Comments:        Anticoagulation Care Providers     Provider Role Specialty Phone number    Tian Santana MD Referring Family Medicine [phone]

## 2021-04-02 ENCOUNTER — TELEPHONE (OUTPATIENT)
Dept: FAMILY MEDICINE | Facility: CLINIC | Age: 76
End: 2021-04-02

## 2021-04-02 NOTE — CONFIDENTIAL NOTE
Writer returned [facility] call and left a detailed VM on secure line with hold orders and bridging plans. Side note, it appears this was already taken care of on 3/23/21. Encouraged callback with further questions. Wendy Adan, MARILEEN, RN

## 2021-04-08 ENCOUNTER — OFFICE VISIT (OUTPATIENT)
Dept: FAMILY MEDICINE | Facility: CLINIC | Age: 76
End: 2021-04-08
Payer: COMMERCIAL

## 2021-04-08 VITALS
DIASTOLIC BLOOD PRESSURE: 64 MMHG | BODY MASS INDEX: 32.89 KG/M2 | SYSTOLIC BLOOD PRESSURE: 120 MMHG | TEMPERATURE: 98 F | HEART RATE: 86 BPM | OXYGEN SATURATION: 95 % | WEIGHT: 210 LBS

## 2021-04-08 DIAGNOSIS — Z79.01 LONG TERM CURRENT USE OF ANTICOAGULANT THERAPY: Primary | ICD-10-CM

## 2021-04-08 DIAGNOSIS — I26.99 OTHER PULMONARY EMBOLISM WITHOUT ACUTE COR PULMONALE, UNSPECIFIED CHRONICITY (H): ICD-10-CM

## 2021-04-08 DIAGNOSIS — K22.719 BARRETT'S ESOPHAGUS WITH DYSPLASIA: ICD-10-CM

## 2021-04-08 DIAGNOSIS — I10 HYPERTENSION GOAL BP (BLOOD PRESSURE) < 140/90: ICD-10-CM

## 2021-04-08 DIAGNOSIS — Z01.818 PRE-OP EXAM: ICD-10-CM

## 2021-04-08 LAB
ALBUMIN SERPL-MCNC: 3.7 G/DL (ref 3.4–5)
ALP SERPL-CCNC: 91 U/L (ref 40–150)
ALT SERPL W P-5'-P-CCNC: 34 U/L (ref 0–70)
ANION GAP SERPL CALCULATED.3IONS-SCNC: 6 MMOL/L (ref 3–14)
AST SERPL W P-5'-P-CCNC: 25 U/L (ref 0–45)
BILIRUB SERPL-MCNC: 0.6 MG/DL (ref 0.2–1.3)
BUN SERPL-MCNC: 17 MG/DL (ref 7–30)
CALCIUM SERPL-MCNC: 8.4 MG/DL (ref 8.5–10.1)
CHLORIDE SERPL-SCNC: 108 MMOL/L (ref 94–109)
CO2 SERPL-SCNC: 25 MMOL/L (ref 20–32)
CREAT SERPL-MCNC: 0.82 MG/DL (ref 0.66–1.25)
GFR SERPL CREATININE-BSD FRML MDRD: 87 ML/MIN/{1.73_M2}
GLUCOSE SERPL-MCNC: 119 MG/DL (ref 70–99)
HGB BLD-MCNC: 16.5 G/DL (ref 13.3–17.7)
POTASSIUM SERPL-SCNC: 4.2 MMOL/L (ref 3.4–5.3)
PROT SERPL-MCNC: 7.7 G/DL (ref 6.8–8.8)
SODIUM SERPL-SCNC: 139 MMOL/L (ref 133–144)

## 2021-04-08 PROCEDURE — 99214 OFFICE O/P EST MOD 30 MIN: CPT | Performed by: FAMILY MEDICINE

## 2021-04-08 PROCEDURE — 80053 COMPREHEN METABOLIC PANEL: CPT | Performed by: FAMILY MEDICINE

## 2021-04-08 PROCEDURE — 36415 COLL VENOUS BLD VENIPUNCTURE: CPT | Performed by: FAMILY MEDICINE

## 2021-04-08 PROCEDURE — 85018 HEMOGLOBIN: CPT | Performed by: FAMILY MEDICINE

## 2021-04-08 NOTE — PROGRESS NOTES
[address] [address]  Phone: [phone]  Primary Provider: Tian Santana        PREOPERATIVE EVALUATION:  Today's date: 4/8/2021    Rob Beavers is a 75 year old male who presents for a preoperative evaluation.    Surgical Information:  Surgery/Procedure: ESOPHAGOGASTRODUODENOSCOPY 360 DEGREE ABLATION  Surgery Location: [facility]  Surgeon: Abi  Surgery Date: 4/22/21  Time of Surgery: Morning  Where patient plans to recover: At home alone  Fax number for surgical facility: [phone]    Type of Anesthesia Anticipated: General    Assessment & Plan     The proposed surgical procedure is considered LOW risk.    Long term current use of anticoagulant therapy      Andrews's esophagus with dysplasia      Hypertension goal BP (blood pressure) < 140/90    - Comprehensive metabolic panel  - Hemoglobin    Other pulmonary embolism without acute cor pulmonale, unspecified chronicity (H)    - Comprehensive metabolic panel  - Hemoglobin    Pre-op exam  Discussed with the patient regarding his bridging therapy as he is taking warfarin.  He has the plan for that.  Advised not to use any NSAIDs or aspirin as well.  He can continue his blood pressure medication  - Comprehensive metabolic panel  - Hemoglobin             Medication Instructions:  As dicssued, he can continue the BP medication . Hold warfarin as per protocl , he is aware of that.    RECOMMENDATION:  APPROVAL GIVEN to proceed with proposed procedure, without further diagnostic evaluation.        Subjective     HPI related to upcoming procedure:     Preop Questions 4/5/2021   1. Have you ever had a heart attack or stroke? No   2. Have you ever had surgery on your heart or blood vessels, such as a stent placement, a coronary artery bypass, or surgery on an artery in your head, neck, heart, or legs? No   3. Do you have chest pain with activity? No   4. Do you have a history of  heart failure? No    5. Do you currently have a cold, bronchitis or symptoms of other infection? No   6. Do you have a cough, shortness of breath, or wheezing? No   7. Do you or anyone in your family have previous history of blood clots? YES -    8. Do you or does anyone in your family have a serious bleeding problem such as prolonged bleeding following surgeries or cuts? No   9. Have you ever had problems with anemia or been told to take iron pills? No   10. Have you had any abnormal blood loss such as black, tarry or bloody stools? No   11. Have you ever had a blood transfusion? No   12. Are you willing to have a blood transfusion if it is medically needed before, during, or after your surgery? Yes   13. Have you or any of your relatives ever had problems with anesthesia? No   14. Do you have sleep apnea, excessive snoring or daytime drowsiness? No   15. Do you have any artifical heart valves or other implanted medical devices like a pacemaker, defibrillator, or continuous glucose monitor? No   16. Do you have artificial joints? YES - *   17. Are you allergic to latex? No       Health Care Directive:  Patient does not have a Health Care Directive or Living Will:    Preoperative Review of :   reviewed - no record of controlled substances prescribed.            Review of Systems  CONSTITUTIONAL: NEGATIVE for fever, chills, change in weight  INTEGUMENTARY/SKIN: NEGATIVE for worrisome rashes, moles or lesions  EYES: NEGATIVE for vision changes or irritation  ENT/MOUTH: NEGATIVE for ear, mouth and throat problems  RESP: NEGATIVE for significant cough or SOB  BREAST: NEGATIVE for masses, tenderness or discharge  CV: NEGATIVE for chest pain, palpitations or peripheral edema  GI: NEGATIVE for nausea, abdominal pain, heartburn, or change in bowel habits  : NEGATIVE for frequency, dysuria, or hematuria  MUSCULOSKELETAL: NEGATIVE for significant arthralgias or myalgia  NEURO: NEGATIVE for weakness, dizziness or paresthesias  ENDOCRINE:  NEGATIVE for temperature intolerance, skin/hair changes  HEME: NEGATIVE for bleeding problems  PSYCHIATRIC: NEGATIVE for changes in mood or affect    Patient Active Problem List    Diagnosis Date Noted     Prostate nodule 11/14/2012     Priority: High     Hypertension goal BP (blood pressure) < 140/90 01/31/2012     Priority: High     Atrial fibrillation, unspecified type (H) 07/11/2019     Priority: Medium     Andrews's esophagus without dysplasia 04/26/2017     Priority: Medium     Long-term (current) use of anticoagulants [Z79.01] 03/15/2016     Priority: Medium     Pulmonary embolus (H) 02/18/2014     Priority: Medium     Benign familial tremor 01/23/2014     Priority: Medium     CARDIOVASCULAR SCREENING; LDL GOAL LESS THAN 130 10/31/2010     Priority: Medium     Advanced directives, counseling/discussion 01/30/2012     Priority: Low     Advance Care Planning 4/26/2017: ACP Review of Chart / Resources Provided:  Reviewed chart for advance care plan.  Rob Beavers has been provided information and resources to begin or update their advance care plan.  Added by Zayda Nazario      Advance Care Planning 8/5/2015: ACP Review and Resources Provided:  Reviewed chart for advance care plan.  Rob Beavers has no plan or code status on file. Discussed available resources and provided with information. Confirmed code status reflects current choices pending further ACP discussions.  Confirmed/documented legally designated decision maker(s). Added by Kaye Leal          Advance Directive Problem List Overview:   Name Relationship Phone    Primary Health Care Agent            Alternative Health Care Agent          Discussed advance care planning with patient; information given to patient to review. 1/30/2012  Karen Loera CMA            Past Medical History:   Diagnosis Date     Andrews's esophagus without dysplasia 4/26/2017     CARDIOVASCULAR SCREENING; LDL GOAL LESS THAN 130 10/31/2010     Hypertension goal BP  (blood pressure) < 140/90 1/31/2012     Past Surgical History:   Procedure Laterality Date     C TOTAL HIP ARTHROPLASTY  2010     DENTAL SURGERY       LAPAROSCOPIC CHOLECYSTECTOMY WITH CHOLANGIOGRAMS N/A 6/29/2017    Procedure: LAPAROSCOPIC CHOLECYSTECTOMY WITH CHOLANGIOGRAMS;  LAPAROSCOPIC CHOLECYSTECTOMY WITH INTROPERATIVE CHOLANGIOGRAMS.;  Surgeon: Gain Yin MD;  Location: [facility]     TONSILLECTOMY  1949     Current Outpatient Medications   Medication Sig Dispense Refill     lisinopril (ZESTRIL) 10 MG tablet Take 1 tablet (10 mg) by mouth daily 90 tablet 3     loratadine (CLARITIN) 10 MG tablet Take 10 mg by mouth daily       MULTI-VITAMIN PO TABS ONE TABLET DAILY       omeprazole (PRILOSEC) 40 MG DR capsule Take 1 tab every day. 90 capsule 3     propranolol ER (INDERAL LA) 80 MG 24 hr capsule TAKE ONE CAPSULE BY MOUTH EVERY DAY 90 capsule 3     VITAMIN D, CHOLECALCIFEROL, PO Take 1,000 Units by mouth daily       warfarin ANTICOAGULANT (COUMADIN) 5 MG tablet TAKE 1 TABLET BY MOUTH ON FRIDAYS AND TAKE 1/2 TAB ALL OTHER DAYS OR AS DIRECTED BY ACC 45 tablet 1     enoxaparin ANTICOAGULANT (LOVENOX) 100 MG/ML syringe Inject 1 mL (100 mg) Subcutaneous 2 times daily 14 mL 1     enoxaparin ANTICOAGULANT (LOVENOX) 100 MG/ML syringe Inject 0.9 mLs (90 mg) Subcutaneous every 12 hours Starting 10/04/2020, last dose 10/06/2020 AM, resume after procedure as directed (Patient not taking: Reported on 1/12/2021) 10 Syringe 0       No Known Allergies     Social History     Tobacco Use     Smoking status: Never Smoker     Smokeless tobacco: Never Used   Substance Use Topics     Alcohol use: Yes     Alcohol/week: 0.0 standard drinks     Comment: moderate     Family History   Problem Relation Age of Onset     Hypertension Mother         passed away at 89     Eye Disorder Mother         macular degeneration     Blood Disease Mother         She is on warfarin     Dementia Father 80        passed away at 88 yrs old     Breast  Cancer Sister      Diabetes Paternal Uncle      Asthma No family hx of      C.A.D. No family hx of      Cancer - colorectal No family hx of      Prostate Cancer No family hx of      Anesthesia Reaction No family hx of      Thyroid Disease No family hx of      History   Drug Use No         Objective     /64   Pulse 86   Temp 98  F (36.7  C) (Tympanic)   Wt 95.3 kg (210 lb)   SpO2 95%   BMI 32.89 kg/m      Physical Exam    GENERAL APPEARANCE: healthy, alert and no distress     EYES: EOMI,  PERRL     HENT: ear canals and TM's normal and nose and mouth without ulcers or lesions     NECK: no adenopathy, no asymmetry, masses, or scars and thyroid normal to palpation     RESP: lungs clear to auscultation - no rales, rhonchi or wheezes     CV: regular rates and rhythm, normal S1 S2, no S3 or S4 and no murmur, click or rub     ABDOMEN:  soft, nontender, no HSM or masses and bowel sounds normal     MS: extremities normal- no gross deformities noted, no evidence of inflammation in joints, FROM in all extremities.     SKIN: no suspicious lesions or rashes     NEURO: Normal strength and tone, sensory exam grossly normal, mentation intact and speech normal     PSYCH: mentation appears normal. and affect normal/bright     LYMPHATICS: No cervical adenopathy    Recent Labs   Lab Test 04/01/21  0859 02/18/21  0909 08/12/20  1007 08/12/20  1007 07/11/19  0906 07/11/19  0906   HGB  --   --   --   --   --  15.5   INR 2.20* 2.30*   < >  --    < >  --    NA  --   --   --  137  --  139   POTASSIUM  --   --   --  4.2  --  4.6   CR  --   --   --  0.84  --  0.89    < > = values in this interval not displayed.        Diagnostics:  Labs pending at this time.  Results will be reviewed when available.       Revised Cardiac Risk Index (RCRI):  The patient has the following serious cardiovascular risks for perioperative complications:   - No serious cardiac risks = 0 points     RCRI Interpretation: 0 points: Class I (very low risk - 0.4%  complication rate)           Signed Electronically by: Tian Santana MD  Copy of this evaluation report is provided to requesting physician.

## 2021-04-16 ENCOUNTER — DOCUMENTATION ONLY (OUTPATIENT)
Dept: FAMILY MEDICINE | Facility: CLINIC | Age: 76
End: 2021-04-16

## 2021-04-16 DIAGNOSIS — I48.91 ATRIAL FIBRILLATION, UNSPECIFIED TYPE (H): ICD-10-CM

## 2021-04-16 DIAGNOSIS — Z79.01 LONG TERM CURRENT USE OF ANTICOAGULANT THERAPY: Primary | ICD-10-CM

## 2021-04-16 DIAGNOSIS — I26.99 PULMONARY EMBOLUS (H): ICD-10-CM

## 2021-04-29 ENCOUNTER — ANTICOAGULATION THERAPY VISIT (OUTPATIENT)
Dept: FAMILY MEDICINE | Facility: CLINIC | Age: 76
End: 2021-04-29

## 2021-04-29 DIAGNOSIS — I26.99 PULMONARY EMBOLUS (H): ICD-10-CM

## 2021-04-29 DIAGNOSIS — Z79.01 LONG TERM CURRENT USE OF ANTICOAGULANT THERAPY: ICD-10-CM

## 2021-04-29 DIAGNOSIS — I48.91 ATRIAL FIBRILLATION, UNSPECIFIED TYPE (H): ICD-10-CM

## 2021-04-29 LAB
CAPILLARY BLOOD COLLECTION: NORMAL
INR PPP: 1.5 (ref 0.86–1.14)

## 2021-04-29 PROCEDURE — 85610 PROTHROMBIN TIME: CPT | Performed by: FAMILY MEDICINE

## 2021-04-29 PROCEDURE — 99207 PR NO CHARGE NURSE ONLY: CPT | Performed by: FAMILY MEDICINE

## 2021-04-29 PROCEDURE — 36416 COLLJ CAPILLARY BLOOD SPEC: CPT | Performed by: FAMILY MEDICINE

## 2021-04-29 NOTE — PROGRESS NOTES
ANTICOAGULATION FOLLOW-UP CLINIC VISIT    Patient Name:  Rob Beavers  Date:  2021  Contact Type:  Telephone    SUBJECTIVE:  Patient Findings     Comments:  Called patient to discuss today's INR results: The patient was assessed for diet, medication, and activity level changes, missed or extra doses, bruising or bleeding, with no problem findings. Patient taking a little long to come back up to therapeutic after holding for invasive procedure 1 week ago. He has been continuing to bridge with Lovenox injections twice daily. Per protocol, will double today's warfarin then he should return to maintenance dosing and recheck IN in 4 days. Patient stated understanding and is in agreement with plan.  Deisy SURAEZ RN  Anticoagulation Team          Clinical Outcomes     Negatives:  Major bleeding event, Thromboembolic event, Anticoagulation-related hospital admission, Anticoagulation-related ED visit, Anticoagulation-related fatality    Comments:  Called patient to discuss today's INR results: The patient was assessed for diet, medication, and activity level changes, missed or extra doses, bruising or bleeding, with no problem findings. Patient taking a little long to come back up to therapeutic after holding for invasive procedure 1 week ago. He has been continuing to bridge with Lovenox injections twice daily. Per protocol, will double today's warfarin then he should return to maintenance dosing and recheck IN in 4 days. Patient stated understanding and is in agreement with plan.  Deisy SUAREZ RN  Anticoagulation Team             OBJECTIVE    Recent labs: (last 7 days)     21  0944   INR 1.50*       ASSESSMENT / PLAN  INR assessment SUB    Recheck INR In: 4 DAYS    INR Location Clinic      Anticoagulation Summary  As of 2021    INR goal:  2.0-3.0   TTR:  62.6 % (11.9 mo)   INR used for dosin.50 (2021)   Warfarin maintenance plan:  5 mg (5 mg x 1) every Fri; 2.5 mg (5 mg x 0.5) all other days   Full  warfarin instructions:  4/29: 5 mg; Otherwise 5 mg every Fri; 2.5 mg all other days   Weekly warfarin total:  20 mg   Plan last modified:  Alecia Saavedra RN (3/15/2016)   Next INR check:  5/3/2021   Priority:  High   Target end date:  Indefinite    Indications    Long-term (current) use of anticoagulants [Z79.01] [Z79.01]  Pulmonary embolus (H) [I26.99]  Atrial fibrillation  unspecified type (H) [I48.91]             Anticoagulation Episode Summary     INR check location:      Preferred lab:      Send INR reminders to:  [facility]    Comments:        Anticoagulation Care Providers     Provider Role Specialty Phone number    Tina Santana MD Referring Family Medicine [phone]            See the Encounter Report to view Anticoagulation Flowsheet and Dosing Calendar (Go to Encounters tab in chart review, and find the Anticoagulation Therapy Visit)    Dosage adjustment made based on physician directed care plan.    Deisy Acevedo RN

## 2021-05-03 ENCOUNTER — ANTICOAGULATION THERAPY VISIT (OUTPATIENT)
Dept: FAMILY MEDICINE | Facility: CLINIC | Age: 76
End: 2021-05-03

## 2021-05-03 DIAGNOSIS — I26.99 PULMONARY EMBOLUS (H): ICD-10-CM

## 2021-05-03 DIAGNOSIS — I48.91 ATRIAL FIBRILLATION, UNSPECIFIED TYPE (H): ICD-10-CM

## 2021-05-03 DIAGNOSIS — Z79.01 LONG TERM CURRENT USE OF ANTICOAGULANT THERAPY: ICD-10-CM

## 2021-05-03 LAB
CAPILLARY BLOOD COLLECTION: NORMAL
INR PPP: 2 (ref 0.86–1.14)

## 2021-05-03 PROCEDURE — 36416 COLLJ CAPILLARY BLOOD SPEC: CPT | Performed by: FAMILY MEDICINE

## 2021-05-03 PROCEDURE — 99207 PR NO CHARGE NURSE ONLY: CPT | Performed by: FAMILY MEDICINE

## 2021-05-03 PROCEDURE — 85610 PROTHROMBIN TIME: CPT | Performed by: FAMILY MEDICINE

## 2021-05-03 NOTE — PROGRESS NOTES
ANTICOAGULATION FOLLOW-UP CLINIC VISIT    Patient Name:  Rob Beavers  Date:  5/3/2021  Contact Type:  Telephone    SUBJECTIVE:  Patient Findings     Comments:  Called patient to discuss today's INR results: The patient was assessed for diet, medication, and activity level changes, missed or extra doses, bruising or bleeding, with no problem findings. Patient is continuing to bridge with Lovenox twice daily. He understands to continue on warfarin maintenance dosing and he needs to check INR again tomorrow and if still over 2.0 then he can discontinue the Lovenox.   Deisy SUAREZ RN  Anticoagulation Team          Clinical Outcomes     Negatives:  Major bleeding event, Thromboembolic event, Anticoagulation-related hospital admission, Anticoagulation-related ED visit, Anticoagulation-related fatality    Comments:  Called patient to discuss today's INR results: The patient was assessed for diet, medication, and activity level changes, missed or extra doses, bruising or bleeding, with no problem findings. Patient is continuing to bridge with Lovenox twice daily. He understands to continue on warfarin maintenance dosing and he needs to check INR again tomorrow and if still over 2.0 then he can discontinue the Lovenox.   Deisy SUAREZ RN  Anticoagulation Team             OBJECTIVE    Recent labs: (last 7 days)     21  1117   INR 2.00*       ASSESSMENT / PLAN  INR assessment THER    Recheck INR In: 1 DAY    INR Location Clinic      Anticoagulation Summary  As of 5/3/2021    INR goal:  2.0-3.0   TTR:  61.9 % (1 y)   INR used for dosin.00 (5/3/2021)   Warfarin maintenance plan:  5 mg (5 mg x 1) every Fri; 2.5 mg (5 mg x 0.5) all other days   Full warfarin instructions:  5 mg every Fri; 2.5 mg all other days   Weekly warfarin total:  20 mg   No change documented:  Deisy Acevedo RN   Plan last modified:  Alecia Saavedra RN (3/15/2016)   Next INR check:  2021   Priority:  High   Target end date:  Indefinite     Indications    Long-term (current) use of anticoagulants [Z79.01] [Z79.01]  Pulmonary embolus (H) [I26.99]  Atrial fibrillation  unspecified type (H) [I48.91]             Anticoagulation Episode Summary     INR check location:      Preferred lab:      Send INR reminders to:  [facility]    Comments:        Anticoagulation Care Providers     Provider Role Specialty Phone number    Tian Santana MD Referring Family Medicine [phone]            See the Encounter Report to view Anticoagulation Flowsheet and Dosing Calendar (Go to Encounters tab in chart review, and find the Anticoagulation Therapy Visit)    Deisy Acevedo RN

## 2021-05-04 ENCOUNTER — ANTICOAGULATION THERAPY VISIT (OUTPATIENT)
Dept: FAMILY MEDICINE | Facility: CLINIC | Age: 76
End: 2021-05-04

## 2021-05-04 DIAGNOSIS — Z79.01 LONG TERM CURRENT USE OF ANTICOAGULANT THERAPY: ICD-10-CM

## 2021-05-04 DIAGNOSIS — I48.91 ATRIAL FIBRILLATION, UNSPECIFIED TYPE (H): ICD-10-CM

## 2021-05-04 DIAGNOSIS — I26.99 PULMONARY EMBOLUS (H): ICD-10-CM

## 2021-05-04 LAB
CAPILLARY BLOOD COLLECTION: NORMAL
INR PPP: 1.9 (ref 0.86–1.14)

## 2021-05-04 PROCEDURE — 99207 PR NO CHARGE NURSE ONLY: CPT | Performed by: FAMILY MEDICINE

## 2021-05-04 PROCEDURE — 85610 PROTHROMBIN TIME: CPT | Performed by: FAMILY MEDICINE

## 2021-05-04 PROCEDURE — 36416 COLLJ CAPILLARY BLOOD SPEC: CPT | Performed by: FAMILY MEDICINE

## 2021-05-04 NOTE — PROGRESS NOTES
Anticoagulation Management    Unable to reach Rob today.    Today's INR result of 1.9 is subtherapeutic (goal INR of 2.0-3.0).  Result received from: Clinic Lab    Follow up required to discuss out of range INR . Per discussion with Tasha Randhawa [facility]: After review of risk factors, ok to give boost of 5 mg today, discontinue Lovenox injections and recheck INR in 1 week.    No answer. Left  to call [phone]. Can transfer to [facility] at [phone].    Anticoagulation clinic to follow up    Deisy Acevedo RN    ANTICOAGULATION FOLLOW-UP CLINIC VISIT    Patient Name:  Rob Beavers  Date:  5/4/2021  Contact Type:  Telephone    SUBJECTIVE:  Patient Findings     Comments:  Patient returned call. The patient was assessed for diet, medication, and activity level changes, missed or extra doses, bruising or bleeding, with no problem findings. Informed patient that risk factors of stopping Lovenox injections vs continuing were removed with Anticoag Pharmacist and at this point we can give a boost of 5 mg warfarin tonight then continue with maintenance dosing and he can discontinue the Lovenox injections. Check INR next week. Patient stated understanding and is in agreement with plan.  Deisy SUAREZ RN  Anticoagulation Team          Clinical Outcomes     Negatives:  Major bleeding event, Thromboembolic event, Anticoagulation-related hospital admission, Anticoagulation-related ED visit, Anticoagulation-related fatality    Comments:  Patient returned call. The patient was assessed for diet, medication, and activity level changes, missed or extra doses, bruising or bleeding, with no problem findings. Informed patient that risk factors of stopping Lovenox injections vs continuing were removed with Anticoag Pharmacist and at this point we can give a boost of 5 mg warfarin tonight then continue with maintenance dosing and he can discontinue the Lovenox injections. Check INR next week. Patient stated understanding and is  in agreement with plan.  Deisy SUAREZ RN  Anticoagulation Team             OBJECTIVE    Recent labs: (last 7 days)     21  1109   INR 1.90*       ASSESSMENT / PLAN  INR assessment SUB    Recheck INR In: 1 WEEK    INR Location Clinic      Anticoagulation Summary  As of 2021    INR goal:  2.0-3.0   TTR:  61.7 % (1 y)   INR used for dosin.90 (2021)   Warfarin maintenance plan:  5 mg (5 mg x 1) every Fri; 2.5 mg (5 mg x 0.5) all other days   Full warfarin instructions:  : 5 mg; Otherwise 5 mg every Fri; 2.5 mg all other days   Weekly warfarin total:  20 mg   Plan last modified:  Alecia Saavedra RN (3/15/2016)   Next INR check:  2021   Priority:  High   Target end date:  Indefinite    Indications    Long-term (current) use of anticoagulants [Z79.01] [Z79.01]  Pulmonary embolus (H) [I26.99]  Atrial fibrillation  unspecified type (H) [I48.91]             Anticoagulation Episode Summary     INR check location:      Preferred lab:      Send INR reminders to:  [facility]    Comments:        Anticoagulation Care Providers     Provider Role Specialty Phone number    Tian Santana MD Referring Family Medicine [phone]            See the Encounter Report to view Anticoagulation Flowsheet and Dosing Calendar (Go to Encounters tab in chart review, and find the Anticoagulation Therapy Visit)    Dosage adjustment made based on physician directed care plan.    Deisy Acevedo RN

## 2021-05-12 ENCOUNTER — ANTICOAGULATION THERAPY VISIT (OUTPATIENT)
Dept: FAMILY MEDICINE | Facility: CLINIC | Age: 76
End: 2021-05-12

## 2021-05-12 DIAGNOSIS — I48.91 ATRIAL FIBRILLATION, UNSPECIFIED TYPE (H): ICD-10-CM

## 2021-05-12 DIAGNOSIS — I26.99 PULMONARY EMBOLUS (H): ICD-10-CM

## 2021-05-12 DIAGNOSIS — Z79.01 LONG TERM CURRENT USE OF ANTICOAGULANT THERAPY: ICD-10-CM

## 2021-05-12 LAB
CAPILLARY BLOOD COLLECTION: NORMAL
INR PPP: 2 (ref 0.86–1.14)

## 2021-05-12 PROCEDURE — 36416 COLLJ CAPILLARY BLOOD SPEC: CPT | Performed by: FAMILY MEDICINE

## 2021-05-12 PROCEDURE — 85610 PROTHROMBIN TIME: CPT | Performed by: FAMILY MEDICINE

## 2021-05-12 PROCEDURE — 99207 PR NO CHARGE NURSE ONLY: CPT | Performed by: FAMILY MEDICINE

## 2021-05-12 NOTE — PROGRESS NOTES
ANTICOAGULATION FOLLOW-UP CLINIC VISIT    Patient Name:  Rob Beavers  Date:  2021  Contact Type:  Telephone    SUBJECTIVE:  Patient Findings     Comments:  Called patient to discuss today's INR results: The patient was assessed for diet, medication, and activity level changes, missed or extra doses, bruising or bleeding, with no problem findings. Reviewed maintenance warfarin dosing with patient. Patient will remain on the same dose until next INR check. No other questions or concerns. Scheduled next lab-only INR in 2 weeks.  Deisy SUAREZ RN  Anticoagulation Team          Clinical Outcomes     Negatives:  Major bleeding event, Thromboembolic event, Anticoagulation-related hospital admission, Anticoagulation-related ED visit, Anticoagulation-related fatality    Comments:  Called patient to discuss today's INR results: The patient was assessed for diet, medication, and activity level changes, missed or extra doses, bruising or bleeding, with no problem findings. Reviewed maintenance warfarin dosing with patient. Patient will remain on the same dose until next INR check. No other questions or concerns. Scheduled next lab-only INR in 2 weeks.  Deisy SUAREZ RN  Anticoagulation Team             OBJECTIVE    Recent labs: (last 7 days)     21  0844   INR 2.00*       ASSESSMENT / PLAN  INR assessment THER    Recheck INR In: 2 WEEKS    INR Location Clinic      Anticoagulation Summary  As of 2021    INR goal:  2.0-3.0   TTR:  59.8 % (1 y)   INR used for dosin.00 (2021)   Warfarin maintenance plan:  5 mg (5 mg x 1) every Fri; 2.5 mg (5 mg x 0.5) all other days   Full warfarin instructions:  5 mg every Fri; 2.5 mg all other days   Weekly warfarin total:  20 mg   No change documented:  Deisy Acevedo RN   Plan last modified:  Alecia Saavedra RN (3/15/2016)   Next INR check:  2021   Priority:  Maintenance   Target end date:  Indefinite    Indications    Long-term (current) use of  anticoagulants [Z79.01] [Z79.01]  Pulmonary embolus (H) [I26.99]  Atrial fibrillation  unspecified type (H) [I48.91]             Anticoagulation Episode Summary     INR check location:      Preferred lab:      Send INR reminders to:  [facility]    Comments:        Anticoagulation Care Providers     Provider Role Specialty Phone number    Tian Santana MD Referring Family Medicine [phone]            See the Encounter Report to view Anticoagulation Flowsheet and Dosing Calendar (Go to Encounters tab in chart review, and find the Anticoagulation Therapy Visit)    Deisy Acevedo RN

## 2021-05-26 ENCOUNTER — ANTICOAGULATION THERAPY VISIT (OUTPATIENT)
Dept: FAMILY MEDICINE | Facility: CLINIC | Age: 76
End: 2021-05-26

## 2021-05-26 DIAGNOSIS — I26.99 PULMONARY EMBOLUS (H): ICD-10-CM

## 2021-05-26 DIAGNOSIS — I48.91 ATRIAL FIBRILLATION, UNSPECIFIED TYPE (H): ICD-10-CM

## 2021-05-26 DIAGNOSIS — Z79.01 LONG TERM CURRENT USE OF ANTICOAGULANT THERAPY: ICD-10-CM

## 2021-05-26 LAB
CAPILLARY BLOOD COLLECTION: NORMAL
INR PPP: 2 (ref 0.86–1.14)

## 2021-05-26 PROCEDURE — 99207 PR NO CHARGE NURSE ONLY: CPT | Performed by: FAMILY MEDICINE

## 2021-05-26 PROCEDURE — 36416 COLLJ CAPILLARY BLOOD SPEC: CPT | Performed by: FAMILY MEDICINE

## 2021-05-26 PROCEDURE — 85610 PROTHROMBIN TIME: CPT | Performed by: FAMILY MEDICINE

## 2021-05-26 NOTE — PROGRESS NOTES
ANTICOAGULATION FOLLOW-UP CLINIC VISIT    Patient Name:  Rob Beavers  Date:  2021  Contact Type:  Telephone/ Rob    SUBJECTIVE:  Patient Findings     Comments:  The patient was assessed for diet, medication, and activity level changes, missed or extra doses, bruising or bleeding, with no problem findings.          Clinical Outcomes     Negatives:  Major bleeding event, Thromboembolic event, Anticoagulation-related hospital admission, Anticoagulation-related ED visit, Anticoagulation-related fatality    Comments:  The patient was assessed for diet, medication, and activity level changes, missed or extra doses, bruising or bleeding, with no problem findings.             OBJECTIVE    Recent labs: (last 7 days)     21  0812   INR 2.00*       ASSESSMENT / PLAN  INR assessment THER    Recheck INR In: 6 WEEKS    INR Location Clinic      Anticoagulation Summary  As of 2021    INR goal:  2.0-3.0   TTR:  59.8 % (1 y)   INR used for dosin.00 (2021)   Warfarin maintenance plan:  5 mg (5 mg x 1) every Fri; 2.5 mg (5 mg x 0.5) all other days   Full warfarin instructions:  5 mg every Fri; 2.5 mg all other days   Weekly warfarin total:  20 mg   No change documented:  Dontae Barreto RN   Plan last modified:  Alecia Saavedra RN (3/15/2016)   Next INR check:  2021   Priority:  Maintenance   Target end date:  Indefinite    Indications    Long-term (current) use of anticoagulants [Z79.01] [Z79.01]  Pulmonary embolus (H) [I26.99]  Atrial fibrillation  unspecified type (H) [I48.91]             Anticoagulation Episode Summary     INR check location:      Preferred lab:      Send INR reminders to:  [facility]    Comments:        Anticoagulation Care Providers     Provider Role Specialty Phone number    Tian Santana MD Referring Family Medicine [phone]            See the Encounter Report to view Anticoagulation Flowsheet and Dosing Calendar (Go to Encounters tab in chart  review, and find the Anticoagulation Therapy Visit)    Patient INR is therapeutic.  Patient will continue to take 20 mg weekly dosing and follow up in 6 weeks or sooner for any problems or concerns.        Dontae Barreto RN

## 2021-06-28 ENCOUNTER — TELEPHONE (OUTPATIENT)
Dept: FAMILY MEDICINE | Facility: CLINIC | Age: 76
End: 2021-06-28

## 2021-06-28 DIAGNOSIS — I26.99 PULMONARY EMBOLUS (H): ICD-10-CM

## 2021-06-28 DIAGNOSIS — Z79.01 LONG TERM CURRENT USE OF ANTICOAGULANT THERAPY: ICD-10-CM

## 2021-06-28 DIAGNOSIS — I48.91 ATRIAL FIBRILLATION, UNSPECIFIED TYPE (H): ICD-10-CM

## 2021-06-28 NOTE — TELEPHONE ENCOUNTER
Patient had similar procedure 4/23/21. Approved for 5-day hold with Lovenox bridging every 12 hours. Routing to [employer] Tasha Morfin to assess and determine if any changes necessary or same hold/bridge/restart for upcoming procedure.    Deisy SUAREZ RN  Anticoagulation Team

## 2021-06-28 NOTE — TELEPHONE ENCOUNTER
Patient is scheduled for Halo EGD on 7/26/21. [facility] is requesting 4 day hold and advise regarding need for bridging?    Call Hellen ROBIN, MNDAVID back with orders.  Elissa Portillo RN

## 2021-07-02 NOTE — TELEPHONE ENCOUNTER
Whit with [facility] also calling about the hold orders. Please call her back at [phone].    China Chavarria RN, BSN, PHN

## 2021-07-05 NOTE — TELEPHONE ENCOUNTER
ANGELITA-PROCEDURAL ANTICOAGULATION  MANAGEMENT    Assessment     Warfarin interruption plan for HALO EGD on 7/26/21.    Unprovoked PE (2014)       Risk stratification for thromboembolism: Low (2012 Chest guidelines)     Afib added to problem list on 7/11/2019 - last EKG 2017. Will seek clarification from PCP regarding whether this should be an active problem.     VTE: 2018 American Society of Hematology recommends against bridging in low and moderate risk VTE patients holding warfarin    Plan       Pre-Procedure:    Hold warfarin for 4 days until after procedure starting: Thursday, July 22       Post-Procedure:    Resume warfarin dose if okay with provider doing procedure on night of procedure, Monday, July 26 PM: take 5 mg x 1, then resume home dose    Recheck INR 7-10 days after resuming warfarin   ?   [facility], [facility]    Subjective/Objective:      Rob NIGEL Beavers, a 75 year old male    Reason for Anticoagulation: PE    Goal INR Range: 2.0-3.0    Patient bridged in past: Yes: most recently in April 2021 for similar procedure.      Wt Readings from Last 3 Encounters:   04/08/21 95.3 kg (210 lb)   01/12/21 95.3 kg (210 lb)   10/29/20 94.3 kg (208 lb)        Ideal body weight: 66.1 kg (145 lb 11.6 oz)  Adjusted ideal body weight: 77.8 kg (171 lb 6.9 oz)     Lab Results   Component Value Date    INR 2.00 (H) 05/26/2021    INR 2.00 (H) 05/12/2021    INR 1.90 (H) 05/04/2021     Lab Results   Component Value Date    HGB 16.5 04/08/2021    HCT 35.3 06/30/2017     06/30/2017     Lab Results   Component Value Date    CR 0.82 04/08/2021    CR 0.84 08/12/2020    CR 0.89 07/11/2019

## 2021-07-06 NOTE — TELEPHONE ENCOUNTER
Next INR is 7/7/21. [facility] RN can discuss with patient at that time.  Deisy SUAREZ RN  Anticoagulation Team

## 2021-07-07 ENCOUNTER — ANTICOAGULATION THERAPY VISIT (OUTPATIENT)
Dept: FAMILY MEDICINE | Facility: CLINIC | Age: 76
End: 2021-07-07

## 2021-07-07 ENCOUNTER — TELEPHONE (OUTPATIENT)
Dept: FAMILY MEDICINE | Facility: CLINIC | Age: 76
End: 2021-07-07

## 2021-07-07 DIAGNOSIS — I48.91 ATRIAL FIBRILLATION, UNSPECIFIED TYPE (H): ICD-10-CM

## 2021-07-07 DIAGNOSIS — Z79.01 LONG TERM CURRENT USE OF ANTICOAGULANT THERAPY: ICD-10-CM

## 2021-07-07 DIAGNOSIS — I48.91 ATRIAL FIBRILLATION, UNSPECIFIED TYPE (H): Primary | ICD-10-CM

## 2021-07-07 DIAGNOSIS — I26.99 PULMONARY EMBOLUS (H): ICD-10-CM

## 2021-07-07 LAB
CAPILLARY BLOOD COLLECTION: NORMAL
INR PPP: 2.4 (ref 0.86–1.14)

## 2021-07-07 PROCEDURE — 36416 COLLJ CAPILLARY BLOOD SPEC: CPT | Performed by: FAMILY MEDICINE

## 2021-07-07 PROCEDURE — 85610 PROTHROMBIN TIME: CPT | Performed by: FAMILY MEDICINE

## 2021-07-07 NOTE — PROGRESS NOTES
ANTICOAGULATION MANAGEMENT     Rob Beavers 75 year old male is on warfarin with therapeutic INR result. (Goal INR 2.0-3.0)    Recent labs: (last 7 days)     07/07/21  0822   INR 2.40*       ASSESSMENT     Source(s): Patient/Caregiver Call       Warfarin doses taken: Warfarin taken as instructed    Diet: No new diet changes identified    New illness, injury, or hospitalization: No    Medication/supplement changes: None noted    Signs or symptoms of bleeding or clotting: No    Previous INR: Therapeutic last 2(+) visits    Additional findings: Upcoming surgery/procedure EGD 7/26 - see procedure plan. 4 day hold without lovenox bridge. Recheck 10 days post procedure.     PLAN     Recommended plan for no diet, medication or health factor changes affecting INR     Dosing Instructions: Continue your current warfarin dose with next INR in 4 weeks       Summary  As of 7/7/2021    Full warfarin instructions:  7/22: Hold; 7/23: Hold; 7/24: Hold; 7/25: Hold; 7/26: 5 mg; Otherwise 5 mg every Fri; 2.5 mg all other days   Next INR check:  8/5/2021             Telephone call with Rob who verbalizes understanding and agrees to plan    Lab visit scheduled    Education provided: Please call back if any changes to your diet, medications or how you've been taking warfarin    Plan made per [facility] anticoagulation protocol    Dontae Barreto RN  Anticoagulation Clinic  7/7/2021    _______________________________________________________________________     Anticoagulation Episode Summary     Current INR goal:  2.0-3.0   TTR:  59.8 % (1 y)   Target end date:  Indefinite   Send INR reminders to:  [facility]    Indications    Long-term (current) use of anticoagulants [Z79.01] [Z79.01]  Pulmonary embolus (H) [I26.99]  Atrial fibrillation  unspecified type (H) [I48.91]           Comments:           Anticoagulation Care Providers     Provider Role Specialty Phone number    Tian Santana MD Referring Family Medicine  [phone]

## 2021-07-07 NOTE — TELEPHONE ENCOUNTER
ANTICOAGULATION MANAGEMENT      Rob Beavers due for annual renewal of referral to anticoagulation monitoring. Order pended for your review and signature.      ANTICOAGULATION SUMMARY      Warfarin indication(s)     Atrial fibrillation  PE    Heart valve present?  NO       Current goal range   INR: 2.0-3.0     Goal appropriate for indication? Yes, INR 2-3 appropriate for hx of DVT, PE, hypercoagulable state, Afib, LVAD, or bileaflet AVR without risk factors     Current duration of therapy Indefinite/long term therapy   Time in Therapeutic Range (TTR)  (Goal > 60%) 59.8%       Office visit with referring provider's group within last year yes on 4/8/21       Dontae Barreto RN

## 2021-07-12 ENCOUNTER — TELEPHONE (OUTPATIENT)
Dept: FAMILY MEDICINE | Facility: CLINIC | Age: 76
End: 2021-07-12

## 2021-07-12 NOTE — TELEPHONE ENCOUNTER
[facility] left a message stating they are still waiting for hold/bridge instructions. Please call or fax orders.    P: [phone]  F: [phone]   USE OF ACCESSORY [facility]CLES

## 2021-07-12 NOTE — TELEPHONE ENCOUNTER
Detailed message left at [facility] advising 4 day warfarin hold, no bridge.     Ting Barreto RN   [facility]  [place], [place], [place]

## 2021-07-26 ENCOUNTER — TRANSFERRED RECORDS (OUTPATIENT)
Dept: HEALTH INFORMATION MANAGEMENT | Facility: CLINIC | Age: 76
End: 2021-07-26

## 2021-08-05 ENCOUNTER — ANTICOAGULATION THERAPY VISIT (OUTPATIENT)
Dept: ANTICOAGULATION | Facility: CLINIC | Age: 76
End: 2021-08-05

## 2021-08-05 ENCOUNTER — LAB (OUTPATIENT)
Dept: LAB | Facility: CLINIC | Age: 76
End: 2021-08-05
Payer: COMMERCIAL

## 2021-08-05 DIAGNOSIS — Z79.01 LONG TERM CURRENT USE OF ANTICOAGULANT THERAPY: ICD-10-CM

## 2021-08-05 DIAGNOSIS — I48.91 ATRIAL FIBRILLATION, UNSPECIFIED TYPE (H): ICD-10-CM

## 2021-08-05 DIAGNOSIS — I26.99 PULMONARY EMBOLUS (H): ICD-10-CM

## 2021-08-05 DIAGNOSIS — Z79.01 LONG TERM CURRENT USE OF ANTICOAGULANT THERAPY: Primary | ICD-10-CM

## 2021-08-05 LAB — INR BLD: 2.5 (ref 0.9–1.1)

## 2021-08-05 PROCEDURE — 36416 COLLJ CAPILLARY BLOOD SPEC: CPT

## 2021-08-05 PROCEDURE — 85610 PROTHROMBIN TIME: CPT

## 2021-08-05 NOTE — PROGRESS NOTES
ANTICOAGULATION MANAGEMENT     Rob Beavers 75 year old male is on warfarin with therapeutic INR result. (Goal INR 2.0-3.0)    Recent labs: (last 7 days)     08/05/21  0846   INR 2.5*       ASSESSMENT     Source(s): Chart Review and Patient/Caregiver Call       Warfarin doses taken: Warfarin taken as instructed    Diet: No new diet changes identified    New illness, injury, or hospitalization: No    Medication/supplement changes: None noted    Signs or symptoms of bleeding or clotting: No    Previous INR: Therapeutic last 2(+) visits    Additional findings: None     PLAN     Recommended plan for no diet, medication or health factor changes affecting INR     Dosing Instructions: Continue your current warfarin dose with next INR in 6 weeks       Summary  As of 8/5/2021    Full warfarin instructions:  5 mg every Fri; 2.5 mg all other days   Next INR check:  9/16/2021             Telephone call with Rob who verbalizes understanding and agrees to plan    Lab visit scheduled    Education provided: Please call back if any changes to your diet, medications or how you've been taking warfarin    Plan made per [facility] anticoagulation protocol    Dontae Barreto RN  Anticoagulation Clinic  8/5/2021    _______________________________________________________________________     Anticoagulation Episode Summary     Current INR goal:  2.0-3.0   TTR:  59.8 % (1 y)   Target end date:  Indefinite   Send INR reminders to:  [facility]    Indications    Long-term (current) use of anticoagulants [Z79.01] [Z79.01]  Pulmonary embolus (H) [I26.99]  Atrial fibrillation  unspecified type (H) [I48.91]           Comments:           Anticoagulation Care Providers     Provider Role Specialty Phone number    Tian Santana MD Referring Family Medicine [phone]

## 2021-08-10 DIAGNOSIS — G25.0 BENIGN FAMILIAL TREMOR: ICD-10-CM

## 2021-08-10 RX ORDER — PROPRANOLOL HYDROCHLORIDE 80 MG/1
CAPSULE, EXTENDED RELEASE ORAL
Qty: 90 CAPSULE | Refills: 3 | Status: SHIPPED | OUTPATIENT
Start: 2021-08-10 | End: 2022-08-02

## 2021-08-26 ENCOUNTER — OFFICE VISIT (OUTPATIENT)
Dept: FAMILY MEDICINE | Facility: CLINIC | Age: 76
End: 2021-08-26
Payer: COMMERCIAL

## 2021-08-26 VITALS
SYSTOLIC BLOOD PRESSURE: 128 MMHG | DIASTOLIC BLOOD PRESSURE: 82 MMHG | OXYGEN SATURATION: 96 % | WEIGHT: 208 LBS | RESPIRATION RATE: 10 BRPM | HEIGHT: 67 IN | TEMPERATURE: 97.9 F | BODY MASS INDEX: 32.65 KG/M2 | HEART RATE: 66 BPM

## 2021-08-26 DIAGNOSIS — Z12.11 SCREEN FOR COLON CANCER: Primary | ICD-10-CM

## 2021-08-26 DIAGNOSIS — Z12.5 SCREENING PSA (PROSTATE SPECIFIC ANTIGEN): ICD-10-CM

## 2021-08-26 DIAGNOSIS — K22.70 BARRETT'S ESOPHAGUS WITHOUT DYSPLASIA: ICD-10-CM

## 2021-08-26 DIAGNOSIS — I10 HYPERTENSION GOAL BP (BLOOD PRESSURE) < 140/90: ICD-10-CM

## 2021-08-26 DIAGNOSIS — Z23 NEED FOR VACCINATION: ICD-10-CM

## 2021-08-26 DIAGNOSIS — Z13.6 CARDIOVASCULAR SCREENING; LDL GOAL LESS THAN 160: ICD-10-CM

## 2021-08-26 DIAGNOSIS — I48.91 ATRIAL FIBRILLATION, UNSPECIFIED TYPE (H): ICD-10-CM

## 2021-08-26 DIAGNOSIS — Z79.01 LONG TERM CURRENT USE OF ANTICOAGULANT THERAPY: ICD-10-CM

## 2021-08-26 DIAGNOSIS — Z00.00 ENCOUNTER FOR ANNUAL PHYSICAL EXAM: ICD-10-CM

## 2021-08-26 LAB
ALBUMIN SERPL-MCNC: 3.8 G/DL (ref 3.4–5)
ALP SERPL-CCNC: 84 U/L (ref 40–150)
ALT SERPL W P-5'-P-CCNC: 40 U/L (ref 0–70)
ANION GAP SERPL CALCULATED.3IONS-SCNC: 4 MMOL/L (ref 3–14)
AST SERPL W P-5'-P-CCNC: 30 U/L (ref 0–45)
BILIRUB SERPL-MCNC: 0.7 MG/DL (ref 0.2–1.3)
BUN SERPL-MCNC: 23 MG/DL (ref 7–30)
CALCIUM SERPL-MCNC: 8.6 MG/DL (ref 8.5–10.1)
CHLORIDE BLD-SCNC: 106 MMOL/L (ref 94–109)
CHOLEST SERPL-MCNC: 177 MG/DL
CO2 SERPL-SCNC: 29 MMOL/L (ref 20–32)
CREAT SERPL-MCNC: 0.91 MG/DL (ref 0.66–1.25)
FASTING STATUS PATIENT QL REPORTED: YES
GFR SERPL CREATININE-BSD FRML MDRD: 83 ML/MIN/1.73M2
GLUCOSE BLD-MCNC: 103 MG/DL (ref 70–99)
HDLC SERPL-MCNC: 44 MG/DL
HGB BLD-MCNC: 15.6 G/DL (ref 13.3–17.7)
LDLC SERPL CALC-MCNC: 109 MG/DL
NONHDLC SERPL-MCNC: 133 MG/DL
POTASSIUM BLD-SCNC: 4.4 MMOL/L (ref 3.4–5.3)
PROT SERPL-MCNC: 7.8 G/DL (ref 6.8–8.8)
SODIUM SERPL-SCNC: 139 MMOL/L (ref 133–144)
TRIGL SERPL-MCNC: 121 MG/DL

## 2021-08-26 PROCEDURE — 99397 PER PM REEVAL EST PAT 65+ YR: CPT | Mod: 25 | Performed by: FAMILY MEDICINE

## 2021-08-26 PROCEDURE — 80061 LIPID PANEL: CPT | Performed by: FAMILY MEDICINE

## 2021-08-26 PROCEDURE — 85018 HEMOGLOBIN: CPT | Performed by: FAMILY MEDICINE

## 2021-08-26 PROCEDURE — 36415 COLL VENOUS BLD VENIPUNCTURE: CPT | Performed by: FAMILY MEDICINE

## 2021-08-26 PROCEDURE — 90714 TD VACC NO PRESV 7 YRS+ IM: CPT | Performed by: FAMILY MEDICINE

## 2021-08-26 PROCEDURE — 80053 COMPREHEN METABOLIC PANEL: CPT | Performed by: FAMILY MEDICINE

## 2021-08-26 PROCEDURE — 90471 IMMUNIZATION ADMIN: CPT | Performed by: FAMILY MEDICINE

## 2021-08-26 PROCEDURE — G0103 PSA SCREENING: HCPCS | Performed by: FAMILY MEDICINE

## 2021-08-26 RX ORDER — LISINOPRIL 10 MG/1
10 TABLET ORAL DAILY
Qty: 90 TABLET | Refills: 3 | Status: SHIPPED | OUTPATIENT
Start: 2021-08-26 | End: 2022-07-28

## 2021-08-26 ASSESSMENT — ENCOUNTER SYMPTOMS
DIZZINESS: 0
EYE PAIN: 0
NERVOUS/ANXIOUS: 0
CONSTIPATION: 0
HEADACHES: 0
ABDOMINAL PAIN: 0
HEARTBURN: 0
FEVER: 0
DYSURIA: 0
JOINT SWELLING: 0
HEMATURIA: 0
DIARRHEA: 0
PALPITATIONS: 0
NAUSEA: 0
CHILLS: 0
MYALGIAS: 0
PARESTHESIAS: 0
FREQUENCY: 0
WEAKNESS: 0
SHORTNESS OF BREATH: 0
COUGH: 0
ARTHRALGIAS: 1
SORE THROAT: 0
HEMATOCHEZIA: 0

## 2021-08-26 ASSESSMENT — ACTIVITIES OF DAILY LIVING (ADL): CURRENT_FUNCTION: NO ASSISTANCE NEEDED

## 2021-08-26 ASSESSMENT — PAIN SCALES - GENERAL: PAINLEVEL: NO PAIN (0)

## 2021-08-26 ASSESSMENT — MIFFLIN-ST. JEOR: SCORE: 1647.11

## 2021-08-26 NOTE — PROGRESS NOTES
"SUBJECTIVE:   Rob Beavers is a 75 year old male who presents for Preventive Visit.    Patient has been advised of split billing requirements and indicates understanding: Yes   Are you in the first 12 months of your Medicare coverage?  No    Healthy Habits:     In general, how would you rate your overall health?  Good    Frequency of exercise:  2-3 days/week    Duration of exercise:  Other    Do you usually eat at least 4 servings of fruit and vegetables a day, include whole grains    & fiber and avoid regularly eating high fat or \"junk\" foods?  No    Taking medications regularly:  Yes    Medication side effects:  None    Ability to successfully perform activities of daily living:  No assistance needed    Home Safety:  Lack of grab bars in the bathroom    Hearing Impairment:  Difficulty following a conversation in a noisy restaurant or crowded room    In the past 6 months, have you been bothered by leaking of urine?  No    In general, how would you rate your overall mental or emotional health?  Good      PHQ-2 Total Score: 0    Additional concerns today:  No    Do you feel safe in your environment? Yes    Have you ever done Advance Care Planning? (For example, a Health Directive, POLST, or a discussion with a medical provider or your loved ones about your wishes): No, advance care planning information given to patient to review.  Patient declined advance care planning discussion at this time.    Fall risk  Fallen 2 or more times in the past year?: (P) No  Any fall with injury in the past year?: (P) No    Cognitive Screening   1) Repeat 3 items (Leader, Season, Table)    2) Clock draw: NORMAL  3) 3 item recall: Recalls 3 objects  Results: 3 item recall, NORMAL clock     Mini-CogTM Copyright [employer]. Licensed by the author for use in [facility]; reprinted with permission (calvin@.[facility]). All rights reserved.      Do you have sleep apnea, excessive snoring or daytime drowsiness?: no    Reviewed and " updated as needed this visit by clinical staff  Tobacco  Allergies  Meds   Med Hx  Surg Hx  Fam Hx  Soc Hx        Reviewed and updated as needed this visit by Provider                Social History     Tobacco Use     Smoking status: Never Smoker     Smokeless tobacco: Never Used   Substance Use Topics     Alcohol use: Yes     Alcohol/week: 0.0 standard drinks     Comment: 1-3 beers once-twice/month     If you drink alcohol do you typically have >3 drinks per day or >7 drinks per week? No    Alcohol Use 8/26/2021   Prescreen: >3 drinks/day or >7 drinks/week? No   Prescreen: >3 drinks/day or >7 drinks/week? -   No flowsheet data found.            Current providers sharing in care for this patient include:     Patient Care Team:  Tian Santana MD as PCP - General (Family Practice)  Tian Santana MD as Assigned PCP    The following health maintenance items are reviewed in Epic and correct as of today:  Health Maintenance Due   Topic Date Due     ANNUAL REVIEW OF HM ORDERS  Never done     ZOSTER IMMUNIZATION (1 of 2) Never done     AORTIC ANEURYSM SCREENING (SYSTEM ASSIGNED)  Never done     DTAP/TDAP/TD IMMUNIZATION (3 - Td or Tdap) 12/13/2020     COLORECTAL CANCER SCREENING  05/25/2021     FALL RISK ASSESSMENT  08/12/2021     INFLUENZA VACCINE (1) 09/01/2021               Review of Systems   Constitutional: Negative for chills and fever.   HENT: Negative for congestion, ear pain, hearing loss and sore throat.    Eyes: Negative for pain and visual disturbance.   Respiratory: Negative for cough and shortness of breath.    Cardiovascular: Negative for chest pain, palpitations and peripheral edema.   Gastrointestinal: Negative for abdominal pain, constipation, diarrhea, heartburn, hematochezia and nausea.   Genitourinary: Negative for discharge, dysuria, frequency, genital sores, hematuria, impotence and urgency.   Musculoskeletal: Positive for arthralgias. Negative for joint swelling and myalgias.   Skin: Negative for  "rash.   Neurological: Negative for dizziness, weakness, headaches and paresthesias.   Psychiatric/Behavioral: Negative for mood changes. The patient is not nervous/anxious.        OBJECTIVE:   There were no vitals taken for this visit. Estimated body mass index is 32.89 kg/m  as calculated from the following:    Height as of 1/12/21: 1.702 m (5' 7\").    Weight as of 4/8/21: 95.3 kg (210 lb).  Physical Exam  GENERAL: healthy, alert and no distress  EYES: Eyes grossly normal to inspection, PERRL and conjunctivae and sclerae normal  HENT: ear canals and TM's normal, nose and mouth without ulcers or lesions  NECK: no adenopathy, no asymmetry, masses, or scars and thyroid normal to palpation  RESP: lungs clear to auscultation - no rales, rhonchi or wheezes  CV: regular rate and rhythm, normal S1 S2, no S3 or S4, no murmur, click or rub, no peripheral edema and peripheral pulses strong  ABDOMEN: soft, nontender, no hepatosplenomegaly, no masses and bowel sounds normal  MS: no gross musculoskeletal defects noted, no edema  SKIN: no suspicious lesions or rashes  NEURO: Normal strength and tone, mentation intact and speech normal  PSYCH: mentation appears normal, affect normal/bright    Diagnostic Test Results:  Labs reviewed in Epic    ASSESSMENT / PLAN:   Rob was seen today for physical.    Diagnoses and all orders for this visit:    Screen for colon cancer    Encounter for annual physical exam  -     REVIEW OF HEALTH MAINTENANCE PROTOCOL ORDERS  -     Comprehensive metabolic panel; Future  -     Hemoglobin; Future  -     Lipid Profile; Future  -     Comprehensive metabolic panel  -     Hemoglobin  -     Lipid Profile    Atrial fibrillation, unspecified type (H)    Hypertension goal BP (blood pressure) < 140/90  -     Comprehensive metabolic panel; Future  -     lisinopril (ZESTRIL) 10 MG tablet; Take 1 tablet (10 mg) by mouth daily  -     Comprehensive metabolic panel    Long-term (current) use of anticoagulants " "[Z79.01]    CARDIOVASCULAR SCREENING; LDL GOAL LESS THAN 160  -     Comprehensive metabolic panel; Future  -     Lipid Profile; Future  -     Comprehensive metabolic panel  -     Lipid Profile    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    Weight as of 4/8/21: 95.3 kg (210 lb).        He reports that he has never smoked. He has never used smokeless tobacco.      Appropriate preventive services were discussed with this patient, including applicable screening as appropriate for cardiovascular disease, diabetes, osteopenia/osteoporosis, and glaucoma.  As appropriate for age/gender, discussed screening for colorectal cancer, prostate cancer, breast cancer, and cervical cancer. Checklist reviewing preventive services available has been given to the patient.    Reviewed patients plan of care and provided an AVS. The Basic Care Plan (routine screening as documented in Health Maintenance) for Rob meets the Care Plan requirement. This Care Plan has been established and reviewed with the Patient.    Counseling Resources:  ATP IV Guidelines  Pooled Cohorts Equation Calculator  Breast Cancer Risk Calculator  Breast Cancer: Medication to Reduce Risk  FRAX Risk Assessment  ICSI Preventive Guidelines  Dietary Guidelines for Americans, 2010  [employer]'s MyPlate  ASA Prophylaxis  Lung CA Screening    Tian Santana MD  [facility]    Identified Health Risks:  "

## 2021-08-27 LAB — PSA SERPL-MCNC: 1.58 UG/L (ref 0–4)

## 2021-09-05 DIAGNOSIS — I27.82 OTHER CHRONIC PULMONARY EMBOLISM WITH ACUTE COR PULMONALE (H): ICD-10-CM

## 2021-09-05 DIAGNOSIS — I26.09 OTHER CHRONIC PULMONARY EMBOLISM WITH ACUTE COR PULMONALE (H): ICD-10-CM

## 2021-09-05 DIAGNOSIS — Z79.01 LONG TERM CURRENT USE OF ANTICOAGULANT THERAPY: ICD-10-CM

## 2021-09-07 RX ORDER — WARFARIN SODIUM 5 MG/1
TABLET ORAL
Qty: 53 TABLET | Refills: 1 | Status: SHIPPED | OUTPATIENT
Start: 2021-09-07 | End: 2022-02-14

## 2021-09-07 NOTE — TELEPHONE ENCOUNTER
Warfarin refill approved per [facility] protocol. Previous INR stable at 2.5 on 8/5/21. Wendy Adan, MARILEEN, RN

## 2021-09-16 ENCOUNTER — ANTICOAGULATION THERAPY VISIT (OUTPATIENT)
Dept: ANTICOAGULATION | Facility: CLINIC | Age: 76
End: 2021-09-16

## 2021-09-16 ENCOUNTER — LAB (OUTPATIENT)
Dept: LAB | Facility: CLINIC | Age: 76
End: 2021-09-16
Payer: COMMERCIAL

## 2021-09-16 DIAGNOSIS — I26.99 PULMONARY EMBOLUS (H): ICD-10-CM

## 2021-09-16 DIAGNOSIS — I48.91 ATRIAL FIBRILLATION, UNSPECIFIED TYPE (H): ICD-10-CM

## 2021-09-16 DIAGNOSIS — Z79.01 LONG TERM CURRENT USE OF ANTICOAGULANT THERAPY: ICD-10-CM

## 2021-09-16 DIAGNOSIS — Z79.01 LONG TERM CURRENT USE OF ANTICOAGULANT THERAPY: Primary | ICD-10-CM

## 2021-09-16 LAB — INR BLD: 1.9 (ref 0.9–1.1)

## 2021-09-16 PROCEDURE — 36416 COLLJ CAPILLARY BLOOD SPEC: CPT

## 2021-09-16 PROCEDURE — 85610 PROTHROMBIN TIME: CPT

## 2021-09-16 NOTE — PROGRESS NOTES
ANTICOAGULATION MANAGEMENT     Rob Beavers 76 year old male is on warfarin with subtherapeutic INR result. (Goal INR 2.0-3.0)    Recent labs: (last 7 days)     09/16/21  0846   INR 1.9*       ASSESSMENT     Source(s): Chart Review and Patient/Caregiver Call       Warfarin doses taken: Warfarin taken as instructed    Diet: No new diet changes identified    New illness, injury, or hospitalization: No    Medication/supplement changes: None noted    Signs or symptoms of bleeding or clotting: No    Previous INR: Therapeutic last 2(+) visits    Additional findings: Slight increase in activity: Riding bike more and went canoeing yesterday.      PLAN     Recommended plan for no diet, medication or health factor changes affecting INR     Dosing Instructions: Continue your current warfarin dose and hold off on greens for 2 days with next INR in 2 weeks       Summary  As of 9/16/2021    Full warfarin instructions:  5 mg every Fri; 2.5 mg all other days   Next INR check:  9/30/2021             Telephone call with Rob who verbalizes understanding and agrees to plan    Lab visit scheduled    Education provided: Please call back if any changes to your diet, medications or how you've been taking warfarin, Monitoring for bleeding signs and symptoms, Monitoring for clotting signs and symptoms and Importance of notifying clinic for changes in medications; a sooner lab recheck maybe needed.    Plan made per [facility] anticoagulation protocol    Wendy Adan RN  Anticoagulation Clinic  9/16/2021    _______________________________________________________________________     Anticoagulation Episode Summary     Current INR goal:  2.0-3.0   TTR:  65.4 % (1 y)   Target end date:  Indefinite   Send INR reminders to:  [facility]    Indications    Long-term (current) use of anticoagulants [Z79.01] [Z79.01]  Pulmonary embolus (H) [I26.99]  Atrial fibrillation  unspecified type (H) [I48.91]           Comments:            Anticoagulation Care Providers     Provider Role Specialty Phone number    Tian Santana MD Referring Family Medicine [phone]

## 2021-09-19 ENCOUNTER — HEALTH MAINTENANCE LETTER (OUTPATIENT)
Age: 76
End: 2021-09-19

## 2021-09-27 ENCOUNTER — TELEPHONE (OUTPATIENT)
Dept: FAMILY MEDICINE | Facility: CLINIC | Age: 76
End: 2021-09-27

## 2021-09-27 ENCOUNTER — MEDICAL CORRESPONDENCE (OUTPATIENT)
Dept: HEALTH INFORMATION MANAGEMENT | Facility: CLINIC | Age: 76
End: 2021-09-27

## 2021-09-27 DIAGNOSIS — I26.99 PULMONARY EMBOLUS (H): ICD-10-CM

## 2021-09-27 DIAGNOSIS — I48.91 ATRIAL FIBRILLATION, UNSPECIFIED TYPE (H): ICD-10-CM

## 2021-09-27 DIAGNOSIS — Z79.01 LONG TERM CURRENT USE OF ANTICOAGULANT THERAPY: Primary | ICD-10-CM

## 2021-09-27 NOTE — TELEPHONE ENCOUNTER
Incoming fax from [facility]    Procedure: EDG/Halo ablation  Date: 10/25/2021  Request: 4 day hold + bridging PRN    Please give orders to [facility]:  Phone: [phone]  Fax: [phone]

## 2021-10-04 ENCOUNTER — LAB (OUTPATIENT)
Dept: LAB | Facility: CLINIC | Age: 76
End: 2021-10-04
Payer: COMMERCIAL

## 2021-10-04 ENCOUNTER — ANTICOAGULATION THERAPY VISIT (OUTPATIENT)
Dept: ANTICOAGULATION | Facility: CLINIC | Age: 76
End: 2021-10-04

## 2021-10-04 DIAGNOSIS — Z79.01 LONG TERM CURRENT USE OF ANTICOAGULANT THERAPY: Primary | ICD-10-CM

## 2021-10-04 DIAGNOSIS — I26.99 PULMONARY EMBOLUS (H): ICD-10-CM

## 2021-10-04 DIAGNOSIS — I48.91 ATRIAL FIBRILLATION, UNSPECIFIED TYPE (H): ICD-10-CM

## 2021-10-04 DIAGNOSIS — Z79.01 LONG TERM CURRENT USE OF ANTICOAGULANT THERAPY: ICD-10-CM

## 2021-10-04 LAB — INR BLD: 2.7 (ref 0.9–1.1)

## 2021-10-04 PROCEDURE — 85610 PROTHROMBIN TIME: CPT

## 2021-10-04 PROCEDURE — 36416 COLLJ CAPILLARY BLOOD SPEC: CPT

## 2021-10-04 NOTE — TELEPHONE ENCOUNTER
"ANGELITA-PROCEDURAL ANTICOAGULATION  MANAGEMENT    ASSESSMENT     Warfarin interruption plan for EGD/Halo ablation on 10/25/21.    Indication for Anticoagulation: PE (unprovoked 2014), afib      Risk stratification for thromboembolism: low (2012 Chest guidelines and 2017 ACC periprocedure pathway for NVAF Expert Consensus)    [id number] = 4 (age+, HTN, VTE++)    atrial fibrilation. Added to problem list 7/11/2019 - last EKG 2017    VTE: 2018 American Society of Hematology recommends against bridging in low and moderate risk VTE patients holding warfarin     RECOMMENDATION       Pre-Procedure:  o Hold warfarin for 4 days, until after procedure starting: Thursday, October 21   o No Bridge      Post-Procedure:  o Resume warfarin dose if okay with provider doing procedure on night of procedure, Monday, October 25 PM: take 5 mg x 1 then resume home dose  o Recheck INR ~ 7 days after resuming warfarin       Plan routed to referring provider for approval  ?   Tasha Randhawa, [facility]    SUBJECTIVE/OBJECTIVE     Rob R Ruthann, a 76 year old male    Goal INR Range: 2.0-3.0     Patient bridged in past: Yes; last in April 2021; however, held x 4 days for same procedure in July 2021    Wt Readings from Last 3 Encounters:   08/26/21 94.3 kg (208 lb)   04/08/21 95.3 kg (210 lb)   01/12/21 95.3 kg (210 lb)      Ideal body weight: 66.1 kg (145 lb 11.6 oz)  Adjusted ideal body weight: 77.4 kg (170 lb 10.1 oz)     Estimated body mass index is 32.58 kg/m  as calculated from the following:    Height as of 8/26/21: 1.702 m (5' 7\").    Weight as of 8/26/21: 94.3 kg (208 lb).    Lab Results   Component Value Date    INR 2.7 (H) 10/04/2021    INR 1.9 (H) 09/16/2021    INR 2.5 (H) 08/05/2021     Lab Results   Component Value Date    HGB 15.6 08/26/2021    HGB 16.5 04/08/2021    HCT 35.3 06/30/2017     06/30/2017     Lab Results   Component Value Date    CR 0.91 08/26/2021    CR 0.82 04/08/2021    CR 0.84 08/12/2020         "

## 2021-10-04 NOTE — PROGRESS NOTES
ANTICOAGULATION MANAGEMENT     Rob Beavers 76 year old male is on warfarin with therapeutic INR result. (Goal INR 2.0-3.0)    Recent labs: (last 7 days)     10/04/21  1016   INR 2.7*       ASSESSMENT     Source(s): Chart Review and Patient/Caregiver Call       Warfarin doses taken: Warfarin taken as instructed    Diet: No new diet changes identified    New illness, injury, or hospitalization: No    Medication/supplement changes: None noted    Signs or symptoms of bleeding or clotting: No    Previous INR: Subtherapeutic    Additional findings: Upcoming surgery/procedure: Endoscopy (in October, date unknown - pt will confirm and myc [facility] team). **Patient required no bridging for 7/26/2021 Endoscopy, however, back in 4/22/2021 patient did bridge post-procedure. Plan to be clarified with PCP and [facility] team.     PLAN     Recommended plan for no diet, medication or health factor changes affecting INR     Dosing Instructions: Continue your current warfarin dose with next INR in 2-4 weeks       Summary  As of 10/4/2021    Full warfarin instructions:  5 mg every Fri; 2.5 mg all other days   Next INR check:  10/29/2021             Telephone call with Rob who verbalizes understanding and agrees to plan    Next INR visit to be scheduled once Endoscopy appt date determined (schedule 1 week post-procedure)    Education provided: Please call back if any changes to your diet, medications or how you've been taking warfarin, Monitoring for bleeding signs and symptoms, Monitoring for clotting signs and symptoms, Importance of notifying clinic for changes in medications; a sooner lab recheck maybe needed. and Importance of notifying clinic of upcoming surgeries and procedures 2 weeks in advance    Plan made per [facility] anticoagulation protocol    Wendy Adan, RN  Anticoagulation Clinic  10/4/2021    _______________________________________________________________________     Anticoagulation Episode Summary     Current INR goal:   2.0-3.0   TTR:  69.7 % (1 y)   Target end date:  Indefinite   Send INR reminders to:  [facility]    Indications    Long-term (current) use of anticoagulants [Z79.01] [Z79.01]  Pulmonary embolus (H) [I26.99]  Atrial fibrillation  unspecified type (H) [I48.91]           Comments:           Anticoagulation Care Providers     Provider Role Specialty Phone number    Tian Santana MD Referring Family Medicine [phone]

## 2021-10-04 NOTE — PROGRESS NOTES
From: Tasha Randhawa RPH  Sent: 10/4/2021   3:37 PM CDT  To: Wendy Adan RN    Okay to instruct on the 4-day hold. We have to await PCP response about bridging.

## 2021-10-05 NOTE — TELEPHONE ENCOUNTER
Left message to review with patient and that I was sending via [email or web address]. Advised to call [facility] with any questions.    Last warfarin dose: 10/20  10/21, NO warfarin  10/22, NO warfarin  10/23, NO warfarin  10/24, NO warfarin  10/25, DAY OF PROCEDURE, Restart warfarin 5mg (1 tabs) in the evening, unless instructed otherwise by the physician.  10/26, Warfarin 2.5mg (0.5 tabs) warfarin in the evening.   10/27, Warfarin 2.5mg (0.5 tabs) warfarin in the evening.   10/28, Warfarin 2.5mg (0.5 tabs) warfarin in the evening.   10/29, Warfarin 2.5mg (0.5 tabs) warfarin in the evening.   10/30, Warfarin 2.5mg (0.5 tabs) warfarin in the evening.   10/31, Warfarin 2.5mg (0.5 tabs) warfarin in the evening.   11/1 Recheck INR at the [facility] for further dosing instructions.   If you have any questions please call the Anticoagulation Clinic at [phone].    Ting Barreto RN   [facility]  [place], [facility]

## 2021-10-25 ENCOUNTER — TRANSFERRED RECORDS (OUTPATIENT)
Dept: HEALTH INFORMATION MANAGEMENT | Facility: CLINIC | Age: 76
End: 2021-10-25
Payer: COMMERCIAL

## 2021-11-08 ENCOUNTER — ANTICOAGULATION THERAPY VISIT (OUTPATIENT)
Dept: ANTICOAGULATION | Facility: CLINIC | Age: 76
End: 2021-11-08

## 2021-11-08 ENCOUNTER — LAB (OUTPATIENT)
Dept: LAB | Facility: CLINIC | Age: 76
End: 2021-11-08
Payer: COMMERCIAL

## 2021-11-08 DIAGNOSIS — Z79.01 LONG TERM CURRENT USE OF ANTICOAGULANT THERAPY: Primary | ICD-10-CM

## 2021-11-08 DIAGNOSIS — Z79.01 LONG TERM CURRENT USE OF ANTICOAGULANT THERAPY: ICD-10-CM

## 2021-11-08 DIAGNOSIS — I26.99 PULMONARY EMBOLUS (H): ICD-10-CM

## 2021-11-08 DIAGNOSIS — I48.91 ATRIAL FIBRILLATION, UNSPECIFIED TYPE (H): ICD-10-CM

## 2021-11-08 LAB — INR BLD: 2.4 (ref 0.9–1.1)

## 2021-11-08 PROCEDURE — 36416 COLLJ CAPILLARY BLOOD SPEC: CPT

## 2021-11-08 PROCEDURE — 85610 PROTHROMBIN TIME: CPT

## 2021-11-08 NOTE — PROGRESS NOTES
ANTICOAGULATION MANAGEMENT     Rob Beavers 76 year old male is on warfarin with therapeutic INR result. (Goal INR 2.0-3.0)    Recent labs: (last 7 days)     11/08/21  0948   INR 2.4*       ASSESSMENT     Source(s): Chart Review and Patient/Caregiver Call       Warfarin doses taken: Warfarin taken as instructed    Diet: No new diet changes identified    New illness, injury, or hospitalization: No    Medication/supplement changes: None noted    Signs or symptoms of bleeding or clotting: No    Previous INR: Therapeutic last 2(+) visits    Additional findings: None     PLAN     Recommended plan for no diet, medication or health factor changes affecting INR     Dosing Instructions: Continue your current warfarin dose with next INR in 6 weeks       Summary  As of 11/8/2021    Full warfarin instructions:  5 mg every Fri; 2.5 mg all other days   Next INR check:  12/20/2021             Telephone call with Rob who verbalizes understanding and agrees to plan    Lab visit scheduled    Education provided: Please call back if any changes to your diet, medications or how you've been taking warfarin    Plan made per [facility] anticoagulation protocol    Dontae Barreto RN  Anticoagulation Clinic  11/8/2021    _______________________________________________________________________     Anticoagulation Episode Summary     Current INR goal:  2.0-3.0   TTR:  76.8 % (1 y)   Target end date:  Indefinite   Send INR reminders to:  [facility]    Indications    Long-term (current) use of anticoagulants [Z79.01] [Z79.01]  Pulmonary embolus (H) [I26.99]  Atrial fibrillation  unspecified type (H) [I48.91]           Comments:           Anticoagulation Care Providers     Provider Role Specialty Phone number    Tian Santana MD Referring Family Medicine [phone]

## 2021-11-29 NOTE — TELEPHONE ENCOUNTER
Prescription approved per [facility] Refill Protocol.  Ting Barreto RN   [facility]  [place], [place], [place]    Spoke with patient and informed her of response below.

## 2021-12-15 ENCOUNTER — MYC MEDICAL ADVICE (OUTPATIENT)
Dept: FAMILY MEDICINE | Facility: CLINIC | Age: 76
End: 2021-12-15
Payer: COMMERCIAL

## 2021-12-20 ENCOUNTER — LAB (OUTPATIENT)
Dept: LAB | Facility: CLINIC | Age: 76
End: 2021-12-20
Payer: COMMERCIAL

## 2021-12-20 ENCOUNTER — ANTICOAGULATION THERAPY VISIT (OUTPATIENT)
Dept: ANTICOAGULATION | Facility: CLINIC | Age: 76
End: 2021-12-20

## 2021-12-20 DIAGNOSIS — I48.91 ATRIAL FIBRILLATION, UNSPECIFIED TYPE (H): ICD-10-CM

## 2021-12-20 DIAGNOSIS — I26.99 PULMONARY EMBOLUS (H): ICD-10-CM

## 2021-12-20 DIAGNOSIS — Z79.01 LONG TERM CURRENT USE OF ANTICOAGULANT THERAPY: ICD-10-CM

## 2021-12-20 LAB — INR BLD: 2.4 (ref 0.9–1.1)

## 2021-12-20 PROCEDURE — 36416 COLLJ CAPILLARY BLOOD SPEC: CPT

## 2021-12-20 PROCEDURE — 85610 PROTHROMBIN TIME: CPT

## 2021-12-20 NOTE — PROGRESS NOTES
ANTICOAGULATION MANAGEMENT     Rob Beavers 76 year old male is on warfarin with therapeutic INR result. (Goal INR 2.0-3.0)    Recent labs: (last 7 days)     12/20/21  0909   INR 2.4*       ASSESSMENT     Source(s): Chart Review and Patient/Caregiver Call       Warfarin doses taken: Warfarin taken as instructed    Diet: No new diet changes identified    New illness, injury, or hospitalization: No    Medication/supplement changes: None noted    Signs or symptoms of bleeding or clotting: No    Previous INR: Therapeutic last 2(+) visits    Additional findings: None     PLAN     Recommended plan for no diet, medication or health factor changes affecting INR     Dosing Instructions: Continue your current warfarin dose with next INR in 6 weeks       Summary  As of 12/20/2021    Full warfarin instructions:  5 mg every Fri; 2.5 mg all other days   Next INR check:  1/31/2022             Telephone call with Rob who verbalizes understanding and agrees to plan    Lab visit scheduled    Education provided: Importance of notifying clinic for changes in medications; a sooner lab recheck maybe needed. and Contact [phone]  with any changes, questions or concerns.     Plan made per [facility] anticoagulation protocol    Amanda Seth RN  Anticoagulation Clinic  12/20/2021    _______________________________________________________________________     Anticoagulation Episode Summary     Current INR goal:  2.0-3.0   TTR:  79.7 % (1 y)   Target end date:  Indefinite   Send INR reminders to:  [facility]    Indications    Long-term (current) use of anticoagulants [Z79.01] [Z79.01]  Pulmonary embolus (H) [I26.99]  Atrial fibrillation  unspecified type (H) [I48.91]           Comments:           Anticoagulation Care Providers     Provider Role Specialty Phone number    Tian Santana MD Referring Family Medicine [phone]

## 2021-12-21 DIAGNOSIS — K22.70 BARRETT'S ESOPHAGUS WITHOUT DYSPLASIA: ICD-10-CM

## 2021-12-22 RX ORDER — OMEPRAZOLE 40 MG/1
CAPSULE, DELAYED RELEASE ORAL
Qty: 90 CAPSULE | Refills: 2 | Status: SHIPPED | OUTPATIENT
Start: 2021-12-22 | End: 2022-09-08

## 2021-12-22 NOTE — TELEPHONE ENCOUNTER
Prescription approved per [facility] Refill Protocol.    Rosangela ENRIQUE RN  EP Triage

## 2022-01-31 ENCOUNTER — ANTICOAGULATION THERAPY VISIT (OUTPATIENT)
Dept: ANTICOAGULATION | Facility: CLINIC | Age: 77
End: 2022-01-31

## 2022-01-31 ENCOUNTER — LAB (OUTPATIENT)
Dept: LAB | Facility: CLINIC | Age: 77
End: 2022-01-31
Payer: COMMERCIAL

## 2022-01-31 DIAGNOSIS — Z79.01 LONG TERM CURRENT USE OF ANTICOAGULANT THERAPY: ICD-10-CM

## 2022-01-31 DIAGNOSIS — I26.99 PULMONARY EMBOLUS (H): ICD-10-CM

## 2022-01-31 DIAGNOSIS — I48.91 ATRIAL FIBRILLATION, UNSPECIFIED TYPE (H): ICD-10-CM

## 2022-01-31 DIAGNOSIS — Z79.01 LONG TERM CURRENT USE OF ANTICOAGULANT THERAPY: Primary | ICD-10-CM

## 2022-01-31 LAB — INR BLD: 2.2 (ref 0.9–1.1)

## 2022-01-31 PROCEDURE — 85610 PROTHROMBIN TIME: CPT

## 2022-01-31 PROCEDURE — 36416 COLLJ CAPILLARY BLOOD SPEC: CPT

## 2022-01-31 NOTE — PROGRESS NOTES
ANTICOAGULATION MANAGEMENT     Rob Beavers 76 year old male is on warfarin with therapeutic INR result. (Goal INR 2.0-3.0)    Recent labs: (last 7 days)     01/31/22  0906   INR 2.2*       ASSESSMENT     Source(s): Chart Review and Patient/Caregiver Call       Warfarin doses taken: Warfarin taken as instructed    Diet: No new diet changes identified    New illness, injury, or hospitalization: No    Medication/supplement changes: None noted    Signs or symptoms of bleeding or clotting: No    Previous INR: Therapeutic last 2(+) visits    Additional findings: None     PLAN     Recommended plan for no diet, medication or health factor changes affecting INR     Dosing Instructions: Continue your current warfarin dose with next INR in 6 weeks. Patient is leaving [facility] next week and wont be back for 8 weeks. Scheduled for soonest INR after he returns.      Summary  As of 1/31/2022    Full warfarin instructions:  5 mg every Fri; 2.5 mg all other days   Next INR check:  4/8/2022             Telephone call with Rob who verbalizes understanding and agrees to plan    Lab visit scheduled    Education provided: Please call back if any changes to your diet, medications or how you've been taking warfarin    Plan made per [facility] anticoagulation protocol    Dontae Barreto RN  Anticoagulation Clinic  1/31/2022    _______________________________________________________________________     Anticoagulation Episode Summary     Current INR goal:  2.0-3.0   TTR:  88.1 % (1 y)   Target end date:  Indefinite   Send INR reminders to:  [facility]    Indications    Long-term (current) use of anticoagulants [Z79.01] [Z79.01]  Pulmonary embolus (H) [I26.99]  Atrial fibrillation  unspecified type (H) [I48.91]           Comments:           Anticoagulation Care Providers     Provider Role Specialty Phone number    Tian Santana MD Referring Family Medicine [phone]

## 2022-03-16 DIAGNOSIS — I26.09 OTHER CHRONIC PULMONARY EMBOLISM WITH ACUTE COR PULMONALE (H): ICD-10-CM

## 2022-03-16 DIAGNOSIS — Z79.01 LONG TERM CURRENT USE OF ANTICOAGULANT THERAPY: ICD-10-CM

## 2022-03-16 DIAGNOSIS — I27.82 OTHER CHRONIC PULMONARY EMBOLISM WITH ACUTE COR PULMONALE (H): ICD-10-CM

## 2022-03-16 RX ORDER — WARFARIN SODIUM 5 MG/1
TABLET ORAL
Qty: 90 TABLET | Refills: 1 | Status: SHIPPED | OUTPATIENT
Start: 2022-03-16 | End: 2022-05-06

## 2022-03-16 NOTE — TELEPHONE ENCOUNTER
Warfarin - Prescription approved per [facility] Refill Protocol.    Ting Barreto RN   [facility]

## 2022-04-08 ENCOUNTER — LAB (OUTPATIENT)
Dept: LAB | Facility: CLINIC | Age: 77
End: 2022-04-08
Payer: COMMERCIAL

## 2022-04-08 ENCOUNTER — ANTICOAGULATION THERAPY VISIT (OUTPATIENT)
Dept: ANTICOAGULATION | Facility: CLINIC | Age: 77
End: 2022-04-08

## 2022-04-08 DIAGNOSIS — Z79.01 LONG TERM CURRENT USE OF ANTICOAGULANT THERAPY: ICD-10-CM

## 2022-04-08 DIAGNOSIS — I26.99 PULMONARY EMBOLUS (H): ICD-10-CM

## 2022-04-08 DIAGNOSIS — Z79.01 LONG TERM CURRENT USE OF ANTICOAGULANT THERAPY: Primary | ICD-10-CM

## 2022-04-08 DIAGNOSIS — I48.91 ATRIAL FIBRILLATION, UNSPECIFIED TYPE (H): ICD-10-CM

## 2022-04-08 LAB — INR BLD: 2.8 (ref 0.9–1.1)

## 2022-04-08 PROCEDURE — 36416 COLLJ CAPILLARY BLOOD SPEC: CPT

## 2022-04-08 PROCEDURE — 85610 PROTHROMBIN TIME: CPT

## 2022-04-08 NOTE — PROGRESS NOTES
ANTICOAGULATION MANAGEMENT     Rob Beavers 76 year old male is on warfarin with therapeutic INR result. (Goal INR 2.0-3.0)    Recent labs: (last 7 days)     04/08/22  0904   INR 2.8*       ASSESSMENT       Source(s): Chart Review and Patient/Caregiver Call       Warfarin doses taken: Warfarin taken as instructed - missed 2-3 days back in February during a road trip    Diet: No new diet changes identified    New illness, injury, or hospitalization: No    Medication/supplement changes: None noted    Signs or symptoms of bleeding or clotting: No    Previous INR: Therapeutic last 2(+) visits    Additional findings: Upcoming surgery/procedure Endoscopy to be scheduled - pt will keep [facility] team updated on date once scheduled       PLAN     Recommended plan for no diet, medication or health factor changes affecting INR     Dosing Instructions: continue your current warfarin dose with next INR in 6 weeks       Summary  As of 4/8/2022    Full warfarin instructions:  5 mg every Fri; 2.5 mg all other days   Next INR check:  5/20/2022             Telephone call with Rob who verbalizes understanding and agrees to plan    Lab visit scheduled    Education provided: Please call back if any changes to your diet, medications or how you've been taking warfarin, Monitoring for bleeding signs and symptoms, Monitoring for clotting signs and symptoms and Importance of notifying clinic for changes in medications; a sooner lab recheck maybe needed.    Plan made per [facility] anticoagulation protocol    Wendy Adan RN  Anticoagulation Clinic  4/8/2022    _______________________________________________________________________     Anticoagulation Episode Summary     Current INR goal:  2.0-3.0   TTR:  88.4 % (1 y)   Target end date:  Indefinite   Send INR reminders to:  [facility]    Indications    Long-term (current) use of anticoagulants [Z79.01] [Z79.01]  Pulmonary embolus (H) [I26.99]  Atrial fibrillation  unspecified type  (H) [I48.91]           Comments:           Anticoagulation Care Providers     Provider Role Specialty Phone number    Tian Santana MD Referring Family Medicine [phone]

## 2022-05-06 DIAGNOSIS — I27.82 OTHER CHRONIC PULMONARY EMBOLISM WITH ACUTE COR PULMONALE (H): ICD-10-CM

## 2022-05-06 DIAGNOSIS — I26.09 OTHER CHRONIC PULMONARY EMBOLISM WITH ACUTE COR PULMONALE (H): ICD-10-CM

## 2022-05-06 DIAGNOSIS — Z79.01 LONG TERM CURRENT USE OF ANTICOAGULANT THERAPY: ICD-10-CM

## 2022-05-06 RX ORDER — WARFARIN SODIUM 5 MG/1
TABLET ORAL
Qty: 52 TABLET | Refills: 1 | Status: ON HOLD | OUTPATIENT
Start: 2022-05-06 | End: 2022-08-06

## 2022-05-06 NOTE — TELEPHONE ENCOUNTER
ANTICOAGULATION MANAGEMENT:  Medication Refill    Anticoagulation Summary  As of 4/8/2022    Warfarin maintenance plan:  5 mg (5 mg x 1) every Fri; 2.5 mg (5 mg x 0.5) all other days   Next INR check:  5/20/2022   Target end date:  Indefinite    Indications    Long-term (current) use of anticoagulants [Z79.01] [Z79.01]  Pulmonary embolus (H) [I26.99]  Atrial fibrillation  unspecified type (H) [I48.91]             Anticoagulation Care Providers     Provider Role Specialty Phone number    Tian Santana MD Referring Family Medicine [phone]          Visit with referring provider/group within last year: Yes    [facility] referral signed within last year: Yes    Rob meets all criteria for refill (current [facility] referral, office visit with referring provider/group in last year, lab monitoring up to date or not exceeding 2 weeks overdue). Rx instructions and quantity supplied updated to match patient's current dosing plan. Warfarin 90 day supply with 1 refill granted per [facility] protocol     Dontae Barreto RN  Anticoagulation Clinic

## 2022-05-19 ENCOUNTER — ANTICOAGULATION THERAPY VISIT (OUTPATIENT)
Dept: ANTICOAGULATION | Facility: CLINIC | Age: 77
End: 2022-05-19

## 2022-05-19 ENCOUNTER — LAB (OUTPATIENT)
Dept: LAB | Facility: CLINIC | Age: 77
End: 2022-05-19
Payer: COMMERCIAL

## 2022-05-19 DIAGNOSIS — Z79.01 LONG TERM CURRENT USE OF ANTICOAGULANT THERAPY: ICD-10-CM

## 2022-05-19 DIAGNOSIS — I26.99 PULMONARY EMBOLUS (H): ICD-10-CM

## 2022-05-19 DIAGNOSIS — I48.91 ATRIAL FIBRILLATION, UNSPECIFIED TYPE (H): ICD-10-CM

## 2022-05-19 DIAGNOSIS — Z79.01 LONG TERM CURRENT USE OF ANTICOAGULANT THERAPY: Primary | ICD-10-CM

## 2022-05-19 LAB — INR BLD: 2.1 (ref 0.9–1.1)

## 2022-05-19 PROCEDURE — 85610 PROTHROMBIN TIME: CPT

## 2022-05-19 PROCEDURE — 36416 COLLJ CAPILLARY BLOOD SPEC: CPT

## 2022-05-19 NOTE — PROGRESS NOTES
ANTICOAGULATION MANAGEMENT     Rob Beavers 76 year old male is on warfarin with therapeutic INR result. (Goal INR 2.0-3.0)    Recent labs: (last 7 days)     05/19/22  0904   INR 2.1*       ASSESSMENT       Source(s): Chart Review and Patient/Caregiver Call       Warfarin doses taken: Warfarin taken as instructed    Diet: No new diet changes identified Was out of town last weekend so diet was not normal    New illness, injury, or hospitalization: No    Medication/supplement changes: None noted    Signs or symptoms of bleeding or clotting: No    Previous INR: Therapeutic last 2(+) visits    Additional findings: None  Will be seeing dentist and will also in the future have an endoscopy but nothing is scheduled yet       PLAN     Recommended plan for no diet, medication or health factor changes affecting INR     Dosing Instructions: continue your current warfarin dose with next INR in 6 weeks       Summary  As of 5/19/2022    Full warfarin instructions:  5 mg every Fri; 2.5 mg all other days   Next INR check:  6/30/2022             Telephone call with Rob who verbalizes understanding and agrees to plan    Lab visit scheduled    Education provided: Please call back if any changes to your diet, medications or how you've been taking warfarin    Plan made per [facility] anticoagulation protocol    Marelny Graham RN  Anticoagulation Clinic  5/19/2022    _______________________________________________________________________     Anticoagulation Episode Summary     Current INR goal:  2.0-3.0   TTR:  97.5 % (1 y)   Target end date:  Indefinite   Send INR reminders to:  [facility]    Indications    Long-term (current) use of anticoagulants [Z79.01] [Z79.01]  Pulmonary embolus (H) [I26.99]  Atrial fibrillation  unspecified type (H) [I48.91]           Comments:           Anticoagulation Care Providers     Provider Role Specialty Phone number    Tian Santana MD Referring Family Medicine [phone]

## 2022-06-27 ENCOUNTER — TRANSFERRED RECORDS (OUTPATIENT)
Dept: HEALTH INFORMATION MANAGEMENT | Facility: CLINIC | Age: 77
End: 2022-06-27

## 2022-06-30 ENCOUNTER — LAB (OUTPATIENT)
Dept: LAB | Facility: CLINIC | Age: 77
End: 2022-06-30
Payer: COMMERCIAL

## 2022-06-30 ENCOUNTER — ANTICOAGULATION THERAPY VISIT (OUTPATIENT)
Dept: ANTICOAGULATION | Facility: CLINIC | Age: 77
End: 2022-06-30

## 2022-06-30 ENCOUNTER — TELEPHONE (OUTPATIENT)
Dept: FAMILY MEDICINE | Facility: CLINIC | Age: 77
End: 2022-06-30

## 2022-06-30 DIAGNOSIS — I48.91 ATRIAL FIBRILLATION, UNSPECIFIED TYPE (H): ICD-10-CM

## 2022-06-30 DIAGNOSIS — Z79.01 LONG TERM CURRENT USE OF ANTICOAGULANT THERAPY: ICD-10-CM

## 2022-06-30 DIAGNOSIS — I26.99 PULMONARY EMBOLUS (H): Primary | ICD-10-CM

## 2022-06-30 DIAGNOSIS — I26.99 PULMONARY EMBOLUS (H): ICD-10-CM

## 2022-06-30 DIAGNOSIS — Z79.01 LONG TERM CURRENT USE OF ANTICOAGULANT THERAPY: Primary | ICD-10-CM

## 2022-06-30 LAB — INR BLD: 2.7 (ref 0.9–1.1)

## 2022-06-30 PROCEDURE — 85610 PROTHROMBIN TIME: CPT

## 2022-06-30 PROCEDURE — 36416 COLLJ CAPILLARY BLOOD SPEC: CPT

## 2022-06-30 NOTE — TELEPHONE ENCOUNTER
ANTICOAGULATION CLINIC REFERRAL RENEWAL REQUEST       An annual renewal order is required for all patients referred to [facility].?  Please review and sign the pended referral order for Rob Beavers.       ANTICOAGULATION SUMMARY      Warfarin indication(s)   Atrial Fibrillation and PE    Mechanical heart valve present?  NO       Current goal range   INR: 2.0-3.0     Goal appropriate for indication? Goal INR 2-3, standard for indication(s) above     Time in Therapeutic Range (TTR)  (Goal > 60%) 97.5%       Office visit with referring provider's group within last year yes on 8/26/21       Wendy Adan RN  [facility]

## 2022-06-30 NOTE — TELEPHONE ENCOUNTER
Patient is schedule for upcoming procedure: Endoscopy on 8/4/22. Patient has yet to receive procedure instructions from GI. Routing to [facility] for review. Next INR appt scheduled 8/11/22. Wendy Adan, MARILEEN, RN

## 2022-06-30 NOTE — PROGRESS NOTES
ANTICOAGULATION MANAGEMENT     Rob Beavers 76 year old male is on warfarin with therapeutic INR result. (Goal INR 2.0-3.0)    Recent labs: (last 7 days)     06/30/22  0858   INR 2.7*       ASSESSMENT       Source(s): Chart Review and Patient/Caregiver Call       Warfarin doses taken: Warfarin taken as instructed    Diet: No new diet changes identified    New illness, injury, or hospitalization: No    Medication/supplement changes: None noted    Signs or symptoms of bleeding or clotting: Yes, gum bleeding noted     Previous INR: Therapeutic last 2(+) visits    Additional findings: Endoscopy scheduled for 8/4/22 - patient has not received instructions yet, but 4-5 day hold is likely required - TE sent to [facility] for review / Dental work on hold at this time       PLAN     Recommended plan for temporary change(s) affecting INR     Dosing Instructions: continue your current warfarin dose with next INR in 6 weeks       Summary  As of 6/30/2022    Full warfarin instructions:  5 mg every Fri; 2.5 mg all other days   Next INR check:  8/11/2022             Telephone call with Rob who verbalizes understanding and agrees to plan    Lab visit scheduled    Education provided: Please call back if any changes to your diet, medications or how you've been taking warfarin, Monitoring for bleeding signs and symptoms, Monitoring for clotting signs and symptoms and Importance of notifying clinic for changes in medications; a sooner lab recheck maybe needed.    Plan made per [facility] anticoagulation protocol    Wendy Adan RN  Anticoagulation Clinic  6/30/2022    _______________________________________________________________________     Anticoagulation Episode Summary     Current INR goal:  2.0-3.0   TTR:  97.5 % (1 y)   Target end date:  Indefinite   Send INR reminders to:  [facility]    Indications    Long-term (current) use of anticoagulants [Z79.01] [Z79.01]  Pulmonary embolus (H) [I26.99]  Atrial  fibrillation  unspecified type (H) [I48.91]           Comments:           Anticoagulation Care Providers     Provider Role Specialty Phone number    Tian Santana MD Referring Family Medicine [phone]

## 2022-07-20 NOTE — TELEPHONE ENCOUNTER
"[facility]  MANAGEMENT    ASSESSMENT     Warfarin interruption plan for endoscopy on 8/4/22.     Indication for Anticoagulation: PE (unprovoked 2014)       Risk stratification for thromboembolism: low (2012 Chest guidelines)    [id number] = 4 (age+, HTN, VTE++)    Atrial fibrillation was added to problem list 7/11/2019 - last EKG [id number]    During 6/28/17 admission for laparoscopic cholecystectomy, patient came out of OR with new atrial fibrillation. Given diltiazem IVP; no prior hx of afib, patient on propranolol PTA for tremors at this time and was already being anticoagulated for prior hx of PE     VTE: 2018 American Society of Hematology recommends against bridging in low and moderate risk VTE patients holding warfarin        RECOMMENDATION       Pre-Procedure:  o Hold warfarin for 4 days, until after procedure starting: Sunday, July 31   o No Bridge      Post-Procedure:  o Resume warfarin dose if okay with provider doing procedure on night of procedure, Thursday, August 4 PM: take 5 mg x 1, then resume home dose  o Recheck INR ~ 7 days after resuming warfarin       Plan routed to referring provider for approval  ?   Tasha Randhawa [facility]    SUBJECTIVE/OBJECTIVE     Rob Beavers, a 76 year old male    Goal INR Range: 2.0-3.0     Patient bridged in past: Yes: last in 2017       Wt Readings from Last 3 Encounters:   08/26/21 94.3 kg (208 lb)   04/08/21 95.3 kg (210 lb)   01/12/21 95.3 kg (210 lb)      Patient weight not recorded     Estimated body mass index is 32.58 kg/m  as calculated from the following:    Height as of 8/26/21: 1.702 m (5' 7\").    Weight as of 8/26/21: 94.3 kg (208 lb).    Lab Results   Component Value Date    INR 2.7 (H) 06/30/2022    INR 2.1 (H) 05/19/2022    INR 2.8 (H) 04/08/2022     Lab Results   Component Value Date    HGB 15.6 08/26/2021    HGB 16.5 04/08/2021    HCT 35.3 06/30/2017     06/30/2017     Lab Results   Component Value Date    CR 0.91 08/26/2021 "    CR 0.82 04/08/2021    CR 0.84 08/12/2020

## 2022-07-21 NOTE — TELEPHONE ENCOUNTER
Left message for patient that below plan was sent via [employer] and to call with any questions.         7/30,  Take last dose of warfarin    7/31 to 8/3, NO warfarin    8/4, DAY OF PROCEDURE, Restart warfarin in the evening, if okay with provider doing the procedure.    Make sure to ask the provider doing your procedure if it is okay to begin your warfarin as planned     On 8/4 take warfarin booster dose of 5 mg then resume current warfarin dose, 5 mg Friday; 2.5 mg all other days    Recheck INR on 8/11/22 at 9 am to ensure INR returning to goal range.      Ting Barreto RN   [facility]

## 2022-07-26 DIAGNOSIS — I10 HYPERTENSION GOAL BP (BLOOD PRESSURE) < 140/90: ICD-10-CM

## 2022-07-26 NOTE — TELEPHONE ENCOUNTER
Katherine, Nurse with [facility] informed of coumadin Hold as noted below.  Adelaide Madrid, RN  Anticoagulation Nurse - [facility] [place] [place]

## 2022-07-28 RX ORDER — LISINOPRIL 10 MG/1
TABLET ORAL
Qty: 90 TABLET | Refills: 0 | Status: SHIPPED | OUTPATIENT
Start: 2022-07-28 | End: 2022-09-01

## 2022-07-28 NOTE — TELEPHONE ENCOUNTER
Prescription approved per [facility] Refill Protocol.  Becky Holloway, RN  [facility] RN Triage Team

## 2022-08-01 DIAGNOSIS — G25.0 BENIGN FAMILIAL TREMOR: ICD-10-CM

## 2022-08-02 RX ORDER — PROPRANOLOL HYDROCHLORIDE 80 MG/1
CAPSULE, EXTENDED RELEASE ORAL
Qty: 90 CAPSULE | Refills: 0 | Status: SHIPPED | OUTPATIENT
Start: 2022-08-02 | End: 2022-09-07

## 2022-08-02 NOTE — TELEPHONE ENCOUNTER
Prescription approved per [facility] Refill Protocol.  Jose Main RN  [facility] Triage Nurse

## 2022-08-03 ENCOUNTER — MYC MEDICAL ADVICE (OUTPATIENT)
Dept: FAMILY MEDICINE | Facility: CLINIC | Age: 77
End: 2022-08-03

## 2022-08-04 ENCOUNTER — APPOINTMENT (OUTPATIENT)
Dept: CT IMAGING | Facility: CLINIC | Age: 77
DRG: 065 | End: 2022-08-04
Attending: EMERGENCY MEDICINE
Payer: COMMERCIAL

## 2022-08-04 ENCOUNTER — NURSE TRIAGE (OUTPATIENT)
Dept: NURSING | Facility: CLINIC | Age: 77
End: 2022-08-04

## 2022-08-04 ENCOUNTER — APPOINTMENT (OUTPATIENT)
Dept: MRI IMAGING | Facility: CLINIC | Age: 77
DRG: 065 | End: 2022-08-04
Attending: EMERGENCY MEDICINE
Payer: COMMERCIAL

## 2022-08-04 ENCOUNTER — HOSPITAL ENCOUNTER (INPATIENT)
Facility: CLINIC | Age: 77
LOS: 2 days | Discharge: HOME | DRG: 065 | End: 2022-08-06
Attending: EMERGENCY MEDICINE | Admitting: INTERNAL MEDICINE
Payer: COMMERCIAL

## 2022-08-04 DIAGNOSIS — I26.99 PULMONARY EMBOLUS (H): ICD-10-CM

## 2022-08-04 DIAGNOSIS — I48.91 ATRIAL FIBRILLATION, UNSPECIFIED TYPE (H): ICD-10-CM

## 2022-08-04 DIAGNOSIS — Z79.01 LONG TERM CURRENT USE OF ANTICOAGULANT THERAPY: Primary | ICD-10-CM

## 2022-08-04 DIAGNOSIS — I63.9 CEREBROVASCULAR ACCIDENT (CVA), UNSPECIFIED MECHANISM (H): ICD-10-CM

## 2022-08-04 DIAGNOSIS — I48.91 ATRIAL FIBRILLATION, UNSPECIFIED TYPE (H): Primary | ICD-10-CM

## 2022-08-04 LAB
ALBUMIN SERPL-MCNC: 3.7 G/DL (ref 3.4–5)
ALP SERPL-CCNC: 77 U/L (ref 40–150)
ALT SERPL W P-5'-P-CCNC: 38 U/L (ref 0–70)
ANION GAP SERPL CALCULATED.3IONS-SCNC: 6 MMOL/L (ref 3–14)
AST SERPL W P-5'-P-CCNC: 31 U/L (ref 0–45)
ATRIAL RATE - MUSE: 68 BPM
BASOPHILS # BLD AUTO: 0.1 10E3/UL (ref 0–0.2)
BASOPHILS NFR BLD AUTO: 1 %
BILIRUB SERPL-MCNC: 0.8 MG/DL (ref 0.2–1.3)
BUN SERPL-MCNC: 15 MG/DL (ref 7–30)
CALCIUM SERPL-MCNC: 8.9 MG/DL (ref 8.5–10.1)
CHLORIDE BLD-SCNC: 105 MMOL/L (ref 94–109)
CO2 SERPL-SCNC: 27 MMOL/L (ref 20–32)
CREAT SERPL-MCNC: 0.94 MG/DL (ref 0.66–1.25)
DIASTOLIC BLOOD PRESSURE - MUSE: NORMAL MMHG
EOSINOPHIL # BLD AUTO: 0.4 10E3/UL (ref 0–0.7)
EOSINOPHIL NFR BLD AUTO: 5 %
ERYTHROCYTE [DISTWIDTH] IN BLOOD BY AUTOMATED COUNT: 13.1 % (ref 10–15)
GFR SERPL CREATININE-BSD FRML MDRD: 85 ML/MIN/1.73M2
GLUCOSE BLD-MCNC: 99 MG/DL (ref 70–99)
HCT VFR BLD AUTO: 44.4 % (ref 40–53)
HGB BLD-MCNC: 15.5 G/DL (ref 13.3–17.7)
HOLD SPECIMEN: NORMAL
IMM GRANULOCYTES # BLD: 0 10E3/UL
IMM GRANULOCYTES NFR BLD: 0 %
INR PPP: 1.23 (ref 0.85–1.15)
INTERPRETATION ECG - MUSE: NORMAL
LYMPHOCYTES # BLD AUTO: 2.2 10E3/UL (ref 0.8–5.3)
LYMPHOCYTES NFR BLD AUTO: 29 %
MCH RBC QN AUTO: 31.4 PG (ref 26.5–33)
MCHC RBC AUTO-ENTMCNC: 34.9 G/DL (ref 31.5–36.5)
MCV RBC AUTO: 90 FL (ref 78–100)
MONOCYTES # BLD AUTO: 0.8 10E3/UL (ref 0–1.3)
MONOCYTES NFR BLD AUTO: 10 %
NEUTROPHILS # BLD AUTO: 4.1 10E3/UL (ref 1.6–8.3)
NEUTROPHILS NFR BLD AUTO: 55 %
NRBC # BLD AUTO: 0 10E3/UL
NRBC BLD AUTO-RTO: 0 /100
P AXIS - MUSE: 44 DEGREES
PLATELET # BLD AUTO: 249 10E3/UL (ref 150–450)
POTASSIUM BLD-SCNC: 4.4 MMOL/L (ref 3.4–5.3)
PR INTERVAL - MUSE: 240 MS
PROT SERPL-MCNC: 7.7 G/DL (ref 6.8–8.8)
QRS DURATION - MUSE: 82 MS
QT - MUSE: 390 MS
QTC - MUSE: 414 MS
R AXIS - MUSE: 26 DEGREES
RADIOLOGIST FLAGS: ABNORMAL
RADIOLOGIST FLAGS: ABNORMAL
RBC # BLD AUTO: 4.94 10E6/UL (ref 4.4–5.9)
SARS-COV-2 RNA RESP QL NAA+PROBE: NEGATIVE
SODIUM SERPL-SCNC: 138 MMOL/L (ref 133–144)
SYSTOLIC BLOOD PRESSURE - MUSE: NORMAL MMHG
T AXIS - MUSE: 24 DEGREES
VENTRICULAR RATE- MUSE: 68 BPM
WBC # BLD AUTO: 7.6 10E3/UL (ref 4–11)

## 2022-08-04 PROCEDURE — 36415 COLL VENOUS BLD VENIPUNCTURE: CPT | Performed by: EMERGENCY MEDICINE

## 2022-08-04 PROCEDURE — 255N000002 HC RX 255 OP 636: Performed by: EMERGENCY MEDICINE

## 2022-08-04 PROCEDURE — 70496 CT ANGIOGRAPHY HEAD: CPT

## 2022-08-04 PROCEDURE — 85025 COMPLETE CBC W/AUTO DIFF WBC: CPT | Performed by: EMERGENCY MEDICINE

## 2022-08-04 PROCEDURE — 70498 CT ANGIOGRAPHY NECK: CPT

## 2022-08-04 PROCEDURE — 250N000009 HC RX 250: Performed by: EMERGENCY MEDICINE

## 2022-08-04 PROCEDURE — 93005 ELECTROCARDIOGRAM TRACING: CPT

## 2022-08-04 PROCEDURE — 70450 CT HEAD/BRAIN W/O DYE: CPT

## 2022-08-04 PROCEDURE — 99285 EMERGENCY DEPT VISIT HI MDM: CPT | Mod: 25

## 2022-08-04 PROCEDURE — 80061 LIPID PANEL: CPT | Performed by: INTERNAL MEDICINE

## 2022-08-04 PROCEDURE — C9803 HOPD COVID-19 SPEC COLLECT: HCPCS

## 2022-08-04 PROCEDURE — A9585 GADOBUTROL INJECTION: HCPCS | Performed by: EMERGENCY MEDICINE

## 2022-08-04 PROCEDURE — 85610 PROTHROMBIN TIME: CPT | Performed by: EMERGENCY MEDICINE

## 2022-08-04 PROCEDURE — 70553 MRI BRAIN STEM W/O & W/DYE: CPT

## 2022-08-04 PROCEDURE — 99223 1ST HOSP IP/OBS HIGH 75: CPT | Mod: AI | Performed by: INTERNAL MEDICINE

## 2022-08-04 PROCEDURE — 80053 COMPREHEN METABOLIC PANEL: CPT | Performed by: EMERGENCY MEDICINE

## 2022-08-04 PROCEDURE — 250N000013 HC RX MED GY IP 250 OP 250 PS 637: Performed by: EMERGENCY MEDICINE

## 2022-08-04 PROCEDURE — 120N000001 HC R&B MED SURG/OB

## 2022-08-04 PROCEDURE — U0003 INFECTIOUS AGENT DETECTION BY NUCLEIC ACID (DNA OR RNA); SEVERE ACUTE RESPIRATORY SYNDROME CORONAVIRUS 2 (SARS-COV-2) (CORONAVIRUS DISEASE [COVID-19]), AMPLIFIED PROBE TECHNIQUE, MAKING USE OF HIGH THROUGHPUT TECHNOLOGIES AS DESCRIBED BY CMS-2020-01-R: HCPCS | Performed by: EMERGENCY MEDICINE

## 2022-08-04 PROCEDURE — 250N000011 HC RX IP 250 OP 636: Performed by: EMERGENCY MEDICINE

## 2022-08-04 PROCEDURE — 83036 HEMOGLOBIN GLYCOSYLATED A1C: CPT | Performed by: INTERNAL MEDICINE

## 2022-08-04 PROCEDURE — 84520 ASSAY OF UREA NITROGEN: CPT | Performed by: EMERGENCY MEDICINE

## 2022-08-04 PROCEDURE — 82040 ASSAY OF SERUM ALBUMIN: CPT | Performed by: EMERGENCY MEDICINE

## 2022-08-04 RX ORDER — GADOBUTROL 604.72 MG/ML
10 INJECTION INTRAVENOUS ONCE
Status: COMPLETED | OUTPATIENT
Start: 2022-08-04 | End: 2022-08-04

## 2022-08-04 RX ORDER — ASPIRIN 81 MG/1
324 TABLET, CHEWABLE ORAL ONCE
Status: COMPLETED | OUTPATIENT
Start: 2022-08-04 | End: 2022-08-04

## 2022-08-04 RX ORDER — IOPAMIDOL 755 MG/ML
75 INJECTION, SOLUTION INTRAVASCULAR ONCE
Status: COMPLETED | OUTPATIENT
Start: 2022-08-04 | End: 2022-08-04

## 2022-08-04 RX ADMIN — SODIUM CHLORIDE 90 ML: 9 INJECTION, SOLUTION INTRAVENOUS at 19:40

## 2022-08-04 RX ADMIN — GADOBUTROL 10 ML: 604.72 INJECTION INTRAVENOUS at 19:57

## 2022-08-04 RX ADMIN — ASPIRIN 81 MG CHEWABLE TABLET 324 MG: 81 TABLET CHEWABLE at 21:16

## 2022-08-04 RX ADMIN — IOPAMIDOL 75 ML: 755 INJECTION, SOLUTION INTRAVENOUS at 19:40

## 2022-08-04 ASSESSMENT — ENCOUNTER SYMPTOMS
NUMBNESS: 0
SPEECH DIFFICULTY: 0
HEADACHES: 1

## 2022-08-04 ASSESSMENT — ACTIVITIES OF DAILY LIVING (ADL): ADLS_ACUITY_SCORE: 37

## 2022-08-04 NOTE — ED PROVIDER NOTES
History   Chief Complaint:  Loss of Vision       The history is provided by the patient.      Rob Beavers is a 76 year old male on coumadin with history of PE, hypertension, and Atrial fibrillation who presents with loss of vision. The patient reports yesterday he woke up at 0300 and had onset of a blind spot in his left visual field.  Visual deficit has been constant since then.  He states he was seen by his eye doctor today prior to being seen in the emergency department. He adds he was scheduled to have an endoscopy for today and notes he stopped taking coumadin over the weekend but it got rescheduled so he started taking it again today. He states 2 [place] got really dehydrated from the heat. He notes that if he sweats a lot he gets a mild headache that is persistent. He denies trouble with speech and numbness or weakness in arms or legs.       Review of Systems   Eyes: Positive for visual disturbance.   Neurological: Positive for headaches (Mild). Negative for speech difficulty and numbness.   All other systems reviewed and are negative.    Allergies:  Monosodium Glutamate  Seasonal Allergies    Medications:  Coumadin   Zestril   Inderal LA    Past Medical History:     Hypertension   Prostate nodule   Benign familial tremor   Pulmonary embolus   Andrews's esophagus without dysplasia   Atrial fibrillation     Past Surgical History:    Dental surgery   4 endoscopies for Andrews's esophagus; 3 w/ Halo 360 type  Laparoscopic cholecystectomy with cholangiograms   Tonsillectomy   Total Hip Artrhoplasty     Family History:    Mother: Hypertension, obesity, macular degeneration, & blood disease  Father: Dementia, prostate cancer, & hypertension     Social History:  The patient presents alone.   The patient arrives via private vehicle.     Physical Exam     Patient Vitals for the past 24 hrs:   BP Temp Temp src Pulse Resp SpO2   08/04/22 2230 -- -- -- -- -- 98 %   08/04/22 2215 -- -- --  -- -- 98 %   08/04/22 2200 (!) 148/92 -- -- 70 -- 97 %   08/04/22 2100 (!) 143/99 -- -- -- -- 98 %   08/04/22 1915 (!) 142/82 -- -- 66 20 97 %   08/04/22 1559 (!) 153/90 98.8  F (37.1  C) Temporal 84 14 95 %       Physical Exam  VS: Reviewed per above  HENT: Mucous membranes moist, no nuchal rigidity  EYES: sclera anicteric  CV: Rate as noted, regular rhythm.   RESP: Effort normal. Breath sounds are normal bilaterally.  GI: no tenderness/rebound/guarding, not distended.  NEURO: GCS 15, cranial nerves II through XII are intact aside from left visual field cut, 5 out of 5 strength in all 4 extremities, sensation is intact light touch in all 4 extremities  MSK: No deformity of the extremities  SKIN: Warm and dry      Emergency Department Course   ECG  ECG taken at 1906, ECG read at 1930  Sinus rhythm with marked sinus arrhythmia with 1st degree AV block   No longer Afib   Changes noted above as compared to prior, dated 6/29/2017.  Rate 68 bpm. WA interval 240 ms. QRS duration 82 ms. QT/QTc 390/414 ms. P-R-T axes 44 26 24.     Imaging:  MR Brain w/o & w Contrast   Final Result   IMPRESSION:   Acute RIGHT posterior cerebral artery territory infarct with an additional punctate infarct in the LEFT occipital lobe. No evidence of hemorrhagic transformation.      Findings of the right PCA infarct were conveyed to Dr. Tyler Pino over the phone by Dr. fItikhar Roberts on 08/04/2022 around 2100 CST.      Please refer to separately dictated CT/CTA for further details.      Head CT w/o contrast   Final Result   Abnormal   IMPRESSION:    HEAD CT:   1.  Acute right occipital lobe infarct (PCA distribution), which would correspond to the history of a left visual field deficit. No acute intracranial hemorrhage.      [Critical Result: #1]      Finding was identified on 8/4/2022 8:38 PM.       Dr. Tyler Pino was contacted by me on 8/4/2022 8:46 PM and verbalized understanding of the critical result.       HEAD CTA:    1.  No large  vessel occlusion or abnormality to account for the patient's deficit in the right PCA distribution.   2.  Subtle irregularity of the left V4 at the PICA origin without occlusion.      NECK CTA:   1.  No significant carotid or right vertebral artery stenosis.   2.  Long segment multifocal irregularity and nonopacification of the left V1 and V2 segments may represent occlusion and/or dissection. Distal flow is likely from a combination of retrograde flow and muscular collaterals.   3.  16 mm left thyroid nodule. Outpatient ultrasound is recommended to further characterize.      CTA Head Neck with Contrast   Final Result   Abnormal   IMPRESSION:    HEAD CT:   1.  Acute right occipital lobe infarct (PCA distribution), which would correspond to the history of a left visual field deficit. No acute intracranial hemorrhage.      [Critical Result: #1]      Finding was identified on 8/4/2022 8:38 PM.       Dr. Tyler Pino was contacted by me on 8/4/2022 8:46 PM and verbalized understanding of the critical result.       HEAD CTA:    1.  No large vessel occlusion or abnormality to account for the patient's deficit in the right PCA distribution.   2.  Subtle irregularity of the left V4 at the PICA origin without occlusion.      NECK CTA:   1.  No significant carotid or right vertebral artery stenosis.   2.  Long segment multifocal irregularity and nonopacification of the left V1 and V2 segments may represent occlusion and/or dissection. Distal flow is likely from a combination of retrograde flow and muscular collaterals.   3.  16 mm left thyroid nodule. Outpatient ultrasound is recommended to further characterize.        Report per radiology    Laboratory:  Labs Ordered and Resulted from Time of ED Arrival to Time of ED Departure   INR - Abnormal       Result Value    INR 1.23 (*)    COMPREHENSIVE METABOLIC PANEL - Normal    Sodium 138      Potassium 4.4      Chloride 105      Carbon Dioxide (CO2) 27      Anion Gap 6       Urea Nitrogen 15      Creatinine 0.94      Calcium 8.9      Glucose 99      Alkaline Phosphatase 77      AST 31      ALT 38      Protein Total 7.7      Albumin 3.7      Bilirubin Total 0.8      GFR Estimate 85     COVID-19 VIRUS (CORONAVIRUS) BY PCR - Normal    SARS CoV2 PCR Negative     CBC WITH PLATELETS AND DIFFERENTIAL    WBC Count 7.6      RBC Count 4.94      Hemoglobin 15.5      Hematocrit 44.4      MCV 90      MCH 31.4      MCHC 34.9      RDW 13.1      Platelet Count 249      % Neutrophils 55      % Lymphocytes 29      % Monocytes 10      % Eosinophils 5      % Basophils 1      % Immature Granulocytes 0      NRBCs per 100 WBC 0      Absolute Neutrophils 4.1      Absolute Lymphocytes 2.2      Absolute Monocytes 0.8      Absolute Eosinophils 0.4      Absolute Basophils 0.1      Absolute Immature Granulocytes 0.0      Absolute NRBCs 0.0          Emergency Department Course:     Reviewed:  I reviewed nursing notes, vitals, past medical history and Care Everywhere    Assessments:  1839 I obtained history and examined the patient as noted above.     Consults:  2045 I spoke with Radiology about patient's findings.   2058 I spoke with Dr. Morales, hospitalist, who accepts admission.     Interventions:  Medications   iopamidol (ISOVUE-370) solution 75 mL (75 mLs Intravenous Given 8/4/22 1940)   Saline Flush (90 mLs Intravenous Given 8/4/22 1940)   gadobutrol (GADAVIST) injection 10 mL (10 mLs Intravenous Given 8/4/22 1957)   aspirin (ASA) chewable tablet 324 mg (324 mg Oral Given 8/4/22 2116)     Disposition:  The patient was admitted to the hospital under the care of Dr. Morales.     Impression & Plan     Medical Decision Making:  Patient presents to the ER for evaluation of left visual field cut that has been constant after onset over 24 hours ago.  On arrival vital signs reassuring.  Neurologic exam is quite normal aside from this left visual field cut.  CT imaging does reveal evidence of acute right occipital lobe  infarct as well as additional punctate infarct in the left occipital lobe, later discovered on MRI.  Case discussed with neurology with plans for aspirin load, hospital admission.  Patient was admitted to hospitalist service for further cares.      Diagnosis:    ICD-10-CM    1. Cerebrovascular accident (CVA), unspecified mechanism (H)  I63.9        Discharge Medications:  New Prescriptions    No medications on file       Scribe Disclosure:  I, Zayda Junior, am serving as a scribe at 6:39 PM on 8/4/2022 to document services personally performed by Tyler Pino MD based on my observations and the provider's statements to me.            Tyler Pino MD  08/04/22 5770

## 2022-08-04 NOTE — ED TRIAGE NOTES
Woke up yesterday with left eye vision changes, saw eye doctor today and was told all looked normal and to go to the ER for further work up, has been off his coumadin for an endoscopy that was supposed to be today but it was cancelled so he did restart his coumadin tujimmie

## 2022-08-04 NOTE — TELEPHONE ENCOUNTER
Patient's instructions for warfarin hold were as follows:  7/31 to 8/3, NO warfarin  8/4, DAY OF PROCEDURE, Restart warfarin in the evening, if okay with provider doing the procedure.    Make sure to ask the provider doing your procedure if it is okay to begin your warfarin as planned   On 8/4 take warfarin booster dose of 5 mg then resume current warfarin dose, 5 mg Friday; 2.5 mg all other days  Recheck INR on 8/11/22 at 9 am to ensure INR returning to goal range.    Called patient. He states he held warfarin Sunday and Monday but Tuesday was notified that surgery was being rescheduled to Sept 7, 2022. He took his regular 2.5 mg dose on Tuesday and on Wednesday.  This writer advised he can still take his 5 mg booster dose today, then return to maintenance dosing and recheck INR as already scheduled on 8/11/22. Patient stated understanding and is in agreement with plan. No other questions or concerns at this time.     Deisy SUAREZ RN  Anticoagulation Team

## 2022-08-04 NOTE — TELEPHONE ENCOUNTER
Covid screening completed    Taking warfarin patient.    Went off for endoscopy but it was resceduled.    Needs to talk to anticouag nurse about restarting warfarin and dosing.    Daria Muse RN  [facility] Nurse Advisor

## 2022-08-05 ENCOUNTER — APPOINTMENT (OUTPATIENT)
Dept: OCCUPATIONAL THERAPY | Facility: CLINIC | Age: 77
DRG: 065 | End: 2022-08-05
Attending: INTERNAL MEDICINE
Payer: COMMERCIAL

## 2022-08-05 ENCOUNTER — APPOINTMENT (OUTPATIENT)
Dept: CARDIOLOGY | Facility: CLINIC | Age: 77
DRG: 065 | End: 2022-08-05
Attending: INTERNAL MEDICINE
Payer: COMMERCIAL

## 2022-08-05 LAB
CHOLEST SERPL-MCNC: 153 MG/DL
HBA1C MFR BLD: 5.7 % (ref 0–5.6)
HDLC SERPL-MCNC: 35 MG/DL
LDLC SERPL CALC-MCNC: 70 MG/DL
LVEF ECHO: NORMAL
NONHDLC SERPL-MCNC: 118 MG/DL
TRIGL SERPL-MCNC: 240 MG/DL

## 2022-08-05 PROCEDURE — 93306 TTE W/DOPPLER COMPLETE: CPT | Mod: 26 | Performed by: INTERNAL MEDICINE

## 2022-08-05 PROCEDURE — 97535 SELF CARE MNGMENT TRAINING: CPT | Mod: GO

## 2022-08-05 PROCEDURE — 120N000001 HC R&B MED SURG/OB

## 2022-08-05 PROCEDURE — 250N000013 HC RX MED GY IP 250 OP 250 PS 637: Performed by: INTERNAL MEDICINE

## 2022-08-05 PROCEDURE — 99222 1ST HOSP IP/OBS MODERATE 55: CPT | Mod: FS | Performed by: STUDENT IN AN ORGANIZED HEALTH CARE EDUCATION/TRAINING PROGRAM

## 2022-08-05 PROCEDURE — 99232 SBSQ HOSP IP/OBS MODERATE 35: CPT | Performed by: INTERNAL MEDICINE

## 2022-08-05 PROCEDURE — 999N000208 ECHOCARDIOGRAM COMPLETE

## 2022-08-05 PROCEDURE — 255N000002 HC RX 255 OP 636: Performed by: INTERNAL MEDICINE

## 2022-08-05 PROCEDURE — 97165 OT EVAL LOW COMPLEX 30 MIN: CPT | Mod: GO

## 2022-08-05 RX ORDER — VITAMIN B COMPLEX
1000 TABLET ORAL DAILY
Status: DISCONTINUED | OUTPATIENT
Start: 2022-08-05 | End: 2022-08-06 | Stop reason: HOSPADM

## 2022-08-05 RX ORDER — ATORVASTATIN CALCIUM 40 MG/1
40 TABLET, FILM COATED ORAL EVERY EVENING
Status: DISCONTINUED | OUTPATIENT
Start: 2022-08-05 | End: 2022-08-06 | Stop reason: HOSPADM

## 2022-08-05 RX ORDER — ASPIRIN 81 MG/1
81 TABLET, CHEWABLE ORAL DAILY
Status: DISCONTINUED | OUTPATIENT
Start: 2022-08-05 | End: 2022-08-05

## 2022-08-05 RX ORDER — LIDOCAINE 40 MG/G
CREAM TOPICAL
Status: DISCONTINUED | OUTPATIENT
Start: 2022-08-05 | End: 2022-08-06 | Stop reason: HOSPADM

## 2022-08-05 RX ORDER — PROPRANOLOL HYDROCHLORIDE 80 MG/1
80 CAPSULE, EXTENDED RELEASE ORAL DAILY
Status: DISCONTINUED | OUTPATIENT
Start: 2022-08-05 | End: 2022-08-06 | Stop reason: HOSPADM

## 2022-08-05 RX ORDER — ONDANSETRON 4 MG/1
4 TABLET, ORALLY DISINTEGRATING ORAL EVERY 6 HOURS PRN
Status: DISCONTINUED | OUTPATIENT
Start: 2022-08-05 | End: 2022-08-06 | Stop reason: HOSPADM

## 2022-08-05 RX ORDER — PROCHLORPERAZINE MALEATE 5 MG
5 TABLET ORAL EVERY 6 HOURS PRN
Status: DISCONTINUED | OUTPATIENT
Start: 2022-08-05 | End: 2022-08-06 | Stop reason: HOSPADM

## 2022-08-05 RX ORDER — ASPIRIN 81 MG/1
81 TABLET ORAL DAILY
Status: DISCONTINUED | OUTPATIENT
Start: 2022-08-05 | End: 2022-08-05

## 2022-08-05 RX ORDER — LORATADINE 10 MG/1
10 TABLET ORAL DAILY
Status: DISCONTINUED | OUTPATIENT
Start: 2022-08-06 | End: 2022-08-06 | Stop reason: HOSPADM

## 2022-08-05 RX ORDER — ONDANSETRON 2 MG/ML
4 INJECTION INTRAMUSCULAR; INTRAVENOUS EVERY 6 HOURS PRN
Status: DISCONTINUED | OUTPATIENT
Start: 2022-08-05 | End: 2022-08-06 | Stop reason: HOSPADM

## 2022-08-05 RX ORDER — PROCHLORPERAZINE 25 MG
12.5 SUPPOSITORY, RECTAL RECTAL EVERY 12 HOURS PRN
Status: DISCONTINUED | OUTPATIENT
Start: 2022-08-05 | End: 2022-08-06 | Stop reason: HOSPADM

## 2022-08-05 RX ADMIN — SODIUM CHLORIDE 1 DROP: 20 SOLUTION OPHTHALMIC at 21:22

## 2022-08-05 RX ADMIN — Medication 1000 UNITS: at 19:07

## 2022-08-05 RX ADMIN — HUMAN ALBUMIN MICROSPHERES AND PERFLUTREN 9 ML: 10; .22 INJECTION, SOLUTION INTRAVENOUS at 10:04

## 2022-08-05 RX ADMIN — ATORVASTATIN CALCIUM 40 MG: 40 TABLET, FILM COATED ORAL at 19:07

## 2022-08-05 RX ADMIN — ASPIRIN 81 MG: 81 TABLET, COATED ORAL at 08:02

## 2022-08-05 RX ADMIN — ASPIRIN 81 MG: 81 TABLET, COATED ORAL at 04:52

## 2022-08-05 RX ADMIN — PROPRANOLOL HYDROCHLORIDE 80 MG: 80 CAPSULE, EXTENDED RELEASE ORAL at 19:06

## 2022-08-05 ASSESSMENT — ACTIVITIES OF DAILY LIVING (ADL)
ADLS_ACUITY_SCORE: 37

## 2022-08-05 NOTE — PLAN OF CARE
SLP: ST orders received and discussed with care team. Pt passed nursing screen and no documented dysphagia symptoms. No changes in speech/language function. Will complete IP SLP orders.

## 2022-08-05 NOTE — PROGRESS NOTES
[facility]  Hospitalist Progress Note  Kelvin Pace MD  08/05/2022    Assessment & Plan   Rob Beavers is a very pleasant 76 year old male with paroxysmal atrial fibrillation on chronic warfarin, hypertension, Andrews's esophagus, history of pulmonary embolism who was admitted on 8/4/2022 for acute right occipital stroke.     Acute right occipital stroke cardioembolic  Subtherapeutic INR in the setting of planned EGD  Presented with symptoms of left visual field cut. Brought to stabilization room and Code stroke called. CT CTA of the head and neck image(s) showing Acute right occipital lobe infarct (PCA distribution), no acute intracranial hemorrhage, neck CTA with long segment multifocal irregularity and nonopacification of the left P1 and P2 segments, possibly representing occlusion and/or dissection.  -admit to inpatient status with telemetry  -continue aspirin 324 mg   -permissive hypertension  -q 4 hour neuro checks  -stroke neurology consultation  -TTE results pending  - MRA T1 pending  -check lipid panel , HDL 35, LDL 70, hemoglobin A1c 5.7  -Physical therapy, occupational therapy, speech therapy consultations  -stroke education     Paroxysmal Atrial fibrillation  Hypertension  Dyslipidemia  Managed PTA with lisinopril, warfarin. INR 1.23 on admission since he was holding warfarin for upcoming EGD procedure.   -hold lisinopril for now given permissive hypertension plan  -Hold on resuming warfarin in case of any intervention planned by neurology     H/o pulmonary embolism  Noted. On warfarin as above.      Andrews's esophagus  -continue omeprazole     Diet:  regular  DVT Prophylaxis: Pneumatic Compression Devices and Ambulate every shift  Hardin Catheter: Not present  Central Lines: None  Cardiac Monitoring: None  Code Status: Full        Clinically Significant Risk Factors Present on Admission       # Coagulation Defect: home medication list includes an anticoagulant  medication   # Hypertension: home medication list includes antihypertensive(s)       Disposition Plan    Expected Discharge Date: 08/06/2022           Interval History   -- chart reviewed  -- noted only deficits visual  -- reviewed imaging studies    -Data reviewed today: I reviewed all new labs and imaging over the last 24 hours. I personally reviewed no images or EKG's today.    Physical Exam    , Blood pressure (!) 154/85, pulse 82, temperature 97.3  F (36.3  C), temperature source Oral, resp. rate 16, SpO2 97 %.  There were no vitals filed for this visit.  Vital Signs with Ranges  Temp:  [97.3  F (36.3  C)-98.8  F (37.1  C)] 97.3  F (36.3  C)  Pulse:  [64-84] 82  Resp:  [14-20] 16  BP: (123-154)/(79-99) 154/85  SpO2:  [95 %-100 %] 97 %  I/O's Last 24 hours  No intake/output data recorded.    Constitutional: Awake, alert, cooperative, no apparent distress  Respiratory: Clear to auscultation bilaterally, no crackles or wheezing  Cardiovascular: Regular rate and rhythm, normal S1 and S2   GI: Normal bowel sounds, soft, non-distended, non-tender  Skin/Integumen: No rashes, no cyanosis, no edema  Other:  visual field cut    Medications   All medications were reviewed.    - MEDICATION INSTRUCTIONS -       - MEDICATION INSTRUCTIONS -         aspirin  325 mg Oral Daily     atorvastatin  40 mg Oral QPM     sodium chloride (PF)  3 mL Intracatheter Q8H        Data   Recent Labs   Lab 08/04/22  1607   WBC 7.6   HGB 15.5   MCV 90      INR 1.23*      POTASSIUM 4.4   CHLORIDE 105   CO2 27   BUN 15   CR 0.94   ANIONGAP 6   SUKHWINDER 8.9   GLC 99   ALBUMIN 3.7   PROTTOTAL 7.7   BILITOTAL 0.8   ALKPHOS 77   ALT 38   AST 31       Recent Results (from the past 24 hour(s))   CTA Head Neck with Contrast   Result Value    Radiologist flags #1 (AA)    Narrative    EXAM: CT HEAD W/O CONTRAST, CTA HEAD NECK W CONTRAST  LOCATION: [facility]  DATE/TIME: 8/4/2022 7:40 PM    INDICATION: Abnormality in the  left visual field.  COMPARISON: None.  CONTRAST: 75 mL Isovue 370  TECHNIQUE: Head and neck CT angiogram with IV contrast. Noncontrast head CT followed by axial helical CT images of the head and neck vessels obtained during the arterial phase of intravenous contrast administration. Axial 2D reconstructed images and   multiplanar 3D MIP reconstructed images of the head and neck vessels were performed by the technologist. Dose reduction techniques were used. All stenosis measurements made according to NASCET criteria unless otherwise specified.    FINDINGS:   NONCONTRAST HEAD CT:   INTRACRANIAL CONTENTS: No intracranial hemorrhage, extraaxial collection, or mass effect.  Loss of gray-white differentiation in the right occipital lobe.  Normal ventricles and sulci.     VISUALIZED ORBITS/SINUSES/MASTOIDS: No intraorbital abnormality. No paranasal sinus mucosal disease. No middle ear or mastoid effusion.    BONES/SOFT TISSUES: No acute abnormality.    HEAD CTA:  ANTERIOR CIRCULATION: No stenosis/occlusion, aneurysm, or high flow vascular malformation. Standard [place] anatomy.    POSTERIOR CIRCULATION: No occlusion. There is subtle irregularity of the left V4 at the PICA origin. Dominant right and smaller left vertebral artery contribute to a normal basilar artery.     DURAL VENOUS SINUSES: Not well evaluated on a technical basis.    NECK CTA:  RIGHT CAROTID: No measurable stenosis or dissection.    LEFT CAROTID: No measurable stenosis or dissection.    VERTEBRAL ARTERIES: No focal stenosis or dissection on the right. Dominant right and smaller left vertebral arteries. Multifocal irregularity and occlusion of the left V1 and V2 segments.    AORTIC ARCH: Classic aortic arch anatomy with no significant stenosis at the origin of the great vessels.    NONVASCULAR STRUCTURES: 16 mm solid left thyroid nodule.      Impression    IMPRESSION:   HEAD CT:  1.  Acute right occipital lobe infarct (PCA distribution), which  would correspond to the history of a left visual field deficit. No acute intracranial hemorrhage.    [Critical Result: #1]    Finding was identified on 8/4/2022 8:38 PM.     Dr. Tyler Pino was contacted by me on 8/4/2022 8:46 PM and verbalized understanding of the critical result.     HEAD CTA:   1.  No large vessel occlusion or abnormality to account for the patient's deficit in the right PCA distribution.  2.  Subtle irregularity of the left V4 at the PICA origin without occlusion.    NECK CTA:  1.  No significant carotid or right vertebral artery stenosis.  2.  Long segment multifocal irregularity and nonopacification of the left V1 and V2 segments may represent occlusion and/or dissection. Distal flow is likely from a combination of retrograde flow and muscular collaterals.  3.  16 mm left thyroid nodule. Outpatient ultrasound is recommended to further characterize.   Head CT w/o contrast   Result Value    Radiologist flags #1 (AA)    Narrative    EXAM: CT HEAD W/O CONTRAST, CTA HEAD NECK W CONTRAST  LOCATION: [facility]  DATE/TIME: 8/4/2022 7:40 PM    INDICATION: Abnormality in the left visual field.  COMPARISON: None.  CONTRAST: 75 mL Isovue 370  TECHNIQUE: Head and neck CT angiogram with IV contrast. Noncontrast head CT followed by axial helical CT images of the head and neck vessels obtained during the arterial phase of intravenous contrast administration. Axial 2D reconstructed images and   multiplanar 3D MIP reconstructed images of the head and neck vessels were performed by the technologist. Dose reduction techniques were used. All stenosis measurements made according to NASCET criteria unless otherwise specified.    FINDINGS:   NONCONTRAST HEAD CT:   INTRACRANIAL CONTENTS: No intracranial hemorrhage, extraaxial collection, or mass effect.  Loss of gray-white differentiation in the right occipital lobe.  Normal ventricles and sulci.     VISUALIZED ORBITS/SINUSES/MASTOIDS: No  intraorbital abnormality. No paranasal sinus mucosal disease. No middle ear or mastoid effusion.    BONES/SOFT TISSUES: No acute abnormality.    HEAD CTA:  ANTERIOR CIRCULATION: No stenosis/occlusion, aneurysm, or high flow vascular malformation. Standard [place] anatomy.    POSTERIOR CIRCULATION: No occlusion. There is subtle irregularity of the left V4 at the PICA origin. Dominant right and smaller left vertebral artery contribute to a normal basilar artery.     DURAL VENOUS SINUSES: Not well evaluated on a technical basis.    NECK CTA:  RIGHT CAROTID: No measurable stenosis or dissection.    LEFT CAROTID: No measurable stenosis or dissection.    VERTEBRAL ARTERIES: No focal stenosis or dissection on the right. Dominant right and smaller left vertebral arteries. Multifocal irregularity and occlusion of the left V1 and V2 segments.    AORTIC ARCH: Classic aortic arch anatomy with no significant stenosis at the origin of the great vessels.    NONVASCULAR STRUCTURES: 16 mm solid left thyroid nodule.      Impression    IMPRESSION:   HEAD CT:  1.  Acute right occipital lobe infarct (PCA distribution), which would correspond to the history of a left visual field deficit. No acute intracranial hemorrhage.    [Critical Result: #1]    Finding was identified on 8/4/2022 8:38 PM.     Dr. Tyler Pino was contacted by me on 8/4/2022 8:46 PM and verbalized understanding of the critical result.     HEAD CTA:   1.  No large vessel occlusion or abnormality to account for the patient's deficit in the right PCA distribution.  2.  Subtle irregularity of the left V4 at the PICA origin without occlusion.    NECK CTA:  1.  No significant carotid or right vertebral artery stenosis.  2.  Long segment multifocal irregularity and nonopacification of the left V1 and V2 segments may represent occlusion and/or dissection. Distal flow is likely from a combination of retrograde flow and muscular collaterals.  3.  16 mm left thyroid  nodule. Outpatient ultrasound is recommended to further characterize.   MR Brain w/o & w Contrast    Narrative    EXAM: MR BRAIN W/O and W CONTRAST  LOCATION: [facility]  DATE/TIME: 8/4/2022 7:54 PM    INDICATION: New homonymous hemianopsia, history of atrial fibrillation, stroke  COMPARISON: CT/CTA performed earlier on the same day.  CONTRAST: 10.0mL Gadavist  TECHNIQUE: Routine multiplanar multisequence head MRI without and with intravenous contrast.    FINDINGS:  INTRACRANIAL CONTENTS: Restricted diffusion within the medial right occipital lobe as well as an additional focus of restricted diffusion in the medial left occipital lobe, both corresponding to low ADC values and FLAIR hyperintensity. Minimal partial   effacement of the overlying sulci without significant mass effect or midline shift. No corresponding signal loss on the susceptibility weighted images to suggest blood products. No intracranial mass or extra-axial fluid collection. Several additional   nonspecific T2/FLAIR hyperintensities within the cerebral white matter , most consistent with mild chronic microvascular ischemic change. Normal ventricles and sulci. Normal position of the cerebellar tonsils. No pathologic contrast enhancement.    SELLA: No abnormality accounting for technique.    OSSEOUS STRUCTURES/SOFT TISSUES: Normal marrow signal.     ORBITS: No abnormality accounting for technique.     SINUSES/MASTOIDS: No significant paranasal sinus mucosal disease. No middle ear or mastoid effusion.       Impression    IMPRESSION:  Acute RIGHT posterior cerebral artery territory infarct with an additional punctate infarct in the LEFT occipital lobe. No evidence of hemorrhagic transformation.    Findings of the right PCA infarct were conveyed to Dr. Tyler Pino over the phone by Dr. Iftikhar Roberts on 08/04/2022 around 2100 CST.    Please refer to separately dictated CT/CTA for further details.       Kelvin Pace MD  Text Page   (7am to 6pm)

## 2022-08-05 NOTE — PLAN OF CARE
Goal Outcome Evaluation:  PT:OT screened for PT. Patients deficits are all visual and OT addressing those deficits. Per OT patient has no balance or mobility deficits so PT not indicated. Will complete order.

## 2022-08-05 NOTE — ED NOTES
Pt reports no changes in his vision. C/o being uncomfortable on the ER cart. Placed on regular hospital bed.

## 2022-08-05 NOTE — H&P
[facility]    History and Physical - Hospitalist Service       Date of Admission:  8/4/2022    Assessment & Plan      Rob Beavers is a very pleasant 76 year old male with paroxysmal atrial fibrillation on chronic warfarin, hypertension, Andrews's esophagus, history of pulmonary embolism who was admitted on 8/4/2022 for acute right occipital stroke.     Acute right occipital stroke   Presented with symptoms of left visual field cut. Brought to stabilization room and Code stroke called. CT CTA of the head and neck image(s) showing Acute right occipital lobe infarct (PCA distribution), no acute intracranial hemorrhage, neck CTA with long segment multifocal irregularity and nonopacification of the left P1 and P2 segments, possibly representing occlusion and/or dissection.  -admit to inpatient status with telemetry  -aspirin 324 mg given in ED  -Continue daily aspirin 81 milligrams unless advised otherwise by neurology  -permissive hypertension  -q 4 hour neuro checks  -stroke neurology consultation  -TTE  -check lipid panel, hemoglobin A1c  -Physical therapy, occupational therapy, speech therapy consultations  -stroke education    Paroxysmal Atrial fibrillation  Hypertension  Dyslipidemia  Managed PTA with lisinopril, warfarin. INR 1.23 on admission since he was holding warfarin for upcoming EGD procedure.   -hold lisinopril for now given permissive hypertension plan  -Hold on resuming warfarin in case of any intervention planned by neurology    H/o pulmonary embolism  Noted. On warfarin as above.     Andrews's esophagus  -resume omeprazole      Diet:  regular  DVT Prophylaxis: Pneumatic Compression Devices and Ambulate every shift  Hardin Catheter: Not present  Central Lines: None  Cardiac Monitoring: None  Code Status: Full    Clinically Significant Risk Factors Present on Admission               # Coagulation Defect: home medication list includes an anticoagulant medication   #  "Hypertension: home medication list includes antihypertensive(s)          Disposition Plan      Expected Discharge Date: 08/06/2022                The patient's care was discussed with the Dr. Pino.    China Morales MD  Hospitalist Service  [facility]  Securely message with the Vocera Web Console (learn more here)  Text page via [employer] Paging/Directory   ______________________________________________________________________    Chief Complaint   Vision changes    History is obtained from the patient    History of Present Illness   Rob Beavers is a pleasant 76 year old male with paroxysmal atrial fibrillation on chronic warfarin, hypertension, Andrews's esophagus, history of pulmonary embolism who awoke around 3 AM on the Wednesday to with altered vision.  Reports noticing a \"blind spot\" in his left thigh.  In the past he has had problems with intermittent blind spots of both eyes and so he made an appointment to see his eye doctor.  Had an outpatient eye doctor visit today with no findings that point to ophthalmologic pathology. Referred to the ED for evaluation.     He is on chronic warfarin for his paroxysmal atrial fibrillation and also history of pulmonary embolism.  However, he has been holding the warfarin in anticipation of an upcoming EGD as follow-up for his Andrews's esophagus.  He did not bridge with any heparin.  The endoscopy was canceled and he was due to resume the warfarin today.  INR 1.23 now.    Discussed with Dr. Pino. Labs and imaging reviewed.     Review of Systems    The 10 point Review of Systems is negative other than noted in the HPI or here.     Past Medical History    I have reviewed this patient's medical history and updated it with pertinent information if needed.   Past Medical History:   Diagnosis Date     Andrews's esophagus without dysplasia 4/26/2017     CARDIOVASCULAR SCREENING; LDL GOAL LESS THAN 130 10/31/2010     Hypertension goal BP " (blood pressure) < 140/90 2012     Past Surgical History   I have reviewed this patient's surgical history and updated it with pertinent information if needed.  Past Surgical History:   Procedure Laterality Date     COLONOSCOPY       DENTAL SURGERY       GI SURGERY      4 endoscopies for Andrews's esophagus;  3 w/ Halo 360  type     LAPAROSCOPIC CHOLECYSTECTOMY WITH CHOLANGIOGRAMS N/A 2017    Procedure: LAPAROSCOPIC CHOLECYSTECTOMY WITH CHOLANGIOGRAMS;  LAPAROSCOPIC CHOLECYSTECTOMY WITH INTROPERATIVE CHOLANGIOGRAMS.;  Surgeon: Gian Yin MD;  Location: [facility] OR     TONSILLECTOMY       [facility] TOTAL HIP ARTHROPLASTY       Social History   I have reviewed this patient's social history and updated it with pertinent information if needed.  Social History     Tobacco Use     Smoking status: Never Smoker     Smokeless tobacco: Never Used   Substance Use Topics     Alcohol use: Yes     Alcohol/week: 0.0 standard drinks     Comment: 1-3 beers once-twice/month     Drug use: No     Family History   I have reviewed this patient's family history and updated it with pertinent information if needed.  Family History   Problem Relation Age of Onset     Hypertension Mother         passed away at 89     Eye Disorder Mother         macular degeneration     Blood Disease Mother         She is on warfarin     Obesity Mother      Dementia Father 80        passed away at 88 yrs old     Hypertension Father      Prostate Cancer Father      Breast Cancer Sister      Diabetes Paternal Uncle      Cerebrovascular Disease Maternal Grandmother      Cerebrovascular Disease Maternal Grandfather      Breast Cancer Sister      Other Cancer Sister               Asthma No family hx of      C.A.D. No family hx of      Cancer - colorectal No family hx of      Prostate Cancer No family hx of      Anesthesia Reaction No family hx of      Thyroid Disease No family hx of        Prior to Admission Medications   Prior  to Admission Medications   Prescriptions Last Dose Informant Patient Reported? Taking?   MULTI-VITAMIN PO TABS   Yes No   Sig: ONE TABLET DAILY   VITAMIN D, CHOLECALCIFEROL, PO   Yes No   Sig: Take 1,000 Units by mouth daily   enoxaparin ANTICOAGULANT (LOVENOX) 100 MG/ML syringe   No No   Sig: Inject 0.9 mLs (90 mg) Subcutaneous every 12 hours Starting 10/04/2020, last dose 10/06/2020 AM, resume after procedure as directed   Patient not taking: Reported on 2021   enoxaparin ANTICOAGULANT (LOVENOX) 100 MG/ML syringe   No No   Sig: Inject 1 mL (100 mg) Subcutaneous 2 times daily   lisinopril (ZESTRIL) 10 MG tablet   No No   Sig: TAKE 1 TABLET BY MOUTH EVERY DAY   loratadine (CLARITIN) 10 MG tablet   Yes No   Sig: Take 10 mg by mouth daily   omeprazole (PRILOSEC) 40 MG DR capsule   No No   Sig: TAKE 1 CAPSULE BY MOUTH EVERY DAY   propranolol ER (INDERAL LA) 80 MG 24 hr capsule   No No   Sig: TAKE 1 CAPSULE BY MOUTH EVERY DAY   warfarin ANTICOAGULANT (COUMADIN) 5 MG tablet   No No   Si MG (FULL TABLET)  + 2.5 MG (HALF TABLET) ALL OTHER DAYS OR AS DIRECTED.      Facility-Administered Medications: None     Allergies   Allergies   Allergen Reactions     Monosodium Glutamate Headache     Seasonal Allergies Other (See Comments)       Physical Exam   Vital Signs: Temp: 98.8  F (37.1  C) Temp src: Temporal BP: (!) 143/99 Pulse: 66   Resp: 20 SpO2: 98 % O2 Device: None (Room air)    Weight: 0 lbs 0 oz    General Appearance: alert, NAD, pleasant and cooperative  Eyes: sclerae anicteric, PERRL, no drainage   HEENT: mmm, atraumatic  Respiratory: CTAB with no wheezes or crackles  No tenderness to palpation of anterior chest wall  Cardiovascular: RRR, no murmurs  No LE edema  GI: normal bowel sounds, no tenderness, no rebound or guarding  Skin: no acute rashes, no petechiae   Musculoskeletal: STEWART, normal muscle tone and bulk  Neurologic: AOx3, + visual field cut (hemonymous hemianopia), no tremors, EOMI  Psychiatric:  pleasant affect, mood congruent, not anxious    Data   Data reviewed today: I reviewed all medications, new labs and imaging results over the last 24 hours. I personally reviewed the CT CTA of the head and neck image(s) showing Acute right occipital lobe infarct (PCA distribution), no acute intracranial hemorrhage, neck CTA with long segment multifocal irregularity and nonopacification of the left P1 and P2 segments, possibly representing occlusion and/or dissection..    Recent Labs   Lab 08/04/22  1607   WBC 7.6   HGB 15.5   MCV 90      INR 1.23*      POTASSIUM 4.4   CHLORIDE 105   CO2 27   BUN 15   CR 0.94   ANIONGAP 6   SUKHWINDER 8.9   GLC 99   ALBUMIN 3.7   PROTTOTAL 7.7   BILITOTAL 0.8   ALKPHOS 77   ALT 38   AST 31

## 2022-08-05 NOTE — PHARMACY-ADMISSION MEDICATION HISTORY
Pharmacy Medication History  Admission medication history interview status for the 8/4/2022  admission is complete. See EPIC admission navigator for prior to admission medications     Location of Interview: Patient room  Medication history sources: Patient and Surescripts    Significant changes made to the medication list:  1) Removed old Lovenox Rx's (over a year old) and patient has not been taking. Patient did not bridge recently while Coumadin was on hold.     In the past week, patient estimated taking medication this percent of the time: greater than 90%    Additional medication history information:   1) Warfarin:   Patient is taking warfarin for the indication of hx of PE with an INR goal of 2-3.   Current dosing regimen is 5mg Friday 2.5mg ROW.   Last dose (5 mg) was taken 8/3 at PM.        Medication reconciliation completed by provider prior to medication history? No    Time spent in this activity: 15 minutes    Prior to Admission medications    Medication Sig Last Dose Taking? Auth Provider Long Term End Date   lisinopril (ZESTRIL) 10 MG tablet TAKE 1 TABLET BY MOUTH EVERY DAY 8/4/2022 at am Yes Tian Santana MD Yes    loratadine (CLARITIN) 10 MG tablet Take 10 mg by mouth daily 8/4/2022 at am Yes Unknown, Entered By History     MULTI-VITAMIN PO TABS ONE TABLET DAILY 8/4/2022 at am Yes Osbaldo Myrick MD     omeprazole (PRILOSEC) 40 MG DR capsule TAKE 1 CAPSULE BY MOUTH EVERY DAY 8/4/2022 at am Yes Tian Santana MD     propranolol ER (INDERAL LA) 80 MG 24 hr capsule TAKE 1 CAPSULE BY MOUTH EVERY DAY 8/4/2022 at am Yes Tian Santana MD Yes    VITAMIN D, CHOLECALCIFEROL, PO Take 1,000 Units by mouth daily 8/4/2022 at am Yes Reported, Patient     warfarin ANTICOAGULANT (COUMADIN) 5 MG tablet 5 MG (FULL TABLET) FRIDAYS + 2.5 MG (HALF TABLET) ALL OTHER DAYS OR AS DIRECTED. 8/3/2022 at 5mg Yes Tian Santana MD         The information provided in this note is only as accurate as the sources available at the  time of update(s)

## 2022-08-05 NOTE — PROGRESS NOTES
Occupational Therapy Discharge Summary    Reason for therapy discharge:    All goals and outcomes met, no further needs identified.    Progress towards therapy goal(s). See goals on Care Plan in [facility] electronic health record for goal details.  Goals met    Therapy recommendation(s):    Continued therapy is recommended.  Rationale/Recommendations:  pt will be able to return to home safely with OP vision OT and ophthalmology referral for complex IADL and vision therapy. may be at risk for safety concerns for driving and IADL given vision deficits.

## 2022-08-05 NOTE — PLAN OF CARE
Arrived to floor at 0400. A/O x4, neuros unchanged. L sided blackout with both eyes. VSS on RA. Tele: NSR. LS: clear. BS active. Regular diet. Pain managed with aspirin. Up SBA.

## 2022-08-05 NOTE — CONSULTS
[facility]    Stroke Consult Note    Reason for Consult:  stroke    Chief Complaint: Loss of Vision       HPI  Rob Beavers is a 76 year old male with past medical history significant for HTN, atrial fibrillation (on Coumadin, held this week for endoscopy). On 8/3, he awoke with left visual field deficits. MRI brain demonstrated an acute right PCA stroke with a small left PCA infarct.     Today on exam he reports some improvement in his visual field deficit.     Stroke Evaluation Summarized    MRI/Head CT Acute right PCA territory infarct with punctate left occipital infarct.    Intracranial Vasculature Subtle left V4 irregularity without occlusion   Cervical Vasculature Multifocal irregularity of the left V1 and V2 segments concerning for occlusion vs dissection  MRA neck pending     Echocardiogram Pending   EKG/Telemetry Sinus with sinus arrhythmia   Other Testing Not Applicable     LDL  8/4/2022: 70 mg/dL   A1C  8/4/2022: 5.7 %   Troponin No lab value available in past 48 hrs       Impression  Acute ischemic stroke of bilateral occipital lobes (R>L)  due to cardioembolism  In the setting of atrial fibrillation, not on anticoagulation due to planned procedure, vs possible left vertebral artery dissection    Recommendations  - Hold prior anticoagulation at this time given size of infarct, okay to restart on 8/9. Pharmacy liaison consult placed for DOAC pricing  - LDL 70, started on Lipitor 40 mg daily. Follow-up with PCP to titrate to goal LDL 40-70  - A1c within goal <7.0  - No indication for permissive HTN at this time. Goal BP <140/90 with tighter control associated with decreased overall CV risk, if tolerated  - Therapies  - Awaiting MRA neck and TTE    Patient Follow-up    - final recommendation pending work-up    Thank you for this consult. We will continue to follow.     ALLYSSA Hogan, CNP  Neurology  To page me or covering stroke neurology team member, click here:  "AMCOM   Choose \"On Call\" tab at top, then search dropdown box for \"Neurology Adult\", select location, press Enter, then look for stroke/neuro ICU/telestroke.    _____________________________________________________    Clinically Significant Risk Factors Present on Admission              # Coagulation Defect: home medication list includes an anticoagulant medication        Past Medical History   Past Medical History:   Diagnosis Date     Andrews's esophagus without dysplasia 4/26/2017     CARDIOVASCULAR SCREENING; LDL GOAL LESS THAN 130 10/31/2010     Cerebrovascular accident (CVA), unspecified mechanism (H) 8/4/2022     Hypertension goal BP (blood pressure) < 140/90 1/31/2012     Past Surgical History   Past Surgical History:   Procedure Laterality Date     COLONOSCOPY  2017     DENTAL SURGERY       GI SURGERY  2020-21    4 endoscopies for Andrews's esophagus;  3 w/ Halo 360  type     LAPAROSCOPIC CHOLECYSTECTOMY WITH CHOLANGIOGRAMS N/A 6/29/2017    Procedure: LAPAROSCOPIC CHOLECYSTECTOMY WITH CHOLANGIOGRAMS;  LAPAROSCOPIC CHOLECYSTECTOMY WITH INTROPERATIVE CHOLANGIOGRAMS.;  Surgeon: Gian Yin MD;  Location: [facility] OR     TONSILLECTOMY  1951     [facility] TOTAL HIP ARTHROPLASTY  2010     Medications   Home Meds  Prior to Admission medications    Medication Sig Start Date End Date Taking? Authorizing Provider   lisinopril (ZESTRIL) 10 MG tablet TAKE 1 TABLET BY MOUTH EVERY DAY 7/28/22  Yes Tian Santana MD   loratadine (CLARITIN) 10 MG tablet Take 10 mg by mouth daily   Yes Unknown, Entered By History   MULTI-VITAMIN PO TABS ONE TABLET DAILY 8/9/10  Yes Osbaldo Myrick MD   omeprazole (PRILOSEC) 40 MG DR capsule TAKE 1 CAPSULE BY MOUTH EVERY DAY 12/22/21  Yes Tian Santana MD   propranolol ER (INDERAL LA) 80 MG 24 hr capsule TAKE 1 CAPSULE BY MOUTH EVERY DAY 8/2/22  Yes Tian Santana MD   VITAMIN D, CHOLECALCIFEROL, PO Take 1,000 Units by mouth daily   Yes Reported, Patient   warfarin ANTICOAGULANT (COUMADIN) " 5 MG tablet 5 MG (FULL TABLET)  + 2.5 MG (HALF TABLET) ALL OTHER DAYS OR AS DIRECTED. 22  Yes Tian Santana MD       Scheduled Meds    [START ON 2022] aspirin  325 mg Oral Daily     atorvastatin  40 mg Oral QPM     sodium chloride (PF)  3 mL Intracatheter Q8H       Infusion Meds    - MEDICATION INSTRUCTIONS -       - MEDICATION INSTRUCTIONS -         PRN Meds  lidocaine 4%, lidocaine (buffered or not buffered), - MEDICATION INSTRUCTIONS -, - MEDICATION INSTRUCTIONS -, ondansetron **OR** ondansetron, prochlorperazine **OR** prochlorperazine **OR** prochlorperazine, sodium chloride (PF)    Allergies   Allergies   Allergen Reactions     Monosodium Glutamate Headache     Seasonal Allergies Other (See Comments)     Family History   Family History   Problem Relation Age of Onset     Hypertension Mother         passed away at 89     Eye Disorder Mother         macular degeneration     Blood Disease Mother         She is on warfarin     Obesity Mother      Dementia Father 80        passed away at 88 yrs old     Hypertension Father      Prostate Cancer Father      Breast Cancer Sister      Diabetes Paternal Uncle      Cerebrovascular Disease Maternal Grandmother      Cerebrovascular Disease Maternal Grandfather      Breast Cancer Sister      Other Cancer Sister               Asthma No family hx of      C.A.D. No family hx of      Cancer - colorectal No family hx of      Prostate Cancer No family hx of      Anesthesia Reaction No family hx of      Thyroid Disease No family hx of      Social History   Social History     Tobacco Use     Smoking status: Never Smoker     Smokeless tobacco: Never Used   Substance Use Topics     Alcohol use: Yes     Alcohol/week: 0.0 standard drinks     Comment: 1-3 beers once-twice/month     Drug use: No       Review of Systems   The 10 point Review of Systems is negative other than noted in the HPI or here.        PHYSICAL EXAMINATION   Temp:  [97.3  F (36.3  C)-98.8  F  (37.1  C)] 98.1  F (36.7  C)  Pulse:  [64-84] 79  Resp:  [14-20] 16  BP: (123-154)/(79-99) 140/88  SpO2:  [95 %-100 %] 95 %    Neuro Exam  Mental Status:  alert, oriented x 3, follows commands, speech clear and fluent, naming and repetition normal  Cranial Nerves:  Left homonymous hemianopsia, PERRL, EOMI with normal smooth pursuit, facial movements symmetric, hearing not formally tested but intact to conversation, no dysarthria, shoulder shrug strong bilaterally, tongue protrusion midline  Motor:  normal muscle tone and bulk, no abnormal movements, able to move all limbs spontaneously, no pronator drift  Reflexes:  toes down-going  Sensory:  light touch sensation intact and symmetric throughout upper and lower extremities, no extinction on double simultaneous stimulation   Coordination:  no obvious ataxia  Station/Gait:  deferred    Dysphagia Screen  Per Nursing    Stroke Scales    NIHSS  1a. Level of Consciousness 0-->Alert, keenly responsive   1b. LOC Questions 0-->Answers both questions correctly   1c. LOC Commands 0-->Performs both tasks correctly   2.   Best Gaze 0-->Normal   3.   Visual 2-->Complete hemianopia   4.   Facial Palsy 0-->Normal symmetrical movements   5a. Motor Arm, Left 0-->No drift, limb holds 90 (or 45) degrees for full 10 secs   5b. Motor Arm, Right 0-->No drift, limb holds 90 (or 45) degrees for full 10 secs   6a. Motor Leg, Left 0-->No drift, leg holds 30 degree position for full 5 secs   6b. Motor Leg, right 0-->No drift, leg holds 30 degree position for full 5 secs   7.   Limb Ataxia 0-->Absent   8.   Sensory 0-->Normal, no sensory loss   9.   Best Language 0-->No aphasia, normal   10. Dysarthria 0-->Normal   11. Extinction and Inattention  0-->No abnormality   Total 2 (08/05/22 1614)       Modified [place] Score (Pre-morbid)  0 - No symptoms.    Imaging  I personally reviewed all imaging; relevant findings per HPI.    Labs Data   CBC  Recent Labs   Lab 08/04/22  1607   WBC 7.6   RBC 4.94    HGB 15.5   HCT 44.4        Basic Metabolic Panel   Recent Labs   Lab 08/04/22  1607      POTASSIUM 4.4   CHLORIDE 105   CO2 27   BUN 15   CR 0.94   GLC 99   SUKHWINDER 8.9     Liver Panel  Recent Labs   Lab 08/04/22  1607   PROTTOTAL 7.7   ALBUMIN 3.7   BILITOTAL 0.8   ALKPHOS 77   AST 31   ALT 38     INR    Recent Labs   Lab Test 08/04/22  1607 06/30/22  0858 05/19/22  0904   INR 1.23* 2.7* 2.1*      Lipid Profile    Recent Labs   Lab Test 08/04/22  1607 08/26/21  0917 08/12/20  1007 04/20/16  0934 02/27/15  1023   CHOL 153 177 172   < > 186   HDL 35* 44 45   < > 53   LDL 70 109* 105*   < > 108   TRIG 240* 121 109   < > 124   CHOLHDLRATIO  --   --   --   --  3.5    < > = values in this interval not displayed.     A1C    Recent Labs   Lab Test 08/04/22  1607   A1C 5.7*     Troponin I  No results for input(s): TROPONINIS, TROPONINI, GHTROP in the last 168 hours.       Stroke Consult Data Data   This was a non-emergent, non-telestroke consult.  I have personally spent a total of 15 minutes providing care and consulting with this patient's medical providers today, with more than 50% of this time spent in consultation, coordination of care, and discussion with the patient and/or family regarding diagnostic results, prognosis, symptom management, risks and benefits of management options, and development of plan of care.

## 2022-08-05 NOTE — CONSULTS
"      [facility]    Stroke Telephone Note    I was called by Tyler Pino on 08/04/22 regarding patient Rob Beavers. The patient is a 76 year old male who has medical history of hTN, afib on warfarin. Warafrin was held 5 days ago due to a planned endoscopy procedure. It is reported that patient complained of sudden onset right visual field cut upon waking up yesterday. He was found to have right occipital stroke on MRI done today.    Imaging Findings   MRI brain on admission:  IMPRESSION:  Acute RIGHT posterior cerebral artery territory infarct with an additional punctate infarct in the LEFT occipital lobe. No evidence of hemorrhagic transformation.    Impression  Sudden onset left visual field cut, 24 to 48 hours ago. With evidence of subacute R PCA infarct most likely cardio embolic due to warfarin suspicion in setting of active afib  Concerning findings of dissection/ atherosclerosis of L V1/ V2 segment    Recommendations   -Due to suspicion of dissection of L V1/V2 segment we will obtain MRA fat sat confirming dissection ,with T1 sequence looking for clot and will manage accordingly  For now continue Aspirin 325 mg  Start atorvastatin 40 mg stta  Complete rest of stroke workup TTE, A1c, LDL, speech, swallow, PT, OT  Do not restart warfarin for now until we rule out dissection/ soft clot on MRA T1, please meanwhile just continue with aspirin.    My recommendations are based on the information provided over the phone by Rob Beavers's in-person providers. They are not intended to replace the clinical judgment of his in-person providers. I was not requested to personally see or examine the patient at this time.    The Stroke Staff is Dr. Hernandez.    Bonnie Cunningham MD  Vascular Neurology Fellow  To page me or covering stroke neurology team member, click here: AMCOM   Choose \"On Call\" tab at top, then search dropdown box for \"Neurology Adult\", select location, press Enter, then look " for stroke/neuro ICU/telestroke.

## 2022-08-05 NOTE — ED NOTES
[facility]  ED Nurse Handoff Report    ED Chief complaint: Loss of Vision      ED Diagnosis:   Final diagnoses:   None       Code Status: Full Code    Allergies:   Allergies   Allergen Reactions     Monosodium Glutamate Headache     Seasonal Allergies Other (See Comments)       Patient Story: Patient presents to ED for loss of vision. Patient went to the eye doctor today and they sent him here for further work up.     Focused Assessment:  Rob Beavers is a 76 year old male on coumadin with history of PE, hypertension, and Atrial fibrillatuon who presents with loss of vision. The patient reports yesterday he woke up at 0300 and had onset of a blind spot in his left eye. He states he has intermittent blind spots in both eyes and headache and was seen by his eye doctor today prior to being seen in the emergency department. He adds he was scheduled to have an endoscopy for today and notes he stopped taking coumadin over the weekend but it got rescheduled so he started taking it again today. He states 2 [place] and got really dehydrated from the heat. He notes that if he sweats a lot he gets a mild headache that is persistent. He denies trouble with speech and numbness in arms or legs.     Treatments and/or interventions provided:   Labs Ordered and Resulted from Time of ED Arrival to Time of ED Departure   INR - Abnormal       Result Value    INR 1.23 (*)    COMPREHENSIVE METABOLIC PANEL - Normal    Sodium 138      Potassium 4.4      Chloride 105      Carbon Dioxide (CO2) 27      Anion Gap 6      Urea Nitrogen 15      Creatinine 0.94      Calcium 8.9      Glucose 99      Alkaline Phosphatase 77      AST 31      ALT 38      Protein Total 7.7      Albumin 3.7      Bilirubin Total 0.8      GFR Estimate 85     CBC WITH PLATELETS AND DIFFERENTIAL    WBC Count 7.6      RBC Count 4.94      Hemoglobin 15.5      Hematocrit 44.4      MCV 90      MCH 31.4      MCHC 34.9      RDW 13.1       Platelet Count 249      % Neutrophils 55      % Lymphocytes 29      % Monocytes 10      % Eosinophils 5      % Basophils 1      % Immature Granulocytes 0      NRBCs per 100 WBC 0      Absolute Neutrophils 4.1      Absolute Lymphocytes 2.2      Absolute Monocytes 0.8      Absolute Eosinophils 0.4      Absolute Basophils 0.1      Absolute Immature Granulocytes 0.0      Absolute NRBCs 0.0       MR Brain w/o & w Contrast   Final Result   IMPRESSION:   Acute RIGHT posterior cerebral artery territory infarct with an additional punctate infarct in the LEFT occipital lobe. No evidence of hemorrhagic transformation.      Findings of the right PCA infarct were conveyed to Dr. Tyler Pino over the phone by Dr. Iftikhar Roberts on 08/04/2022 around 2100 CST.      Please refer to separately dictated CT/CTA for further details.      Head CT w/o contrast   Preliminary Result   Abnormal   IMPRESSION:    HEAD CT:   1.  Acute right occipital lobe infarct (PCA distribution), which would correspond to the history of a left visual field deficit. No acute intracranial hemorrhage.      [Critical Result: #1]      Finding was identified on 8/4/2022 8:38 PM.       Dr. Tyler Pino was contacted by me on 8/4/2022 8:46 PM and verbalized understanding of the critical result.       HEAD CTA:    1.  No large vessel occlusion or abnormality to account for the patient's deficit in the right PCA distribution.   2.  Subtle irregularity of the left V4 at the PICA origin without occlusion.      NECK CTA:   1.  No significant carotid or right vertebral artery stenosis.   2.  Long segment multifocal irregularity and nonopacification of the left V1 and V2 segments may represent occlusion and/or dissection. Flow is seen distally, likely from a combination of retrograde flow and muscular collaterals.      CTA Head Neck with Contrast   Preliminary Result   Abnormal   IMPRESSION:    HEAD CT:   1.  Acute right occipital lobe infarct (PCA distribution), which  would correspond to the history of a left visual field deficit. No acute intracranial hemorrhage.      [Critical Result: #1]      Finding was identified on 8/4/2022 8:38 PM.       Dr. Tyler Pino was contacted by me on 8/4/2022 8:46 PM and verbalized understanding of the critical result.       HEAD CTA:    1.  No large vessel occlusion or abnormality to account for the patient's deficit in the right PCA distribution.   2.  Subtle irregularity of the left V4 at the PICA origin without occlusion.      NECK CTA:   1.  No significant carotid or right vertebral artery stenosis.   2.  Long segment multifocal irregularity and nonopacification of the left V1 and V2 segments may represent occlusion and/or dissection. Flow is seen distally, likely from a combination of retrograde flow and muscular collaterals.        Does this patient have any cognitive concerns?: None    Activity level - Baseline/Home:  Independent  Activity Level - Current:   Independent    Patient's Preferred language: English   Needed?: No    Isolation: None  Infection: Not Applicable  Patient tested for COVID 19 prior to admission: YES  Bariatric?: No    Vital Signs:   Vitals:    08/04/22 1559 08/04/22 1915 08/04/22 2100   BP: (!) 153/90 (!) 142/82 (!) 143/99   Pulse: 84 66    Resp: 14 20    Temp: 98.8  F (37.1  C)     TempSrc: Temporal     SpO2: 95% 97% 98%       Cardiac Rhythm:     Was the PSS-3 completed:   Yes  What interventions are required if any?               Family Comments: Patient updated family via brain phone   OBS brochure/video discussed/provided to patient/family: N/A    For the majority of the shift this patient's behavior was Green.   Behavioral interventions performed were Care explained.    ED NURSE PHONE NUMBER: [phone]

## 2022-08-05 NOTE — PROGRESS NOTES
"   08/05/22 0900   Quick Adds   Type of Visit Initial Occupational Therapy Evaluation   Living Environment   Living Environment Comments pt lives alone in a house with stairs and walk in shower   Self-Care   Usual Activity Tolerance excellent   Current Activity Tolerance good   Equipment Currently Used at Home none   Fall history within last six months no   Activity/Exercise/Self-Care Comment indp self care and ADL   Instrumental Activities of Daily Living (IADL)   IADL Comments indp. drives. retired   General Information   Onset of Illness/Injury or Date of Surgery 08/04/22   Referring Physician China Morales MD   Patient/Family Therapy Goal Statement (OT) to go home today   Additional Occupational Profile Info/Pertinent History of Current Problem per neuro note  \"The patient is a 76 year old male who has medical history of hTN, afib on warfarin. Warafrin was held 5 days ago due to a planned endoscopy procedure. It is reported that patient complained of sudden onset right visual field cut upon waking up yesterday. He was found to have right occipital stroke on MRI\"   Performance Patterns (Routines, Roles, Habits) likes to play golf   Limitations/Impairments visual   General Observations and Info agreeable to OT eval   Cognitive Status Examination   Orientation Status orientation to person, place and time   Cognitive Status Comments no deficits   Visual Perception   Visual Impairment/Limitations corrective lenses full-time;peripheral vision impaired left   Visual Field Deficit homonymous hemianopsia, left   Impact of Vision Impairment on Function (Vision) no motor impairment. Cancel Q demo L field cut bilateral eyes   Sensory   Sensory Quick Adds No deficits were identified   Pain Assessment   Patient Currently in Pain No   Integumentary/Edema   Integumentary/Edema no deficits were identifed   Posture   Posture not impaired   Range of Motion Comprehensive   General Range of Motion no range of motion " deficits identified   Strength Comprehensive (MMT)   General Manual Muscle Testing (MMT) Assessment no strength deficits identified   Coordination   Upper Extremity Coordination No deficits were identified   Bed Mobility   Bed Mobility No deficits identified   Balance   Balance Assessment no deficits were identified   Activities of Daily Living   BADL Assessment/Intervention no deficits identified   Clinical Impression   Criteria for Skilled Therapeutic Interventions Met (OT) Yes, treatment indicated   OT Diagnosis decreased function in IADL and safety   OT Problem List-Impairments impacting ADL vision   Assessment of Occupational Performance 1-3 Performance Deficits   Identified Performance Deficits driving, safety, home mgmt   Planned Therapy Interventions (OT) IADL retraining;ADL retraining   Clinical Decision Making Complexity (OT) low complexity   Risk & Benefits of therapy have been explained evaluation/treatment results reviewed;care plan/treatment goals reviewed;risks/benefits reviewed;current/potential barriers reviewed;participants voiced agreement with care plan;participants included;patient   Clinical Impression Comments decreased safety in IADL warrants skilled IP OT tx   OT Discharge Planning   OT Discharge Recommendation (DC Rec) home  (OP OT vision therapy. ophthalmology or as per neuro)   OT Rationale for DC Rec expect that with skilled IP OT tx pt will be able to return to home safely with OP vision OT and ophthalmology referral for complex IADL and vision therapy. may be at risk for safety concerns for driving and IADL given vision deficits   Total Evaluation Time (Minutes)   Total Evaluation Time (Minutes) 10   OT Goals   Therapy Frequency (OT) One time eval and treatment   OT Predicted Duration/Target Date for Goal Attainment 08/05/22   OT Goals OT Goal 1;OT Goal 2   OT: Goal 1 pt will demo 3 vision compensatory strategies   OT: Goal 2 pt will verbalize 3 signs/symptoms of stroke for home safety

## 2022-08-05 NOTE — PROVIDER NOTIFICATION
RECEIVING UNIT ED HANDOFF REVIEW    ED Nurse Handoff Report was reviewed by: Gary Murguia RN on August 5, 2022 at 3:56 AM

## 2022-08-06 ENCOUNTER — APPOINTMENT (OUTPATIENT)
Dept: MRI IMAGING | Facility: CLINIC | Age: 77
DRG: 065 | End: 2022-08-06
Attending: NURSE PRACTITIONER
Payer: COMMERCIAL

## 2022-08-06 VITALS
HEART RATE: 69 BPM | OXYGEN SATURATION: 96 % | SYSTOLIC BLOOD PRESSURE: 129 MMHG | RESPIRATION RATE: 16 BRPM | DIASTOLIC BLOOD PRESSURE: 74 MMHG | TEMPERATURE: 97.9 F

## 2022-08-06 PROCEDURE — 255N000002 HC RX 255 OP 636: Performed by: INTERNAL MEDICINE

## 2022-08-06 PROCEDURE — A9585 GADOBUTROL INJECTION: HCPCS | Performed by: INTERNAL MEDICINE

## 2022-08-06 PROCEDURE — 99239 HOSP IP/OBS DSCHRG MGMT >30: CPT | Performed by: INTERNAL MEDICINE

## 2022-08-06 PROCEDURE — 70549 MR ANGIOGRAPH NECK W/O&W/DYE: CPT

## 2022-08-06 PROCEDURE — 99233 SBSQ HOSP IP/OBS HIGH 50: CPT | Performed by: NURSE PRACTITIONER

## 2022-08-06 PROCEDURE — 250N000013 HC RX MED GY IP 250 OP 250 PS 637: Performed by: INTERNAL MEDICINE

## 2022-08-06 RX ORDER — GADOBUTROL 604.72 MG/ML
10 INJECTION INTRAVENOUS ONCE
Status: COMPLETED | OUTPATIENT
Start: 2022-08-06 | End: 2022-08-06

## 2022-08-06 RX ORDER — ASPIRIN 325 MG
325 TABLET, DELAYED RELEASE (ENTERIC COATED) ORAL DAILY
Qty: 2 TABLET | Refills: 0 | Status: SHIPPED | OUTPATIENT
Start: 2022-08-07 | End: 2022-08-09

## 2022-08-06 RX ORDER — ATORVASTATIN CALCIUM 40 MG/1
40 TABLET, FILM COATED ORAL EVERY EVENING
Qty: 30 TABLET | Refills: 0 | Status: SHIPPED | OUTPATIENT
Start: 2022-08-06 | End: 2022-09-02

## 2022-08-06 RX ADMIN — ASPIRIN 325 MG: 325 TABLET, COATED ORAL at 08:48

## 2022-08-06 RX ADMIN — LORATADINE 10 MG: 10 TABLET ORAL at 08:48

## 2022-08-06 RX ADMIN — Medication 1000 UNITS: at 08:48

## 2022-08-06 RX ADMIN — PROPRANOLOL HYDROCHLORIDE 80 MG: 80 CAPSULE, EXTENDED RELEASE ORAL at 08:48

## 2022-08-06 RX ADMIN — GADOBUTROL 10 ML: 604.72 INJECTION INTRAVENOUS at 05:44

## 2022-08-06 ASSESSMENT — ACTIVITIES OF DAILY LIVING (ADL)
ADLS_ACUITY_SCORE: 37

## 2022-08-06 NOTE — PHARMACY-CONSULT NOTE
Pharmacy Consult to evaluate for medication related stroke core measures    Rob Beavers, 76 year old male admitted for R PCA territory occipital lobe infarct on 8/4/2022.    Thrombolytic was not given because of Time from onset contraindications    VTE Prophylaxis SCDs /PCDs placed on 8/5, as appropriate prior to end of hospital day 2.    Antithrombotic: aspirin started on 325 mg, as appropriate by end of hospital day 2. Continue antithrombotic therapy on discharge to meet quality measures, unless contraindicated.    Anticoagulation if history of A-fib/flutter: Patient on warfarin; continue anticoagulation on discharge to meet quality measures, unless contraindicated.    LDL Cholesterol Calculated   Date Value Ref Range Status   08/04/2022 70 <=100 mg/dL Final   08/12/2020 105 (H) <100 mg/dL Final     Comment:     Above desirable:  100-129 mg/dl  Borderline High:  130-159 mg/dL  High:             160-189 mg/dL  Very high:       >189 mg/dl         Patient currently receiving Lipitor (atorvastatin) continue statin on discharge to meet quality measures, unless contraindicated.    Recommendations: None at this time    Thank you for the consult.    Constance Flores, PharmD 8/6/2022 11:31 AM

## 2022-08-06 NOTE — CONSULTS
Stroke Education Note    The following information has been reviewed with the patient:    1. Warning signs of stroke    2. Calling 911 if having warning signs of stroke    3. All modifiable risk factors: hypertension, CAD, atrial fib, diabetes, hypercholesterolemia, smoking, substance abuse, diet, physical inactivity, obesity, sleep apnea.    4. Patient's risk factors for stroke which include: HTN,HLD,Physical inactivity,poor diet, Afib    5. Follow-up plan for after discharge    6. Discharge medications which include: Lipitor, Lisinopril, ASA    In addition, the above information was given to the patient in writing as a part of the [facility] Stroke Class Handout.    Learner's response to risk factors / lifestyle modification education: Desire to change    Rob had stroke education with [facility] via ipad. Pt answered all teachback questions, reviewed types of stroke, s/s stroke and all controllable and uncontrollable risk factors. Pt appreciated class and will likely discharge to home today.         Nicole Rodriguez RN

## 2022-08-06 NOTE — DISCHARGE SUMMARY
[facility]  Discharge Summary        Rob Beavers MRN# [id number]   YOB: 1945 Age: 76 year old     Date of Admission:  8/4/2022  Date of Discharge:  8/6/2022  Admitting Physician:  China Morales MD  Discharge Physician: Kelvin Pace MD  Discharging Service: Hospitalist     Primary Provider:  Tian Santana  Primary Care Physician Phone Number: [phone]         Discharge Diagnoses/Problem Oriented Hospital Course (Providers):    Rob Beavers was admitted on 8/4/2022 by China Morales MD and I would refer you to their history and physical.  The following problems were addressed during his hospitalization:    Rob Beavers is a very pleasant 76 year old male with paroxysmal atrial fibrillation on chronic warfarin, hypertension, Nadrews's esophagus, history of pulmonary embolism who was admitted on 8/4/2022 for acute right occipital stroke.     Acute right occipital stroke cardioembolic  Subtherapeutic INR in the setting of planned EGD  Presented with symptoms of left visual field cut. Brought to stabilization room and Code stroke called. CT CTA of the head and neck image(s) showing Acute right occipital lobe infarct (PCA distribution), no acute intracranial hemorrhage, neck CTA with long segment multifocal irregularity and nonopacification of the left P1 and P2 segments, possibly representing occlusion and/or dissection.  -admit to inpatient status with telemetry  -continue aspirin 324 mg for 2 days and then transition to DOAC   -permissive hypertension  -stroke neurology consultation  -TTE results shows normal LVEF 55-60%  - MRA T1 pending, shows no significant dissection  -check lipid panel , HDL 35, LDL 70, hemoglobin A1c 5.7, started on lipitor  -Physical therapy, occupational therapy, speech therapy consultations  -stroke education  -discharge on asa 325 for 2 days and then xarelto    -reschedule EGD in 3 months with minium xarelto  interruption.  -no driving until outpatient eye evaluation with ophthalmology and OT     Paroxysmal Atrial fibrillation  Hypertension  Dyslipidemia  Managed PTA with lisinopril, warfarin. INR 1.23 on admission since he was holding warfarin for upcoming EGD procedure.   -resume lisinopril  -discontinue warfarin and start xarelto on 8/9     H/o pulmonary embolism  - transition to xarelto from warfarin     Andrews's esophagus  -continue omeprazole  - hold EGD for 3 months      Diet:  regular    Clinically Significant Risk Factors Present on Admission       # Coagulation Defect: home medication list includes an anticoagulant medication   # Hypertension: home medication list includes antihypertensive(s)          Expected Discharge Date: 08/06/2022              Code Status:      Full Code         Important Results:      NAD  HEENT NCAT  PULM CTA  CV IRREGULAR  GI SOFT +BS  MS NO EDEMA  NEURO FIELD CUT           Pending Results:        Unresulted Labs Ordered in the Past 30 Days of this Admission     No orders found from 7/5/2022 to 8/5/2022.               Discharge Instructions and Follow-Up:      Follow-up Appointments     Follow-up and recommended labs and tests       Follow up with PCP in 7-10 days for stroke follow up    Outpatient OT for driving test, no driving until     Follow up with neurology in 4 weeks    Reschedule EGD in 3 months                  Discharge Disposition:      Discharged to home         Discharge Medications:        Current Discharge Medication List      START taking these medications    Details   aspirin (ASA) 325 MG EC tablet Take 1 tablet (325 mg) by mouth daily for 2 days  Qty: 2 tablet, Refills: 0    Associated Diagnoses: Cerebrovascular accident (CVA), unspecified mechanism (H)      atorvastatin (LIPITOR) 40 MG tablet Take 1 tablet (40 mg) by mouth every evening for 30 days  Qty: 30 tablet, Refills: 0    Associated Diagnoses: Cerebrovascular accident (CVA), unspecified mechanism (H)       rivaroxaban ANTICOAGULANT (XARELTO) 20 MG TABS tablet Take 1 tablet (20 mg) by mouth daily (with dinner)  Qty: 30 tablet, Refills: 1    Comments: Future refills by Primary Care Physician or Cardiologist  Associated Diagnoses: Atrial fibrillation, unspecified type (H)         CONTINUE these medications which have NOT CHANGED    Details   lisinopril (ZESTRIL) 10 MG tablet TAKE 1 TABLET BY MOUTH EVERY DAY  Qty: 90 tablet, Refills: 0    Comments: DX Code Needed  .  Associated Diagnoses: Hypertension goal BP (blood pressure) < 140/90      loratadine (CLARITIN) 10 MG tablet Take 10 mg by mouth daily      MULTI-VITAMIN PO TABS ONE TABLET DAILY      omeprazole (PRILOSEC) 40 MG DR capsule TAKE 1 CAPSULE BY MOUTH EVERY DAY  Qty: 90 capsule, Refills: 2    Associated Diagnoses: Andrews's esophagus without dysplasia      propranolol ER (INDERAL LA) 80 MG 24 hr capsule TAKE 1 CAPSULE BY MOUTH EVERY DAY  Qty: 90 capsule, Refills: 0    Associated Diagnoses: Benign familial tremor      VITAMIN D, CHOLECALCIFEROL, PO Take 1,000 Units by mouth daily         STOP taking these medications       warfarin ANTICOAGULANT (COUMADIN) 5 MG tablet Comments:   Reason for Stopping:                    Allergies:         Allergies   Allergen Reactions     Monosodium Glutamate Headache     Seasonal Allergies Other (See Comments)            Consultations This Hospital Stay:      Consultation during this admission received from neurology          Discharge Orders      After Care Instructions     Activity      Your activity upon discharge: no driving until  OP vision OT and ophthalmology referral for complex IADL and vision therapy. may be at risk for safety concerns for driving and IADL given vision deficits.         Diet      Follow this diet upon discharge: Orders Placed This Encounter      Combination Diet Regular Diet             Future tests that were ordered for you     Stroke Hospital Follow Up      [facility] will call you to  coordinate care as prescribed by your provider. If you don t hear from a representative within 2 business days, please call [phone].                    Rehab orders     Referrals     Future Labs/Procedures    Occupational Therapy Referral     Process Instructions:    Work Related Injury: Functional Capacity and Work Conditioning are only   offered at [facility] and [facility] (service can be provided by PT   or OT).    *This therapy referral will be filtered to a centralized scheduling office   at [facility] and the patient will receive a call to   schedule an appointment at a [place] location most convenient for them. *      Comments:    Please be aware that coverage of these services is subject to the terms   and limitations of your health insurance plan.  Call member services at   your health plan with any benefit or coverage questions.  If you have not heard from the scheduling office within 2 business days,   please call [phone] for [facility], [phone] for [place] and   [phone] for [place].             Discharge Time:       Greater than 30 minutes.        Image Results From This Hospital Stay (For Non-EPIC Providers):        Results for orders placed or performed during the hospital encounter of 08/04/22   MR Brain w/o & w Contrast    Narrative    EXAM: MR BRAIN W/O and W CONTRAST  LOCATION: [facility]  DATE/TIME: 8/4/2022 7:54 PM    INDICATION: New homonymous hemianopsia, history of atrial fibrillation, stroke  COMPARISON: CT/CTA performed earlier on the same day.  CONTRAST: 10.0mL Gadavist  TECHNIQUE: Routine multiplanar multisequence head MRI without and with intravenous contrast.    FINDINGS:  INTRACRANIAL CONTENTS: Restricted diffusion within the medial right occipital lobe as well as an additional focus of restricted diffusion in the medial left occipital lobe, both corresponding to low ADC values and FLAIR  hyperintensity. Minimal partial   effacement of the overlying sulci without significant mass effect or midline shift. No corresponding signal loss on the susceptibility weighted images to suggest blood products. No intracranial mass or extra-axial fluid collection. Several additional   nonspecific T2/FLAIR hyperintensities within the cerebral white matter , most consistent with mild chronic microvascular ischemic change. Normal ventricles and sulci. Normal position of the cerebellar tonsils. No pathologic contrast enhancement.    SELLA: No abnormality accounting for technique.    OSSEOUS STRUCTURES/SOFT TISSUES: Normal marrow signal.     ORBITS: No abnormality accounting for technique.     SINUSES/MASTOIDS: No significant paranasal sinus mucosal disease. No middle ear or mastoid effusion.       Impression    IMPRESSION:  Acute RIGHT posterior cerebral artery territory infarct with an additional punctate infarct in the LEFT occipital lobe. No evidence of hemorrhagic transformation.    Findings of the right PCA infarct were conveyed to Dr. Tyler Pino over the phone by Dr. Iftikhar Roberts on 08/04/2022 around 2100 CST.    Please refer to separately dictated CT/CTA for further details.   Head CT w/o contrast     Value    Radiologist flags #1 (AA)    Narrative    EXAM: CT HEAD W/O CONTRAST, CTA HEAD NECK W CONTRAST  LOCATION: [facility]  DATE/TIME: 8/4/2022 7:40 PM    INDICATION: Abnormality in the left visual field.  COMPARISON: None.  CONTRAST: 75 mL Isovue 370  TECHNIQUE: Head and neck CT angiogram with IV contrast. Noncontrast head CT followed by axial helical CT images of the head and neck vessels obtained during the arterial phase of intravenous contrast administration. Axial 2D reconstructed images and   multiplanar 3D MIP reconstructed images of the head and neck vessels were performed by the technologist. Dose reduction techniques were used. All stenosis measurements made according to  NASCET criteria unless otherwise specified.    FINDINGS:   NONCONTRAST HEAD CT:   INTRACRANIAL CONTENTS: No intracranial hemorrhage, extraaxial collection, or mass effect.  Loss of gray-white differentiation in the right occipital lobe.  Normal ventricles and sulci.     VISUALIZED ORBITS/SINUSES/MASTOIDS: No intraorbital abnormality. No paranasal sinus mucosal disease. No middle ear or mastoid effusion.    BONES/SOFT TISSUES: No acute abnormality.    HEAD CTA:  ANTERIOR CIRCULATION: No stenosis/occlusion, aneurysm, or high flow vascular malformation. Standard [place] of [place] anatomy.    POSTERIOR CIRCULATION: No occlusion. There is subtle irregularity of the left V4 at the PICA origin. Dominant right and smaller left vertebral artery contribute to a normal basilar artery.     DURAL VENOUS SINUSES: Not well evaluated on a technical basis.    NECK CTA:  RIGHT CAROTID: No measurable stenosis or dissection.    LEFT CAROTID: No measurable stenosis or dissection.    VERTEBRAL ARTERIES: No focal stenosis or dissection on the right. Dominant right and smaller left vertebral arteries. Multifocal irregularity and occlusion of the left V1 and V2 segments.    AORTIC ARCH: Classic aortic arch anatomy with no significant stenosis at the origin of the great vessels.    NONVASCULAR STRUCTURES: 16 mm solid left thyroid nodule.      Impression    IMPRESSION:   HEAD CT:  1.  Acute right occipital lobe infarct (PCA distribution), which would correspond to the history of a left visual field deficit. No acute intracranial hemorrhage.    [Critical Result: #1]    Finding was identified on 8/4/2022 8:38 PM.     Dr. Tyler Pino was contacted by me on 8/4/2022 8:46 PM and verbalized understanding of the critical result.     HEAD CTA:   1.  No large vessel occlusion or abnormality to account for the patient's deficit in the right PCA distribution.  2.  Subtle irregularity of the left V4 at the PICA origin without occlusion.    NECK  CTA:  1.  No significant carotid or right vertebral artery stenosis.  2.  Long segment multifocal irregularity and nonopacification of the left V1 and V2 segments may represent occlusion and/or dissection. Distal flow is likely from a combination of retrograde flow and muscular collaterals.  3.  16 mm left thyroid nodule. Outpatient ultrasound is recommended to further characterize.   CTA Head Neck with Contrast     Value    Radiologist flags #1 (NATALIE)    Narrative    EXAM: CT HEAD W/O CONTRAST, CTA HEAD NECK W CONTRAST  LOCATION: [facility]  DATE/TIME: 8/4/2022 7:40 PM    INDICATION: Abnormality in the left visual field.  COMPARISON: None.  CONTRAST: 75 mL Isovue 370  TECHNIQUE: Head and neck CT angiogram with IV contrast. Noncontrast head CT followed by axial helical CT images of the head and neck vessels obtained during the arterial phase of intravenous contrast administration. Axial 2D reconstructed images and   multiplanar 3D MIP reconstructed images of the head and neck vessels were performed by the technologist. Dose reduction techniques were used. All stenosis measurements made according to NASCET criteria unless otherwise specified.    FINDINGS:   NONCONTRAST HEAD CT:   INTRACRANIAL CONTENTS: No intracranial hemorrhage, extraaxial collection, or mass effect.  Loss of gray-white differentiation in the right occipital lobe.  Normal ventricles and sulci.     VISUALIZED ORBITS/SINUSES/MASTOIDS: No intraorbital abnormality. No paranasal sinus mucosal disease. No middle ear or mastoid effusion.    BONES/SOFT TISSUES: No acute abnormality.    HEAD CTA:  ANTERIOR CIRCULATION: No stenosis/occlusion, aneurysm, or high flow vascular malformation. Standard [place] of Lucas anatomy.    POSTERIOR CIRCULATION: No occlusion. There is subtle irregularity of the left V4 at the PICA origin. Dominant right and smaller left vertebral artery contribute to a normal basilar artery.     DURAL VENOUS SINUSES:  Not well evaluated on a technical basis.    NECK CTA:  RIGHT CAROTID: No measurable stenosis or dissection.    LEFT CAROTID: No measurable stenosis or dissection.    VERTEBRAL ARTERIES: No focal stenosis or dissection on the right. Dominant right and smaller left vertebral arteries. Multifocal irregularity and occlusion of the left V1 and V2 segments.    AORTIC ARCH: Classic aortic arch anatomy with no significant stenosis at the origin of the great vessels.    NONVASCULAR STRUCTURES: 16 mm solid left thyroid nodule.      Impression    IMPRESSION:   HEAD CT:  1.  Acute right occipital lobe infarct (PCA distribution), which would correspond to the history of a left visual field deficit. No acute intracranial hemorrhage.    [Critical Result: #1]    Finding was identified on 8/4/2022 8:38 PM.     Dr. Tyler Pino was contacted by me on 8/4/2022 8:46 PM and verbalized understanding of the critical result.     HEAD CTA:   1.  No large vessel occlusion or abnormality to account for the patient's deficit in the right PCA distribution.  2.  Subtle irregularity of the left V4 at the PICA origin without occlusion.    NECK CTA:  1.  No significant carotid or right vertebral artery stenosis.  2.  Long segment multifocal irregularity and nonopacification of the left V1 and V2 segments may represent occlusion and/or dissection. Distal flow is likely from a combination of retrograde flow and muscular collaterals.  3.  16 mm left thyroid nodule. Outpatient ultrasound is recommended to further characterize.   MRA Neck (Carotids) wo & w Contrast    Narrative    EXAM: MRA NECK (CAROTIDS) WITHOUT AND WITH CONTRAST  LOCATION: [facility]  DATE/TIME: 08/06/2022, 5:42 AM    INDICATION: Abnormal CTA. Posterior circulation acute stroke. Left vertebral artery occlusion versus dissection.  Evaluate left vertebral artery.  COMPARISON: CT angiogram head and neck 08/04/2022.  CONTRAST: 10 mL Gadavist IV.  TECHNIQUE:  Neck MRA without and with IV contrast. Stenosis measurements made according to NASCET criteria unless otherwise specified.      FINDINGS:  RIGHT CAROTID: No measurable stenosis or dissection.    LEFT CAROTID: No measurable stenosis or dissection.    VERTEBRAL ARTERIES: The right vertebral artery is widely patent, without stenosis. Absence of flow-related enhancement in the left vertebral artery on the axial 2-D time-of-flight series. On the postcontrast series, there is no significant intravascular   contrast enhancement of the V1 or proximal to mid V2 segments of the left vertebral artery. There is some intravascular contrast opacification noted in the upper most V2 segment, the V3 segment, and the V4 segment of the left vertebral artery. There is   luminal irregularity and mild/mild to moderate stenosis seen in the V4 segment of the left vertebral artery. The pattern is overall similar to what was observed on the most recent CT exam. On the axial T1 fat-suppressed sequence, there is a subtle area   of linear, longitudinally oriented intrinsic T1 hyperintense signal along the expected course of the upper V1/proximal V2 segment of the left vertebral artery (series 801 image 28). This does not conform to a classic crescent shape of an intramural   hematoma, although it is difficult to exclude a small intramural hematoma. This signal could also potentially be the result of slow/absent flow and/or clot.    AORTIC ARCH: Classic aortic arch anatomy with no significant stenosis at the origin of the great vessels.      Impression    IMPRESSION:  1.  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  2.  The right vertebral artery is patent.  3.  The bilateral cervical carotid arteries are patent without significant stenosis.     Echocardiogram Complete - For age > 60 yrs     Value    LVEF  55-60%    Narrative    [id number]  [facility]  [id number]  [id number]^TADEO^JOHNNIE^JAIME      [facility]  Echocardiography Laboratory  [address] [place] [address]     Name: DEEPAK CHAPPELL  MRN: [id number]  : 1945  Study Date: 2022 09:46 AM  Age: 76 yrs  Gender: Male  Patient [facility]  Reason For Study: Cerebrovascular Incident  Ordering Physician: JOHNNIE PIEDRA  Referring Physician: Tian Santana  Performed By: Karlo Gottlieb RDCS     BSA: 2.1 m2  Height: 67 in  Weight: 208 lb  HR: 68  BP: 143/85 mmHg  ______________________________________________________________________________  Procedure  Complete Portable Echo Adult. Optison (NDC #0498-3995) given intravenously.  ______________________________________________________________________________  Interpretation Summary     Technically challenging study due to patient body habitus.     Left ventricular size, global systolic function, and wall motion are normal,  estimated LVEF 55-60%.  The right ventricle is mildly dilated. The right ventricular systolic function  is normal.  There is mild (1+) mitral regurgitation.  IVC diameter is small (<1cm), with near complete collapse with inspiration,  suggesting relative hypovolemia.  There are no prior studies available for comparison.     This study was compared to a TTE from 2017. Global biventricular function  is stable.  ______________________________________________________________________________  Left Ventricle  The left ventricle is normal in size. There is normal left ventricular wall  thickness. Left ventricular systolic function is normal. The visual ejection  fraction is 55-60%. Left ventricular diastolic function is normal. No regional  wall motion abnormalities noted.     Right Ventricle  The right ventricle is mildly dilated. The right ventricular systolic function  is normal.     Atria  Normal left atrial size. Right atrial size is normal.     Mitral Valve  There is mild mitral annular calcification. There is mild (1+)  mitral  regurgitation.     Tricuspid Valve  There is trace tricuspid regurgitation. The right ventricular systolic  pressure is approximated at 22.2 mmHg plus the right atrial pressure.     Aortic Valve  The aortic valve is normal in structure and function.     Pulmonic Valve  The pulmonic valve is not well seen, but is grossly normal.     Vessels  The aortic root is normal size. Normal size ascending aorta. Small inferior  vena cava size consistent with hypovolemia. IVC diameter is small (<1cm), with  near complete collapse with inspiration, suggesting relative hypovolemia.     ______________________________________________________________________________  MMode/2D Measurements & Calculations  IVSd: 1.2 cm  LVIDd: 4.4 cm  LVIDs: 3.0 cm  LVPWd: 1.1 cm  FS: 31.0 %  LV mass(C)d: 174.9 grams  LV mass(C)dI: 85.0 grams/m2  Ao root diam: 3.6 cm  LA dimension: 3.9 cm  asc Aorta Diam: 3.6 cm  LA/Ao: 1.1     LA Volume (BP): 55.8 ml  LA Volume Index (BP): 27.1 ml/m2  RWT: 0.48     Doppler Measurements & Calculations  MV E max shara: 74.4 cm/sec  MV A max shara: 72.7 cm/sec  MV E/A: 1.0  MV dec slope: 384.1 cm/sec2  MV dec time: 0.19 sec  PA acc time: 0.16 sec  TR max shara: 235.8 cm/sec  TR max P.2 mmHg  E/E' av.7     Lateral E/e': 7.4  Medial E/e': 9.9     ______________________________________________________________________________  Report approved by: Ozzie Reyes 2022 04:48 PM                 Most Recent Lab Results In EPIC (For Non-EPIC Providers):    Most Recent 3 CBC's:  Recent Labs   Lab Test 22  1607 21  0917 21  1022 19  0906 17  0803 17  2135   WBC 7.6  --   --   --  11.4* 12.9*   HGB 15.5 15.6 16.5   < > 12.3* 15.2   MCV 90  --   --   --  91 89     --   --   --  212 238    < > = values in this interval not displayed.      Most Recent 3 BMP's:  Recent Labs   Lab Test 22  1607 21  0917 21  1022    139 139   POTASSIUM 4.4 4.4 4.2    CHLORIDE 105 106 108   CO2 27 29 25   BUN 15 23 17   CR 0.94 0.91 0.82   ANIONGAP 6 4 6   SUKHWINDER 8.9 8.6 8.4*   GLC 99 103* 119*     Most Recent 3 Troponin's:No lab results found.    Invalid input(s): TROP, TROPONINIES  Most Recent 3 INR's:  Recent Labs   Lab Test 08/04/22  1607 06/30/22  0858 05/19/22  0904   INR 1.23* 2.7* 2.1*     Most Recent 2 LFT's:  Recent Labs   Lab Test 08/04/22  1607 08/26/21  0917   AST 31 30   ALT 38 40   ALKPHOS 77 84   BILITOTAL 0.8 0.7     Most Recent Cholesterol Panel:  Recent Labs   Lab Test 08/04/22  1607   CHOL 153   LDL 70   HDL 35*   TRIG 240*     Most Recent 6 Bacteria Isolates From Any Culture (See EPIC Reports for Culture Details):No lab results found.  Most Recent TSH, T4 and HgbA1c:  Recent Labs   Lab Test 08/04/22  1607 06/30/17  0803   TSH  --  0.19*   A1C 5.7*  --

## 2022-08-06 NOTE — PROGRESS NOTES
[facility]    Stroke Progress Note    Interval EventsNo events overnight. Feels as though vision is improving.     HPI Summary  76 year old male with past medical history significant for HTN, atrial fibrillation (on Coumadin, held this week for endoscopy). On 8/3, he awoke with left visual field deficits. MRI brain demonstrated an acute right PCA stroke with a small left PCA infarct    Stroke Evaluation Summarized     MRI/Head CT Acute right PCA territory infarct with punctate left occipital infarct.    Intracranial Vasculature Subtle left V4 irregularity without occlusion   Cervical Vasculature Multifocal irregularity of the left V1 and V2 segments concerning for occlusion vs dissection  MRA neck pending      Echocardiogram Pending   EKG/Telemetry Sinus with sinus arrhythmia   Other Testing Not Applicable      LDL  8/4/2022: 70 mg/dL   A1C  8/4/2022: 5.7 %   Troponin No lab value available in past 48 hrs       Impression   Acute ischemic stroke of bilateral occipital lobes (R>L)  due to cardioembolism  In the setting of atrial fibrillation, not on anticoagulation due to planned procedure. Repeat vessel imaging inconclusive for dissection, although no history to suggest this is the likely etiology.    Plan  - Continue  mg alone until 8/8. On 8/9, okay to restart anticoagulation. DOAC preferred from a neuro perspective. Discussed pricing with Mr. Beavers. If he elects to continue on Coumadin, recommend bridge  - LDL 70, started on Lipitor 40 mg daily. Follow-up with PCP to titrate to goal LDL 40-70  - A1c within goal <7.0  - Goal BP <130/80 with tighter control associated with decreased overall CV risk, if tolerated  - Therapies, as needed. Will need outpatient OT driving evaluation to clear for driving.   - Elective procedures should be delayed a minimum of 3 months post-stroke. If procedure becomes more urgent, plan for bridging/anticoagulation cessation should be discussed  "with neurologist prior to procedure.   - Interested in DISCOVERY trial, will notify coordination team    Patient Follow-up    - in the next 1-2 week(s) with PCP  - in 6-8 weeks with [facility] [phone] (neurology referral order placed)    No further stroke evaluation is recommended, so we will sign off. Please contact us with any additional questions.    ALLYSSA Hogan, CNP  Neurology  To page me or covering stroke neurology team member, click here: [facility]   Choose \"On Call\" tab at top, then search dropdown box for \"Neurology Adult\", select location, press Enter, then look for stroke/neuro ICU/telestroke.    ______________________________________________________    Clinically Significant Risk Factors Present on Admission                 Medications   Scheduled Meds    aspirin  325 mg Oral Daily     atorvastatin  40 mg Oral QPM     loratadine  10 mg Oral Daily     propranolol ER  80 mg Oral Daily     sodium chloride (PF)  3 mL Intracatheter Q8H     Vitamin D3  1,000 Units Oral Daily       Infusion Meds    - MEDICATION INSTRUCTIONS -       - MEDICATION INSTRUCTIONS -         PRN Meds  lidocaine 4%, lidocaine (buffered or not buffered), - MEDICATION INSTRUCTIONS -, - MEDICATION INSTRUCTIONS -, ondansetron **OR** ondansetron, prochlorperazine **OR** prochlorperazine **OR** prochlorperazine, sodium chloride, sodium chloride (PF)       PHYSICAL EXAMINATION  Temp:  [97.8  F (36.6  C)-98.2  F (36.8  C)] 97.9  F (36.6  C)  Pulse:  [64-93] 69  Resp:  [14-16] 16  BP: (117-134)/(72-83) 129/74  SpO2:  [93 %-96 %] 96 %      Neurologic  Mental Status:  alert, oriented x 3, follows commands, speech clear and fluent, naming and repetition normal  Cranial Nerves:  PERRL, EOMI with normal smooth pursuit, facial movements symmetric, hearing not formally tested but intact to conversation, no dysarthria, tongue protrusion midline, L VF deficit  Motor:  normal muscle tone and bulk, no abnormal movements, able to " move all limbs spontaneously  Reflexes:  deferred  Sensory:  light touch sensation intact and symmetric throughout upper and lower extremities, no extinction on double simultaneous stimulation   Coordination:  no obvious ataxia  Station/Gait:  deferred      Modified Anne Score (Discharge)  2 - Slight disability.  Able to look after own affairs without assistance, but unable to carry out all previous activities.    Imaging  I personally reviewed all imaging; relevant findings per HPI.     Lab Results Data   CBC  Recent Labs   Lab 08/04/22  1607   WBC 7.6   RBC 4.94   HGB 15.5   HCT 44.4        Basic Metabolic Panel    Recent Labs   Lab 08/04/22  1607      POTASSIUM 4.4   CHLORIDE 105   CO2 27   BUN 15   CR 0.94   GLC 99   SUKHWINDER 8.9     Liver Panel  Recent Labs   Lab 08/04/22  1607   PROTTOTAL 7.7   ALBUMIN 3.7   BILITOTAL 0.8   ALKPHOS 77   AST 31   ALT 38     INR    Recent Labs   Lab Test 08/04/22  1607 06/30/22  0858 05/19/22  0904   INR 1.23* 2.7* 2.1*      Lipid Profile    Recent Labs   Lab Test 08/04/22  1607 08/26/21  0917 08/12/20  1007 04/20/16  0934 02/27/15  1023   CHOL 153 177 172   < > 186   HDL 35* 44 45   < > 53   LDL 70 109* 105*   < > 108   TRIG 240* 121 109   < > 124   CHOLHDLRATIO  --   --   --   --  3.5    < > = values in this interval not displayed.     A1C    Recent Labs   Lab Test 08/04/22  1607   A1C 5.7*     Troponin I  No results for input(s): TROPONINIS, TROPONINI, GHTROP in the last 168 hours.     Billing: I have personally spent a total of 40 minutes providing care today, time spent in reviewing medical records and reviewing tests, examining the patient and obtaining history, coordination of care, and discussion with the patient and/or family regarding diagnostic results, prognosis, symptom management, risks and benefits of management options, and development of plan of care. Greater than 50% was spent in counseling and coordination of care.

## 2022-08-06 NOTE — PLAN OF CARE
Pt is Aox4, independent in the room, regular diet. OT discontinued PT and speech. L visual cut. MRI of carotids w/ and w/o contrast ordered- checklist done. Neuros Q4h and NIH Q8h. Skin intact. Had episode of rapid afib sustained in 150's for about 15min asymptomatic- converted spontaneously back into SR- hospitalist calixto messaged.

## 2022-08-06 NOTE — PLAN OF CARE
Pt is A&Ox4, independent in the room, regular diet. Had MRI of carotids on night shift, see results. Pt okayed for discharge from neurology and hospitalist. Medications sent to [facility] in [facility] in [place]. Extensive teaching given on aspirin bridge and Xarelto start date (8/9). Gone over bleeding precautions. Discharge instructions gone over, Pt verbalized understanding of picking up meds, administration of meds, and follow up appointments needed. IV's were removed, Pt was brought down to ER door by student nurse.

## 2022-08-08 ENCOUNTER — TELEPHONE (OUTPATIENT)
Dept: FAMILY MEDICINE | Facility: CLINIC | Age: 77
End: 2022-08-08

## 2022-08-08 ENCOUNTER — PATIENT OUTREACH (OUTPATIENT)
Dept: CARE COORDINATION | Facility: CLINIC | Age: 77
End: 2022-08-08

## 2022-08-08 DIAGNOSIS — Z71.89 OTHER SPECIFIED COUNSELING: ICD-10-CM

## 2022-08-08 NOTE — TELEPHONE ENCOUNTER
Pt switched to Xarelto with ER visit and admission to hospital for rt occipital stroke  Warfarin discontinued

## 2022-08-08 NOTE — PROGRESS NOTES
Clinic Care Coordination Contact  [facility]/Voicemail       Clinical Data: Care Coordinator Outreach  Outreach attempted x 1.  Phone rings no voicemail unable to leave a  voicemail with call back information and requested return call.  Plan:Care Coordinator will try to reach patient again in 1-2 business days.    Jeana Hernández  Care Transitions Assistant  [facility]

## 2022-08-09 ENCOUNTER — TELEPHONE (OUTPATIENT)
Dept: NEUROLOGY | Facility: CLINIC | Age: 77
End: 2022-08-09

## 2022-08-09 NOTE — PROGRESS NOTES
Clinic Care Coordination Contact  [facility]/Voicemail       Clinical Data: Care Coordinator Outreach  Outreach attempted x 2.  Left message on patient's voicemail with call back information and requested return call.  Plan: Care Coordinator will do no further outreaches at this time.    Jeana Hernández  Care Transitions Assistant  [facility]

## 2022-08-09 NOTE — TELEPHONE ENCOUNTER
Patient has been consented into the DISCOVERY study via a telephone consent per DISCOVERY protocol. All questions were answered to the patient's satisfaction. Writer personally went through full consent process with Pamela Blanca acting as the witness. Permission to approach was obtained from Sheryl Bowers.

## 2022-08-19 ENCOUNTER — HOSPITAL ENCOUNTER (OUTPATIENT)
Dept: OCCUPATIONAL THERAPY | Facility: CLINIC | Age: 77
Setting detail: THERAPIES SERIES
Discharge: HOME | End: 2022-08-19
Attending: INTERNAL MEDICINE
Payer: COMMERCIAL

## 2022-08-19 DIAGNOSIS — I63.9 CEREBROVASCULAR ACCIDENT (CVA), UNSPECIFIED MECHANISM (H): ICD-10-CM

## 2022-08-19 PROCEDURE — 97530 THERAPEUTIC ACTIVITIES: CPT | Mod: GO | Performed by: OCCUPATIONAL THERAPIST

## 2022-08-19 PROCEDURE — 97166 OT EVAL MOD COMPLEX 45 MIN: CPT | Mod: GO | Performed by: OCCUPATIONAL THERAPIST

## 2022-08-19 PROCEDURE — 97535 SELF CARE MNGMENT TRAINING: CPT | Mod: GO | Performed by: OCCUPATIONAL THERAPIST

## 2022-08-19 NOTE — PROGRESS NOTES
08/19/22 1100   Quick Adds   Type of Visit Initial Outpatient Occupational Therapy Evaluation   General Information   Start Of Care Date 08/19/22   Referring Physician Kelvin Pace MD   Orders Evaluate and treat as indicated   Other Orders OP vision OT and ophthalmology referral for complex IADL and vision therapy. may be at risk for safety concerns for driving and IADL given vision deficits.   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 PMHx: paroxysmal a-fib, on chronic warfarin, hypertension, Andrews's esophagus, history of pulmonary embolism, dyslipidemia   Role/Living Environment   Current Community Support   (lives alone)   Patient role/Employment history Retired   Community/Avocational Activities golf, [employer] puzzles   Current Living Environment [facility]   Number of Stairs to Enter Home yes   Number of Stairs Within Home yes   Primary Bathroom Location/Comments main   Prior Level - Transfers Independent   Prior Level - Ambulation Independent   Prior Level - ADLS Independent   Prior Responsibilities - IADL Meal Preparation;Housekeeping;Laundry;Shopping;Medication management;Finances;Driving   Prior Level Comments used city bus and light rail today; has some friends that can take to the store,   Patient/family Goals Statement get back vision so he can return to driving   Pain   Patient currently in pain No   Fall Risk Screen   Fall screen completed by OT   Have you fallen 2 or more times in the past year? No   Abuse Screen (yes response referral indicated)   Feels Unsafe at Home or Work/School no   Feels Threatened by Someone no   Physical Signs of Abuse Present no   Cognitive Status Examination   Cognitive Comment no changes noted; good attention to  task noted with visual field testing today   Visual Perception   Visual Field L visual field impairment, L eye greatest impairment in L superior field and less in lower field and right eye L upper visual field imapired and lower field less impaired than Left eye with confrontation testing/peripheral field testing. MENG [employer] screen today finds nearly full L homoymous hemianopia with exception of 30 degrees seen in inferior field of L side with L eye and R eye and small portion of L upper field  seen with L eye. (See separate results   Visual Perception Comments wears glasses; misses L side of Tv screen and wider reading material;   Posture   Posture Comments WFL   Range of Motion (ROM)   ROM Comments WFL per acute OT eval   Strength   Strength Comments WFL per acute OT eval   Hand Strength   Hand Dominance Right   Balance   Balance Comments WFL   Functional Mobility   Ambulation Ind   Transfer Skill   Level of [place]: Transfers independent   Bathing   Level of [place] - Bathing independent   Upper Body Dressing   Level of [place]: Dress Upper Body independent   Lower Body Dressing   Level of [place]: Dress Lower Body independent   Toileting   Level of [place]: Toilet independent   Grooming   Level of [place]: Grooming independent   Eating/Self-Feeding   Level of [place]: Eating independent   Activity Tolerance   Activity Tolerance appears WFL   Planned Therapy Interventions   Planned Therapy Interventions ADL training;IADL training;Therapeutic activities;Visual perception;Self care/Home management    OT Goal 1   Goal Description Pt will demonstrate at least 3 methods to compensate for L visual field loss with visual scanning in  near and far array tasks and reading to avoid errors.   Target Date 11/11/22    OT Goal 2   Goal Description Pt will demonstrate accuracy with visual compensation for community mobilityas measured byscores consistently on the  [employer] Visuomotor  training device at 100% on left side.   Target Date 11/11/22   Clinical Impression   Criteria for Skilled Therapeutic Interventions Met Yes, treatment indicated   OT Diagnosis Impaired ADL/IADL   Influenced by the following impairments L homonymous hemianopia with reduced attention to L visual field.   Assessment of Occupational Performance 3-5 Performance Deficits   Identified Performance Deficits driving, leisure (golfing), community mobility, shopping, home management,   Clinical Decision Making (Complexity) Moderate complexity   Therapy Frequency 1x/week   Predicted Duration of Therapy Intervention (days/wks) 6-8 weeks and tapering pending progress   Risks and Benefits of Treatment have been explained. Yes   Patient, Family & other staff in agreement with plan of care Yes   Clinical Impression Comments Pt reports since his stroke on 8/4 where he went to his optometrist first after one day of L visual problems and then was sent to ED/hospital, he has been home completing all if his own IADL, using public transportation to get to appt today, has played golf once and was frustrated by limited progress with seeing the balls and his  his left visual field. He is motivated to return to driving but appears to be following recommendations to avoid driving at this time due to his L homonymous hemianopia.   Education Assessment   Barriers To Learning No Barriers   Preferred Learning Style Listening;Demonstration   Total Evaluation Time   OT Evaristo Moderate Complexity Minutes (62833) 35

## 2022-08-19 NOTE — PROGRESS NOTES
[facility]          OUTPATIENT OCCUPATIONAL THERAPY  EVALUATION  PLAN OF TREATMENT FOR OUTPATIENT REHABILITATION  (COMPLETE FOR INITIAL CLAIMS ONLY)  Patient's Last Name, First Name, M.I.  YOB: 1945  Rob Beavers                        Provider's Name  [facility] Medical Record No.  [id number]                               Onset Date:         Start of Care Date:     08/19/22   Type:     ___PT   _X_OT   ___SLP Medical Diagnosis:     CVA                          OT Diagnosis:     Impaired ADL/IADL Visits from SOC:  1   _________________________________________________________________________________  Plan of Treatment/Functional Goals:  ADL training, IADL training, Therapeutic activities, Visual perception, Self care/Home management          Goals     Goal Description: Pt will demonstrate at least 3 methods to compensate for L visual field loss with visual scanning in  near and far array tasks and reading to avoid errors.  Target Date: 11/11/22        Goal Description: Pt will demonstrate accuracy with visual compensation for community mobilityas measured byscores consistently on the  [employer] Visuomotor training device at 100% on left side.  Target Date: 11/11/22          Therapy Frequency: 1x/week     Predicted Duration of Therapy Intervention (days/wks): 6-8 weeks and tapering pending progress 8/19/22-11/11/22  PB Jordan/TOSIN         I CERTIFY THE NEED FOR THESE SERVICES FURNISHED UNDER        THIS PLAN OF TREATMENT AND WHILE UNDER MY CARE     (Physician co-signature of this document indicates review and certification of the therapy plan).                 Referring Physician: Kelvin Pace MD( inpatient hospital);  Tian Santana MD is primary care MD     Initial Assessment        See Epic Evaluation      Start Of Care Date: 08/19/22

## 2022-08-24 ENCOUNTER — TELEPHONE (OUTPATIENT)
Dept: FAMILY MEDICINE | Facility: CLINIC | Age: 77
End: 2022-08-24

## 2022-08-24 DIAGNOSIS — I48.91 ATRIAL FIBRILLATION, UNSPECIFIED TYPE (H): ICD-10-CM

## 2022-08-24 DIAGNOSIS — I26.99 PULMONARY EMBOLUS (H): ICD-10-CM

## 2022-08-24 DIAGNOSIS — Z79.01 LONG TERM CURRENT USE OF ANTICOAGULANT THERAPY: Primary | ICD-10-CM

## 2022-08-24 NOTE — TELEPHONE ENCOUNTER
ANTICOAGULATION  MANAGEMENT    Rob Beavers is being discharged from the [facility] ([facility]).    Reason for discharge: warfarin replaced by alternate therapy, Xarelto    Anticoagulation episode resolved, ACC referral closed and Standing order discontinued    If patient needs warfarin management in the future, please send a new referral    Dontae Barreto RN

## 2022-09-01 DIAGNOSIS — I63.9 CEREBROVASCULAR ACCIDENT (CVA), UNSPECIFIED MECHANISM (H): ICD-10-CM

## 2022-09-01 DIAGNOSIS — I10 HYPERTENSION GOAL BP (BLOOD PRESSURE) < 140/90: ICD-10-CM

## 2022-09-01 ASSESSMENT — ACTIVITIES OF DAILY LIVING (ADL): CURRENT_FUNCTION: TRANSPORTATION REQUIRES ASSISTANCE

## 2022-09-01 ASSESSMENT — ENCOUNTER SYMPTOMS
FEVER: 0
COUGH: 0
PALPITATIONS: 0
SHORTNESS OF BREATH: 0
ARTHRALGIAS: 0
HEMATURIA: 0
EYE PAIN: 0
HEARTBURN: 0
DYSURIA: 0
MYALGIAS: 0
PARESTHESIAS: 0
JOINT SWELLING: 0
HEMATOCHEZIA: 0
ABDOMINAL PAIN: 0
WEAKNESS: 0
HEADACHES: 0
FREQUENCY: 0
DIARRHEA: 0
SORE THROAT: 0
CHILLS: 0
NAUSEA: 0
NERVOUS/ANXIOUS: 0
DIZZINESS: 0
CONSTIPATION: 0

## 2022-09-02 RX ORDER — LISINOPRIL 10 MG/1
10 TABLET ORAL DAILY
Qty: 90 TABLET | Refills: 0 | Status: SHIPPED | OUTPATIENT
Start: 2022-09-02 | End: 2022-09-07

## 2022-09-02 RX ORDER — ATORVASTATIN CALCIUM 40 MG/1
40 TABLET, FILM COATED ORAL EVERY EVENING
Qty: 90 TABLET | Refills: 0 | Status: SHIPPED | OUTPATIENT
Start: 2022-09-02 | End: 2022-09-07

## 2022-09-02 NOTE — TELEPHONE ENCOUNTER
Prescription approved per [facility] Refill Protocol.    Lauren Richard RN  [facility] Triage Team

## 2022-09-07 ENCOUNTER — OFFICE VISIT (OUTPATIENT)
Dept: FAMILY MEDICINE | Facility: CLINIC | Age: 77
End: 2022-09-07
Payer: COMMERCIAL

## 2022-09-07 VITALS
SYSTOLIC BLOOD PRESSURE: 130 MMHG | TEMPERATURE: 98.1 F | WEIGHT: 203 LBS | HEART RATE: 73 BPM | DIASTOLIC BLOOD PRESSURE: 82 MMHG | RESPIRATION RATE: 12 BRPM | OXYGEN SATURATION: 95 % | BODY MASS INDEX: 31.79 KG/M2

## 2022-09-07 DIAGNOSIS — Z79.01 LONG TERM CURRENT USE OF ANTICOAGULANT THERAPY: ICD-10-CM

## 2022-09-07 DIAGNOSIS — I10 HYPERTENSION GOAL BP (BLOOD PRESSURE) < 140/90: Primary | ICD-10-CM

## 2022-09-07 DIAGNOSIS — K22.70 BARRETT'S ESOPHAGUS WITHOUT DYSPLASIA: ICD-10-CM

## 2022-09-07 DIAGNOSIS — G25.0 BENIGN FAMILIAL TREMOR: ICD-10-CM

## 2022-09-07 DIAGNOSIS — Z00.00 ENCOUNTER FOR ANNUAL PHYSICAL EXAM: ICD-10-CM

## 2022-09-07 DIAGNOSIS — I63.9 CEREBROVASCULAR ACCIDENT (CVA), UNSPECIFIED MECHANISM (H): ICD-10-CM

## 2022-09-07 DIAGNOSIS — Z12.5 SCREENING FOR PROSTATE CANCER: ICD-10-CM

## 2022-09-07 DIAGNOSIS — I26.99 OTHER PULMONARY EMBOLISM WITHOUT ACUTE COR PULMONALE, UNSPECIFIED CHRONICITY (H): ICD-10-CM

## 2022-09-07 DIAGNOSIS — I48.91 ATRIAL FIBRILLATION, UNSPECIFIED TYPE (H): ICD-10-CM

## 2022-09-07 LAB — PSA SERPL-MCNC: 1.63 UG/L (ref 0–4)

## 2022-09-07 PROCEDURE — 99213 OFFICE O/P EST LOW 20 MIN: CPT | Mod: 25 | Performed by: FAMILY MEDICINE

## 2022-09-07 PROCEDURE — G0439 PPPS, SUBSEQ VISIT: HCPCS | Performed by: FAMILY MEDICINE

## 2022-09-07 PROCEDURE — G0103 PSA SCREENING: HCPCS | Performed by: FAMILY MEDICINE

## 2022-09-07 PROCEDURE — 36415 COLL VENOUS BLD VENIPUNCTURE: CPT | Performed by: FAMILY MEDICINE

## 2022-09-07 RX ORDER — PROPRANOLOL HYDROCHLORIDE 80 MG/1
CAPSULE, EXTENDED RELEASE ORAL
Qty: 90 CAPSULE | Refills: 3 | Status: SHIPPED | OUTPATIENT
Start: 2022-09-07 | End: 2023-09-08

## 2022-09-07 RX ORDER — ATORVASTATIN CALCIUM 40 MG/1
40 TABLET, FILM COATED ORAL EVERY EVENING
Qty: 90 TABLET | Refills: 3 | Status: SHIPPED | OUTPATIENT
Start: 2022-09-07 | End: 2023-09-08

## 2022-09-07 RX ORDER — LISINOPRIL 10 MG/1
10 TABLET ORAL DAILY
Qty: 90 TABLET | Refills: 3 | Status: SHIPPED | OUTPATIENT
Start: 2022-09-07 | End: 2023-09-08

## 2022-09-07 ASSESSMENT — ENCOUNTER SYMPTOMS
DIZZINESS: 0
HEMATURIA: 0
DIARRHEA: 0
NAUSEA: 0
ARTHRALGIAS: 0
NERVOUS/ANXIOUS: 0
PALPITATIONS: 0
FREQUENCY: 0
FEVER: 0
PARESTHESIAS: 0
EYE PAIN: 0
CHILLS: 0
COUGH: 0
WEAKNESS: 0
HEARTBURN: 0
SORE THROAT: 0
JOINT SWELLING: 0
HEADACHES: 0
HEMATOCHEZIA: 0
ABDOMINAL PAIN: 0
SHORTNESS OF BREATH: 0
DYSURIA: 0
CONSTIPATION: 0
MYALGIAS: 0

## 2022-09-07 ASSESSMENT — PAIN SCALES - GENERAL: PAINLEVEL: NO PAIN (0)

## 2022-09-07 ASSESSMENT — ACTIVITIES OF DAILY LIVING (ADL): CURRENT_FUNCTION: TRANSPORTATION REQUIRES ASSISTANCE

## 2022-09-07 NOTE — PROGRESS NOTES
"SUBJECTIVE:   Rob Beavers is a 77 year old male who presents for Preventive Visit.    {Patient has been advised of split billing requirements and indicates understanding: Yes  Are you in the first 12 months of your Medicare coverage?  No    Healthy Habits:     In general, how would you rate your overall health?  Good    Frequency of exercise:  2-3 days/week    Duration of exercise:  15-30 minutes    Do you usually eat at least 4 servings of fruit and vegetables a day, include whole grains    & fiber and avoid regularly eating high fat or \"junk\" foods?  No    Taking medications regularly:  Yes    Medication side effects:  None    Ability to successfully perform activities of daily living:  Transportation requires assistance    Home Safety:  Lack of grab bars in the bathroom    Hearing Impairment:  Difficulty following a conversation in a noisy restaurant or crowded room    In the past 6 months, have you been bothered by leaking of urine?  No    In general, how would you rate your overall mental or emotional health?  Good      PHQ-2 Total Score: 0    Additional concerns today:  Yes  Patient was recently admitted in the hospital secondary to some peripheral visual loss.  He was noted to be in atrial fibrillation and his warfarin was subtherapeutic.  His endoscopy was rescheduled so he was not taking the up-to-date dose of his warfarin.  Denies any chest pain no shortness of breath he does not have any weakness in arms however he continued to have some peripheral vision on the left side which is not very clear.  He is planning to see neurology and ophthalmology.  Requesting a cardiology referral in case if he needs any further evaluation.  His warfarin was stopped in the hospital and he was started on Xarelto.  Currently taking Xarelto without any interruption.  In future he will need minimal interruption the medication if he ever need any invasive procedure.    Do you feel safe in your environment? Yes    Have you " ever done Advance Care Planning? (For example, a Health Directive, POLST, or a discussion with a medical provider or your loved ones about your wishes): No, advance care planning information given to patient to review.  Patient plans to discuss their wishes with loved ones or provider.        Fall risk  Fallen 2 or more times in the past year?: No  Any fall with injury in the past year?: No    Cognitive Screening   1) Repeat 3 items (Leader, Season, Table)      2) Clock draw:   NORMAL  3) 3 item recall:   Recalls 3 objects  Results: 3 items recalled: COGNITIVE IMPAIRMENT LESS LIKELY    Mini-CogTM Copyright S [employer]. Licensed by the author for use in [facility]; reprinted with permission (sobella@[facility]). All rights reserved.      Do you have sleep apnea, excessive snoring or daytime drowsiness?: no    Reviewed and updated as needed this visit by clinical staff   Tobacco  Allergies  Meds                Reviewed and updated as needed this visit by Provider                   Social History     Tobacco Use     Smoking status: Never Smoker     Smokeless tobacco: Never Used   Substance Use Topics     Alcohol use: Yes     Alcohol/week: 0.0 standard drinks     Comment: 1-3 beers once-twice/month         Alcohol Use 9/1/2022   Prescreen: >3 drinks/day or >7 drinks/week? No   Prescreen: >3 drinks/day or >7 drinks/week? -           Current providers sharing in care for this patient include:     Patient Care Team:  Tian Santana MD as PCP - General (Family Practice)  Tian Santana MD as Assigned PCP    The following health maintenance items are reviewed in Epic and correct as of today:  Health Maintenance Due   Topic Date Due     ZOSTER IMMUNIZATION (1 of 2) Never done     AORTIC ANEURYSM SCREENING (SYSTEM ASSIGNED)  Never done     COVID-19 Vaccine (4 - Booster for Moderna series) 08/20/2022     ANNUAL REVIEW OF HM ORDERS  08/26/2022     INFLUENZA VACCINE (1) 09/01/2022     Lab work is in process          Review of  "Systems   Constitutional: Negative for chills and fever.   HENT: Negative for congestion, ear pain, hearing loss and sore throat.    Eyes: Positive for visual disturbance. Negative for pain.   Respiratory: Negative for cough and shortness of breath.    Cardiovascular: Negative for chest pain, palpitations and peripheral edema.   Gastrointestinal: Negative for abdominal pain, constipation, diarrhea, heartburn, hematochezia and nausea.   Genitourinary: Negative for dysuria, frequency, genital sores, hematuria, impotence, penile discharge and urgency.   Musculoskeletal: Negative for arthralgias, joint swelling and myalgias.   Skin: Negative for rash.   Neurological: Negative for dizziness, weakness, headaches and paresthesias.   Psychiatric/Behavioral: Negative for mood changes. The patient is not nervous/anxious.          OBJECTIVE:   /82   Pulse 73   Temp 98.1  F (36.7  C) (Tympanic)   Resp 12   Wt 92.1 kg (203 lb)   SpO2 95%   BMI 31.79 kg/m   Estimated body mass index is 31.79 kg/m  as calculated from the following:    Height as of 8/26/21: 1.702 m (5' 7\").    Weight as of this encounter: 92.1 kg (203 lb).  Physical Exam  GENERAL: healthy, alert and no distress  EYES: Eyes grossly normal to inspection, PERRL and conjunctivae and sclerae normal  HENT: ear canals and TM's normal, nose and mouth without ulcers or lesions  NECK: no adenopathy, no asymmetry, masses, or scars and thyroid normal to palpation  RESP: lungs clear to auscultation - no rales, rhonchi or wheezes  CV: regular rate and rhythm, normal S1 S2, no S3 or S4, no murmur, click or rub, no peripheral edema and peripheral pulses strong  ABDOMEN: soft, nontender, no hepatosplenomegaly, no masses and bowel sounds normal  MS: no gross musculoskeletal defects noted, no edema  SKIN: no suspicious lesions or rashes  NEURO: Normal strength and tone, mentation intact and speech normal  Appropriate strength in bilateral arms and legs.  Peripheral vision " on the left side has some leg.  PSYCH: mentation appears normal, affect normal/bright      ASSESSMENT / PLAN:   Rob was seen today for physical.    Diagnoses and all orders for this visit:    Hypertension goal BP (blood pressure) < 140/90  -     lisinopril (ZESTRIL) 10 MG tablet; Take 1 tablet (10 mg) by mouth daily  Blood pressure stable medication refilled for  Encounter for annual physical exam  Labs reviewed we will get PSA for his screening and follow-up on that.  Long term current use of anticoagulant therapy  He was previously previously on warfarin.  That was stopped and currently on Xarelto.  Advised to continue that without any interruption.  Screening for prostate cancer  -     PSA, screen; Future  -     PSA, screen    Andrews's esophagus without dysplasia  No current symptoms.  Cerebrovascular accident (CVA), unspecified mechanism (H)  -     atorvastatin (LIPITOR) 40 MG tablet; Take 1 tablet (40 mg) by mouth every evening  Discussed recent episode of CVA with some vision issues.  He is planning to see a neurology as well as ophthalmology for vision issues.  He continued to have some peripheral vision loss.  Denies any chest pain no shortness of breath.  Currently on appropriate medications.  Atrial fibrillation, unspecified type (H)  -     Adult Cardiology Eval [facility] Referral; Future  -     rivaroxaban ANTICOAGULANT (XARELTO) 20 MG TABS tablet; Take 1 tablet (20 mg) by mouth daily (with dinner)  Currently on normal rhythm.  Most likely paroxysmal atrial fibrillation  Other pulmonary embolism without acute cor pulmonale, unspecified chronicity (H)  History of PE he is currently on Xarelto which will be helpful.  Benign familial tremor  -     propranolol ER (INDERAL LA) 80 MG 24 hr capsule; TAKE 1 CAPSULE BY MOUTH EVERY DAY    Other orders  -     REVIEW OF HEALTH MAINTENANCE PROTOCOL ORDERS        Patient has been advised of split billing requirements and indicates understanding:  "Yes    COUNSELING:  Reviewed preventive health counseling, as reflected in patient instructions       Regular exercise       Healthy diet/nutrition    Estimated body mass index is 31.79 kg/m  as calculated from the following:    Height as of 8/26/21: 1.702 m (5' 7\").    Weight as of this encounter: 92.1 kg (203 lb).        He reports that he has never smoked. He has never used smokeless tobacco.      Appropriate preventive services were discussed with this patient, including applicable screening as appropriate for cardiovascular disease, diabetes, osteopenia/osteoporosis, and glaucoma.  As appropriate for age/gender, discussed screening for colorectal cancer, prostate cancer, breast cancer, and cervical cancer. Checklist reviewing preventive services available has been given to the patient.    Reviewed patients plan of care and provided an AVS. The Basic Care Plan (routine screening as documented in Health Maintenance) for Rob meets the Care Plan requirement. This Care Plan has been established and reviewed with the Patient.    Counseling Resources:  ATP IV Guidelines  Pooled Cohorts Equation Calculator  Breast Cancer Risk Calculator  Breast Cancer: Medication to Reduce Risk  FRAX Risk Assessment  ICSI Preventive Guidelines  Dietary Guidelines for Americans, 2010  [employer]'s MyPlate  ASA Prophylaxis  Lung CA Screening    Tian Santana MD  [facility]    Identified Health Risks:  "

## 2022-09-13 ENCOUNTER — HOSPITAL ENCOUNTER (OUTPATIENT)
Dept: OCCUPATIONAL THERAPY | Facility: CLINIC | Age: 77
Setting detail: THERAPIES SERIES
Discharge: HOME | End: 2022-09-13
Attending: FAMILY MEDICINE
Payer: COMMERCIAL

## 2022-09-13 PROCEDURE — 97535 SELF CARE MNGMENT TRAINING: CPT | Mod: GO | Performed by: OCCUPATIONAL THERAPIST

## 2022-09-14 DIAGNOSIS — H53.462 LEFT HOMONYMOUS HEMIANOPSIA: Primary | ICD-10-CM

## 2022-09-19 NOTE — TELEPHONE ENCOUNTER
Please see note below and advise. Thank you.  Pauline Estrada,     
Reason for Call:  Other Order    Detailed comments: Pt is having a procedure called a halo 360 EGD and the Pt is on warfrain and is asking for a four day hold on Rx prior to procedure.      Phone Number [facility] can be reached at: Other phone number:      Best Time: anytime    Can we leave a detailed message on this number? YES    Call taken on 4/2/2021 at 8:44 AM by Osei Horner            
Show Aperture Variable?: Yes
Duration Of Freeze Thaw-Cycle (Seconds): 0
Render Note In Bullet Format When Appropriate: No
Detail Level: Detailed
Post-Care Instructions: I reviewed with the patient in detail post-care instructions. Patient is to wear sunprotection, and avoid picking at any of the treated lesions. Pt may apply Vaseline to crusted or scabbing areas.
Consent: The patient's consent was obtained including but not limited to risks of crusting, scabbing, blistering, scarring, darker or lighter pigmentary change, recurrence, incomplete removal and infection.

## 2022-09-21 ENCOUNTER — HOSPITAL ENCOUNTER (OUTPATIENT)
Dept: OCCUPATIONAL THERAPY | Facility: CLINIC | Age: 77
Setting detail: THERAPIES SERIES
Discharge: HOME | End: 2022-09-21
Attending: FAMILY MEDICINE
Payer: COMMERCIAL

## 2022-09-21 PROCEDURE — 97535 SELF CARE MNGMENT TRAINING: CPT | Mod: GO | Performed by: OCCUPATIONAL THERAPIST

## 2022-09-27 ENCOUNTER — OFFICE VISIT (OUTPATIENT)
Dept: CARDIOLOGY | Facility: CLINIC | Age: 77
End: 2022-09-27
Attending: FAMILY MEDICINE
Payer: COMMERCIAL

## 2022-09-27 ENCOUNTER — HOSPITAL ENCOUNTER (OUTPATIENT)
Dept: CARDIOLOGY | Facility: CLINIC | Age: 77
Discharge: HOME | End: 2022-09-27
Attending: INTERNAL MEDICINE | Admitting: INTERNAL MEDICINE
Payer: COMMERCIAL

## 2022-09-27 VITALS
SYSTOLIC BLOOD PRESSURE: 133 MMHG | WEIGHT: 207.8 LBS | BODY MASS INDEX: 32.62 KG/M2 | OXYGEN SATURATION: 100 % | HEIGHT: 67 IN | HEART RATE: 77 BPM | DIASTOLIC BLOOD PRESSURE: 85 MMHG

## 2022-09-27 DIAGNOSIS — I48.91 ATRIAL FIBRILLATION, UNSPECIFIED TYPE (H): ICD-10-CM

## 2022-09-27 PROCEDURE — 93000 ELECTROCARDIOGRAM COMPLETE: CPT | Mod: 59 | Performed by: INTERNAL MEDICINE

## 2022-09-27 PROCEDURE — 93270 REMOTE 30 DAY ECG REV/REPORT: CPT

## 2022-09-27 PROCEDURE — 99204 OFFICE O/P NEW MOD 45 MIN: CPT | Performed by: INTERNAL MEDICINE

## 2022-09-27 PROCEDURE — 93272 ECG/REVIEW INTERPRET ONLY: CPT | Performed by: INTERNAL MEDICINE

## 2022-09-27 NOTE — PROGRESS NOTES
HPI and Plan:   Today I had the pleasure of seeing Rob Beavers at [facility]. He is a pleasant 77 year old patient w who presents to the clinic for a follow-up visit.      The patient has history of pulmonary embolism 10-12 years ago.  He was on warfarin ever since.  5 years ago he was noted to have atrial fibrillation after gallbladder surgery.  Episode subsided spontaneously and did not recur.  However because of this he was continued on warfarin therapy until last month when it was discontinued in preparation for an endoscopy.  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  Work-up with MRI showed blockage of V2-V4 segment of the left vertebral artery.  They started him on a statin, switched him from Coumadin to Xarelto and continued all his other medications.  He has not seen his neurologist since hospital discharge but is going to see them next week.  He is unable to drive currently until cleared by them.  Throughout his stay at the hospital he was not noted to be in atrial fibrillation at any point.  I reviewed his EKG, echo, MRI, MRA and blood work and notes from recent admission.    He otherwise feels well and does not have any cardiac issues.  He denies chest pain, shortness of breath, orthopnea, PND, lower extremity edema, presyncope or syncope.    Assessment and plan:   1.  Paroxysmal atrial fibrillation-on anticoagulation  2.  Ischemic stroke  3.  History of pulmonary embolism  4.  Essential hypertension  5.  Hyperlipidemia    It was a pleasure to meet Rob today in clinic.  I reviewed the imaging and notes from his recent admission to the hospital for stroke.  On my review of the notes it appears that the reason for stroke was blockage of the V2-V4 segment of left vertebral artery.  It is a little unclear to me if this was a thromboembolic occlusion or it was secondary to a plaque rupture with superimposed clot  formation although I am leaning more towards the latter. I will wait for Mr. Banda to see his Neurologist to get a definitive answer.  I will perform a month-long event monitor to to evaluate frequency and burden of atrial fibrillation.  He will continue to be on anticoagulation indefinitely.      Thank you for allowing me to participate in the care of Rob Beavers    This note was completed in part using Dragon voice recognition software. Although reviewed after completion, some word and grammatical errors may occur.    Jonny Verduzco MD  Cardiology    Orders Placed This Encounter   Procedures     EKG 12-lead complete w/read - Clinics (performed today)     Cardiac Event Monitor Adult Pediatric       No orders of the defined types were placed in this encounter.      There are no discontinued medications.    Encounter Diagnosis   Name Primary?     Atrial fibrillation, unspecified type (H)        CURRENT MEDICATIONS:  Current Outpatient Medications   Medication Sig Dispense Refill     atorvastatin (LIPITOR) 40 MG tablet Take 1 tablet (40 mg) by mouth every evening 90 tablet 3     lisinopril (ZESTRIL) 10 MG tablet Take 1 tablet (10 mg) by mouth daily 90 tablet 3     loratadine (CLARITIN) 10 MG tablet Take 10 mg by mouth daily       MULTI-VITAMIN PO TABS ONE TABLET DAILY       omeprazole (PRILOSEC) 40 MG DR capsule TAKE 1 CAPSULE BY MOUTH EVERY DAY 90 capsule 3     propranolol ER (INDERAL LA) 80 MG 24 hr capsule TAKE 1 CAPSULE BY MOUTH EVERY DAY 90 capsule 3     rivaroxaban ANTICOAGULANT (XARELTO) 20 MG TABS tablet Take 1 tablet (20 mg) by mouth daily (with dinner) 90 tablet 1     VITAMIN D, CHOLECALCIFEROL, PO Take 1,000 Units by mouth daily         ALLERGIES     Allergies   Allergen Reactions     Monosodium Glutamate Headache     Seasonal Allergies Other (See Comments)       PAST MEDICAL HISTORY:  Past Medical History:   Diagnosis Date     Andrews's esophagus without dysplasia 4/26/2017     CARDIOVASCULAR  SCREENING; LDL GOAL LESS THAN 130 10/31/2010     Cerebrovascular accident (CVA), unspecified mechanism (H) 2022     Hypertension goal BP (blood pressure) < 140/90 2012       PAST SURGICAL HISTORY:  Past Surgical History:   Procedure Laterality Date     COLONOSCOPY       DENTAL SURGERY       GI SURGERY      4 endoscopies for Andrews's esophagus;  3 w/ Halo 360  type     LAPAROSCOPIC CHOLECYSTECTOMY WITH CHOLANGIOGRAMS N/A 2017    Procedure: LAPAROSCOPIC CHOLECYSTECTOMY WITH CHOLANGIOGRAMS;  LAPAROSCOPIC CHOLECYSTECTOMY WITH INTROPERATIVE CHOLANGIOGRAMS.;  Surgeon: Gian Yin MD;  Location: [facility] OR     TONSILLECTOMY       [facility] TOTAL HIP ARTHROPLASTY         FAMILY HISTORY:  Family History   Problem Relation Age of Onset     Hypertension Mother         passed away at 89     Eye Disorder Mother         macular degeneration     Blood Disease Mother         She is on warfarin     Obesity Mother      Dementia Father 80        passed away at 88 yrs old     Hypertension Father      Prostate Cancer Father      Breast Cancer Sister      Diabetes Paternal Uncle      Cerebrovascular Disease Maternal Grandmother      Cerebrovascular Disease Maternal Grandfather      Breast Cancer Sister      Other Cancer Sister               Asthma No family hx of      C.A.D. No family hx of      Cancer - colorectal No family hx of      Prostate Cancer No family hx of      Anesthesia Reaction No family hx of      Thyroid Disease No family hx of        SOCIAL HISTORY:  Social History     Socioeconomic History     Marital status: Single     Spouse name: None     Number of children: 0     Years of education: 16     Highest education level: None   Occupational History     Occupation:      Employer: RETIRED   Tobacco Use     Smoking status: Never Smoker     Smokeless tobacco: Never Used   Substance and Sexual Activity     Alcohol use: Yes     Comment: 1-2 per week     Drug use: No     Sexual  "activity: Not Currently     Partners: Female   Other Topics Concern      Service Yes     Comment: [id number]     Blood Transfusions No     Occupational Exposure Yes     Comment: He was      Special Diet No     Exercise Yes     Comment: [employer]; Fitness center 2 times per week; cross country skiing     Seat Belt Yes     Parent/sibling w/ CABG, MI or angioplasty before 65F 55M? No   Social History Narrative    Eats fruits and vegetables most days. He takes a multivitamin. Increased calcium containing foods recommended.       Review of Systems:  Skin:  Negative bruising     Eyes:  Positive for glasses    ENT:  not assessed      Respiratory:  not assessed       Cardiovascular:  Negative;palpitations;chest pain;edema;lightheadedness;fatigue;dizziness;exercise intolerance      Gastroenterology: Positive for reflux Treated  Genitourinary:  not assessed      Musculoskeletal:  not assessed      Neurologic:  not assessed      Psychiatric:  not assessed      Heme/Lymph/Imm:  not assessed      Endocrine:  Negative thyroid disorder;diabetes      Physical Exam:  Vitals: /85   Pulse 77   Ht 1.702 m (5' 7\")   Wt 94.3 kg (207 lb 12.8 oz)   SpO2 100%   BMI 32.55 kg/m    Eyes: No icterus.  Pulmonary: Chest symmetric, lungs clear bilaterally and no crackles, wheezes or rales.  Cardiovascular: RRR with normal S1 and S2, no murmur, JVP normal.  Musculoskeletal: Edema of the lower extremities: None.  Neurologic: Oriented and appropriate without obvious focal deficits.   Psychiatric: Normal affect.     Recent Lab Results:  LIPID RESULTS:  Lab Results   Component Value Date    CHOL 153 08/04/2022    CHOL 172 08/12/2020    HDL 35 (L) 08/04/2022    HDL 45 08/12/2020    LDL 70 08/04/2022     (H) 08/12/2020    TRIG 240 (H) 08/04/2022    TRIG 109 08/12/2020    CHOLHDLRATIO 3.5 02/27/2015       LIVER ENZYME RESULTS:  Lab Results   Component Value Date    AST 31 08/04/2022    AST 25 04/08/2021    ALT 38 08/04/2022 "    ALT 34 04/08/2021       CBC RESULTS:  Lab Results   Component Value Date    WBC 7.6 08/04/2022    WBC 11.4 (H) 06/30/2017    RBC 4.94 08/04/2022    RBC 3.89 (L) 06/30/2017    HGB 15.5 08/04/2022    HGB 16.5 04/08/2021    HCT 44.4 08/04/2022    HCT 35.3 (L) 06/30/2017    MCV 90 08/04/2022    MCV 91 06/30/2017    MCH 31.4 08/04/2022    MCH 31.6 06/30/2017    MCHC 34.9 08/04/2022    MCHC 34.8 06/30/2017    RDW 13.1 08/04/2022    RDW 13.4 06/30/2017     08/04/2022     06/30/2017       BMP RESULTS:  Lab Results   Component Value Date     08/04/2022     04/08/2021    POTASSIUM 4.4 08/04/2022    POTASSIUM 4.2 04/08/2021    CHLORIDE 105 08/04/2022    CHLORIDE 108 04/08/2021    CO2 27 08/04/2022    CO2 25 04/08/2021    ANIONGAP 6 08/04/2022    ANIONGAP 6 04/08/2021    GLC 99 08/04/2022     (H) 04/08/2021    BUN 15 08/04/2022    BUN 17 04/08/2021    CR 0.94 08/04/2022    CR 0.82 04/08/2021    GFRESTIMATED 85 08/04/2022    GFRESTIMATED 87 04/08/2021    GFRESTBLACK >90 04/08/2021    SUKHWINDER 8.9 08/04/2022    SUKHWINDER 8.4 (L) 04/08/2021        A1C RESULTS:  Lab Results   Component Value Date    A1C 5.7 (H) 08/04/2022    A1C 5.6 05/10/2011       INR RESULTS:  Lab Results   Component Value Date    INR 1.23 (H) 08/04/2022    INR 2.7 (H) 06/30/2022    INR 2.1 (H) 05/19/2022    INR 2.40 (H) 07/07/2021    INR 2.00 (H) 05/26/2021       CC  Tian Santana MD  [address] [place] [facility] [place] [address]    All medical records were reviewed in detail and discussed with the patient. Greater than 30 mins were spent with the patient, 50% of this time was spent on counseling and coordination of care.  After visit summary was printed and given to the patient.

## 2022-09-27 NOTE — LETTER
9/27/2022    Tian Santana MD  [address]  [place] [address]    RE: Rob Beavers       Dear Colleague,     I had the pleasure of seeing Rob Beavers in the [facility].  HPI and Plan:   Today I had the pleasure of seeing Rob Beavers at [facility]. He is a pleasant 77 year old patient w who presents to the clinic for a follow-up visit.      The patient has history of pulmonary embolism 10-12 years ago.  He was on warfarin ever since.  5 years ago he was noted to have atrial fibrillation after gallbladder surgery.  Episode subsided spontaneously and did not recur.  However because of this he was continued on warfarin therapy until last month when it was discontinued in preparation for an endoscopy.  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  Work-up with MRI showed blockage of V2-V4 segment of the left vertebral artery.  They started him on a statin, switched him from Coumadin to Xarelto and continued all his other medications.  He has not seen his neurologist since hospital discharge but is going to see them next week.  He is unable to drive currently until cleared by them.  Throughout his stay at the hospital he was not noted to be in atrial fibrillation at any point.  I reviewed his EKG, echo, MRI, MRA and blood work and notes from recent admission.    He otherwise feels well and does not have any cardiac issues.  He denies chest pain, shortness of breath, orthopnea, PND, lower extremity edema, presyncope or syncope.    Assessment and plan:   1.  Paroxysmal atrial fibrillation-on anticoagulation  2.  Ischemic stroke  3.  History of pulmonary embolism  4.  Essential hypertension  5.  Hyperlipidemia    It was a pleasure to meet Rob today in clinic.  I reviewed the imaging and notes from his recent admission to the hospital for stroke.  On my review of the notes it appears that the  reason for stroke was blockage of the V2-V4 segment of left vertebral artery.  It is a little unclear to me if this was a thromboembolic occlusion or it was secondary to a plaque rupture with superimposed clot formation although I am leaning more towards the latter. I will wait for Mr. Banda to see his Neurologist to get a definitive answer.  I will perform a month-long event monitor to to evaluate frequency and burden of atrial fibrillation.  He will continue to be on anticoagulation indefinitely.      Thank you for allowing me to participate in the care of Rob Beavers    This note was completed in part using Dragon voice recognition software. Although reviewed after completion, some word and grammatical errors may occur.    Jonny Verduzco MD  Cardiology    Orders Placed This Encounter   Procedures     EKG 12-lead complete w/read - Clinics (performed today)     Cardiac Event Monitor Adult Pediatric       No orders of the defined types were placed in this encounter.      There are no discontinued medications.    Encounter Diagnosis   Name Primary?     Atrial fibrillation, unspecified type (H)        CURRENT MEDICATIONS:  Current Outpatient Medications   Medication Sig Dispense Refill     atorvastatin (LIPITOR) 40 MG tablet Take 1 tablet (40 mg) by mouth every evening 90 tablet 3     lisinopril (ZESTRIL) 10 MG tablet Take 1 tablet (10 mg) by mouth daily 90 tablet 3     loratadine (CLARITIN) 10 MG tablet Take 10 mg by mouth daily       MULTI-VITAMIN PO TABS ONE TABLET DAILY       omeprazole (PRILOSEC) 40 MG DR capsule TAKE 1 CAPSULE BY MOUTH EVERY DAY 90 capsule 3     propranolol ER (INDERAL LA) 80 MG 24 hr capsule TAKE 1 CAPSULE BY MOUTH EVERY DAY 90 capsule 3     rivaroxaban ANTICOAGULANT (XARELTO) 20 MG TABS tablet Take 1 tablet (20 mg) by mouth daily (with dinner) 90 tablet 1     VITAMIN D, CHOLECALCIFEROL, PO Take 1,000 Units by mouth daily         ALLERGIES     Allergies   Allergen Reactions     Monosodium  Glutamate Headache     Seasonal Allergies Other (See Comments)       PAST MEDICAL HISTORY:  Past Medical History:   Diagnosis Date     Andrews's esophagus without dysplasia 2017     CARDIOVASCULAR SCREENING; LDL GOAL LESS THAN 130 10/31/2010     Cerebrovascular accident (CVA), unspecified mechanism (H) 2022     Hypertension goal BP (blood pressure) < 140/90 2012       PAST SURGICAL HISTORY:  Past Surgical History:   Procedure Laterality Date     COLONOSCOPY       DENTAL SURGERY       GI SURGERY      4 endoscopies for Andrews's esophagus;  3 w/ Halo 360  type     LAPAROSCOPIC CHOLECYSTECTOMY WITH CHOLANGIOGRAMS N/A 2017    Procedure: LAPAROSCOPIC CHOLECYSTECTOMY WITH CHOLANGIOGRAMS;  LAPAROSCOPIC CHOLECYSTECTOMY WITH INTROPERATIVE CHOLANGIOGRAMS.;  Surgeon: Gian Yin MD;  Location: [facility] OR     TONSILLECTOMY       ZZ TOTAL HIP ARTHROPLASTY         FAMILY HISTORY:  Family History   Problem Relation Age of Onset     Hypertension Mother         passed away at 89     Eye Disorder Mother         macular degeneration     Blood Disease Mother         She is on warfarin     Obesity Mother      Dementia Father 80        passed away at 88 yrs old     Hypertension Father      Prostate Cancer Father      Breast Cancer Sister      Diabetes Paternal Uncle      Cerebrovascular Disease Maternal Grandmother      Cerebrovascular Disease Maternal Grandfather      Breast Cancer Sister      Other Cancer Sister               Asthma No family hx of      C.A.D. No family hx of      Cancer - colorectal No family hx of      Prostate Cancer No family hx of      Anesthesia Reaction No family hx of      Thyroid Disease No family hx of        SOCIAL HISTORY:  Social History     Socioeconomic History     Marital status: Single     Spouse name: None     Number of children: 0     Years of education: 16     Highest education level: None   Occupational History     Occupation:       "Employer: RETIRED   Tobacco Use     Smoking status: Never Smoker     Smokeless tobacco: Never Used   Substance and Sexual Activity     Alcohol use: Yes     Comment: 1-2 per week     Drug use: No     Sexual activity: Not Currently     Partners: Female   Other Topics Concern      Service Yes     Comment: [id number]     Blood Transfusions No     Occupational Exposure Yes     Comment: He was      Special Diet No     Exercise Yes     Comment: [employer]; Fitness center 2 times per week; cross country skiing     Seat Belt Yes     Parent/sibling w/ CABG, MI or angioplasty before 65F 55M? No   Social History Narrative    Eats fruits and vegetables most days. He takes a multivitamin. Increased calcium containing foods recommended.       Review of Systems:  Skin:  Negative bruising     Eyes:  Positive for glasses    ENT:  not assessed      Respiratory:  not assessed       Cardiovascular:  Negative;palpitations;chest pain;edema;lightheadedness;fatigue;dizziness;exercise intolerance      Gastroenterology: Positive for reflux Treated  Genitourinary:  not assessed      Musculoskeletal:  not assessed      Neurologic:  not assessed      Psychiatric:  not assessed      Heme/Lymph/Imm:  not assessed      Endocrine:  Negative thyroid disorder;diabetes      Physical Exam:  Vitals: /85   Pulse 77   Ht 1.702 m (5' 7\")   Wt 94.3 kg (207 lb 12.8 oz)   SpO2 100%   BMI 32.55 kg/m    Eyes: No icterus.  Pulmonary: Chest symmetric, lungs clear bilaterally and no crackles, wheezes or rales.  Cardiovascular: RRR with normal S1 and S2, no murmur, JVP normal.  Musculoskeletal: Edema of the lower extremities: None.  Neurologic: Oriented and appropriate without obvious focal deficits.   Psychiatric: Normal affect.     Recent Lab Results:  LIPID RESULTS:  Lab Results   Component Value Date    CHOL 153 08/04/2022    CHOL 172 08/12/2020    HDL 35 (L) 08/04/2022    HDL 45 08/12/2020    LDL 70 08/04/2022     (H) 08/12/2020    " TRIG 240 (H) 08/04/2022    TRIG 109 08/12/2020    CHOLHDLRATIO 3.5 02/27/2015       LIVER ENZYME RESULTS:  Lab Results   Component Value Date    AST 31 08/04/2022    AST 25 04/08/2021    ALT 38 08/04/2022    ALT 34 04/08/2021       CBC RESULTS:  Lab Results   Component Value Date    WBC 7.6 08/04/2022    WBC 11.4 (H) 06/30/2017    RBC 4.94 08/04/2022    RBC 3.89 (L) 06/30/2017    HGB 15.5 08/04/2022    HGB 16.5 04/08/2021    HCT 44.4 08/04/2022    HCT 35.3 (L) 06/30/2017    MCV 90 08/04/2022    MCV 91 06/30/2017    MCH 31.4 08/04/2022    MCH 31.6 06/30/2017    MCHC 34.9 08/04/2022    MCHC 34.8 06/30/2017    RDW 13.1 08/04/2022    RDW 13.4 06/30/2017     08/04/2022     06/30/2017       BMP RESULTS:  Lab Results   Component Value Date     08/04/2022     04/08/2021    POTASSIUM 4.4 08/04/2022    POTASSIUM 4.2 04/08/2021    CHLORIDE 105 08/04/2022    CHLORIDE 108 04/08/2021    CO2 27 08/04/2022    CO2 25 04/08/2021    ANIONGAP 6 08/04/2022    ANIONGAP 6 04/08/2021    GLC 99 08/04/2022     (H) 04/08/2021    BUN 15 08/04/2022    BUN 17 04/08/2021    CR 0.94 08/04/2022    CR 0.82 04/08/2021    GFRESTIMATED 85 08/04/2022    GFRESTIMATED 87 04/08/2021    GFRESTBLACK >90 04/08/2021    SUKHWINDER 8.9 08/04/2022    SUKHWINDER 8.4 (L) 04/08/2021        A1C RESULTS:  Lab Results   Component Value Date    A1C 5.7 (H) 08/04/2022    A1C 5.6 05/10/2011       INR RESULTS:  Lab Results   Component Value Date    INR 1.23 (H) 08/04/2022    INR 2.7 (H) 06/30/2022    INR 2.1 (H) 05/19/2022    INR 2.40 (H) 07/07/2021    INR 2.00 (H) 05/26/2021       CC  Tian Santana MD  [address] [place] [address]    All medical records were reviewed in detail and discussed with the patient. Greater than 30 mins were spent with the patient, 50% of this time was spent on counseling and coordination of care.  After visit summary was printed and given to the patient.    Thank you for allowing me to participate in the care  of your patient.      Sincerely,     Jonny Verduzco MD     [facility]  cc:   Tian Santana MD  [address] [place] [address]

## 2022-09-29 ENCOUNTER — TELEPHONE (OUTPATIENT)
Dept: NEUROLOGY | Facility: CLINIC | Age: 77
End: 2022-09-29

## 2022-09-29 NOTE — TELEPHONE ENCOUNTER
[facility] Call Center    Phone Message    May a detailed message be left on voicemail: yes     Reason for Call: Other: Patient is returning call to sooner appt. Patient would be willing to do video visit but can't do the week of Oct 3rd. Would be willing to schedule after next week. Writer only able to schedule next week appts- nothing beyond next week.    Action Taken: Message routed to:  Other: [facility]    Travel Screening: Not Applicable

## 2022-09-29 NOTE — TELEPHONE ENCOUNTER
Called to offer sooner stroke hospital follow up video appointment with stroke [facility] team that is familiar with his care.     I have video appointment openings as soon as next week and November appointment could be cancelled if patient is agreeable.    BLAKE DEMPSEY, CMA

## 2022-09-29 NOTE — TELEPHONE ENCOUNTER
November stroke hospital follow up appointment cancelled. Patient moved to 10/12/22 at 2 PM, confirmed with patient.    BLAKE DEMPSEY CMA

## 2022-10-03 ENCOUNTER — TELEPHONE (OUTPATIENT)
Dept: CARDIOLOGY | Facility: CLINIC | Age: 77
End: 2022-10-03

## 2022-10-03 NOTE — TELEPHONE ENCOUNTER
Team 3 has received heart rhythm monitor strip for Rob that shows Atrial Fib.  Dated 10\1\22 at 0739.  This was an auto generated notification, the patient did not push the event button.    This patient already has known atrial fibrillation.  He is on anticoagulant.  Writer spoke with Dr. Verduzco, who reviewed the rhythm strip with writer.  Dr. Verduzco states that part of the main reason for doing the monitor is to try and learn how much burden of atrial fibrillation that oRb has.    We will continue to monitor throughout the duration of heart rhythm monitoring device, and when we receive end of service summary, we will have some idea of how often Rob is in atrial fib.    Mary Mayorga RN on 10/3/2022 at 12:25 PM

## 2022-10-04 ENCOUNTER — HOSPITAL ENCOUNTER (OUTPATIENT)
Dept: OCCUPATIONAL THERAPY | Facility: CLINIC | Age: 77
Setting detail: THERAPIES SERIES
Discharge: HOME | End: 2022-10-04
Attending: INTERNAL MEDICINE
Payer: COMMERCIAL

## 2022-10-04 PROCEDURE — 97530 THERAPEUTIC ACTIVITIES: CPT | Mod: GO

## 2022-10-05 ENCOUNTER — OFFICE VISIT (OUTPATIENT)
Dept: OPHTHALMOLOGY | Facility: CLINIC | Age: 77
End: 2022-10-05
Attending: OPHTHALMOLOGY
Payer: COMMERCIAL

## 2022-10-05 DIAGNOSIS — Q14.1 CONGENITAL HYPERTROPHY OF RETINAL PIGMENT EPITHELIUM OF RIGHT EYE: ICD-10-CM

## 2022-10-05 DIAGNOSIS — H04.123 DRY EYES, BILATERAL: ICD-10-CM

## 2022-10-05 DIAGNOSIS — H53.462 LEFT HOMONYMOUS HEMIANOPSIA: Primary | ICD-10-CM

## 2022-10-05 DIAGNOSIS — H02.886 MEIBOMIAN GLAND DYSFUNCTION (MGD) OF BOTH EYES: ICD-10-CM

## 2022-10-05 DIAGNOSIS — H25.13 NUCLEAR SCLEROTIC CATARACT OF BOTH EYES: ICD-10-CM

## 2022-10-05 DIAGNOSIS — H02.883 MEIBOMIAN GLAND DYSFUNCTION (MGD) OF BOTH EYES: ICD-10-CM

## 2022-10-05 PROCEDURE — 92083 EXTENDED VISUAL FIELD XM: CPT | Performed by: STUDENT IN AN ORGANIZED HEALTH CARE EDUCATION/TRAINING PROGRAM

## 2022-10-05 PROCEDURE — G0463 HOSPITAL OUTPT CLINIC VISIT: HCPCS | Performed by: STUDENT IN AN ORGANIZED HEALTH CARE EDUCATION/TRAINING PROGRAM

## 2022-10-05 PROCEDURE — 92004 COMPRE OPH EXAM NEW PT 1/>: CPT | Performed by: STUDENT IN AN ORGANIZED HEALTH CARE EDUCATION/TRAINING PROGRAM

## 2022-10-05 ASSESSMENT — REFRACTION_WEARINGRX
OD_ADD: +2.50
OD_CYLINDER: SPHERE
SPECS_TYPE: PAL
OS_ADD: +2.50
OD_SPHERE: -0.50
OS_SPHERE: -0.25
OS_CYLINDER: SPHERE

## 2022-10-05 ASSESSMENT — VISUAL ACUITY
OD_CC: 20/25+2
CORRECTION_TYPE: GLASSES
METHOD: SNELLEN - LINEAR
OS_CC: 20/25-2

## 2022-10-05 ASSESSMENT — CONF VISUAL FIELD
OD_SUPERIOR_NASAL_RESTRICTION: 1
OS_INFERIOR_TEMPORAL_RESTRICTION: 3
OS_SUPERIOR_TEMPORAL_RESTRICTION: 1
OD_INFERIOR_NASAL_RESTRICTION: 3

## 2022-10-05 ASSESSMENT — TONOMETRY
IOP_METHOD: TONOPEN
OD_IOP_MMHG: 10
OS_IOP_MMHG: 13

## 2022-10-05 ASSESSMENT — SLIT LAMP EXAM - LIDS
COMMENTS: MGD
COMMENTS: MGD

## 2022-10-05 ASSESSMENT — EXTERNAL EXAM - LEFT EYE: OS_EXAM: WNL

## 2022-10-05 ASSESSMENT — CUP TO DISC RATIO
OS_RATIO: 0.3
OD_RATIO: 0.3

## 2022-10-05 ASSESSMENT — EXTERNAL EXAM - RIGHT EYE: OD_EXAM: WNL

## 2022-10-05 NOTE — PROGRESS NOTES
HPI     Visual Field Defect Evaluation     Onset was sudden.  This started 2 months ago.  Presenting in peripheral vision.  Charactertized as  missing vision.  Severity is moderate.  It is worse throughout the day.  Since onset it is stable.  Associated symptoms include Negative for previous episodes and flashes.  Pain was noted as 0/10.              Comments     Pt here for evaluation of left homonymous hemianopsia, referred by occupational therapy (Santos John).  Pt had a stroke in August and is here to determine the extent of the damage.    He doesn't feel there are significant changes since the stroke originally occurred.  Sometimes he thinks he is seeing more in his periphery than he was able to at first.  He doesn't have any other symptoms.  He denies flashes/floaters/eye pain.      No ocular meds    PARAS Alfred 1:01 PM 10/05/2022              Last edited by Adelaide Gregg on 10/5/2022  1:01 PM. (History)          Review of systems for the eyes was negative other than the pertinent positives/negatives listed in the HPI.    Ocular Meds: none    Ocular Hx: refractive error OU    FOHx: mom - macular degeneration    Assessment & Plan      Rob Beavers is a 77 year old male with the following diagnoses:    1. Left homonymous hemianopsia    2. Meibomian gland dysfunction (MGD) of both eyes    3. Dry eyes, bilateral    4. 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      - Early August 2022, suffered ischemic stroke due to being off of anticoagulation for procedure resulting in left homonymous hemianopia  - confirmed with CVF and HVF 24-2 OU  - patient working with a occupational therapist and was referred eval  - discussed options including Peli prism and seeing one of our technicians for fitting; also offered patient to see our low vision specialist; patient would like to see about peli prisms sooner and then would  like to see our low vision specilalist; appreciate their expertise in caring for this patient  - advised warm compresses BID OU x 5-10 min and ATs 3-4x / day OU for mgd/dry eyes OU  - can observe cataracts for now; not affecting ADLs  - CHRPE noted incidentally on exam in right eye; observe  - counseled return precautions    Patient disposition:   Return next available Optometry Dr Klein, for or sooner changes.      Attending Physician Attestation:  Complete documentation of historical and exam elements from today's encounter can be found in the full encounter summary report (not reduplicated in this progress note).  I personally obtained the chief complaint(s) and history of present illness.  I confirmed and edited as necessary the review of systems, past medical/surgical history, family history, social history, and examination findings as documented by others; and I examined the patient myself.  I personally reviewed the relevant tests, images, and reports as documented above.  I formulated and edited as necessary the assessment and plan and discussed the findings and management plan with the patient and family. . - Latricia Liao MD

## 2022-10-11 NOTE — PROGRESS NOTES
Rob is a 77 year old who is being evaluated via a billable video visit.      How would you like to obtain your AVS? MyChart  If the video visit is dropped, the invitation should be resent by: Send to e-mail at: [email or web address]  Will anyone else be joining your video visit? No        Video-Visit Details    Video Start Time: 1:58 PM    Type of service:  Video Visit    Video End Time:2:32 PM    Originating Location (pt. Location): Home    Distant Location (provider location):  [facility] NEUROLOGY [facility]     Platform used for Video Visit: 1Mind    __________________________________________________________      [facility] Neurology [facility]   [phone]  __________________________________________________________    Chief Complaint: Patient presents with:  Stroke      History of Present Illness: Rob Beavers is a 77 year old male presenting for follow-up for acute right PCA stroke and small acute left PCA infarct.    He was hospitalized at [facility] on 8/4-8/6/2022. Prior to the hospital stay, he had a past medical history of HTN, atrial fibrillation (on Coumadin but had been held for endoscopy PTA), PE.    He presented to the hospital with left visual field deficits that were present on waking.     Stroke Evaluation summarized:  MRI/Head CT MRI brain: Acute right PCA territory infarct with punctate left occipital infarct.    Intracranial Vasculature CTA head: Subtle left V4 irregularity without occlusion   Cervical Vasculature CTA neck: Multifocal irregularity of the left V1 and V2 segments concerning for occlusion vs dissection  MRA neck:  Long segment left vertebral artery occlusion involving the V1 and V2 segments.      Echocardiogram LVEF 55-60%, normal LA size   EKG/Telemetry Sinus with sinus arrhythmia   Other Testing Not Applicable      LDL  8/4/2022: 70 mg/dL   A1C  8/4/2022: 5.7 %   Troponin No lab value available in past 48 hrs     He was started on ASA  325 mg with transition back to Xarelto 1 week after the stroke for recurrent stroke prevention. Since being discharged, he continues to have a L visual field cut that is stable without much improvenment. Has been working with OT for his vision issues and saw ophthalmology on 10/5 with L homonymous hemianopsia documented on visual field testing. Has an appointment with someone that specializes in prism lenses in December. He is not driving.    Checks BP at home - has been running 120/70s.    Modified Crockett Scale  Score: 2-Slight disability; unable to carry out all previous activities, but able to look after own affairs    Impression:   Problem List Items Addressed This Visit        Nervous and Auditory    Cerebrovascular accident (CVA), unspecified mechanism (H) - Primary     Acute ischemic stroke of bilateral occipital lobes (R>L) due to cardio-embolism in the setting of atrial fibrillation, not on anticoagulation at the time of the stroke due to planned procedure. Repeat vessel imaging inconclusive for dissection, although no history to suggest this was the etiology.    Plan:   - Continue therapeutic anticoagulation indefinitely (currently on Xarelto 20 mg daily) for secondary stroke prevention.  - Continue Lipitor 40 mg daily. Follow-up with PCP to titrate to goal LDL 40-70. Lipid panel should be rechecked in the next couple of months through his primary care clinic.  - Goal BP <130/80 with tighter control associated with decreased overall CV risk, if tolerated  - Elective procedures should be delayed a minimum of 3 months post-stroke. If procedure becomes more urgent, plan for bridging/anticoagulation cessation should be discussed with neurologist prior to procedure.   - For now should continue to refrain from driving. Have reviewed recent ophthalmology note and patient has a significant L visual field cut. He will continue to work with OT. We discussed referral for OT driving eval. He would like to discuss this  "with OT at his visit tomorrow and will let us know if he wants an order.  - Sleep Apnea screen: states does not snore and no concerns for apnea  - Encouraged to stay physically active, states he is walking and playing golf    - Return in about 6 months (around 4/12/2023) for with me, using a video visit.  - Clinical trial screening: patient enrolled in [employer]    Stroke Education provided.  He will call us with any questions.  For any acute neurologic deficits he was advised to  go directly to the hospital rather than call the clinic.    Marylin Harris PA-C  Neurology  10/12/2022 2:37 PM  To page me or covering stroke neurology team member, click here: AMCOM  Choose \"On Call\" tab at top, then search dropdown box for \"Neurology Adult\" & press Enter, look for Neuro ICU/Stroke    ___________________________________________________________________    Current Medications  Current Outpatient Medications   Medication Sig     atorvastatin (LIPITOR) 40 MG tablet Take 1 tablet (40 mg) by mouth every evening     lisinopril (ZESTRIL) 10 MG tablet Take 1 tablet (10 mg) by mouth daily     loratadine (CLARITIN) 10 MG tablet Take 10 mg by mouth daily     MULTI-VITAMIN PO TABS ONE TABLET DAILY     omeprazole (PRILOSEC) 40 MG DR capsule TAKE 1 CAPSULE BY MOUTH EVERY DAY     propranolol ER (INDERAL LA) 80 MG 24 hr capsule TAKE 1 CAPSULE BY MOUTH EVERY DAY     rivaroxaban ANTICOAGULANT (XARELTO) 20 MG TABS tablet Take 1 tablet (20 mg) by mouth daily (with dinner)     VITAMIN D, CHOLECALCIFEROL, PO Take 1,000 Units by mouth daily     No current facility-administered medications for this visit.       Past Medical History  Past Medical History:   Diagnosis Date     Andrews's esophagus without dysplasia 4/26/2017     CARDIOVASCULAR SCREENING; LDL GOAL LESS THAN 130 10/31/2010     Cerebrovascular accident (CVA), unspecified mechanism (H) 8/4/2022     Hypertension goal BP (blood pressure) < 140/90 1/31/2012       Social History  Social " "History     Tobacco Use     Smoking status: Never     Passive exposure: Never     Smokeless tobacco: Never   Substance Use Topics     Alcohol use: Yes     Comment: 1-2 per week     Drug use: No       Family History  Family History   Problem Relation Age of Onset     Hypertension Mother         passed away at 89     Eye Disorder Mother         macular degeneration     Blood Disease Mother         She is on warfarin     Obesity Mother      Dementia Father 80        passed away at 88 yrs old     Hypertension Father      Prostate Cancer Father      Breast Cancer Sister      Diabetes Paternal Uncle      Cerebrovascular Disease Maternal Grandmother      Cerebrovascular Disease Maternal Grandfather      Breast Cancer Sister      Other Cancer Sister               Asthma No family hx of      C.A.D. No family hx of      Cancer - colorectal No family hx of      Prostate Cancer No family hx of      Anesthesia Reaction No family hx of      Thyroid Disease No family hx of        Physical Exam    Vitals - Patient Reported 10/12/2022   Weight (Patient Reported) 205 lb   Systolic (Patient Reported) 121   Diastolic (Patient Reported) 75             Estimated body mass index is 32.55 kg/m  as calculated from the following:    Height as of 22: 1.702 m (5' 7\").    Weight as of 22: 94.3 kg (207 lb 12.8 oz).      General:  no acute distress  HEENT:  normocephalic/atraumatic  Pulmonary:  no respiratory distress    Neurologic  Mental Status:  alert, oriented x 3, follows commands, speech clear and fluent  Cranial Nerves:  EOMI, facial movements symmetric, hearing not formally tested but intact to conversation, no dysarthria  Motor:  no abnormal movements, symmetric strength bilateral upper extremities, no pronator drift  Reflexes:  unable to test (telestroke)  Sensory:  unable to test (telestroke)  Coordination:  normal finger-to-nose without dysmetria  Station/Gait:  unable to test (telestroke)    Neuroimaging: as per " HPI. I personally reviewed those images    Labs:    Coagulation studies:  Recent Labs   Lab Test 08/04/22  1607 06/30/22  0858 05/19/22  0904   INR 1.23* 2.7* 2.1*        Lipid panel:  Recent Labs   Lab Test 08/04/22  1607 08/26/21  0917   CHOL 153 177   HDL 35* 44   LDL 70 109*   TRIG 240* 121       HbA1C:  Recent Labs   Lab Test 08/04/22  1607   A1C 5.7*       Troponin:  No lab results found.  No lab results found.  No lab results found.    Questionnaires:    Stroke Clinic Questionnaire - Hospital Follow Up for Stroke    Section A     Do you have any residual effects from the stroke? Yes, I do     Please describe the most bothersome symptoms: vision problems    Section B     Could you live alone without any help from another person? This means being able to bathe, use the toilet, shop, prepare or get meals, and manage finances.      Section C    Please indicate your level of independence in the following activities:    Walking Independent   Eating (including cutting food) Independent   Going up and down stairs Independent   Dressing and undressing (including shoes) Independent   Bathing/showering including grooming Independent   Using the toilet Independent     Do you have bowel incontinence? No    Do you have bladder incontinence? No    Are you able to use electronics such as computers, tablets, and smartphones? Yes    Do you cook or do any house chores? Yes    Do you do your own shopping (including online shopping)? Yes    Do you manage your own finances (balancing your checkbook, paying your bills, etc.)? Yes    Are you currently doing any:    - Physical therapy? No       - Occupational therapy? Yes  How often: several sessions    - Speech therapy? No       - Other therapy? No         Do you get any home health services? No, not needed      Section D    Please rate your current quality of life on a scale from 0% to 100% with 0% being the worst and 100% the best quality of life you can imagine for yourself.  Consider all aspects of your life, physical, mental, and psychological.        What kind of work do you do (or did you do before the stroke)?         What is your work status?        How do you get around?        What was your living situation before the stroke?       What is your living situation right now?       Medications:    How do you take your medications?   Myself, from a pillbox that I set up    Do you sometimes miss or forget to take your medications? How often?   Rarely    Stroke Risk Factor Management:    Do you check your blood pressure at home? Yes  What are the blood pressure numbers that you usually get at home?: 120/70          Do you check your blood sugar at home? No       Is there any second-hand smoke at home? No      How much caffeine do you consume a day? That includes coffee, tea, caffeine-containing soda, and energy drinks.    0      Who completed this questionnaire? The patient independently      Billing:    I spent a total of 60 minutes on the day of the visit.   Time spent doing chart review, history and exam, documentation and further activities per the note

## 2022-10-12 ENCOUNTER — MYC MEDICAL ADVICE (OUTPATIENT)
Dept: OPHTHALMOLOGY | Facility: CLINIC | Age: 77
End: 2022-10-12

## 2022-10-12 ENCOUNTER — VIRTUAL VISIT (OUTPATIENT)
Dept: NEUROLOGY | Facility: CLINIC | Age: 77
End: 2022-10-12
Payer: COMMERCIAL

## 2022-10-12 ENCOUNTER — TELEPHONE (OUTPATIENT)
Dept: OPHTHALMOLOGY | Facility: CLINIC | Age: 77
End: 2022-10-12

## 2022-10-12 DIAGNOSIS — I63.9 CEREBROVASCULAR ACCIDENT (CVA), UNSPECIFIED MECHANISM (H): Primary | ICD-10-CM

## 2022-10-12 PROCEDURE — 99205 OFFICE O/P NEW HI 60 MIN: CPT | Mod: 95

## 2022-10-12 NOTE — TELEPHONE ENCOUNTER
NANI for patient.  He may schedule a technician appointment on a Monday for a peli prism trial.  He may choose to wait until his appointment with Dr. Klein in December who also would place the trial prisms.

## 2022-10-12 NOTE — PATIENT INSTRUCTIONS
-For now you should continue to refrain from driving. OT driving evaluation recommended prior to return to driving.  -Continue long term follow-up of stroke risk factor management with PCP  -Life style habits that can be beneficial for overall cardiovascular health: Mediterranean diet or diet (fruits, vegetables, low fat dairy & reduced saturated fat), exercise at least 30 minutes/day x 5 days/week, BMI goal <25.   -Call our stroke nurse care coordinator Starla with any questions or concerns at [phone], or send a SwitchNote medical advice message  -Call [phone] with any new stroke symptoms

## 2022-10-13 ENCOUNTER — HOSPITAL ENCOUNTER (OUTPATIENT)
Dept: OCCUPATIONAL THERAPY | Facility: CLINIC | Age: 77
Setting detail: THERAPIES SERIES
Discharge: HOME | End: 2022-10-13
Attending: INTERNAL MEDICINE
Payer: COMMERCIAL

## 2022-10-13 PROCEDURE — 97530 THERAPEUTIC ACTIVITIES: CPT | Mod: GO

## 2022-10-13 NOTE — PROGRESS NOTES
"[facility]    Outpatient Occupational Therapy Discharge Note  Patient: Rob Beavers  : 1945    Beginning/End Dates of Reporting Period:  8/15/22 to 10/13/22    Referring Provider: Kelvin Pace MD    Therapy Diagnosis: Impaired ADL/IADL    Client Self Report: Pt has been seen by optometry 10/5/22 which reveals L visual field cut. He also had follow up video visit with neurology 10/12/22 who recommends to continue to refrain from driving and recommends behind the wheel driving referral. Pt scheduled appt 10/17 to trail prisms for glasses. He was receptive to behind the wheel driving assessement and states he will follow up before retruning to driving. He feels like he is able to initate strategies leared in therapy to compensate for field cut and implements recommended visual scanning activities. He feels confident to discharge from OT and understands to return should he note change in function.    Objective Measurements:     Objective Measure: Dynavision Visuomotor Training Device   Details: Restuls 10/4/2022: Mode A-self-paced 35 hits/ min, 43 hits/min (safe : 52+)Mode B Lights paced at 1.5\" each-45 hits/min (safe  42+)   Objective Measure: MVPT-V Motor Free Visual Perception Test-Vertical   Details: Restuls from , longer processing time on 1 item involving visual discrimination, score WFL   Objective Measure: BiVABA (Brain Injusty Visual Perceptual  Assessment Battery   Details: 10/4/22: BiVABA (Brain Injusty Visual Perceptual  Assessment Battery:3030  word search 1 min 13 seconds (imporved from 1' 30\"); structured complex circles 30 32' (improved from 30 57\"; random complex circles search (40/40 1; 08' (imporved from 39/40, located error with cue to self-correct and 1'24; 10/13/2022: Telephone Number copy: 10/10 1 min 45 sec (avg 50 sec); Single letter searcch " "crowded 40/40 80 seconds (av seconds)           Goals:     Goal Identifier     Goal Description Pt will demonstrate at least 3 methods to compensate for L visual field loss with visual scanning in  near and far array tasks and reading to avoid errors.   Target Date 22   Date Met  10/13/22   Progress (detail required for progress note): Pt able to verbalize and initiate strategies to compensate for L visual field cut while reading, interacting with environment, and in community     Goal Identifier     Goal Description Pt will demonstrate accuracy with visual compensation for community mobilityas measured byscores consistently on the  Dynavision Visuomotor training device at 100% on left side.   Target Date 22   Date Met      Progress (detail required for progress note): Last Dynavision 10/4/22 score resulted in:Mode A-self-paced 35 hits/ min, 43 hits/min (safe : 52+)Mode B Lights paced at 1.5\" each-45 hits/min (safe  42+); recommended behind the wheel driving assessement to ensure safe return to driving         Plan:  Discharge from therapy.    Discharge Summary: Discharge Summary:  Pt demonstrates improvements to iniate strategies to compensate for L HH/field cut and self-corrects error with improved independence with community integration (public transportion, grocery store) and daily activities (computer use, reading). Pt continues to gain more insight about visual impairment and functional implications. He demonstrates ability to self-correct errors during visual scanning tasks and demonstrates improved scores on objective measures iwth Biva cancellation tasks and Dynavision hits/min and reaction time, however, scores on Marisa vision continue to be BNL for safe driving.  Pt educated on how scores on dynavision provide functional implications for driving and, he has been educated on [place] state driving requirements for field of view, and educated on driving rehabilitation specialists with " resources for thorough driving assessment. He is receptive to driving assessment, he feels like he is able to initate strategies leared in therapy to compensate for field cut and implements recommended visual scanning activities. He feels confident to discharge from OT and understands to return should he note change in function.    Reason for Discharge: Patient has made expected progress in setting of left homonymous hemianopsia. He feels like he is able to initate strategies leared in therapy to compensate for field cut and implements recommended visual scanning activities. He feels confident to discharge from OT and understands to return should he note change in function.        Discharge Plan: Patient to continue home program.

## 2022-10-17 ENCOUNTER — ALLIED HEALTH/NURSE VISIT (OUTPATIENT)
Dept: OPHTHALMOLOGY | Facility: CLINIC | Age: 77
End: 2022-10-17
Attending: STUDENT IN AN ORGANIZED HEALTH CARE EDUCATION/TRAINING PROGRAM
Payer: COMMERCIAL

## 2022-10-17 DIAGNOSIS — H53.462 LEFT HOMONYMOUS HEMIANOPSIA: Primary | ICD-10-CM

## 2022-10-17 PROCEDURE — 99207 PR NO BILLABLE SERVICE THIS VISIT: CPT | Performed by: STUDENT IN AN ORGANIZED HEALTH CARE EDUCATION/TRAINING PROGRAM

## 2022-10-17 PROCEDURE — 999N000103 HC STATISTIC NO CHARGE FACILITY FEE

## 2022-11-11 ENCOUNTER — TELEPHONE (OUTPATIENT)
Dept: CARDIOLOGY | Facility: CLINIC | Age: 77
End: 2022-11-11

## 2022-11-11 DIAGNOSIS — I48.91 ATRIAL FIBRILLATION, UNSPECIFIED TYPE (H): Primary | ICD-10-CM

## 2022-11-11 NOTE — TELEPHONE ENCOUNTER
I phoned patient to inform him that Dr. Verduzco reviewed his 30 day event monitor and it showed that on 10/1 he had some episodes of afb with HR's 90-11 bpm. These were auto trigger events. Patient states that he never felt anything during the monitoring period.    I told him that Dr. Verduzco fells that afib was the likely trigger for his recent CVA andthus must remain on Xarelto indefinitely. He does not need to be on any AV node blockers at this point.    We had a discussion about lifestyle measures that will help reduce the occurrence of afib. He does quite a bit of walking and plays golf. We talked about the importance of getting good sleep and avoiding alcohol as this can be a potent trigger for afib.    At this point, Dr. Verduzco recommends a return visit in 3 months and patient is agreeable to this.I will place the order and told him that scheduling will call him.    All questions and concerns were addressed to his satisfaction    The EOS monitor report was signed by Dr. Verduzco today and placed in the slot to be scanned into chart.

## 2022-11-20 ENCOUNTER — HEALTH MAINTENANCE LETTER (OUTPATIENT)
Age: 77
End: 2022-11-20

## 2023-03-22 ENCOUNTER — TELEPHONE (OUTPATIENT)
Dept: FAMILY MEDICINE | Facility: CLINIC | Age: 78
End: 2023-03-22
Payer: COMMERCIAL

## 2023-03-22 NOTE — TELEPHONE ENCOUNTER
Can he have a three day hold of   Xarelto prior to endoscopy for Barett's  DX ?      Please provide any bridging orders for the medication.     Endoscopy procedure is  4/24/2023.     Marisol Daugherty RN  [facility]

## 2023-03-24 NOTE — TELEPHONE ENCOUNTER
On anticoagulation for A Fib, this question is more appropriate for his cardiologist, please forward to their team

## 2023-03-28 ENCOUNTER — TELEPHONE (OUTPATIENT)
Dept: FAMILY MEDICINE | Facility: CLINIC | Age: 78
End: 2023-03-28
Payer: COMMERCIAL

## 2023-03-28 DIAGNOSIS — I63.9 CEREBROVASCULAR ACCIDENT (CVA), UNSPECIFIED MECHANISM (H): ICD-10-CM

## 2023-03-28 DIAGNOSIS — I48.91 ATRIAL FIBRILLATION, UNSPECIFIED TYPE (H): Primary | ICD-10-CM

## 2023-03-28 NOTE — TELEPHONE ENCOUNTER
TO PCP:     Cory with [facility] called, requesting Xarelto hold orders for upcoming EGD scheduled for 4/26/23    Asking if ok to hold Xarelto 3 days, and does pt need any bridging?     Phone: [phone]  Message kylie GUILLEN, Triage RN  [facility]

## 2023-03-29 NOTE — TELEPHONE ENCOUNTER
Jonny Verduzco MD  You Yesterday (10:29 AM)     RR  Okay to hold her Xarelto for a few days prior to endoscopy.  Resume once the procedure is complete.  Risk of stroke during the time he is off Xarelto is extremely low but not 0.      Called [facility] and left message with Provider message above.    Lisa MOISE RN  [facility] Triage Team

## 2023-03-30 ENCOUNTER — TELEPHONE (OUTPATIENT)
Dept: NEUROLOGY | Facility: CLINIC | Age: 78
End: 2023-03-30
Payer: COMMERCIAL

## 2023-03-30 NOTE — TELEPHONE ENCOUNTER
Call to schedule 60 Minute Telephone/Video visit return with Marylin Harris PA-C or Any Stroke CARRIE in April/May 2023 for stroke follow up.    BLAKE DEMPSEY, CMA

## 2023-03-31 ENCOUNTER — TELEPHONE (OUTPATIENT)
Dept: FAMILY MEDICINE | Facility: CLINIC | Age: 78
End: 2023-03-31
Payer: COMMERCIAL

## 2023-03-31 DIAGNOSIS — I48.91 ATRIAL FIBRILLATION, UNSPECIFIED TYPE (H): ICD-10-CM

## 2023-03-31 RX ORDER — ENOXAPARIN SODIUM 100 MG/ML
INJECTION SUBCUTANEOUS
Qty: 6 ML | Refills: 0 | Status: SHIPPED | OUTPATIENT
Start: 2023-03-31 | End: 2024-09-20

## 2023-03-31 NOTE — TELEPHONE ENCOUNTER
Called to offer stroke follow up with Marylin Harris. OK to use ANY open slot 5/12/23 with her preferably, or any CARRIE if patient needs a different date/ time.    BLAKE DEMPSEY, CMA

## 2023-03-31 NOTE — TELEPHONE ENCOUNTER
Gina RN with [facility] calling to seek clarification on Xarelto hold order. RN relayed request was for a 3 day hold and cardiology did approve of this request (see 3/22/2023 Telephone encounter). Gina ROBIN did not have any additional questions or concerns.

## 2023-03-31 NOTE — TELEPHONE ENCOUNTER
To clarify indication for Xarelto: this is being utilized due to patients history of atrial fibrillation and his stroke was felt to be cardioembolic in the setting of known atrial fibrillation not on anticoagulation (it was being held for a procedure). There has been nothing to firmly indicate an alterative etiology such as vertebral artery pathology.     Recommendations for stroke standpoint:  -minimize time off of Xarelto  -while he if off of Xarelto, would recommend bridging with Lovenox 1mg/kd every 12 hours  -last dose of lovenox should be 12 hours prior to procedure  -Xarelto should be promptly restarted following completion of the procedure     Marivel--can you please make sure these recommendations get to [facility]?  Thanks!

## 2023-03-31 NOTE — TELEPHONE ENCOUNTER
Called and left message with hold orders and provided my cell phone as call back with any questions.

## 2023-03-31 NOTE — TELEPHONE ENCOUNTER
Received call from [facility] regarding clarification of Xarelto hold orders. It appears cardiology has already responded to this question and okayed a 3 day hold of Xarelto without bridging (see phone note dated 3/22, orders from Dr. Guido).     It is unclear why this questions was also sent to the stroke team if an answer had already been received.    In any event, given that patient did experience an embolic infarct last time his anticoagulation was held for a procedure (Aug 2022), I do recommend he be bridged with lovenox.  This was discussed with RN from [facility] and prescription for Lovenox bridge will be sent (1mg/kg dosing=90mg/0.9mL BID for 3 days prior to procedure with last dosage being 12 hours prior to procedure).     Routing to all providers/teams that have been involved in this question over the numerous messages in hopes the will be final clarification needed.

## 2023-03-31 NOTE — TELEPHONE ENCOUNTER
[facility] Call Center    Phone Message    May a detailed message be left on voicemail: yes     Reason for Call: Other: Pt called back to schedule appt. Writer unable to schedule appt for 60 minutes. Template only allows for a 30 minute appt. Please contact pt back to schedule.     Action Taken: Message routed to:  Other: [facility]    Travel Screening: Not Applicable

## 2023-04-04 RX ORDER — RIVAROXABAN 20 MG/1
TABLET, FILM COATED ORAL
Qty: 90 TABLET | Refills: 1 | Status: SHIPPED | OUTPATIENT
Start: 2023-04-04 | End: 2023-09-08

## 2023-04-17 ENCOUNTER — VIRTUAL VISIT (OUTPATIENT)
Dept: CARDIOLOGY | Facility: CLINIC | Age: 78
End: 2023-04-17
Payer: COMMERCIAL

## 2023-04-17 VITALS
BODY MASS INDEX: 32.55 KG/M2 | SYSTOLIC BLOOD PRESSURE: 113 MMHG | HEIGHT: 67 IN | DIASTOLIC BLOOD PRESSURE: 79 MMHG | HEART RATE: 67 BPM

## 2023-04-17 DIAGNOSIS — I48.91 ATRIAL FIBRILLATION, UNSPECIFIED TYPE (H): Primary | ICD-10-CM

## 2023-04-17 PROCEDURE — 99214 OFFICE O/P EST MOD 30 MIN: CPT | Mod: VID | Performed by: INTERNAL MEDICINE

## 2023-04-17 NOTE — PROGRESS NOTES
Rob is a 77 year old who is being evaluated via a billable video visit.      How would you like to obtain your AVS? MyChart  If the video visit is dropped, the invitation should be resent by: Text to cell phone: [phone]  Will anyone else be joining your video visit? No         Review Of Systems  Respiratory: NEGATIVE  Cardiovascular:NEGATIVE    Vitals - Patient Reported  Systolic (Patient Reported): 113  Diastolic (Patient Reported): 79  Pulse (Patient Reported): 67    Telephone number of patient: [phone]    ARABELLA Matthew    Video-Visit Details    Type of service:  Video Visit MiFi  Video Start Time: 10:00 AM  Video End Time:10:30 AM    Originating Location (pt. Location): Home    Distant Location (provider location):  On-site  Platform used for Video Visit: Echobit     This clinic visit was performed via telephone/video .    Today, I had the pleasure of connecting with Rob Beavers for a follow-up visit.  He is a pleasant 77 year old patient with a past medical history of paroxysmal atrial fibrillation complicated by stroke, hypertension, hyperlipidemia who presents to the clinic for a follow-up visit.  Please refer to my prior note for detailed history.  Briefly, The patient had pulmonary embolism 10-12 years ago.  He was on warfarin ever since.  5 years ago he was noted to have atrial fibrillation after gallbladder surgery.  Episode subsided spontaneously and did not recur.  However because of this he was continued on warfarin therapy until in 08/2022 when it was discontinued in preparation for an endoscopy.   Unfortunately, this led to to an embolic stroke.  He was then started on NOAC.  Rhythm monitoring showed paroxysms of atrial fibrillation.     Today, he presents for follow-up visit.  Fortunately, he is doing well from cardiac standpoint and does not have any significant complaints today.  He denies chest pain, shortness of breath, orthopnea, PND, lower extremity edema,  presyncope or syncope.    Assessment:  1.  Chronic atrial fibrillation complicated by stroke-on anticoagulation  2.  Essential hypertension  3.  Hyperlipidemia  4.  History of pulmonary embolism    Plan:   1.  Continue current dose of propranolol.  Also continue Xarelto uninterrupted.  In the future if  he needs to discontinue Xarelto in preparation for the procedure we will have to bridge him with Lovenox.  2.  Blood pressure is well controlled.  Continue dose of lisinopril  3.  We will repeat rhythm monitoring in a year to reassess his rate control and adjust medications if needed.    Thank you for allowing me to participate in the care of Rob Beavers    This note was completed in part using Dragon voice recognition software. Although reviewed after completion, some word and grammatical errors may occur.    Jonny Verduzco MD  Cardiology    No orders of the defined types were placed in this encounter.      No orders of the defined types were placed in this encounter.      There are no discontinued medications.    No diagnosis found.    CURRENT MEDICATIONS:  Current Outpatient Medications   Medication Sig Dispense Refill     atorvastatin (LIPITOR) 40 MG tablet Take 1 tablet (40 mg) by mouth every evening 90 tablet 3     enoxaparin ANTICOAGULANT (LOVENOX) 100 MG/ML syringe Inject 90mg (0.9mL) twice daily for 3 days while Xarelto is held prior to upcoming procedure. Last dosage of enoxaparin should be approximately 12 hours prior to procedure. 6 mL 0     lisinopril (ZESTRIL) 10 MG tablet Take 1 tablet (10 mg) by mouth daily 90 tablet 3     loratadine (CLARITIN) 10 MG tablet Take 10 mg by mouth daily       MULTI-VITAMIN PO TABS ONE TABLET DAILY       omeprazole (PRILOSEC) 40 MG DR capsule TAKE 1 CAPSULE BY MOUTH EVERY DAY 90 capsule 3     propranolol ER (INDERAL LA) 80 MG 24 hr capsule TAKE 1 CAPSULE BY MOUTH EVERY DAY 90 capsule 3     VITAMIN D, CHOLECALCIFEROL, PO Take 1,000 Units by mouth daily       XARELTO  ANTICOAGULANT 20 MG TABS tablet TAKE 1 TABLET BY MOUTH DAILY WITH DINNER 90 tablet 1       ALLERGIES     Allergies   Allergen Reactions     Monosodium Glutamate Headache     Seasonal Allergies Other (See Comments)       PAST MEDICAL HISTORY:  Past Medical History:   Diagnosis Date     Andrews's esophagus without dysplasia 2017     CARDIOVASCULAR SCREENING; LDL GOAL LESS THAN 130 10/31/2010     Cerebrovascular accident (CVA), unspecified mechanism (H) 2022     Hypertension goal BP (blood pressure) < 140/90 2012       PAST SURGICAL HISTORY:  Past Surgical History:   Procedure Laterality Date     COLONOSCOPY       DENTAL SURGERY       GI SURGERY      4 endoscopies for Andrews's esophagus;  3 w/ Halo 360  type     LAPAROSCOPIC CHOLECYSTECTOMY WITH CHOLANGIOGRAMS N/A 2017    Procedure: LAPAROSCOPIC CHOLECYSTECTOMY WITH CHOLANGIOGRAMS;  LAPAROSCOPIC CHOLECYSTECTOMY WITH INTROPERATIVE CHOLANGIOGRAMS.;  Surgeon: Gian Yin MD;  Location: [facility] OR     TONSILLECTOMY       ZZ TOTAL HIP ARTHROPLASTY         FAMILY HISTORY:  Family History   Problem Relation Age of Onset     Hypertension Mother         passed away at 89     Eye Disorder Mother         macular degeneration     Blood Disease Mother         She is on warfarin     Obesity Mother      Dementia Father 80        passed away at 88 yrs old     Hypertension Father      Prostate Cancer Father      Breast Cancer Sister      Diabetes Paternal Uncle      Cerebrovascular Disease Maternal Grandmother      Cerebrovascular Disease Maternal Grandfather      Breast Cancer Sister      Other Cancer Sister               Asthma No family hx of      C.A.D. No family hx of      Cancer - colorectal No family hx of      Prostate Cancer No family hx of      Anesthesia Reaction No family hx of      Thyroid Disease No family hx of        SOCIAL HISTORY:  Social History     Socioeconomic History     Marital status: Single     Spouse name:  None     Number of children: 0     Years of education: 16     Highest education level: None   Occupational History     Occupation:      Employer: RETIRED   Tobacco Use     Smoking status: Never     Passive exposure: Never     Smokeless tobacco: Never   Substance and Sexual Activity     Alcohol use: Yes     Comment: 0-1 per week     Drug use: No     Sexual activity: Not Currently     Partners: Female   Other Topics Concern      Service Yes     Comment: [id number]     Blood Transfusions No     Occupational Exposure Yes     Comment: He was      Special Diet No     Exercise Yes     Comment: [employer]; [employer] center 2 times per week; cross country skiing     Seat Belt Yes     Parent/sibling w/ CABG, MI or angioplasty before 65F 55M? No   Social History Narrative    Eats fruits and vegetables most days. He takes a multivitamin. Increased calcium containing foods recommended.       General Appearance: No distress, normal body habitus, upright.  ENT/Mouth:  Normal head shape, no evidence of injury or laceration.  EYES: No scleral icterus, normal conjunctivae  Neck:  No evidence of thyromegaly  Chest/Lungs: No audible wheezing. Non labored breathing. No cough.  Cardiovascular: No evidence of elevated jugular venous pressure.   Skin: No xanthelasma, normal skin collar. No evidence of facial lacerations.  Neurologic: No focal neurological defect. Normal speech.  Psychiatric: Alert and oriented x3

## 2023-04-17 NOTE — LETTER
4/17/2023    Tian Santana MD  [address]  [place] [address]    RE: Rob Beavers       Dear Colleague,     I had the pleasure of seeing Rob Beavers in the [facility].  Rob is a 77 year old who is being evaluated via a billable video visit.      How would you like to obtain your AVS? MyChart  If the video visit is dropped, the invitation should be resent by: Text to cell phone: [phone]  Will anyone else be joining your video visit? No         Review Of Systems  Respiratory: NEGATIVE  Cardiovascular:NEGATIVE    Vitals - Patient Reported  Systolic (Patient Reported): 113  Diastolic (Patient Reported): 79  Pulse (Patient Reported): 67    Telephone number of patient: [phone]    ARABELLA Matthew    Video-Visit Details    Type of service:  Video Visit [id number]  Video Start Time: 10:00 AM  Video End Time:10:30 AM    Originating Location (pt. Location): Home    Distant Location (provider location):  On-site  Platform used for Video Visit: PT [place] [employer]     This clinic visit was performed via telephone/video .    Today, I had the pleasure of connecting with Rob Beavers for a follow-up visit.  He is a pleasant 77 year old patient with a past medical history of paroxysmal atrial fibrillation complicated by stroke, hypertension, hyperlipidemia who presents to the clinic for a follow-up visit.  Please refer to my prior note for detailed history.  Briefly, The patient had pulmonary embolism 10-12 years ago.  He was on warfarin ever since.  5 years ago he was noted to have atrial fibrillation after gallbladder surgery.  Episode subsided spontaneously and did not recur.  However because of this he was continued on warfarin therapy until in 08/2022 when it was discontinued in preparation for an endoscopy.   Unfortunately, this led to to an embolic stroke.  He was then started on NOAC.  Rhythm monitoring showed paroxysms of atrial fibrillation.     Today, he presents for  follow-up visit.  Fortunately, he is doing well from cardiac standpoint and does not have any significant complaints today.  He denies chest pain, shortness of breath, orthopnea, PND, lower extremity edema, presyncope or syncope.    Assessment:  1.  Chronic atrial fibrillation complicated by stroke-on anticoagulation  2.  Essential hypertension  3.  Hyperlipidemia  4.  History of pulmonary embolism    Plan:   1.  Continue current dose of propranolol.  Also continue Xarelto uninterrupted.  In the future if  he needs to discontinue Xarelto in preparation for the procedure we will have to bridge him with Lovenox.  2.  Blood pressure is well controlled.  Continue dose of lisinopril  3.  We will repeat rhythm monitoring in a year to reassess his rate control and adjust medications if needed.    Thank you for allowing me to participate in the care of Rob Beavers    This note was completed in part using Dragon voice recognition software. Although reviewed after completion, some word and grammatical errors may occur.    Jonny Verduzco MD  Cardiology    No orders of the defined types were placed in this encounter.      No orders of the defined types were placed in this encounter.      There are no discontinued medications.    No diagnosis found.    CURRENT MEDICATIONS:  Current Outpatient Medications   Medication Sig Dispense Refill    atorvastatin (LIPITOR) 40 MG tablet Take 1 tablet (40 mg) by mouth every evening 90 tablet 3    enoxaparin ANTICOAGULANT (LOVENOX) 100 MG/ML syringe Inject 90mg (0.9mL) twice daily for 3 days while Xarelto is held prior to upcoming procedure. Last dosage of enoxaparin should be approximately 12 hours prior to procedure. 6 mL 0    lisinopril (ZESTRIL) 10 MG tablet Take 1 tablet (10 mg) by mouth daily 90 tablet 3    loratadine (CLARITIN) 10 MG tablet Take 10 mg by mouth daily      MULTI-VITAMIN PO TABS ONE TABLET DAILY      omeprazole (PRILOSEC) 40 MG DR capsule TAKE 1 CAPSULE BY MOUTH EVERY  DAY 90 capsule 3    propranolol ER (INDERAL LA) 80 MG 24 hr capsule TAKE 1 CAPSULE BY MOUTH EVERY DAY 90 capsule 3    VITAMIN D, CHOLECALCIFEROL, PO Take 1,000 Units by mouth daily      XARELTO ANTICOAGULANT 20 MG TABS tablet TAKE 1 TABLET BY MOUTH DAILY WITH DINNER 90 tablet 1       ALLERGIES     Allergies   Allergen Reactions    Monosodium Glutamate Headache    Seasonal Allergies Other (See Comments)       PAST MEDICAL HISTORY:  Past Medical History:   Diagnosis Date    Andrews's esophagus without dysplasia 2017    CARDIOVASCULAR SCREENING; LDL GOAL LESS THAN 130 10/31/2010    Cerebrovascular accident (CVA), unspecified mechanism (H) 2022    Hypertension goal BP (blood pressure) < 140/90 2012       PAST SURGICAL HISTORY:  Past Surgical History:   Procedure Laterality Date    COLONOSCOPY      DENTAL SURGERY      GI SURGERY      4 endoscopies for Andrews's esophagus;  3 w/ Halo 360  type    LAPAROSCOPIC CHOLECYSTECTOMY WITH CHOLANGIOGRAMS N/A 2017    Procedure: LAPAROSCOPIC CHOLECYSTECTOMY WITH CHOLANGIOGRAMS;  LAPAROSCOPIC CHOLECYSTECTOMY WITH INTROPERATIVE CHOLANGIOGRAMS.;  Surgeon: Gian Yin MD;  Location: [facility] OR    TONSILLECTOMY      [facility] TOTAL HIP ARTHROPLASTY         FAMILY HISTORY:  Family History   Problem Relation Age of Onset    Hypertension Mother         passed away at 89    Eye Disorder Mother         macular degeneration    Blood Disease Mother         She is on warfarin    Obesity Mother     Dementia Father 80        passed away at 88 yrs old    Hypertension Father     Prostate Cancer Father     Breast Cancer Sister     Diabetes Paternal Uncle     Cerebrovascular Disease Maternal Grandmother     Cerebrovascular Disease Maternal Grandfather     Breast Cancer Sister     Other Cancer Sister              Asthma No family hx of     C.A.D. No family hx of     Cancer - colorectal No family hx of     Prostate Cancer No family hx of     Anesthesia  Reaction No family hx of     Thyroid Disease No family hx of        SOCIAL HISTORY:  Social History     Socioeconomic History    Marital status: Single     Spouse name: None    Number of children: 0    Years of education: 16    Highest education level: None   Occupational History    Occupation:      Employer: RETIRED   Tobacco Use    Smoking status: Never     Passive exposure: Never    Smokeless tobacco: Never   Substance and Sexual Activity    Alcohol use: Yes     Comment: 0-1 per week    Drug use: No    Sexual activity: Not Currently     Partners: Female   Other Topics Concern     Service Yes     Comment: [employer]    Blood Transfusions No    Occupational Exposure Yes     Comment: He was     Special Diet No    Exercise Yes     Comment: [employer]; [employer] 2 times per week; cross country skiing    Seat Belt Yes    Parent/sibling w/ CABG, MI or angioplasty before 65F 55M? No   Social History Narrative    Eats fruits and vegetables most days. He takes a multivitamin. Increased calcium containing foods recommended.       General Appearance: No distress, normal body habitus, upright.  ENT/Mouth:  Normal head shape, no evidence of injury or laceration.  EYES: No scleral icterus, normal conjunctivae  Neck:  No evidence of thyromegaly  Chest/Lungs: No audible wheezing. Non labored breathing. No cough.  Cardiovascular: No evidence of elevated jugular venous pressure.   Skin: No xanthelasma, normal skin collar. No evidence of facial lacerations.  Neurologic: No focal neurological defect. Normal speech.  Psychiatric: Alert and oriented x3    Thank you for allowing me to participate in the care of your patient.      Sincerely,     Jonny Verduzco MD   [facility]  cc: No referring provider defined for this encounter.

## 2023-04-19 NOTE — PROGRESS NOTES
__________________________________________________________      [facility] [place]   [phone]  __________________________________________________________    Chief Complaint: Patient presents with:  CVA      History of Present Illness: Rob Beavers is a 77 year old male presenting for follow-up for bilateral PCA infarcts.     He was hospitalized at [facility] on 8/4-8/6/23. Prior to the hospital stay, he had a past medical history of HTN, atrial fibrillation (previously on warfarin, had been held for endoscopy PTA), PE.    He presented to the hospital with wake up symptoms of left visual field deficit.     He was started on aspirin 325 mg while in the hospital then transitioned to xarelto 1 week post-stroke for recurrent stroke prevention. At his last visit, he had ongoing left visual field cut. It was essentially unchanged from onset. He was working with OT and followed up with ophthalmology where he was noted to have left homonymous hemianopsia. He did trial prism lenses but they weren't a good fit for him; he has adjusted well to his visual deficit.    He was discharged from occupational therapy. Recommendation was to complete a behind the wheel driving assessment prior to return to driving. He is looking into it.     Modified Parmelee Scale  Score: 2-Slight disability; unable to carry out all previous activities, but able to look after own affairs    Stroke Evaluation Summarized    MRI/Head CT MRI: Acute RIGHT posterior cerebral artery territory infarct with an additional punctate infarct in the LEFT occipital lobe. No evidence of hemorrhagic transformation.    CT:  Acute right occipital lobe infarct (PCA distribution), which would correspond to the history of a left visual field deficit. No acute intracranial hemorrhage.   Intracranial Vasculature CTA: No large vessel occlusion or abnormality to account for the patient's deficit in the right PCA distribution. Subtle  irregularity of the left V4 at the PICA origin without occlusion.   Cervical Vasculature MRA:   1.  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  2.  The right vertebral artery is patent.  3.  The bilateral cervical carotid arteries are patent without significant stenosis.    CTA: No significant carotid or right vertebral artery stenosis. Long segment multifocal irregularity and nonopacification of the left V1 and V2 segments may represent occlusion and/or dissection. Distal flow is likely from a combination of retrograde flow and muscular collaterals. 16 mm left thyroid nodule. Outpatient ultrasound is recommended to further characterize.     Echocardiogram EF 55-60%, no regional WMA, normal atrial sizes    EKG/Telemetry SR, 1st degree AVB   Other Testing Previous cardiac monitoring showed atrial fibrillation      Labs Lab Results   Component Value Date    LDL 70 08/04/2022    A1C 5.7 (H) 08/04/2022    INR 1.23 (H) 08/04/2022    INR 2.7 (H) 06/30/2022    INR 2.1 (H) 05/19/2022        Impression:   Problem List Items Addressed This Visit        Neurology Diagnoses    Cerebrovascular accident (CVA), unspecified mechanism (H) - Primary    Relevant Orders    Pharmacy Liaison for Medication Coverage       Other    Atrial fibrillation, unspecified type (H)    Relevant Orders    Pharmacy Liaison for Medication Coverage     76 y/o man with bilateral occipital lobe infarcts (R>L) due to cardioembolism in the the setting of atrial fibrillation and anticoagulation being held for planned procedure.     Plan:   - continue therapeutic anticoagulation indefinitely for atrial fibrillation and secondary stroke prevention; currently on xarelto  - continue atorvastatin 40 mg daily; LDL goal 40-70; LDL was 70 in 08/2022  - blood pressure goal is less than 130/80  - in the event of any planned procedures requiring anticoagulation to be held, would consider bridging with heparin or  "lovenox  - behind the wheel driving assessment prior to return to driving  - Return for questions/concerns regarding stroke.    Stroke Education provided.  He will call us with any questions.  For any acute neurologic deficits he was advised to  go directly to the hospital rather than call the clinic.    Karen Pryor NP  Neurology  04/20/2023 11:34 AM  To page me or covering stroke neurology team member, click here: AMCOM  Choose \"On Call\" tab at top, then search dropdown box for \"Neurology\" & press Enter, look for Neuro ICU/Stroke    ___________________________________________________________________    Current Medications  Current Outpatient Medications   Medication Sig     atorvastatin (LIPITOR) 40 MG tablet Take 1 tablet (40 mg) by mouth every evening     enoxaparin ANTICOAGULANT (LOVENOX) 100 MG/ML syringe Inject 90mg (0.9mL) twice daily for 3 days while Xarelto is held prior to upcoming procedure. Last dosage of enoxaparin should be approximately 12 hours prior to procedure.     lisinopril (ZESTRIL) 10 MG tablet Take 1 tablet (10 mg) by mouth daily     loratadine (CLARITIN) 10 MG tablet Take 10 mg by mouth daily     MULTI-VITAMIN PO TABS ONE TABLET DAILY     omeprazole (PRILOSEC) 40 MG DR capsule TAKE 1 CAPSULE BY MOUTH EVERY DAY     propranolol ER (INDERAL LA) 80 MG 24 hr capsule TAKE 1 CAPSULE BY MOUTH EVERY DAY     VITAMIN D, CHOLECALCIFEROL, PO Take 1,000 Units by mouth daily     XARELTO ANTICOAGULANT 20 MG TABS tablet TAKE 1 TABLET BY MOUTH DAILY WITH DINNER     No current facility-administered medications for this visit.       Past Medical History  Past Medical History:   Diagnosis Date     Andrews's esophagus without dysplasia 4/26/2017     CARDIOVASCULAR SCREENING; LDL GOAL LESS THAN 130 10/31/2010     Cerebrovascular accident (CVA), unspecified mechanism (H) 8/4/2022     Hypertension goal BP (blood pressure) < 140/90 1/31/2012       Social History  Social History     Tobacco Use     Smoking " "status: Never     Passive exposure: Never     Smokeless tobacco: Never   Substance Use Topics     Alcohol use: Yes     Comment: 0-1 per week     Drug use: No       Physical Exam    Estimated body mass index is 32.55 kg/m  as calculated from the following:    Height as of this encounter: 1.702 m (5' 7\").    Weight as of this encounter: 94.3 kg (207 lb 12.8 oz).    /84   Pulse 85   Ht 1.702 m (5' 7\")   Wt 94.3 kg (207 lb 12.8 oz)   SpO2 98%   BMI 32.55 kg/m         General Exam  General: Sitting up in chair in no acute distress  HEENT:  normocephalic/atraumatic  Cardio:  RRR  Pulmonary:  no respiratory distress      Neurologic:  Mental Status:  alert, oriented, attentive, speech clear and fluent   Cranial Nerves:  Left homonymous hemianopsia, EOMI with normal smooth pursuit, facial movements symmetric, hearing not formally tested but intact to conversation, no dysarthria  Motor:  no abnormal movements, able to move all limbs spontaneously   Station/Gait:  normal width, turn, arm swing    Neuroimaging: as per HPI. I personally reviewed those images.    Labs:        Questionnaires:        10/12/2022     1:55 PM 4/20/2023    10:05 AM   Stroke Questionnaire   Residual effects: Any residual effects from stroke? Yes, I do Yes, I do   Residual effects: Most bothersome symptom vision problems vision   Level of independence: Walking Independent Independent   Level of independence: Eating Independent Independent   Level of independence: Stairs Independent Independent   Level of independence: Dressing Independent Independent   Level of independence: Bathing Independent Independent   Level of independence: Toileting Independent Independent   Incontinence: Bowel? No No   Incontinence: Bladder? No No   Able to: Use electronics Yes Yes   Able to: Cook or do house chores Yes Yes   Able to: Do own shopping, including online Yes Yes   Able to: Manage own finances Yes Yes   Current therapy: Physical Therapy No No   Current " therapy: Occupation Therapy Yes No   Current therapy: How often OT several sessions    Current therapy: Speech Therapy No No   Current therapy: Other No No   Current services: Home Health No, not needed No, not needed   Medication: How do you take your meds Myself, from a pillbox that I set up Myself, from individual bottles   Medication: Do you ever miss or forget meds Rarely Rarely   Risk Factor: Checking blood pressure at home Yes Yes   Risk Factor: Usual blood pressure numbers 120/70 115-125/70-80   Risk Factor: Checking blood sugar at home No No   Risk Factor: Second-hand smoke at home No No   Risk Factor: How much caffeine per day 0 none   Who completed this questionnaire? The patient independently The patient independently         Billing:    I spent a total of 30 minutes on the day of the visit.   Time spent by me doing chart review, history and exam, documentation and further activities per the note

## 2023-04-20 ENCOUNTER — OFFICE VISIT (OUTPATIENT)
Dept: NEUROLOGY | Facility: CLINIC | Age: 78
End: 2023-04-20
Payer: COMMERCIAL

## 2023-04-20 VITALS
WEIGHT: 207.8 LBS | OXYGEN SATURATION: 98 % | HEART RATE: 85 BPM | HEIGHT: 67 IN | SYSTOLIC BLOOD PRESSURE: 131 MMHG | DIASTOLIC BLOOD PRESSURE: 84 MMHG | BODY MASS INDEX: 32.62 KG/M2

## 2023-04-20 DIAGNOSIS — I48.91 ATRIAL FIBRILLATION, UNSPECIFIED TYPE (H): ICD-10-CM

## 2023-04-20 DIAGNOSIS — I63.9 CEREBROVASCULAR ACCIDENT (CVA), UNSPECIFIED MECHANISM (H): Primary | ICD-10-CM

## 2023-04-20 PROCEDURE — 99214 OFFICE O/P EST MOD 30 MIN: CPT | Performed by: NURSE PRACTITIONER

## 2023-04-20 NOTE — PATIENT INSTRUCTIONS
- continue therapeutic anticoagulation indefinitely for atrial fibrillation and secondary stroke prevention; currently on xarelto  - continue atorvastatin 40 mg daily; LDL goal 40-70; LDL was 70 in 08/2022  - blood pressure goal is less than 130/80  - in the event of any planned procedures requiring anticoagulation to be held, would consider bridging with heparin or lovenox  - behind the wheel driving assessment prior to return to driving      Stroke Prevention Recommendations  High blood pressure, or hypertension, is a leading cause of stroke and the most significant controllable risk factor. You should aim to keep your blood pressure below 130/80.     Smoking cessation: The nicotine and carbon monoxide in cigarette smoke damage the cardiovascular system and pave the way for a stroke. If you use nicotine, please reach out to your primary care provider for assistance with quitting or consider utilizing one of [place]'s free quit support programs (https://www.health.[facility]placed [facility].[place]./communities/tobacco/quitting/index.html)    If you have Type 1 or 2 diabetes, control you blood sugar. You should aim to keep your HGBA1C below 7.0.     Eat an overall health dietary pattern that emphasizes:  - a wide variety of fruits and vegetables   - whole grains and products made up of mostly whole grains   - healthy sources of protein (mostly plants such as legumes and nuts; fish and seafood; low-fat or non-fat dairy; and, if you eat meat and poultry, ensuring it is lean and unprocessed)  - liquid non-tropical vegetable oils  - minimally processed foods   - minimize intake of added sugars  - foods prepared with little or no salt  - limited or preferably no alcohol intake    Aim for being active at least 150 minutes a week, but if you don't want to sweat the numbers, just move more and sit less.    Excess body weight and obesity are linked with an increased risk of high blood pressure, diabetes, heart disease, and stroke. Losing as  little as 5 to 10 pounds can make a significant difference in your risks.  [employer] [place] has a Comprehensive Weight Management program if you would like assistance/support: https://[email or web address].[email or web address]/treatments/comprehensive-weight-management    Large amounts of cholesterol in the blood can build up and cause blood clots - leading to a stroke. Aim to keep your LDL cholesterol between 40 and 70.     Call 911 if these signs are present

## 2023-04-20 NOTE — NURSING NOTE
Symptoms or concerns today: nothing     Med comments: n/a    Who the patient is here with today: self    Alissa Meza

## 2023-04-20 NOTE — LETTER
4/20/2023         RE: Rob Beavers  [address]  [place] [address]        Dear Colleague,    Thank you for referring your patient, Rob Beavers, to the [facility] NEUROLOGY [facility]. Please see a copy of my visit note below.    __________________________________________________________      [facility] Neurology [facility]   [phone]  __________________________________________________________    Chief Complaint: Patient presents with:  CVA      History of Present Illness: Rob Beavers is a 77 year old male presenting for follow-up for bilateral PCA infarcts.     He was hospitalized at [facility] on 8/4-8/6/23. Prior to the hospital stay, he had a past medical history of HTN, atrial fibrillation (previously on warfarin, had been held for endoscopy PTA), PE.    He presented to the hospital with wake up symptoms of left visual field deficit.     He was started on aspirin 325 mg while in the hospital then transitioned to xarelto 1 week post-stroke for recurrent stroke prevention. At his last visit, he had ongoing left visual field cut. It was essentially unchanged from onset. He was working with OT and followed up with ophthalmology where he was noted to have left homonymous hemianopsia. He did trial prism lenses but they weren't a good fit for him; he has adjusted well to his visual deficit.    He was discharged from occupational therapy. Recommendation was to complete a behind the wheel driving assessment prior to return to driving. He is looking into it.     Modified [place] Scale  Score: 2-Slight disability; unable to carry out all previous activities, but able to look after own affairs    Stroke Evaluation Summarized    MRI/Head CT MRI: Acute RIGHT posterior cerebral artery territory infarct with an additional punctate infarct in the LEFT occipital lobe. No evidence of hemorrhagic transformation.    CT:  Acute right occipital lobe infarct (PCA  distribution), which would correspond to the history of a left visual field deficit. No acute intracranial hemorrhage.   Intracranial Vasculature CTA: No large vessel occlusion or abnormality to account for the patient's deficit in the right PCA distribution. Subtle irregularity of the left V4 at the PICA origin without occlusion.   Cervical Vasculature MRA:   1.  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  2.  The right vertebral artery is patent.  3.  The bilateral cervical carotid arteries are patent without significant stenosis.    CTA: No significant carotid or right vertebral artery stenosis. Long segment multifocal irregularity and nonopacification of the left V1 and V2 segments may represent occlusion and/or dissection. Distal flow is likely from a combination of retrograde flow and muscular collaterals. 16 mm left thyroid nodule. Outpatient ultrasound is recommended to further characterize.     Echocardiogram EF 55-60%, no regional WMA, normal atrial sizes    EKG/Telemetry SR, 1st degree AVB   Other Testing Previous cardiac monitoring showed atrial fibrillation      Labs Lab Results   Component Value Date    LDL 70 08/04/2022    A1C 5.7 (H) 08/04/2022    INR 1.23 (H) 08/04/2022    INR 2.7 (H) 06/30/2022    INR 2.1 (H) 05/19/2022        Impression:   Problem List Items Addressed This Visit        Neurology Diagnoses    Cerebrovascular accident (CVA), unspecified mechanism (H) - Primary    Relevant Orders    Pharmacy Liaison for Medication Coverage       Other    Atrial fibrillation, unspecified type (H)    Relevant Orders    Pharmacy Liaison for Medication Coverage     78 y/o man with bilateral occipital lobe infarcts (R>L) due to cardioembolism in the the setting of atrial fibrillation and anticoagulation being held for planned procedure.     Plan:   - continue therapeutic anticoagulation indefinitely for atrial fibrillation and secondary stroke prevention;  "currently on xarelto  - continue atorvastatin 40 mg daily; LDL goal 40-70; LDL was 70 in 08/2022  - blood pressure goal is less than 130/80  - in the event of any planned procedures requiring anticoagulation to be held, would consider bridging with heparin or lovenox  - behind the wheel driving assessment prior to return to driving  - Return for questions/concerns regarding stroke.    Stroke Education provided.  He will call us with any questions.  For any acute neurologic deficits he was advised to  go directly to the hospital rather than call the clinic.    Karen Pryor NP  Neurology  04/20/2023 11:34 AM  To page me or covering stroke neurology team member, click here: [facility]  Choose \"On Call\" tab at top, then search dropdown box for \"Neurology\" & press Enter, look for Neuro ICU/Stroke    ___________________________________________________________________    Current Medications  Current Outpatient Medications   Medication Sig     atorvastatin (LIPITOR) 40 MG tablet Take 1 tablet (40 mg) by mouth every evening     enoxaparin ANTICOAGULANT (LOVENOX) 100 MG/ML syringe Inject 90mg (0.9mL) twice daily for 3 days while Xarelto is held prior to upcoming procedure. Last dosage of enoxaparin should be approximately 12 hours prior to procedure.     lisinopril (ZESTRIL) 10 MG tablet Take 1 tablet (10 mg) by mouth daily     loratadine (CLARITIN) 10 MG tablet Take 10 mg by mouth daily     MULTI-VITAMIN PO TABS ONE TABLET DAILY     omeprazole (PRILOSEC) 40 MG DR capsule TAKE 1 CAPSULE BY MOUTH EVERY DAY     propranolol ER (INDERAL LA) 80 MG 24 hr capsule TAKE 1 CAPSULE BY MOUTH EVERY DAY     VITAMIN D, CHOLECALCIFEROL, PO Take 1,000 Units by mouth daily     XARELTO ANTICOAGULANT 20 MG TABS tablet TAKE 1 TABLET BY MOUTH DAILY WITH DINNER     No current facility-administered medications for this visit.       Past Medical History  Past Medical History:   Diagnosis Date     Andrews's esophagus without dysplasia 4/26/2017     " "CARDIOVASCULAR SCREENING; LDL GOAL LESS THAN 130 10/31/2010     Cerebrovascular accident (CVA), unspecified mechanism (H) 8/4/2022     Hypertension goal BP (blood pressure) < 140/90 1/31/2012       Social History  Social History     Tobacco Use     Smoking status: Never     Passive exposure: Never     Smokeless tobacco: Never   Substance Use Topics     Alcohol use: Yes     Comment: 0-1 per week     Drug use: No       Physical Exam    Estimated body mass index is 32.55 kg/m  as calculated from the following:    Height as of this encounter: 1.702 m (5' 7\").    Weight as of this encounter: 94.3 kg (207 lb 12.8 oz).    /84   Pulse 85   Ht 1.702 m (5' 7\")   Wt 94.3 kg (207 lb 12.8 oz)   SpO2 98%   BMI 32.55 kg/m         General Exam  General: Sitting up in chair in no acute distress  HEENT:  normocephalic/atraumatic  Cardio:  RRR  Pulmonary:  no respiratory distress      Neurologic:  Mental Status:  alert, oriented, attentive, speech clear and fluent   Cranial Nerves:  Left homonymous hemianopsia, EOMI with normal smooth pursuit, facial movements symmetric, hearing not formally tested but intact to conversation, no dysarthria  Motor:  no abnormal movements, able to move all limbs spontaneously   Station/Gait:  normal width, turn, arm swing    Neuroimaging: as per HPI. I personally reviewed those images.    Labs:        Questionnaires:        10/12/2022     1:55 PM 4/20/2023    10:05 AM   Stroke Questionnaire   Residual effects: Any residual effects from stroke? Yes, I do Yes, I do   Residual effects: Most bothersome symptom vision problems vision   Level of independence: Walking Independent Independent   Level of independence: Eating Independent Independent   Level of independence: Stairs Independent Independent   Level of independence: Dressing Independent Independent   Level of independence: Bathing Independent Independent   Level of independence: Toileting Independent Independent   Incontinence: Bowel? No " No   Incontinence: Bladder? No No   Able to: Use electronics Yes Yes   Able to: Cook or do house chores Yes Yes   Able to: Do own shopping, including online Yes Yes   Able to: Manage own finances Yes Yes   Current therapy: Physical Therapy No No   Current therapy: Occupation Therapy Yes No   Current therapy: How often OT several sessions    Current therapy: Speech Therapy No No   Current therapy: Other No No   Current services: Home Health No, not needed No, not needed   Medication: How do you take your meds Myself, from a pillbox that I set up Myself, from individual bottles   Medication: Do you ever miss or forget meds Rarely Rarely   Risk Factor: Checking blood pressure at home Yes Yes   Risk Factor: Usual blood pressure numbers 120/70 115-125/70-80   Risk Factor: Checking blood sugar at home No No   Risk Factor: Second-hand smoke at home No No   Risk Factor: How much caffeine per day 0 none   Who completed this questionnaire? The patient independently The patient independently         Billing:    I spent a total of 30 minutes on the day of the visit.   Time spent by me doing chart review, history and exam, documentation and further activities per the note                Again, thank you for allowing me to participate in the care of your patient.        Sincerely,        Karen Pryor NP

## 2023-04-23 ENCOUNTER — NURSE TRIAGE (OUTPATIENT)
Dept: FAMILY MEDICINE | Facility: CLINIC | Age: 78
End: 2023-04-23
Payer: COMMERCIAL

## 2023-04-23 DIAGNOSIS — I10 HYPERTENSION GOAL BP (BLOOD PRESSURE) < 140/90: ICD-10-CM

## 2023-04-24 ENCOUNTER — MYC MEDICAL ADVICE (OUTPATIENT)
Dept: FAMILY MEDICINE | Facility: CLINIC | Age: 78
End: 2023-04-24

## 2023-04-24 ENCOUNTER — E-VISIT (OUTPATIENT)
Dept: URGENT CARE | Facility: CLINIC | Age: 78
End: 2023-04-24
Payer: COMMERCIAL

## 2023-04-24 DIAGNOSIS — Z20.822 SUSPECTED 2019 NOVEL CORONAVIRUS INFECTION: Primary | ICD-10-CM

## 2023-04-24 PROCEDURE — 99207 PR NO CHARGE LOS: CPT | Performed by: FAMILY MEDICINE

## 2023-04-24 RX ORDER — LISINOPRIL 10 MG/1
10 TABLET ORAL DAILY
Qty: 90 TABLET | Refills: 3 | OUTPATIENT
Start: 2023-04-24

## 2023-04-24 NOTE — PATIENT INSTRUCTIONS
Thank you for choosing us for your care. Based on your symptoms and length of illness, I feel that a virtual visit to further discuss symptoms would be appropriate.  Please go ahead and get this scheduled.  I will not charge you for today's ED visit.

## 2023-04-25 NOTE — TELEPHONE ENCOUNTER
Chart was reviewed. Pt's is currently taking xarelto which requires a virtual visit for paxlovid. Pt notified to call central scheduling.    Wendy Daniels RN on 4/25/2023 at 10:27 AM

## 2023-04-25 NOTE — TELEPHONE ENCOUNTER
Reason for call:  Patient reporting a symptom    Symptom or request: Pt has been tested positive for covid on Sunday, he would like to have medication soon as possibil he as fever 101    Duration (how long have symptoms been present): 3 days    Have you been treated for this before? no    Additional comments: no    Phone Number patient can be reached at:  [phone]    Best Time:      Can we leave a detailed message on this number:  yes    Call taken on 4/25/2023 at 3:41 PM by Mariaa Cai

## 2023-05-22 ENCOUNTER — TRANSFERRED RECORDS (OUTPATIENT)
Dept: HEALTH INFORMATION MANAGEMENT | Facility: CLINIC | Age: 78
End: 2023-05-22
Payer: COMMERCIAL

## 2023-09-05 ASSESSMENT — ENCOUNTER SYMPTOMS
SHORTNESS OF BREATH: 0
PALPITATIONS: 0
EYE PAIN: 0
MYALGIAS: 0
CHILLS: 0
ABDOMINAL PAIN: 0
DIZZINESS: 0
HEMATOCHEZIA: 0
FREQUENCY: 0
NAUSEA: 0
HEARTBURN: 0
DYSURIA: 0
WEAKNESS: 0
SORE THROAT: 0
HEADACHES: 0
FEVER: 0
COUGH: 0
DIARRHEA: 0
HEMATURIA: 0
NERVOUS/ANXIOUS: 0
JOINT SWELLING: 0
ARTHRALGIAS: 1
CONSTIPATION: 0
PARESTHESIAS: 0

## 2023-09-05 ASSESSMENT — ACTIVITIES OF DAILY LIVING (ADL): CURRENT_FUNCTION: NO ASSISTANCE NEEDED

## 2023-09-07 DIAGNOSIS — K22.70 BARRETT'S ESOPHAGUS WITHOUT DYSPLASIA: ICD-10-CM

## 2023-09-07 RX ORDER — OMEPRAZOLE 40 MG/1
CAPSULE, DELAYED RELEASE ORAL
Qty: 90 CAPSULE | Refills: 0 | Status: SHIPPED | OUTPATIENT
Start: 2023-09-07 | End: 2023-09-08

## 2023-09-08 ENCOUNTER — OFFICE VISIT (OUTPATIENT)
Dept: FAMILY MEDICINE | Facility: CLINIC | Age: 78
End: 2023-09-08
Payer: COMMERCIAL

## 2023-09-08 VITALS
BODY MASS INDEX: 32.62 KG/M2 | RESPIRATION RATE: 16 BRPM | SYSTOLIC BLOOD PRESSURE: 129 MMHG | WEIGHT: 207.8 LBS | OXYGEN SATURATION: 97 % | HEART RATE: 58 BPM | TEMPERATURE: 97 F | DIASTOLIC BLOOD PRESSURE: 80 MMHG | HEIGHT: 67 IN

## 2023-09-08 DIAGNOSIS — Z12.5 SCREENING FOR PROSTATE CANCER: ICD-10-CM

## 2023-09-08 DIAGNOSIS — Z23 NEED FOR SHINGLES VACCINE: ICD-10-CM

## 2023-09-08 DIAGNOSIS — E78.5 HYPERLIPIDEMIA LDL GOAL <100: ICD-10-CM

## 2023-09-08 DIAGNOSIS — G25.0 BENIGN FAMILIAL TREMOR: ICD-10-CM

## 2023-09-08 DIAGNOSIS — K22.70 BARRETT'S ESOPHAGUS WITHOUT DYSPLASIA: ICD-10-CM

## 2023-09-08 DIAGNOSIS — I48.91 ATRIAL FIBRILLATION, UNSPECIFIED TYPE (H): ICD-10-CM

## 2023-09-08 DIAGNOSIS — I63.9 CEREBROVASCULAR ACCIDENT (CVA), UNSPECIFIED MECHANISM (H): ICD-10-CM

## 2023-09-08 DIAGNOSIS — Z00.00 ENCOUNTER FOR ANNUAL WELLNESS VISIT (AWV) IN MEDICARE PATIENT: Primary | ICD-10-CM

## 2023-09-08 DIAGNOSIS — I10 HYPERTENSION GOAL BP (BLOOD PRESSURE) < 140/90: ICD-10-CM

## 2023-09-08 LAB
ALBUMIN SERPL BCG-MCNC: 4 G/DL (ref 3.5–5.2)
ALP SERPL-CCNC: 93 U/L (ref 40–129)
ALT SERPL W P-5'-P-CCNC: 17 U/L (ref 0–70)
ANION GAP SERPL CALCULATED.3IONS-SCNC: 10 MMOL/L (ref 7–15)
AST SERPL W P-5'-P-CCNC: 30 U/L (ref 0–45)
BILIRUB SERPL-MCNC: 0.8 MG/DL
BUN SERPL-MCNC: 14.2 MG/DL (ref 8–23)
CALCIUM SERPL-MCNC: 8.8 MG/DL (ref 8.8–10.2)
CHLORIDE SERPL-SCNC: 104 MMOL/L (ref 98–107)
CHOLEST SERPL-MCNC: 105 MG/DL
CREAT SERPL-MCNC: 0.93 MG/DL (ref 0.67–1.17)
DEPRECATED HCO3 PLAS-SCNC: 26 MMOL/L (ref 22–29)
EGFRCR SERPLBLD CKD-EPI 2021: 85 ML/MIN/1.73M2
ERYTHROCYTE [DISTWIDTH] IN BLOOD BY AUTOMATED COUNT: 12.8 % (ref 10–15)
GLUCOSE SERPL-MCNC: 103 MG/DL (ref 70–99)
HCT VFR BLD AUTO: 44.7 % (ref 40–53)
HDLC SERPL-MCNC: 38 MG/DL
HGB BLD-MCNC: 14.9 G/DL (ref 13.3–17.7)
LDLC SERPL CALC-MCNC: 48 MG/DL
MCH RBC QN AUTO: 30.9 PG (ref 26.5–33)
MCHC RBC AUTO-ENTMCNC: 33.3 G/DL (ref 31.5–36.5)
MCV RBC AUTO: 93 FL (ref 78–100)
NONHDLC SERPL-MCNC: 67 MG/DL
PLATELET # BLD AUTO: 232 10E3/UL (ref 150–450)
POTASSIUM SERPL-SCNC: 4.4 MMOL/L (ref 3.4–5.3)
PROT SERPL-MCNC: 7.1 G/DL (ref 6.4–8.3)
PSA SERPL DL<=0.01 NG/ML-MCNC: 0.92 NG/ML (ref 0–6.5)
RBC # BLD AUTO: 4.82 10E6/UL (ref 4.4–5.9)
SODIUM SERPL-SCNC: 140 MMOL/L (ref 136–145)
TRIGL SERPL-MCNC: 97 MG/DL
WBC # BLD AUTO: 6.8 10E3/UL (ref 4–11)

## 2023-09-08 PROCEDURE — G0439 PPPS, SUBSEQ VISIT: HCPCS | Performed by: FAMILY MEDICINE

## 2023-09-08 PROCEDURE — 85027 COMPLETE CBC AUTOMATED: CPT | Performed by: FAMILY MEDICINE

## 2023-09-08 PROCEDURE — 99214 OFFICE O/P EST MOD 30 MIN: CPT | Mod: 25 | Performed by: FAMILY MEDICINE

## 2023-09-08 PROCEDURE — 80061 LIPID PANEL: CPT | Performed by: FAMILY MEDICINE

## 2023-09-08 PROCEDURE — 36415 COLL VENOUS BLD VENIPUNCTURE: CPT | Performed by: FAMILY MEDICINE

## 2023-09-08 PROCEDURE — 80053 COMPREHEN METABOLIC PANEL: CPT | Performed by: FAMILY MEDICINE

## 2023-09-08 PROCEDURE — G0103 PSA SCREENING: HCPCS | Performed by: FAMILY MEDICINE

## 2023-09-08 RX ORDER — OMEPRAZOLE 40 MG/1
40 CAPSULE, DELAYED RELEASE ORAL DAILY
Qty: 90 CAPSULE | Refills: 3 | Status: SHIPPED | OUTPATIENT
Start: 2023-09-08 | End: 2024-09-19

## 2023-09-08 RX ORDER — PROPRANOLOL HYDROCHLORIDE 80 MG/1
CAPSULE, EXTENDED RELEASE ORAL
Qty: 90 CAPSULE | Refills: 3 | Status: SHIPPED | OUTPATIENT
Start: 2023-09-08 | End: 2024-09-20

## 2023-09-08 RX ORDER — ATORVASTATIN CALCIUM 40 MG/1
40 TABLET, FILM COATED ORAL EVERY EVENING
Qty: 90 TABLET | Refills: 3 | Status: SHIPPED | OUTPATIENT
Start: 2023-09-08 | End: 2024-09-20

## 2023-09-08 RX ORDER — LISINOPRIL 10 MG/1
10 TABLET ORAL DAILY
Qty: 90 TABLET | Refills: 3 | Status: SHIPPED | OUTPATIENT
Start: 2023-09-08 | End: 2024-09-19

## 2023-09-08 ASSESSMENT — ENCOUNTER SYMPTOMS
ABDOMINAL PAIN: 0
SORE THROAT: 0
WEAKNESS: 0
CONSTIPATION: 0
CHILLS: 0
COUGH: 0
DYSURIA: 0
DIZZINESS: 0
FREQUENCY: 0
NAUSEA: 0
DIARRHEA: 0
ARTHRALGIAS: 1
EYE PAIN: 0
HEMATURIA: 0
FEVER: 0
NERVOUS/ANXIOUS: 0
HEARTBURN: 0
PALPITATIONS: 0
HEADACHES: 0
HEMATOCHEZIA: 0
PARESTHESIAS: 0
SHORTNESS OF BREATH: 0
MYALGIAS: 0
JOINT SWELLING: 0

## 2023-09-08 ASSESSMENT — ACTIVITIES OF DAILY LIVING (ADL): CURRENT_FUNCTION: NO ASSISTANCE NEEDED

## 2023-09-08 ASSESSMENT — PAIN SCALES - GENERAL: PAINLEVEL: NO PAIN (0)

## 2023-09-08 NOTE — LETTER
September 13, 2023      Rob Beavers  [address] [place] [address]        Dear ,    We are writing to inform you of your test results.    -Normal red blood cell (hgb) levels, normal white blood cell count and normal platelet levels.   -PSA (prostate specific antigen) test is normal.  This indicates a low likelihood of prostate cancer.  ADVISE: rechecking this in 1 year.   -Cholesterol levels (LDL,HDL, Triglycerides) are well with meds..  ADVISE: rechecking in 1 year.   -Liver and gallbladder tests are normal (ALT,AST, Alk phos, bilirubin), kidney function is normal (Cr, GFR), sodium is normal, potassium is normal, calcium is normal, glucose is in diabetic range.   -No change in medications.     Resulted Orders   Lipid panel reflex to direct LDL Non-fasting   Result Value Ref Range    Cholesterol 105 <200 mg/dL    Triglycerides 97 <150 mg/dL    Direct Measure HDL 38 (L) >=40 mg/dL    LDL Cholesterol Calculated 48 <=100 mg/dL    Non HDL Cholesterol 67 <130 mg/dL    Narrative    Cholesterol  Desirable:  <200 mg/dL    Triglycerides  Normal:  Less than 150 mg/dL  Borderline High:  150-199 mg/dL  High:  200-499 mg/dL  Very High:  Greater than or equal to 500 mg/dL    Direct Measure HDL  Female:  Greater than or equal to 50 mg/dL   Male:  Greater than or equal to 40 mg/dL    LDL Cholesterol  Desirable:  <100mg/dL  Above Desirable:  100-129 mg/dL   Borderline High:  130-159 mg/dL   High:  160-189 mg/dL   Very High:  >= 190 mg/dL    Non HDL Cholesterol  Desirable:  130 mg/dL  Above Desirable:  130-159 mg/dL  Borderline High:  160-189 mg/dL  High:  190-219 mg/dL  Very High:  Greater than or equal to 220 mg/dL   CBC with Platelets   Result Value Ref Range    WBC Count 6.8 4.0 - 11.0 10e3/uL    RBC Count 4.82 4.40 - 5.90 10e6/uL    Hemoglobin 14.9 13.3 - 17.7 g/dL    Hematocrit 44.7 40.0 - 53.0 %    MCV 93 78 - 100 fL    MCH 30.9 26.5 - 33.0 pg    MCHC 33.3 31.5 - 36.5 g/dL    RDW 12.8 10.0 - 15.0  %    Platelet Count 232 150 - 450 10e3/uL   Comprehensive metabolic panel (BMP + Alb, Alk Phos, ALT, AST, Total. Bili, TP)   Result Value Ref Range    Sodium 140 136 - 145 mmol/L    Potassium 4.4 3.4 - 5.3 mmol/L    Chloride 104 98 - 107 mmol/L    Carbon Dioxide (CO2) 26 22 - 29 mmol/L    Anion Gap 10 7 - 15 mmol/L    Urea Nitrogen 14.2 8.0 - 23.0 mg/dL    Creatinine 0.93 0.67 - 1.17 mg/dL    Calcium 8.8 8.8 - 10.2 mg/dL    Glucose 103 (H) 70 - 99 mg/dL    Alkaline Phosphatase 93 40 - 129 U/L    AST 30 0 - 45 U/L      Comment:      Reference intervals for this test were updated on 6/12/2023 to more accurately reflect our healthy population. There may be differences in the flagging of prior results with similar values performed with this method. Interpretation of those prior results can be made in the context of the updated reference intervals.    ALT 17 0 - 70 U/L      Comment:      Reference intervals for this test were updated on 6/12/2023 to more accurately reflect our healthy population. There may be differences in the flagging of prior results with similar values performed with this method. Interpretation of those prior results can be made in the context of the updated reference intervals.      Protein Total 7.1 6.4 - 8.3 g/dL    Albumin 4.0 3.5 - 5.2 g/dL    Bilirubin Total 0.8 <=1.2 mg/dL    GFR Estimate 85 >60 mL/min/1.73m2   PSA, screen   Result Value Ref Range    Prostate Specific Antigen Screen 0.92 0.00 - 6.50 ng/mL    Narrative    This result is obtained using the Roche Elecsys total PSA method on the kye [id number] immunoassay analyzer. Results obtained with different assay methods or kits cannot be used interchangeably.       If you have any questions or concerns, please call the clinic at the number listed above.       Sincerely,      Tian Santana MD

## 2023-09-08 NOTE — PROGRESS NOTES
"SUBJECTIVE:   Rob is a 78 year old who presents for Preventive Visit.      9/8/2023    10:55 AM   Additional Questions   Roomed by yessica       Are you in the first 12 months of your Medicare coverage?  No    Healthy Habits:     In general, how would you rate your overall health?  Good    Frequency of exercise:  2-3 days/week    Duration of exercise:  Other    Do you usually eat at least 4 servings of fruit and vegetables a day, include whole grains    & fiber and avoid regularly eating high fat or \"junk\" foods?  No    Taking medications regularly:  Yes    Medication side effects:  None    Ability to successfully perform activities of daily living:  No assistance needed    Home Safety:  Lack of grab bars in the bathroom    Hearing Impairment:  Difficulty following a conversation in a noisy restaurant or crowded room and need to ask people to speak up or repeat themselves    In the past 6 months, have you been bothered by leaking of urine?  No    In general, how would you rate your overall mental or emotional health?  Good    Additional concerns today:  No  Overall stable.  Denies any chest pains no shortness of breath.  History of atrial fibrillation currently on Xarelto.  Had stroke last year recovering well.  On appropriate medications.      Have you ever done Advance Care Planning? (For example, a Health Directive, POLST, or a discussion with a medical provider or your loved ones about your wishes): No, advance care planning information given to patient to review.  Patient declined advance care planning discussion at this time.    Fall risk  Fallen 2 or more times in the past year?: No  Any fall with injury in the past year?: No  click delete button to remove this line now  Cognitive Screening   1) Repeat 3 items (Leader, Season, Table)    2) Clock draw: NORMAL  3) 3 item recall: Recalls 3 objects  Results: 3 items recalled: COGNITIVE IMPAIRMENT LESS LIKELY    Mini-CogTM Copyright S Vipul. Licensed by the " author for use in [facility]; reprinted with permission (calvin@[facility]). All rights reserved.        Reviewed and updated as needed this visit by clinical staff    Allergies  Meds              Reviewed and updated as needed this visit by Provider                 Social History     Tobacco Use    Smoking status: Never     Passive exposure: Never    Smokeless tobacco: Never   Substance Use Topics    Alcohol use: Yes     Comment: 1-3 beers once-twice/month             9/5/2023    11:48 AM   Alcohol Use   Prescreen: >3 drinks/day or >7 drinks/week? No     Do you have a current opioid prescription? No  Do you use any other controlled substances or medications that are not prescribed by a provider? None      Current providers sharing in care for this patient include: Patient Care Team:  Tian Santana MD as PCP - General (Family Practice)  Tian Santana MD as Assigned PCP  Jonny Verduzco MD as Assigned Heart and Vascular Provider  Latricia Liao MD as Assigned Surgical Provider    The following health maintenance items are reviewed in Epic and correct as of today:  Health Maintenance   Topic Date Due    HF ACTION PLAN  Never done    ZOSTER IMMUNIZATION (1 of 2) Never done    BMP  02/04/2023    COVID-19 Vaccine (5 - Moderna series) 03/07/2023    ALT  08/04/2023    LIPID  08/04/2023    CBC  08/04/2023    INFLUENZA VACCINE (1) 09/01/2023    ANNUAL REVIEW OF HM ORDERS  09/07/2023    MEDICARE ANNUAL WELLNESS VISIT  09/07/2023    FALL RISK ASSESSMENT  09/08/2024    COLORECTAL CANCER SCREENING  05/25/2026    ADVANCE CARE PLANNING  09/07/2027    DTAP/TDAP/TD IMMUNIZATION (3 - Td or Tdap) 08/26/2031    TSH W/FREE T4 REFLEX  Completed    HEPATITIS C SCREENING  Completed    PHQ-2 (once per calendar year)  Completed    Pneumococcal Vaccine: 65+ Years  Completed    IPV IMMUNIZATION  Aged Out    HPV IMMUNIZATION  Aged Out    MENINGITIS IMMUNIZATION  Aged Out           Review of Systems   Constitutional:  Negative for  "chills and fever.   HENT:  Negative for congestion, ear pain, hearing loss and sore throat.    Eyes:  Negative for pain and visual disturbance.   Respiratory:  Negative for cough and shortness of breath.    Cardiovascular:  Negative for chest pain, palpitations and peripheral edema.   Gastrointestinal:  Negative for abdominal pain, constipation, diarrhea, heartburn, hematochezia and nausea.   Genitourinary:  Negative for dysuria, frequency, genital sores, hematuria, impotence, penile discharge and urgency.   Musculoskeletal:  Positive for arthralgias. Negative for joint swelling and myalgias.   Skin:  Negative for rash.   Neurological:  Negative for dizziness, weakness, headaches and paresthesias.   Psychiatric/Behavioral:  Negative for mood changes. The patient is not nervous/anxious.          OBJECTIVE:   /80   Pulse 58   Temp 97  F (36.1  C) (Temporal)   Resp 16   Ht 1.702 m (5' 7\")   Wt 94.3 kg (207 lb 12.8 oz)   SpO2 97%   BMI 32.55 kg/m   Estimated body mass index is 32.55 kg/m  as calculated from the following:    Height as of this encounter: 1.702 m (5' 7\").    Weight as of this encounter: 94.3 kg (207 lb 12.8 oz).  Physical Exam  GENERAL: healthy, alert and no distress  EYES: Eyes grossly normal to inspection, PERRL and conjunctivae and sclerae normal  HENT: ear canals and TM's normal, nose and mouth without ulcers or lesions  NECK: no adenopathy, no asymmetry, masses, or scars and thyroid normal to palpation  RESP: lungs clear to auscultation - no rales, rhonchi or wheezes  CV: regular rate and rhythm, normal S1 S2, no S3 or S4, no murmur, click or rub, no peripheral edema and peripheral pulses strong  ABDOMEN: soft, nontender, no hepatosplenomegaly, no masses and bowel sounds normal  MS: no gross musculoskeletal defects noted, no edema  SKIN: no suspicious lesions or rashes  NEURO: Normal strength and tone, mentation intact and speech normal  PSYCH: mentation appears normal, affect " normal/bright    Diagnostic Test Results:  Labs reviewed in Epic    ASSESSMENT / PLAN:   Rob was seen today for physical.    Diagnoses and all orders for this visit:    Encounter for annual wellness visit (AWV) in Medicare patient  -     Lipid panel reflex to direct LDL Non-fasting; Future  -     CBC with Platelets; Future  -     Comprehensive metabolic panel (BMP + Alb, Alk Phos, ALT, AST, Total. Bili, TP); Future  -     Lipid panel reflex to direct LDL Non-fasting  -     CBC with Platelets  -     Comprehensive metabolic panel (BMP + Alb, Alk Phos, ALT, AST, Total. Bili, TP)    Benign familial tremor  -     propranolol ER (INDERAL LA) 80 MG 24 hr capsule; TAKE 1 CAPSULE BY MOUTH EVERY DAY    Andrews's esophagus without dysplasia  -     omeprazole (PRILOSEC) 40 MG DR capsule; Take 1 capsule (40 mg) by mouth daily    Hypertension goal BP (blood pressure) < 140/90  -     lisinopril (ZESTRIL) 10 MG tablet; Take 1 tablet (10 mg) by mouth daily  -     Comprehensive metabolic panel (BMP + Alb, Alk Phos, ALT, AST, Total. Bili, TP); Future  -     Comprehensive metabolic panel (BMP + Alb, Alk Phos, ALT, AST, Total. Bili, TP)    Cerebrovascular accident (CVA), unspecified mechanism (H)  -     Lipid panel reflex to direct LDL Non-fasting; Future  -     atorvastatin (LIPITOR) 40 MG tablet; Take 1 tablet (40 mg) by mouth every evening  -     Lipid panel reflex to direct LDL Non-fasting    Atrial fibrillation, unspecified type (H)  -     rivaroxaban ANTICOAGULANT (XARELTO ANTICOAGULANT) 20 MG TABS tablet; Take 1 tablet (20 mg) by mouth daily (with dinner)    Need for shingles vaccine    Hyperlipidemia LDL goal <100  -     Lipid panel reflex to direct LDL Non-fasting; Future  -     Lipid panel reflex to direct LDL Non-fasting    Screening for prostate cancer  -     PSA, screen; Future  -     PSA, screen    Other orders  -     REVIEW OF HEALTH MAINTENANCE PROTOCOL ORDERS        Patient has been advised of split billing  "requirements and indicates understanding: Yes      COUNSELING:  Reviewed preventive health counseling, as reflected in patient instructions       Regular exercise       Healthy diet/nutrition      BMI:   Estimated body mass index is 32.55 kg/m  as calculated from the following:    Height as of this encounter: 1.702 m (5' 7\").    Weight as of this encounter: 94.3 kg (207 lb 12.8 oz).         He reports that he has never smoked. He has never been exposed to tobacco smoke. He has never used smokeless tobacco.      Appropriate preventive services were discussed with this patient, including applicable screening as appropriate for cardiovascular disease, diabetes, osteopenia/osteoporosis, and glaucoma.  As appropriate for age/gender, discussed screening for colorectal cancer, prostate cancer, breast cancer, and cervical cancer. Checklist reviewing preventive services available has been given to the patient.    Reviewed patients plan of care and provided an AVS. The Basic Care Plan (routine screening as documented in Health Maintenance) for Rob meets the Care Plan requirement. This Care Plan has been established and reviewed with the Patient.          Tian Santana MD  [facility]    Identified Health Risks:    "

## 2024-03-20 DIAGNOSIS — I48.91 ATRIAL FIBRILLATION, UNSPECIFIED TYPE (H): Primary | ICD-10-CM

## 2024-04-19 ENCOUNTER — TELEPHONE (OUTPATIENT)
Dept: FAMILY MEDICINE | Facility: CLINIC | Age: 79
End: 2024-04-19
Payer: COMMERCIAL

## 2024-04-19 NOTE — TELEPHONE ENCOUNTER
Requesting hold for blood thinner: xarelto    3 day hold starting may 26th.     May 29th EGD    If not able to do a three day hold, requesting most recent lab within 90 days of surgery.

## 2024-05-15 ENCOUNTER — TELEPHONE (OUTPATIENT)
Dept: FAMILY MEDICINE | Facility: CLINIC | Age: 79
End: 2024-05-15
Payer: COMMERCIAL

## 2024-05-15 DIAGNOSIS — I10 HYPERTENSION GOAL BP (BLOOD PRESSURE) < 140/90: Primary | ICD-10-CM

## 2024-05-15 NOTE — TELEPHONE ENCOUNTER
I would not suggest holding blood thinners.  Advised patient to do blood work to check his creatinine and if that helps them to do the procedure while on Xarelto

## 2024-05-15 NOTE — TELEPHONE ENCOUNTER
[facility] needs hold on Xarelto .  Upper endoscopy on May 29th with [facility].       Hold Xeralto starting  5/26/2024 .     If unable to hold Xeralto, will need creatine lever past 90- days.     May leave medication hold orders on secure voicemail   [phone].         Marisol Daugherty RN  -[facility]

## 2024-05-15 NOTE — TELEPHONE ENCOUNTER
Called [facility] and left confidential voice mail to instruct on blood thinner per Dr. Santana.       Marisol Daugherty RN  [facility]

## 2024-05-29 ENCOUNTER — TRANSFERRED RECORDS (OUTPATIENT)
Dept: HEALTH INFORMATION MANAGEMENT | Facility: CLINIC | Age: 79
End: 2024-05-29
Payer: COMMERCIAL

## 2024-07-08 ENCOUNTER — MYC MEDICAL ADVICE (OUTPATIENT)
Dept: CARDIOLOGY | Facility: CLINIC | Age: 79
End: 2024-07-08
Payer: COMMERCIAL

## 2024-07-08 DIAGNOSIS — I10 HYPERTENSION GOAL BP (BLOOD PRESSURE) < 140/90: ICD-10-CM

## 2024-07-08 DIAGNOSIS — I63.9 CEREBROVASCULAR ACCIDENT (CVA), UNSPECIFIED MECHANISM (H): ICD-10-CM

## 2024-07-08 DIAGNOSIS — I48.91 ATRIAL FIBRILLATION, UNSPECIFIED TYPE (H): Primary | ICD-10-CM

## 2024-07-09 NOTE — TELEPHONE ENCOUNTER
"Writer has entered new \"replacement\" order for Ziopatch heart rhythm monitor because patient would like it to be mailed to his home.    Mary Mayorga RN on 7/9/2024 at 4:58 PM    "

## 2024-07-10 ENCOUNTER — ORDERS ONLY (AUTO-RELEASED) (OUTPATIENT)
Dept: CARDIOLOGY | Facility: CLINIC | Age: 79
End: 2024-07-10
Payer: COMMERCIAL

## 2024-07-10 DIAGNOSIS — I10 HYPERTENSION GOAL BP (BLOOD PRESSURE) < 140/90: ICD-10-CM

## 2024-07-10 DIAGNOSIS — I48.91 ATRIAL FIBRILLATION, UNSPECIFIED TYPE (H): ICD-10-CM

## 2024-07-10 DIAGNOSIS — I63.9 CEREBROVASCULAR ACCIDENT (CVA), UNSPECIFIED MECHANISM (H): ICD-10-CM

## 2024-08-04 PROCEDURE — 93248 EXT ECG>7D<15D REV&INTERPJ: CPT | Performed by: INTERNAL MEDICINE

## 2024-09-19 DIAGNOSIS — I10 HYPERTENSION GOAL BP (BLOOD PRESSURE) < 140/90: ICD-10-CM

## 2024-09-19 DIAGNOSIS — K22.70 BARRETT'S ESOPHAGUS WITHOUT DYSPLASIA: ICD-10-CM

## 2024-09-19 RX ORDER — OMEPRAZOLE 40 MG/1
40 CAPSULE, DELAYED RELEASE ORAL DAILY
Qty: 90 CAPSULE | Refills: 1 | Status: SHIPPED | OUTPATIENT
Start: 2024-09-19

## 2024-09-19 RX ORDER — LISINOPRIL 10 MG/1
10 TABLET ORAL DAILY
Qty: 90 TABLET | Refills: 1 | Status: SHIPPED | OUTPATIENT
Start: 2024-09-19

## 2024-09-20 ENCOUNTER — OFFICE VISIT (OUTPATIENT)
Dept: FAMILY MEDICINE | Facility: CLINIC | Age: 79
End: 2024-09-20
Payer: COMMERCIAL

## 2024-09-20 VITALS
DIASTOLIC BLOOD PRESSURE: 72 MMHG | TEMPERATURE: 97.3 F | HEART RATE: 60 BPM | RESPIRATION RATE: 21 BRPM | HEIGHT: 67 IN | WEIGHT: 196.4 LBS | SYSTOLIC BLOOD PRESSURE: 106 MMHG | OXYGEN SATURATION: 97 % | BODY MASS INDEX: 30.83 KG/M2

## 2024-09-20 DIAGNOSIS — I48.91 ATRIAL FIBRILLATION, UNSPECIFIED TYPE (H): ICD-10-CM

## 2024-09-20 DIAGNOSIS — E78.5 HYPERLIPIDEMIA LDL GOAL <100: ICD-10-CM

## 2024-09-20 DIAGNOSIS — Z00.00 ENCOUNTER FOR ANNUAL WELLNESS VISIT (AWV) IN MEDICARE PATIENT: ICD-10-CM

## 2024-09-20 DIAGNOSIS — I63.9 CEREBROVASCULAR ACCIDENT (CVA), UNSPECIFIED MECHANISM (H): Primary | ICD-10-CM

## 2024-09-20 DIAGNOSIS — I10 HYPERTENSION GOAL BP (BLOOD PRESSURE) < 140/90: ICD-10-CM

## 2024-09-20 DIAGNOSIS — G25.0 BENIGN FAMILIAL TREMOR: ICD-10-CM

## 2024-09-20 DIAGNOSIS — Z12.5 SCREENING FOR PROSTATE CANCER: ICD-10-CM

## 2024-09-20 LAB
ALBUMIN SERPL BCG-MCNC: 4.3 G/DL (ref 3.5–5.2)
ALP SERPL-CCNC: 103 U/L (ref 40–150)
ALT SERPL W P-5'-P-CCNC: 14 U/L (ref 0–70)
ANION GAP SERPL CALCULATED.3IONS-SCNC: 11 MMOL/L (ref 7–15)
AST SERPL W P-5'-P-CCNC: 23 U/L (ref 0–45)
BILIRUB SERPL-MCNC: 1.1 MG/DL
BUN SERPL-MCNC: 19.2 MG/DL (ref 8–23)
CALCIUM SERPL-MCNC: 8.8 MG/DL (ref 8.8–10.4)
CHLORIDE SERPL-SCNC: 102 MMOL/L (ref 98–107)
CHOLEST SERPL-MCNC: 107 MG/DL
CREAT SERPL-MCNC: 0.96 MG/DL (ref 0.67–1.17)
EGFRCR SERPLBLD CKD-EPI 2021: 81 ML/MIN/1.73M2
ERYTHROCYTE [DISTWIDTH] IN BLOOD BY AUTOMATED COUNT: 12.8 % (ref 10–15)
FASTING STATUS PATIENT QL REPORTED: YES
FASTING STATUS PATIENT QL REPORTED: YES
GLUCOSE SERPL-MCNC: 110 MG/DL (ref 70–99)
HCO3 SERPL-SCNC: 25 MMOL/L (ref 22–29)
HCT VFR BLD AUTO: 42.8 % (ref 40–53)
HDLC SERPL-MCNC: 37 MG/DL
HGB BLD-MCNC: 14.9 G/DL (ref 13.3–17.7)
LDLC SERPL CALC-MCNC: 48 MG/DL
MCH RBC QN AUTO: 32.5 PG (ref 26.5–33)
MCHC RBC AUTO-ENTMCNC: 34.8 G/DL (ref 31.5–36.5)
MCV RBC AUTO: 93 FL (ref 78–100)
NONHDLC SERPL-MCNC: 70 MG/DL
PLATELET # BLD AUTO: 221 10E3/UL (ref 150–450)
POTASSIUM SERPL-SCNC: 4.5 MMOL/L (ref 3.4–5.3)
PROT SERPL-MCNC: 7.5 G/DL (ref 6.4–8.3)
PSA SERPL DL<=0.01 NG/ML-MCNC: 0.82 NG/ML (ref 0–6.5)
RBC # BLD AUTO: 4.59 10E6/UL (ref 4.4–5.9)
SODIUM SERPL-SCNC: 138 MMOL/L (ref 135–145)
TRIGL SERPL-MCNC: 111 MG/DL
WBC # BLD AUTO: 7.3 10E3/UL (ref 4–11)

## 2024-09-20 PROCEDURE — G0103 PSA SCREENING: HCPCS | Performed by: FAMILY MEDICINE

## 2024-09-20 PROCEDURE — 80061 LIPID PANEL: CPT | Performed by: FAMILY MEDICINE

## 2024-09-20 PROCEDURE — G0439 PPPS, SUBSEQ VISIT: HCPCS | Performed by: FAMILY MEDICINE

## 2024-09-20 PROCEDURE — 85027 COMPLETE CBC AUTOMATED: CPT | Performed by: FAMILY MEDICINE

## 2024-09-20 PROCEDURE — 80053 COMPREHEN METABOLIC PANEL: CPT | Performed by: FAMILY MEDICINE

## 2024-09-20 PROCEDURE — 36415 COLL VENOUS BLD VENIPUNCTURE: CPT | Performed by: FAMILY MEDICINE

## 2024-09-20 RX ORDER — ATORVASTATIN CALCIUM 40 MG/1
40 TABLET, FILM COATED ORAL EVERY EVENING
Qty: 90 TABLET | Refills: 3 | Status: SHIPPED | OUTPATIENT
Start: 2024-09-20

## 2024-09-20 RX ORDER — PROPRANOLOL HYDROCHLORIDE 80 MG/1
CAPSULE, EXTENDED RELEASE ORAL
Qty: 90 CAPSULE | Refills: 3 | Status: SHIPPED | OUTPATIENT
Start: 2024-09-20

## 2024-09-20 ASSESSMENT — ACTIVITIES OF DAILY LIVING (ADL): CURRENT_FUNCTION: NO ASSISTANCE NEEDED

## 2024-09-20 ASSESSMENT — PAIN SCALES - GENERAL: PAINLEVEL: NO PAIN (0)

## 2024-09-20 NOTE — PROGRESS NOTES
"Preventive Care Visit  [facility] JERRY Santana MD, Family Medicine  Sep 20, 2024      Assessment & Plan     Encounter for annual wellness visit (AWV) in Medicare patient  Medication refilled,  - REVIEW OF HEALTH MAINTENANCE PROTOCOL ORDERS  - CBC with Platelets; Future  - Comprehensive metabolic panel (BMP + Alb, Alk Phos, ALT, AST, Total. Bili, TP); Future  - CBC with Platelets  - Comprehensive metabolic panel (BMP + Alb, Alk Phos, ALT, AST, Total. Bili, TP)    Cerebrovascular accident (CVA), unspecified mechanism (H)    - Lipid panel reflex to direct LDL Non-fasting; Future  - Comprehensive metabolic panel (BMP + Alb, Alk Phos, ALT, AST, Total. Bili, TP); Future  - Lipid panel reflex to direct LDL Non-fasting  - Comprehensive metabolic panel (BMP + Alb, Alk Phos, ALT, AST, Total. Bili, TP)    Atrial fibrillation, unspecified type (H)  stable  Hypertension goal BP (blood pressure) < 140/90    - Comprehensive metabolic panel (BMP + Alb, Alk Phos, ALT, AST, Total. Bili, TP); Future  - Comprehensive metabolic panel (BMP + Alb, Alk Phos, ALT, AST, Total. Bili, TP)    Hyperlipidemia LDL goal <100    - Lipid panel reflex to direct LDL Non-fasting; Future  - Comprehensive metabolic panel (BMP + Alb, Alk Phos, ALT, AST, Total. Bili, TP); Future  - Lipid panel reflex to direct LDL Non-fasting  - Comprehensive metabolic panel (BMP + Alb, Alk Phos, ALT, AST, Total. Bili, TP)    Screening for prostate cancer    - Prostate Specific Antigen Screen; Future  - Prostate Specific Antigen Screen    Patient has been advised of split billing requirements and indicates understanding: Yes        BMI  Estimated body mass index is 30.75 kg/m  as calculated from the following:    Height as of this encounter: 1.702 m (5' 7.01\").    Weight as of this encounter: 89.1 kg (196 lb 6.4 oz).       Counseling  Appropriate preventive services were addressed with this patient via screening, questionnaire, or discussion as " "appropriate for fall prevention, nutrition, physical activity, Tobacco-use cessation, social engagement, weight loss and cognition.  Checklist reviewing preventive services available has been given to the patient.  Reviewed patient's diet, addressing concerns and/or questions.   The patient was instructed to see the dentist every 6 months.   Patient reported safety concerns were addressed today.The patient was provided with written information regarding signs of hearing loss.           Peggy Rivas is a 79 year old, presenting for the following:  Annual Visit        9/20/2024    10:49 AM   Additional Questions   Roomed by Katherine MOISE        Health Care Directive  Patient does not have a Health Care Directive or Living Will: Discussed advance care planning with patient; however, patient declined at this time.    Healthy Habits:     In general, how would you rate your overall health?  Good    Frequency of exercise:  6-7 days/week    Duration of exercise:  45-60 minutes    Do you usually eat at least 4 servings of fruit and vegetables a day, include whole grains    & fiber and avoid regularly eating high fat or \"junk\" foods?  Yes    Taking medications regularly:  Yes    Barriers to taking medications:  None    Medication side effects:  None    Ability to successfully perform activities of daily living:  No assistance needed    Home Safety:  Lack of grab bars in the bathroom    Hearing Impairment:  Difficulty following a conversation in a noisy restaurant or crowded room    In the past 6 months, have you been bothered by leaking of urine?  No    In general, how would you rate your overall mental or emotional health?  Very good    Additional concerns today:  No    Patient denies  any concerns.  Current medical conditions are stable.      9/15/2024   General Health   How would you rate your overall physical health? Good   Feel stress (tense, anxious, or unable to sleep) Not at all            9/15/2024   Nutrition   Diet: " Regular (no restrictions)            9/15/2024   Exercise   Days per week of moderate/strenous exercise 6 days   Average minutes spent exercising at this level 50 min            9/15/2024   Social Factors   Frequency of gathering with friends or relatives Twice a week   Worry food won't last until get money to buy more No   Food not last or not have enough money for food? No   Do you have housing? (Housing is defined as stable permanent housing and does not include staying ouside in a car, in a tent, in an abandoned building, in an overnight shelter, or couch-surfing.) Yes   Are you worried about losing your housing? No   Lack of transportation? No   Unable to get utilities (heat,electricity)? No            9/15/2024   Fall Risk   Fallen 2 or more times in the past year? No    No   Trouble with walking or balance? No    No       Multiple values from one day are sorted in reverse-chronological order          9/15/2024   Activities of Daily Living- Home Safety   Needs help with the following daily activites None of the above   Safety concerns in the home No grab bars in the bathroom            9/15/2024   Dental   Dentist two times every year? (!) NO            9/15/2024   Hearing Screening   Hearing concerns? (!) IT'S HARD TO FOLLOW A CONVERSATION IN A NOISY RESTAURANT OR CROWDED ROOM.            9/15/2024   Driving Risk Screening   Patient/family members have concerns about driving No            9/15/2024   General Alertness/Fatigue Screening   Have you been more tired than usual lately? No            9/15/2024   Urinary Incontinence Screening   Bothered by leaking urine in past 6 months No            9/15/2024   TB Screening   Were you born outside of the [place]? No              9/15/2024   Substance Use   Alcohol more than 3/day or more than 7/wk No   Do you have a current opioid prescription? No   How severe/bad is pain from 1 to 10? 0/10 (No Pain)   Do you use any other substances recreationally? No        Social  History     Tobacco Use    Smoking status: Never     Passive exposure: Never    Smokeless tobacco: Never   Substance Use Topics    Alcohol use: Yes     Comment: 1-3 beers once-twice/month    Drug use: No       ASCVD Risk   The ASCVD Risk score (Cedrick MCGILL, et al., 2019) failed to calculate for the following reasons:    The patient has a prior MI or stroke diagnosis          Reviewed and updated as needed this visit by Provider                    Past Medical History:   Diagnosis Date    Andrews's esophagus without dysplasia 4/26/2017    CARDIOVASCULAR SCREENING; LDL GOAL LESS THAN 130 10/31/2010    Cerebrovascular accident (CVA), unspecified mechanism (H) 8/4/2022    Hypertension goal BP (blood pressure) < 140/90 1/31/2012     Past Surgical History:   Procedure Laterality Date    COLONOSCOPY  2017    DENTAL SURGERY      GI SURGERY  2020-21    4 endoscopies for Andrews's esophagus;  3 w/ Halo 360  type    LAPAROSCOPIC CHOLECYSTECTOMY WITH CHOLANGIOGRAMS N/A 6/29/2017    Procedure: LAPAROSCOPIC CHOLECYSTECTOMY WITH CHOLANGIOGRAMS;  LAPAROSCOPIC CHOLECYSTECTOMY WITH INTROPERATIVE CHOLANGIOGRAMS.;  Surgeon: Gian Yin MD;  Location: [facility] OR    TONSILLECTOMY  1951    [facility] TOTAL HIP ARTHROPLASTY  2010     Current providers sharing in care for this patient include:  Patient Care Team:  Tian Santana MD as PCP - General (Family Practice)  Tian Santana MD as Assigned PCP  Jonny Verduzco MD as Assigned Heart and Vascular Provider  Karen Pryor NP as Assigned Neuroscience Provider  Lanie Griffin PA-C as Physician Assistant (Cardiology)    The following health maintenance items are reviewed in Epic and correct as of today:  Health Maintenance   Topic Date Due    HF ACTION PLAN  Never done    ZOSTER IMMUNIZATION (1 of 2) Never done    BMP  03/08/2024    INFLUENZA VACCINE (1) 09/01/2024    COVID-19 Vaccine (6 - 2024-25 season) 09/01/2024    ALT  09/08/2024    LIPID  09/08/2024    ANNUAL REVIEW OF HM ORDERS   "09/08/2024    CBC  09/08/2024    MEDICARE ANNUAL WELLNESS VISIT  09/08/2024    FALL RISK ASSESSMENT  09/20/2025    COLORECTAL CANCER SCREENING  05/25/2026    GLUCOSE  09/08/2026    ADVANCE CARE PLANNING  09/08/2028    DTAP/TDAP/TD IMMUNIZATION (3 - Td or Tdap) 08/26/2031    TSH W/FREE T4 REFLEX  Completed    HEPATITIS C SCREENING  Completed    PHQ-2 (once per calendar year)  Completed    Pneumococcal Vaccine: 65+ Years  Completed    RSV VACCINE  Completed    HPV IMMUNIZATION  Aged Out    MENINGITIS IMMUNIZATION  Aged Out    RSV MONOCLONAL ANTIBODY  Aged Out         Review of Systems  CONSTITUTIONAL: NEGATIVE for fever, chills, change in weight  INTEGUMENTARY/SKIN: NEGATIVE for worrisome rashes, moles or lesions  EYES: NEGATIVE for vision changes or irritation  ENT/MOUTH: NEGATIVE for ear, mouth and throat problems  RESP: NEGATIVE for significant cough or SOB  BREAST: NEGATIVE for masses, tenderness or discharge  CV: NEGATIVE for chest pain, palpitations or peripheral edema  GI: NEGATIVE for nausea, abdominal pain, heartburn, or change in bowel habits  : NEGATIVE for frequency, dysuria, or hematuria  MUSCULOSKELETAL: NEGATIVE for significant arthralgias or myalgia  NEURO: NEGATIVE for weakness, dizziness or paresthesias  ENDOCRINE: NEGATIVE for temperature intolerance, skin/hair changes  HEME: NEGATIVE for bleeding problems  PSYCHIATRIC: NEGATIVE for changes in mood or affect     Objective    Exam  /72   Pulse 60   Temp 97.3  F (36.3  C)   Resp 21   Ht 1.702 m (5' 7.01\")   Wt 89.1 kg (196 lb 6.4 oz)   SpO2 97%   BMI 30.75 kg/m     Estimated body mass index is 30.75 kg/m  as calculated from the following:    Height as of this encounter: 1.702 m (5' 7.01\").    Weight as of this encounter: 89.1 kg (196 lb 6.4 oz).    Physical Exam  GENERAL: alert and no distress  EYES: Eyes grossly normal to inspection, PERRL and conjunctivae and sclerae normal  HENT: ear canals and TM's normal, nose and mouth without " ulcers or lesions  NECK: no adenopathy, no asymmetry, masses, or scars  RESP: lungs clear to auscultation - no rales, rhonchi or wheezes  CV: regular rate and rhythm, normal S1 S2, no S3 or S4, no murmur, click or rub, no peripheral edema  ABDOMEN: soft, nontender, no hepatosplenomegaly, no masses and bowel sounds normal  MS: no gross musculoskeletal defects noted, no edema  SKIN: no suspicious lesions or rashes  NEURO: Normal strength and tone, mentation intact and speech normal  PSYCH: mentation appears normal, affect normal/bright        9/20/2024   Mini Cog   3 Item Recall 3 objects recalled            Vision Screen  Reason Vision Screen Not Completed: Patient had exam in last 12 months      Signed Electronically by: Tina Santana MD

## 2024-10-03 ENCOUNTER — OFFICE VISIT (OUTPATIENT)
Dept: CARDIOLOGY | Facility: CLINIC | Age: 79
End: 2024-10-03
Attending: INTERNAL MEDICINE
Payer: COMMERCIAL

## 2024-10-03 VITALS
OXYGEN SATURATION: 100 % | HEART RATE: 58 BPM | BODY MASS INDEX: 30.83 KG/M2 | WEIGHT: 196.4 LBS | HEIGHT: 67 IN | SYSTOLIC BLOOD PRESSURE: 131 MMHG | DIASTOLIC BLOOD PRESSURE: 83 MMHG

## 2024-10-03 DIAGNOSIS — I63.9 CEREBROVASCULAR ACCIDENT (CVA), UNSPECIFIED MECHANISM (H): ICD-10-CM

## 2024-10-03 DIAGNOSIS — I10 HYPERTENSION GOAL BP (BLOOD PRESSURE) < 140/90: ICD-10-CM

## 2024-10-03 DIAGNOSIS — I48.91 ATRIAL FIBRILLATION, UNSPECIFIED TYPE (H): ICD-10-CM

## 2024-10-03 PROCEDURE — G2211 COMPLEX E/M VISIT ADD ON: HCPCS | Performed by: PHYSICIAN ASSISTANT

## 2024-10-03 PROCEDURE — 99214 OFFICE O/P EST MOD 30 MIN: CPT | Performed by: PHYSICIAN ASSISTANT

## 2024-10-03 NOTE — LETTER
10/3/2024    Tian Santana MD  [address]  [place] [address]    RE: Rob Beavers       Dear Colleague,     I had the pleasure of seeing Rob Beavers in the [facility].          HEART CARE FOLLOW UP    Primary Care: Tian Santana MD  Primary Cardiologist: Dr Verduzco      Assessment/Recommendations     Atrial Fibrillation, paroxysmal.  Recent ZIO monitor showed 6% burden of paroxysmal atrial fibrillation with the longest event being 18.5 hours on the average rate being 56 bpm.  He did have a pause of 3.4 seconds at 2 AM.  Continue propranolol 80 mg extended release, taking this for tremors and they are well controlled but he still has issues with writing. He is not having dizziness, lightheadedness, pre-syncope or fatigue. If he develops this, we may need to consider reduction of propranolol, working closely with neurology to manage symptoms.   Continue propranolol 80 mg ER daily   Continue rivaroxaban 20 mg daily  for anticoagulation, no evidence of abnormal bleeding or bruising  Risk factors for AF include obesity, hypertension, CAD and JEZ. We reviewed this today and that working on these factors can decrease the burden of atrial fibrillation. He has lost 10 pounds with diet and exercise and hopes to lose 5 more.   Hypertension, blood pressure well controlled on current regimen. Reports medication compliance.   Continue lisinopril 10 mg daily   Continue propranolol 20 EF 80 mg daily   Dyslipidemia, Most recent  and LDL 48. Patient is maintained on therapy.  Continue atorvastatin 40 mg daily   We discussed the role diet, exercise and weight management can play in managing dyslipidemia.   Mitral regurgitation, mild.  Repeat TTE q 2-3 years, sooner if symptoms. Due fall 2025    Return to care in 1 year with Dr Verduzco.      History of Present Illness/Subjective    Rob Beavers is a 79 year old male with past medical history significant for:  Atrial fibrillation,  paroxysmal  Dx after gallbladder surgery in 2018  CVA, embolic 2022  In setting of warfarin held for endoscopy   Hypertension  Hyperlipidemia   Mitral regurgitation, mild  History of pulmonary embolism ~2012    The patient was last seen in in clinic in 2023 by my colleague Dr Verduzco.  At that time he was doing well and had no major concerns.  In July of this year the patient reached out, He was feeling well but reminded us that he was to have an repeat ZIO monitor.    Today the patient tells me he's feeling well, no major concerns. He's been focusing on weight loss and has lost 10 pounds, feels much better with this. He does a lot of golfing and is tolerating this well and enjoying this. He exercises daily, does squats and sit ups and stretching. He also golfs or walks almost daily. Gets 7-8 K steps per day. He indepdnetly does the cooking, cleaning and shopping. Patient denies chest pain, pressure and tightness. No shortness of breath or dyspnea on exertion. He has had one episode of dizziness when his BP was in the 90s, otherwise no dizziness, lightheadedness, pre-syncope or syncope. No palpitations or racing heart. Sleeping well without orthopnea or PND. No leg swelling or cramping. No claudication symptoms. No blood in stool or urine. No fevers, chills or cough.          Data Review Today:   7/2024 Zio Patch:  14-day cardiac monitor.     Paroxysmal atrial fibrillation:    -burden ~6%    -longest event 18.5 hours    -average rate 56    -longest pause 3.4 seconds at 1:57 AM.     Patient activations correlated with sinus rhythm.    TTE 8/2022:  Left ventricular size, global systolic function, and wall motion are normal,  estimated LVEF 55-60%.  The right ventricle is mildly dilated. The right ventricular systolic function  is normal.  There is mild (1+) mitral regurgitation.  IVC diameter is small (<1cm), with near complete collapse with inspiration,  suggesting relative hypovolemia.  There are no prior studies  "available for comparison.      I have reviewed and updated the patient's past medical history, allergy list and medication list.          Physical Examination   Vitals: /83 (BP Location: Right arm, Patient Position: Sitting)   Pulse 58   Ht 1.702 m (5' 7\")   Wt 89.1 kg (196 lb 6.4 oz)   SpO2 100%   BMI 30.76 kg/m      BMI= Body mass index is 30.76 kg/m .    Wt Readings from Last 3 Encounters:   10/03/24 89.1 kg (196 lb 6.4 oz)   09/20/24 89.1 kg (196 lb 6.4 oz)   09/08/23 94.3 kg (207 lb 12.8 oz)       General :   Alert and oriented, in no acute distress.    HEENT:  Normocephalic and atraumatic. .    Neck: No JVP, carotid bruit or obvious thyromegaly.   Lungs:   Respirations unlabored. Clear bilaterally with no rales, rhonchi, or wheezes.     Cardiovascular:   Rhythm is regular. S1 and S2 are normal. No significant murmur is present. Lower extremities demonstrate no significant edema.        Skin: Skin is warm, dry, and otherwise intact.   Neurologic: Gait not assessed. Mood and affect appropriate.           Medical History  Surgical History Family History Social History   Past Medical History:   Diagnosis Date     Andrews's esophagus without dysplasia 4/26/2017     CARDIOVASCULAR SCREENING; LDL GOAL LESS THAN 130 10/31/2010     Cerebrovascular accident (CVA), unspecified mechanism (H) 8/4/2022     Hypertension goal BP (blood pressure) < 140/90 1/31/2012    Past Surgical History:   Procedure Laterality Date     COLONOSCOPY  2017     DENTAL SURGERY       GI SURGERY  2020-21    4 endoscopies for Andrews's esophagus;  3 w/ Halo 360  type     LAPAROSCOPIC CHOLECYSTECTOMY WITH CHOLANGIOGRAMS N/A 6/29/2017    Procedure: LAPAROSCOPIC CHOLECYSTECTOMY WITH CHOLANGIOGRAMS;  LAPAROSCOPIC CHOLECYSTECTOMY WITH INTROPERATIVE CHOLANGIOGRAMS.;  Surgeon: Gian Yin MD;  Location: [facility] OR     TONSILLECTOMY  1949     [facility] TOTAL HIP ARTHROPLASTY  2010    Family History   Problem Relation Age of Onset     " Hypertension Mother         passed away at 89     Eye Disorder Mother         macular degeneration     Blood Disease Mother         She is on warfarin     Obesity Mother      Dementia Father 80        passed away at 88 yrs old     Hypertension Father      Prostate Cancer Father      Breast Cancer Sister      Diabetes Paternal Uncle      Cerebrovascular Disease Maternal Grandmother      Cerebrovascular Disease Maternal Grandfather      Breast Cancer Sister      Other Cancer Sister               Asthma No family hx of      C.A.D. No family hx of      Cancer - colorectal No family hx of      Prostate Cancer No family hx of      Anesthesia Reaction No family hx of      Thyroid Disease No family hx of     Social History     Socioeconomic History     Marital status: Single     Spouse name: Not on file     Number of children: 0     Years of education: 16     Highest education level: Not on file   Occupational History     Occupation:      Employer: RETIRED   Tobacco Use     Smoking status: Never     Passive exposure: Never     Smokeless tobacco: Never   Substance and Sexual Activity     Alcohol use: Yes     Comment: 1-3 beers once-twice/month     Drug use: No     Sexual activity: Not Currently     Partners: Female   Other Topics Concern      Service Yes     Comment: [id number]     Blood Transfusions No     Caffeine Concern Not Asked     Occupational Exposure Yes     Comment: He was      Hobby Hazards Not Asked     Sleep Concern Not Asked     Stress Concern Not Asked     Weight Concern Not Asked     Special Diet No     Back Care Not Asked     Exercise Yes     Comment: [email or web address]; Fitness center 2 times per week; cross country skiing     Bike Helmet Not Asked     Seat Belt Yes     Self-Exams Not Asked     Parent/sibling w/ CABG, MI or angioplasty before 65F 55M? No   Social History Narrative    Eats fruits and vegetables most days. He takes a multivitamin. Increased calcium containing foods  recommended.     Social Determinants of Health     Financial Resource Strain: Low Risk  (9/15/2024)    Financial Resource Strain      Within the past 12 months, have you or your family members you live with been unable to get utilities (heat, electricity) when it was really needed?: No   Food Insecurity: Low Risk  (9/15/2024)    Food Insecurity      Within the past 12 months, did you worry that your food would run out before you got money to buy more?: No      Within the past 12 months, did the food you bought just not last and you didn t have money to get more?: No   Transportation Needs: Low Risk  (9/15/2024)    Transportation Needs      Within the past 12 months, has lack of transportation kept you from medical appointments, getting your medicines, non-medical meetings or appointments, work, or from getting things that you need?: No   Physical Activity: Sufficiently Active (9/15/2024)    Exercise Vital Sign      Days of Exercise per Week: 6 days      Minutes of Exercise per Session: 50 min   Stress: No Stress Concern Present (9/15/2024)    [employer] [place] [employer] - Occupational Stress Questionnaire      Feeling of Stress : Not at all   Social Connections: Unknown (9/15/2024)    Social Connection and Isolation Panel [NHANES]      Frequency of Communication with Friends and Family: Not on file      Frequency of Social Gatherings with Friends and Family: Twice a week      Attends Judaism Services: Not on file      Active Member of Clubs or Organizations: Not on file      Attends Club or Organization Meetings: Not on file      Marital Status: Not on file   Interpersonal Safety: Not on file   Housing Stability: Low Risk  (9/15/2024)    Housing Stability      Do you have housing? : Yes      Are you worried about losing your housing?: No          Medications  Allergies   Scheduled Meds:  Current Outpatient Medications   Medication Sig Dispense Refill     atorvastatin (LIPITOR) 40 MG tablet Take 1  tablet (40 mg) by mouth every evening. 90 tablet 3     lisinopril (ZESTRIL) 10 MG tablet TAKE 1 TABLET (10 MG) BY MOUTH DAILY. 90 tablet 1     loratadine (CLARITIN) 10 MG tablet Take 10 mg by mouth daily       MULTI-VITAMIN PO TABS ONE TABLET DAILY       omeprazole (PRILOSEC) 40 MG DR capsule TAKE 1 CAPSULE BY MOUTH EVERY DAY 90 capsule 1     propranolol ER (INDERAL LA) 80 MG 24 hr capsule TAKE 1 CAPSULE BY MOUTH EVERY DAY 90 capsule 3     rivaroxaban ANTICOAGULANT (XARELTO ANTICOAGULANT) 20 MG TABS tablet Take 1 tablet (20 mg) by mouth daily (with dinner). 90 tablet 3     VITAMIN D, CHOLECALCIFEROL, PO Take 1,000 Units by mouth daily      Allergies   Allergen Reactions     Monosodium Glutamate Headache     Seasonal Allergies Other (See Comments)         Lab Results    Chemistry/lipid CBC Cardiac Enzymes/BNP/TSH/INR   Lab Results   Component Value Date    CHOL 107 09/20/2024    HDL 37 (L) 09/20/2024    TRIG 111 09/20/2024    BUN 19.2 09/20/2024     09/20/2024    CO2 25 09/20/2024    Lab Results   Component Value Date    WBC 7.3 09/20/2024    HGB 14.9 09/20/2024    HCT 42.8 09/20/2024    MCV 93 09/20/2024     09/20/2024    @RESUFAST(BMP,CBC,BNP,TSH,  INR)@      38 minutes spent reviewing prior records (including documentation, laboratory studies, cardiac testing/imaging), history and physical exam, planning, and subsequent documentation.   This note has been dictated using voice recognition software. Any grammatical, typographical, or context distortions are unintentional and inherent to the software.    Lanie Griffin PA-C, RD  General Cardiology           Thank you for allowing me to participate in the care of your patient.      Sincerely,     Lanie Griffin PA-C     [facility]  cc:   Jonny Verduzco MD  [address]  [place] [address]

## 2024-10-03 NOTE — PATIENT INSTRUCTIONS
It was great to see you today! Your Cardiology Team includes myself and Dr. Verduzco.     Recommendations from today:  Continue heart medications as you are  Monitor for dizziness, lightheadedness, chest pain or shortness of breath   Follow up in 1 year with Dr Verduzco, echocardiogram prior.     For scheduling questions, our 24 hours scheduling line is available at [phone].    To reach our nursing team, please call [phone]. Our nursing team is available 8-4:30 Monday-Friday. If you have a medical emergency, please call [phone].

## 2024-10-03 NOTE — PROGRESS NOTES
HEART CARE FOLLOW UP    Primary Care: Tian Santana MD  Primary Cardiologist: Dr Verduzco      Assessment/Recommendations     Atrial Fibrillation, paroxysmal.  Recent ZIO monitor showed 6% burden of paroxysmal atrial fibrillation with the longest event being 18.5 hours on the average rate being 56 bpm.  He did have a pause of 3.4 seconds at 2 AM.  Continue propranolol 80 mg extended release, taking this for tremors and they are well controlled but he still has issues with writing. He is not having dizziness, lightheadedness, pre-syncope or fatigue. If he develops this, we may need to consider reduction of propranolol, working closely with neurology to manage symptoms.   Continue propranolol 80 mg ER daily   Continue rivaroxaban 20 mg daily  for anticoagulation, no evidence of abnormal bleeding or bruising  Risk factors for AF include obesity, hypertension, CAD and JEZ. We reviewed this today and that working on these factors can decrease the burden of atrial fibrillation. He has lost 10 pounds with diet and exercise and hopes to lose 5 more.   Hypertension, blood pressure well controlled on current regimen. Reports medication compliance.   Continue lisinopril 10 mg daily   Continue propranolol 20 EF 80 mg daily   Dyslipidemia, Most recent  and LDL 48. Patient is maintained on therapy.  Continue atorvastatin 40 mg daily   We discussed the role diet, exercise and weight management can play in managing dyslipidemia.   Mitral regurgitation, mild.  Repeat TTE q 2-3 years, sooner if symptoms. Due fall 2025    Return to care in 1 year with Dr Verduzco.      History of Present Illness/Subjective    Rob Beavers is a 79 year old male with past medical history significant for:  Atrial fibrillation, paroxysmal  Dx after gallbladder surgery in 2018  CVA, embolic 2022  In setting of warfarin held for endoscopy   Hypertension  Hyperlipidemia   Mitral regurgitation, mild  History of pulmonary embolism ~2012    The  patient was last seen in in clinic in 2023 by my colleague Dr Verduzco.  At that time he was doing well and had no major concerns.  In July of this year the patient reached out, He was feeling well but reminded us that he was to have an repeat ZIO monitor.    Today the patient tells me he's feeling well, no major concerns. He's been focusing on weight loss and has lost 10 pounds, feels much better with this. He does a lot of golfing and is tolerating this well and enjoying this. He exercises daily, does squats and sit ups and stretching. He also golfs or walks almost daily. Gets 7-8 K steps per day. He indepdnetly does the cooking, cleaning and shopping. Patient denies chest pain, pressure and tightness. No shortness of breath or dyspnea on exertion. He has had one episode of dizziness when his BP was in the 90s, otherwise no dizziness, lightheadedness, pre-syncope or syncope. No palpitations or racing heart. Sleeping well without orthopnea or PND. No leg swelling or cramping. No claudication symptoms. No blood in stool or urine. No fevers, chills or cough.          Data Review Today:   7/2024 Zio Patch:  14-day cardiac monitor.     Paroxysmal atrial fibrillation:    -burden ~6%    -longest event 18.5 hours    -average rate 56    -longest pause 3.4 seconds at 1:57 AM.     Patient activations correlated with sinus rhythm.    TTE 8/2022:  Left ventricular size, global systolic function, and wall motion are normal,  estimated LVEF 55-60%.  The right ventricle is mildly dilated. The right ventricular systolic function  is normal.  There is mild (1+) mitral regurgitation.  IVC diameter is small (<1cm), with near complete collapse with inspiration,  suggesting relative hypovolemia.  There are no prior studies available for comparison.      I have reviewed and updated the patient's past medical history, allergy list and medication list.          Physical Examination   Vitals: /83 (BP Location: Right arm, Patient  "Position: Sitting)   Pulse 58   Ht 1.702 m (5' 7\")   Wt 89.1 kg (196 lb 6.4 oz)   SpO2 100%   BMI 30.76 kg/m      BMI= Body mass index is 30.76 kg/m .    Wt Readings from Last 3 Encounters:   10/03/24 89.1 kg (196 lb 6.4 oz)   09/20/24 89.1 kg (196 lb 6.4 oz)   09/08/23 94.3 kg (207 lb 12.8 oz)       General :   Alert and oriented, in no acute distress.    HEENT:  Normocephalic and atraumatic. .    Neck: No JVP, carotid bruit or obvious thyromegaly.   Lungs:   Respirations unlabored. Clear bilaterally with no rales, rhonchi, or wheezes.     Cardiovascular:   Rhythm is regular. S1 and S2 are normal. No significant murmur is present. Lower extremities demonstrate no significant edema.        Skin: Skin is warm, dry, and otherwise intact.   Neurologic: Gait not assessed. Mood and affect appropriate.           Medical History  Surgical History Family History Social History   Past Medical History:   Diagnosis Date    Andrews's esophagus without dysplasia 4/26/2017    CARDIOVASCULAR SCREENING; LDL GOAL LESS THAN 130 10/31/2010    Cerebrovascular accident (CVA), unspecified mechanism (H) 8/4/2022    Hypertension goal BP (blood pressure) < 140/90 1/31/2012    Past Surgical History:   Procedure Laterality Date    COLONOSCOPY  2017    DENTAL SURGERY      GI SURGERY  2020-21    4 endoscopies for Andrews's esophagus;  3 w/ Halo 360  type    LAPAROSCOPIC CHOLECYSTECTOMY WITH CHOLANGIOGRAMS N/A 6/29/2017    Procedure: LAPAROSCOPIC CHOLECYSTECTOMY WITH CHOLANGIOGRAMS;  LAPAROSCOPIC CHOLECYSTECTOMY WITH INTROPERATIVE CHOLANGIOGRAMS.;  Surgeon: Gian Yin MD;  Location: [facility] OR    TONSILLECTOMY  1951    [facility] TOTAL HIP ARTHROPLASTY  2010    Family History   Problem Relation Age of Onset    Hypertension Mother         passed away at 89    Eye Disorder Mother         macular degeneration    Blood Disease Mother         She is on warfarin    Obesity Mother     Dementia Father 80        passed away at 88 yrs old    " Hypertension Father     Prostate Cancer Father     Breast Cancer Sister     Diabetes Paternal Uncle     Cerebrovascular Disease Maternal Grandmother     Cerebrovascular Disease Maternal Grandfather     Breast Cancer Sister     Other Cancer Sister          1975    Asthma No family hx of     C.A.D. No family hx of     Cancer - colorectal No family hx of     Prostate Cancer No family hx of     Anesthesia Reaction No family hx of     Thyroid Disease No family hx of     Social History     Socioeconomic History    Marital status: Single     Spouse name: Not on file    Number of children: 0    Years of education: 16    Highest education level: Not on file   Occupational History    Occupation:      Employer: RETIRED   Tobacco Use    Smoking status: Never     Passive exposure: Never    Smokeless tobacco: Never   Substance and Sexual Activity    Alcohol use: Yes     Comment: 1-3 beers once-twice/month    Drug use: No    Sexual activity: Not Currently     Partners: Female   Other Topics Concern     Service Yes     Comment: [id number]    Blood Transfusions No    Caffeine Concern Not Asked    Occupational Exposure Yes     Comment: He was     Hobby Hazards Not Asked    Sleep Concern Not Asked    Stress Concern Not Asked    Weight Concern Not Asked    Special Diet No    Back Care Not Asked    Exercise Yes     Comment: [employer]; Fitness center 2 times per week; cross country skiing    Bike Helmet Not Asked    Seat Belt Yes    Self-Exams Not Asked    Parent/sibling w/ CABG, MI or angioplasty before 65F 55M? No   Social History Narrative    Eats fruits and vegetables most days. He takes a multivitamin. Increased calcium containing foods recommended.     Social Determinants of Health     Financial Resource Strain: Low Risk  (9/15/2024)    Financial Resource Strain     Within the past 12 months, have you or your family members you live with been unable to get utilities (heat, electricity) when it was really  needed?: No   Food Insecurity: Low Risk  (9/15/2024)    Food Insecurity     Within the past 12 months, did you worry that your food would run out before you got money to buy more?: No     Within the past 12 months, did the food you bought just not last and you didn t have money to get more?: No   Transportation Needs: Low Risk  (9/15/2024)    Transportation Needs     Within the past 12 months, has lack of transportation kept you from medical appointments, getting your medicines, non-medical meetings or appointments, work, or from getting things that you need?: No   Physical Activity: Sufficiently Active (9/15/2024)    Exercise Vital Sign     Days of Exercise per Week: 6 days     Minutes of Exercise per Session: 50 min   Stress: No Stress Concern Present (9/15/2024)    [employer] [employer] - Occupational Stress Questionnaire     Feeling of Stress : Not at all   Social Connections: Unknown (9/15/2024)    Social Connection and Isolation Panel [NHANES]     Frequency of Communication with Friends and Family: Not on file     Frequency of Social Gatherings with Friends and Family: Twice a week     Attends Adventist Services: Not on file     Active Member of Clubs or Organizations: Not on file     Attends Club or Organization Meetings: Not on file     Marital Status: Not on file   Interpersonal Safety: Not on file   Housing Stability: Low Risk  (9/15/2024)    Housing Stability     Do you have housing? : Yes     Are you worried about losing your housing?: No          Medications  Allergies   Scheduled Meds:  Current Outpatient Medications   Medication Sig Dispense Refill    atorvastatin (LIPITOR) 40 MG tablet Take 1 tablet (40 mg) by mouth every evening. 90 tablet 3    lisinopril (ZESTRIL) 10 MG tablet TAKE 1 TABLET (10 MG) BY MOUTH DAILY. 90 tablet 1    loratadine (CLARITIN) 10 MG tablet Take 10 mg by mouth daily      MULTI-VITAMIN PO TABS ONE TABLET DAILY      omeprazole (PRILOSEC) 40 MG DR capsule TAKE  1 CAPSULE BY MOUTH EVERY DAY 90 capsule 1    propranolol ER (INDERAL LA) 80 MG 24 hr capsule TAKE 1 CAPSULE BY MOUTH EVERY DAY 90 capsule 3    rivaroxaban ANTICOAGULANT (XARELTO ANTICOAGULANT) 20 MG TABS tablet Take 1 tablet (20 mg) by mouth daily (with dinner). 90 tablet 3    VITAMIN D, CHOLECALCIFEROL, PO Take 1,000 Units by mouth daily      Allergies   Allergen Reactions    Monosodium Glutamate Headache    Seasonal Allergies Other (See Comments)         Lab Results    Chemistry/lipid CBC Cardiac Enzymes/BNP/TSH/INR   Lab Results   Component Value Date    CHOL 107 09/20/2024    HDL 37 (L) 09/20/2024    TRIG 111 09/20/2024    BUN 19.2 09/20/2024     09/20/2024    CO2 25 09/20/2024    Lab Results   Component Value Date    WBC 7.3 09/20/2024    HGB 14.9 09/20/2024    HCT 42.8 09/20/2024    MCV 93 09/20/2024     09/20/2024    @RESUFAST(BMP,CBC,BNP,TSH,  INR)@      38 minutes spent reviewing prior records (including documentation, laboratory studies, cardiac testing/imaging), history and physical exam, planning, and subsequent documentation.   This note has been dictated using voice recognition software. Any grammatical, typographical, or context distortions are unintentional and inherent to the software.    Lanie Griffin PA-C, RD  General Cardiology

## 2024-11-09 ENCOUNTER — HEALTH MAINTENANCE LETTER (OUTPATIENT)
Age: 79
End: 2024-11-09

## 2025-03-13 DIAGNOSIS — I10 HYPERTENSION GOAL BP (BLOOD PRESSURE) < 140/90: ICD-10-CM

## 2025-03-13 DIAGNOSIS — K22.70 BARRETT'S ESOPHAGUS WITHOUT DYSPLASIA: ICD-10-CM

## 2025-03-13 RX ORDER — OMEPRAZOLE 40 MG/1
40 CAPSULE, DELAYED RELEASE ORAL DAILY
Qty: 90 CAPSULE | Refills: 1 | Status: SHIPPED | OUTPATIENT
Start: 2025-03-13

## 2025-03-13 RX ORDER — LISINOPRIL 10 MG/1
10 TABLET ORAL DAILY
Qty: 90 TABLET | Refills: 1 | Status: SHIPPED | OUTPATIENT
Start: 2025-03-13

## (undated) DEVICE — SOL NACL 0.9% IRRIG 1000ML BOTTLE 2F7124

## (undated) DEVICE — SU VICRYL 4-0 PS-2 18" UND J496H

## (undated) DEVICE — DRSG BANDAID 3/4X3"

## (undated) DEVICE — DEVICE SUTURE GRASPER TROCAR CLOSURE 14GA PMITCSG

## (undated) DEVICE — ENDO CANNULA 05MM VERSAONE UNIVERSAL UNVCA5STF

## (undated) DEVICE — SU VICRYL 0 UR-6 27" J603H

## (undated) DEVICE — ESU HOLDER LAP INST DISP PURPLE LONG 330MM H-PRO-330

## (undated) DEVICE — ENDO TROCAR OPTICAL 11MM VERSAPORT PLUS W/FIX CAN ONB11STF

## (undated) DEVICE — DRAIN JACKSON PRATT RESERVOIR 100ML SU130-1305

## (undated) DEVICE — DRAIN JACKSON PRATT 15FR ROUND SIL LF JP-2229

## (undated) DEVICE — GLOVE PROTEXIS W/NEU-THERA 7.5  2D73TE75

## (undated) DEVICE — PACK LAP CHOLE SLC15LCFSD

## (undated) DEVICE — ENDO POUCH GOLD 10MM ECATCH 173050G

## (undated) DEVICE — DRSG STERI STRIP 1/2X4" R1547

## (undated) DEVICE — GLOVE PROTEXIS BLUE W/NEU-THERA 7.5  2D73EB75

## (undated) DEVICE — ENDO TROCAR FIRST ENTRY KII FIOS Z-THRD 05X100MM CTF03

## (undated) DEVICE — LINEN TOWEL PACK X5 5464

## (undated) DEVICE — SOL NACL 0.9% INJ 1000ML BAG 2B1324X

## (undated) DEVICE — ESU GROUND PAD UNIVERSAL W/O CORD

## (undated) DEVICE — CLIP APPLIER ENDO 05MM MED/LG 176630

## (undated) DEVICE — SU ETHILON 4-0 FS-2 18" 662H

## (undated) DEVICE — SUCTION CANISTER MEDIVAC LINER 3000ML W/LID 65651-530

## (undated) DEVICE — SU SILK 2-0 FSL 18" 677G

## (undated) DEVICE — SUCTION IRR STRYKERFLOW II W/TIP 250-070-520

## (undated) DEVICE — SURGICEL ABSORBABLE HEMOSTAT SNOW 2"X4" 2082

## (undated) DEVICE — ESU CORD MONOPOLAR 10'  E0510

## (undated) DEVICE — PREP CHLORAPREP 26ML TINTED ORANGE  260815

## (undated) RX ORDER — ESMOLOL HYDROCHLORIDE 10 MG/ML
INJECTION INTRAVENOUS
Status: DISPENSED
Start: 2017-06-29

## (undated) RX ORDER — DEXAMETHASONE SODIUM PHOSPHATE 4 MG/ML
INJECTION, SOLUTION INTRA-ARTICULAR; INTRALESIONAL; INTRAMUSCULAR; INTRAVENOUS; SOFT TISSUE
Status: DISPENSED
Start: 2017-06-29

## (undated) RX ORDER — PROPOFOL 10 MG/ML
INJECTION, EMULSION INTRAVENOUS
Status: DISPENSED
Start: 2017-06-29

## (undated) RX ORDER — DILTIAZEM HYDROCHLORIDE 5 MG/ML
INJECTION INTRAVENOUS
Status: DISPENSED
Start: 2017-06-29

## (undated) RX ORDER — GLYCOPYRROLATE 0.2 MG/ML
INJECTION, SOLUTION INTRAMUSCULAR; INTRAVENOUS
Status: DISPENSED
Start: 2017-06-29

## (undated) RX ORDER — LIDOCAINE HYDROCHLORIDE 20 MG/ML
INJECTION, SOLUTION EPIDURAL; INFILTRATION; INTRACAUDAL; PERINEURAL
Status: DISPENSED
Start: 2017-06-29

## (undated) RX ORDER — ONDANSETRON 2 MG/ML
INJECTION INTRAMUSCULAR; INTRAVENOUS
Status: DISPENSED
Start: 2017-06-29

## (undated) RX ORDER — FENTANYL CITRATE 50 UG/ML
INJECTION, SOLUTION INTRAMUSCULAR; INTRAVENOUS
Status: DISPENSED
Start: 2017-06-29

## (undated) RX ORDER — NEOSTIGMINE METHYLSULFATE 1 MG/ML
VIAL (ML) INJECTION
Status: DISPENSED
Start: 2017-06-29

## (undated) RX ORDER — LABETALOL HYDROCHLORIDE 5 MG/ML
INJECTION, SOLUTION INTRAVENOUS
Status: DISPENSED
Start: 2017-06-29